# Patient Record
Sex: FEMALE | Race: WHITE | NOT HISPANIC OR LATINO | Employment: OTHER | URBAN - METROPOLITAN AREA
[De-identification: names, ages, dates, MRNs, and addresses within clinical notes are randomized per-mention and may not be internally consistent; named-entity substitution may affect disease eponyms.]

---

## 2019-02-19 LAB
BILIRUB SERPL-MCNC: NEGATIVE MG/DL
BLOOD URINE, POC: NORMAL
CLARITY, POC UA: CLEAR
COLOR, POC UA: NORMAL
GLUCOSE UR QL STRIP: NORMAL
KETONES UR QL STRIP: NORMAL
LEUKOCYTE EST, POC UA: NORMAL
NITRITE, POC UA: POSITIVE
PH, POC UA: 7
PROTEIN, POC: NORMAL
SPECIFIC GRAVITY, POC UA: 1.02
UROBILINOGEN, POC UA: NORMAL

## 2019-09-30 ENCOUNTER — HISTORICAL (OUTPATIENT)
Dept: ADMINISTRATIVE | Facility: HOSPITAL | Age: 49
End: 2019-09-30

## 2019-09-30 LAB
APPEARANCE, UA: CLEAR
BACTERIA #/AREA URNS AUTO: ABNORMAL /[HPF]
BILIRUB UR QL STRIP: NEGATIVE
COLOR UR: YELLOW
GLUCOSE (UA): NORMAL
HGB UR QL STRIP: NEGATIVE
HYALINE CASTS #/AREA URNS LPF: ABNORMAL /[LPF]
KETONES UR QL STRIP: NEGATIVE
LEUKOCYTE ESTERASE UR QL STRIP: NEGATIVE
NITRITE UR QL STRIP: NEGATIVE
PH UR STRIP: 7 [PH] (ref 4.5–8)
PROT UR QL STRIP: NEGATIVE
RBC #/AREA URNS AUTO: ABNORMAL /[HPF]
SP GR UR STRIP: 1.01 (ref 1–1.03)
SQUAMOUS #/AREA URNS LPF: ABNORMAL /[LPF]
UROBILINOGEN UR STRIP-ACNC: NORMAL
WBC #/AREA URNS AUTO: ABNORMAL /HPF

## 2019-10-03 LAB — FINAL CULTURE: NO GROWTH

## 2020-06-01 ENCOUNTER — HISTORICAL (OUTPATIENT)
Dept: ADMINISTRATIVE | Facility: HOSPITAL | Age: 50
End: 2020-06-01

## 2020-06-01 LAB
ABS NEUT (OLG): 1.72 X10(3)/MCL (ref 2.1–9.2)
ALBUMIN SERPL-MCNC: 4.1 GM/DL (ref 3.4–5)
ALBUMIN/GLOB SERPL: 1.2 RATIO (ref 1.1–2)
ALP SERPL-CCNC: 61 UNIT/L (ref 45–117)
ALT SERPL-CCNC: 32 UNIT/L (ref 12–78)
AST SERPL-CCNC: 12 UNIT/L (ref 15–37)
BASOPHILS # BLD AUTO: 0 X10(3)/MCL (ref 0–0.2)
BASOPHILS NFR BLD AUTO: 0 %
BILIRUB SERPL-MCNC: 0.5 MG/DL (ref 0.2–1)
BILIRUBIN DIRECT+TOT PNL SERPL-MCNC: 0.1 MG/DL (ref 0–0.2)
BILIRUBIN DIRECT+TOT PNL SERPL-MCNC: 0.4 MG/DL
BUN SERPL-MCNC: 24 MG/DL (ref 7–18)
CALCIUM SERPL-MCNC: 9.9 MG/DL (ref 8.5–10.1)
CHLORIDE SERPL-SCNC: 108 MMOL/L (ref 98–107)
CHOLEST SERPL-MCNC: 248 MG/DL
CHOLEST/HDLC SERPL: 2.4 {RATIO} (ref 0–4.4)
CO2 SERPL-SCNC: 31 MMOL/L (ref 21–32)
CREAT SERPL-MCNC: 0.6 MG/DL (ref 0.6–1.3)
DEPRECATED CALCIDIOL+CALCIFEROL SERPL-MC: 39.9 NG/ML (ref 30–80)
EOSINOPHIL # BLD AUTO: 0.2 X10(3)/MCL (ref 0–0.9)
EOSINOPHIL NFR BLD AUTO: 4 %
ERYTHROCYTE [DISTWIDTH] IN BLOOD BY AUTOMATED COUNT: 12 % (ref 11.5–14.5)
EST. AVERAGE GLUCOSE BLD GHB EST-MCNC: 94 MG/DL
GLOBULIN SER-MCNC: 3.5 GM/ML (ref 2.3–3.5)
GLUCOSE SERPL-MCNC: 72 MG/DL (ref 74–106)
HBA1C MFR BLD: 4.9 % (ref 4.2–6.3)
HCT VFR BLD AUTO: 41.1 % (ref 35–46)
HDLC SERPL-MCNC: 103 MG/DL (ref 40–59)
HGB BLD-MCNC: 13.4 GM/DL (ref 12–16)
IMM GRANULOCYTES # BLD AUTO: 0.01 10*3/UL
IMM GRANULOCYTES NFR BLD AUTO: 0 %
LDLC SERPL CALC-MCNC: 136 MG/DL
LYMPHOCYTES # BLD AUTO: 2.2 X10(3)/MCL (ref 0.6–4.6)
LYMPHOCYTES NFR BLD AUTO: 48 %
MCH RBC QN AUTO: 31.3 PG (ref 26–34)
MCHC RBC AUTO-ENTMCNC: 32.6 GM/DL (ref 31–37)
MCV RBC AUTO: 96 FL (ref 80–100)
MONOCYTES # BLD AUTO: 0.4 X10(3)/MCL (ref 0.1–1.3)
MONOCYTES NFR BLD AUTO: 8 %
NEUTROPHILS # BLD AUTO: 1.72 X10(3)/MCL (ref 2.1–9.2)
NEUTROPHILS NFR BLD AUTO: 39 %
PLATELET # BLD AUTO: 237 X10(3)/MCL (ref 130–400)
PMV BLD AUTO: 9.1 FL (ref 7.4–10.4)
POTASSIUM SERPL-SCNC: 4.6 MMOL/L (ref 3.5–5.1)
PROT SERPL-MCNC: 7.6 GM/DL (ref 6.4–8.2)
RBC # BLD AUTO: 4.28 X10(6)/MCL (ref 4–5.2)
SODIUM SERPL-SCNC: 142 MMOL/L (ref 136–145)
TRIGL SERPL-MCNC: 43 MG/DL
TSH SERPL-ACNC: 1.84 MIU/L (ref 0.36–3.74)
VLDLC SERPL CALC-MCNC: 9 MG/DL
WBC # SPEC AUTO: 4.5 X10(3)/MCL (ref 4.5–11)

## 2020-06-24 ENCOUNTER — HISTORICAL (OUTPATIENT)
Dept: RADIOLOGY | Facility: HOSPITAL | Age: 50
End: 2020-06-24

## 2020-10-27 ENCOUNTER — HISTORICAL (OUTPATIENT)
Dept: LAB | Facility: HOSPITAL | Age: 50
End: 2020-10-27

## 2020-10-27 LAB
ALBUMIN SERPL-MCNC: 4.3 GM/DL (ref 3.5–5)
ALBUMIN/GLOB SERPL: 1.7 RATIO (ref 1.1–2)
ALP SERPL-CCNC: 50 UNIT/L (ref 40–150)
ALT SERPL-CCNC: 18 UNIT/L (ref 0–55)
AST SERPL-CCNC: 14 UNIT/L (ref 5–34)
BILIRUB SERPL-MCNC: 0.5 MG/DL
BILIRUBIN DIRECT+TOT PNL SERPL-MCNC: 0.2 MG/DL (ref 0–0.5)
BILIRUBIN DIRECT+TOT PNL SERPL-MCNC: 0.3 MG/DL (ref 0–0.8)
BUN SERPL-MCNC: 17 MG/DL (ref 7–18.7)
CALCIUM SERPL-MCNC: 9.7 MG/DL (ref 8.4–10.2)
CHLORIDE SERPL-SCNC: 104 MMOL/L (ref 98–107)
CO2 SERPL-SCNC: 31 MMOL/L (ref 22–29)
CREAT SERPL-MCNC: 0.7 MG/DL (ref 0.55–1.02)
ERYTHROCYTE [DISTWIDTH] IN BLOOD BY AUTOMATED COUNT: 12 % (ref 11.5–14.5)
ERYTHROCYTE [SEDIMENTATION RATE] IN BLOOD: 2 MM/HR (ref 0–20)
GLOBULIN SER-MCNC: 2.6 GM/DL (ref 2.4–3.5)
GLUCOSE SERPL-MCNC: 94 MG/DL (ref 74–100)
HCT VFR BLD AUTO: 40 % (ref 35–46)
HGB BLD-MCNC: 13.2 GM/DL (ref 12–16)
MCH RBC QN AUTO: 31.6 PG (ref 26–34)
MCHC RBC AUTO-ENTMCNC: 33 GM/DL (ref 31–37)
MCV RBC AUTO: 95.7 FL (ref 80–100)
PLATELET # BLD AUTO: 244 X10(3)/MCL (ref 130–400)
PMV BLD AUTO: 9.7 FL (ref 7.4–10.4)
POTASSIUM SERPL-SCNC: 3.9 MMOL/L (ref 3.5–5.1)
PROT SERPL-MCNC: 6.9 GM/DL (ref 6.4–8.3)
RBC # BLD AUTO: 4.18 X10(6)/MCL (ref 4–5.2)
RHEUMATOID FACT SERPL-ACNC: <13 IU/ML
SODIUM SERPL-SCNC: 139 MMOL/L (ref 136–145)
URATE SERPL-MCNC: 2.5 MG/DL (ref 2.6–6)
WBC # SPEC AUTO: 4.3 X10(3)/MCL (ref 4.5–11)

## 2021-01-25 ENCOUNTER — HISTORICAL (OUTPATIENT)
Dept: RADIOLOGY | Facility: HOSPITAL | Age: 51
End: 2021-01-25

## 2021-04-19 LAB
BILIRUB SERPL-MCNC: NEGATIVE MG/DL
BLOOD URINE, POC: NEGATIVE
CLARITY, POC UA: CLEAR
COLOR, POC UA: NORMAL
GLUCOSE UR QL STRIP: NEGATIVE
KETONES UR QL STRIP: NEGATIVE
LEUKOCYTE EST, POC UA: NEGATIVE
NITRITE, POC UA: NEGATIVE
PH, POC UA: 7
PROTEIN, POC: NEGATIVE
SPECIFIC GRAVITY, POC UA: 1.01
UROBILINOGEN, POC UA: NORMAL

## 2021-04-23 LAB
HUMAN PAPILLOMAVIRUS (HPV): NORMAL
HUMAN PAPILLOMAVIRUS (HPV): NORMAL
PAP RECOMMENDATION EXT: ABNORMAL
PAP SMEAR: ABNORMAL

## 2021-07-26 ENCOUNTER — HISTORICAL (OUTPATIENT)
Dept: LAB | Facility: HOSPITAL | Age: 51
End: 2021-07-26

## 2021-07-26 LAB
APPEARANCE, UA: CLEAR
BACTERIA #/AREA URNS AUTO: ABNORMAL /HPF
BILIRUB UR QL STRIP: NEGATIVE
COLOR UR: NORMAL
GLUCOSE (UA): NEGATIVE
HGB UR QL STRIP: NEGATIVE
HYALINE CASTS #/AREA URNS LPF: ABNORMAL /LPF
KETONES UR QL STRIP: NEGATIVE
LEUKOCYTE ESTERASE UR QL STRIP: NEGATIVE
NITRITE UR QL STRIP: NEGATIVE
PH UR STRIP: 5.5 [PH] (ref 4.5–8)
PROT UR QL STRIP: NEGATIVE
RBC #/AREA URNS AUTO: ABNORMAL /HPF
SP GR UR STRIP: 1.02 (ref 1–1.03)
SQUAMOUS #/AREA URNS LPF: ABNORMAL /LPF
UROBILINOGEN UR STRIP-ACNC: NORMAL
WBC #/AREA URNS AUTO: ABNORMAL /HPF

## 2021-07-28 LAB — FINAL CULTURE: NORMAL

## 2021-12-08 ENCOUNTER — HISTORICAL (OUTPATIENT)
Dept: LAB | Facility: HOSPITAL | Age: 51
End: 2021-12-08

## 2021-12-08 LAB
ABS NEUT (OLG): 2.63 X10(3)/MCL (ref 2.1–9.2)
BASOPHILS # BLD AUTO: 0 X10(3)/MCL (ref 0–0.2)
BASOPHILS NFR BLD AUTO: 0 %
CHOLEST SERPL-MCNC: 231 MG/DL
CHOLEST/HDLC SERPL: 3 {RATIO} (ref 0–5)
DEPRECATED CALCIDIOL+CALCIFEROL SERPL-MC: 34.6 NG/ML (ref 30–80)
EOSINOPHIL # BLD AUTO: 0.1 X10(3)/MCL (ref 0–0.9)
EOSINOPHIL NFR BLD AUTO: 2 %
ERYTHROCYTE [DISTWIDTH] IN BLOOD BY AUTOMATED COUNT: 11.9 % (ref 11.5–14.5)
EST. AVERAGE GLUCOSE BLD GHB EST-MCNC: 93.9 MG/DL
HBA1C MFR BLD: 4.9 %
HCT VFR BLD AUTO: 40.2 % (ref 35–46)
HDLC SERPL-MCNC: 90 MG/DL (ref 35–60)
HGB BLD-MCNC: 13.7 GM/DL (ref 12–16)
IMM GRANULOCYTES # BLD AUTO: 0.01 10*3/UL
IMM GRANULOCYTES NFR BLD AUTO: 0 %
LDLC SERPL CALC-MCNC: 134 MG/DL (ref 50–140)
LYMPHOCYTES # BLD AUTO: 2.2 X10(3)/MCL (ref 0.6–4.6)
LYMPHOCYTES NFR BLD AUTO: 39 %
MCH RBC QN AUTO: 32.5 PG (ref 26–34)
MCHC RBC AUTO-ENTMCNC: 34.1 GM/DL (ref 31–37)
MCV RBC AUTO: 95.5 FL (ref 80–100)
MONOCYTES # BLD AUTO: 0.6 X10(3)/MCL (ref 0.1–1.3)
MONOCYTES NFR BLD AUTO: 11 %
NEUTROPHILS # BLD AUTO: 2.63 X10(3)/MCL (ref 2.1–9.2)
NEUTROPHILS NFR BLD AUTO: 46 %
NRBC BLD AUTO-RTO: 0 % (ref 0–0.2)
PLATELET # BLD AUTO: 267 X10(3)/MCL (ref 130–400)
PMV BLD AUTO: 9.2 FL (ref 7.4–10.4)
RBC # BLD AUTO: 4.21 X10(6)/MCL (ref 4–5.2)
TRIGL SERPL-MCNC: 35 MG/DL (ref 37–140)
TSH SERPL-ACNC: 1.92 UIU/ML (ref 0.35–4.94)
VIT B12 SERPL-MCNC: 573 PG/ML (ref 213–816)
VLDLC SERPL CALC-MCNC: 7 MG/DL
WBC # SPEC AUTO: 5.7 X10(3)/MCL (ref 4.5–11)

## 2022-02-25 ENCOUNTER — HISTORICAL (OUTPATIENT)
Dept: RADIOLOGY | Facility: HOSPITAL | Age: 52
End: 2022-02-25

## 2022-02-25 ENCOUNTER — HISTORICAL (OUTPATIENT)
Dept: ADMINISTRATIVE | Facility: HOSPITAL | Age: 52
End: 2022-02-25

## 2022-04-10 ENCOUNTER — HISTORICAL (OUTPATIENT)
Dept: ADMINISTRATIVE | Facility: HOSPITAL | Age: 52
End: 2022-04-10
Payer: MEDICAID

## 2022-04-27 VITALS
BODY MASS INDEX: 21.76 KG/M2 | DIASTOLIC BLOOD PRESSURE: 64 MMHG | OXYGEN SATURATION: 97 % | HEIGHT: 64 IN | SYSTOLIC BLOOD PRESSURE: 104 MMHG | WEIGHT: 127.44 LBS

## 2022-05-03 NOTE — HISTORICAL [facility]
This is a historical note converted from Cerjad. Formatting and pictures may have been removed.  Please reference Cerjad for original formatting and attached multimedia. Chief Complaint  Left foot pain  History of Present Illness  49 y/o female PMH anxiety, depression,?IC,?insomnia presents to clinic to establish care.  She has to complaints that she would like to focus on today.  ?  Abdominal pain:Chronic issue. Has a history of IC. Does not follow diet. Recently treated by urgent care for UTI. Symptoms has not resolved. Last urology appt was 9/2019. Patient reports pain is under breast to pelvic area. Unrelated to food, but does notice bloating after eating. Takes tums and pepcid without relief. Reports occasional loose stools. Grandfather with history of colon cancer. No abdominal surgeries.  ?  Left foot pain: Sustained a foot fracture in 2018. Reports pain under ball of foot x several weeks. Has been seen in urgent care. Was told she needs to establish care in order to get a podiatry referral. Describes pain as sharp, shooting pain. Mostly digits 2-4. Also reports pain under ball in foot. Has been going on x several weeks. Reports?pain is worse with walking and extended use. Has tried soaking without improvement. Takes OTC NSAIDS without relief.  Review of Systems  Constitutional: no weight gain,?no weight loss,?no fatigue, no fever,?no chills,?no weakness  Eye: no vision loss/changes,?no blurry or double vision,??no flashing lights  Respiratory: No cough, no hemoptysis, no shortness of breath, no wheeze  Cardiovascular: No chest pain, no tightness, no palpitations, no dyspnea on exertion, no calf pain with walking  Hema/Lymph:No ease of bruising, no abnormal bleeding  Endocrine: No heat or cold intolerance, no sweating, no frequent urination, no thirst, no change in appetite  Integumentary: No rash, no blisters, no itching, no hair or nail changes  Neurologic: No dizziness, no fainting, no seizures, no  weakness  All Other ROS negative  Physical Exam  Vitals & Measurements  T:?36.9? ?C (Oral)? HR:?63(Peripheral)? RR:?18? BP:?121/78? SpO2:?98%?  HT:?162?cm? WT:?56.8?kg? BMI:?21.64?  General: Alert, well appearing, in no acute distress  Eye: PERRLA, EOMI, clear conjunctiva, No pallor  HENT: Oropharynx without erythema/exudate, Oropharynx and nasal mucosal surfaces moist  Neck: full range of motion, no thyromegaly or lymphadenopathy appreciated  Respiratory: clear to auscultation bilaterally, no wheezes, no crackles  Cardiovascular: regular rate and rhythm without murmurs, gallops or rubs  Gastrointestinal: soft, diffuse tenderness mostly RUQ, non-distended with active bowel sounds  Genitourinary: no CVA tenderness to palpation, declined pelvic exam  Musculoskeletal: Left foot: no gross abnormalities, digit range of motion not limited, palpable pulse, sensation intact  Integumentary: no rashes or skin lesions present  Neurologic: no signs of peripheral neurological deficit, motor/sensory function intact  Psych: logical thought, good eye contact?  Assessment/Plan  Interstitial cystitis?N30.10  ?Discussed diet, encouraged compliance  ?Will refer back to urology  Ordered:  CBC w/ Auto Diff, Routine collect, 06/01/20 11:29:00 CDT, Blood, Stop date 06/01/20 11:29:00 CDT, Lab Collect, Interstitial cystitis  Left foot pain  Pelvic pain, 06/01/20 11:29:00 CDT  Clinic Follow up, *Est. 09/01/20 3:00:00 CDT, Order for future visit, Interstitial cystitis  Abdominal pain  Left foot pain  Pelvic pain  Pathological fracture, unspecified foot, sequela  Blood in stool  Clinic Follow up, 06/08/20 10:54:00 CDT, Order for future visit, Interstitial cystitis  Abdominal pain  Left foot pain  Pelvic pain  Comprehensive Metabolic Panel, Routine collect, 06/01/20 11:29:00 CDT, Blood, Stop date 06/01/20 11:29:00 CDT, Lab Collect, Interstitial cystitis  Left foot pain  Pelvic pain, 06/01/20 11:29:00 CDT  Hemoglobin A1C [facility], Routine  collect, 06/01/20 11:29:00 CDT, Blood, Stop date 06/01/20 11:29:00 CDT, Lab Collect, Interstitial cystitis  Left foot pain  Pelvic pain, 06/01/20 11:29:00 CDT  Lipid Panel, Routine collect, 06/01/20 11:29:00 CDT, Blood, Stop date 06/01/20 11:29:00 CDT, Lab Collect, Interstitial cystitis  Left foot pain  Pelvic pain, 06/01/20 11:29:00 CDT  Thyroid Stimulating Hormone, Routine collect, 06/01/20 11:29:00 CDT, Blood, Stop date 06/01/20 11:29:00 CDT, Lab Collect, Interstitial cystitis  Left foot pain  Pelvic pain, 06/01/20 11:29:00 CDT  Vitamin D, 25-Hydroxy Level, Routine collect, 06/01/20 11:29:00 CDT, Blood, Stop date 06/01/20 11:29:00 CDT, Lab Collect, Interstitial cystitis  Left foot pain  Pelvic pain, 06/01/20 11:29:00 CDT  ?  Left foot pain?M79.672  ?Xray ordered  ?Will refer to sports medicine  ?Declined NSAIDS  Ordered:  CBC w/ Auto Diff, Routine collect, 06/01/20 11:29:00 CDT, Blood, Stop date 06/01/20 11:29:00 CDT, Lab Collect, Interstitial cystitis  Left foot pain  Pelvic pain, 06/01/20 11:29:00 CDT  Clinic Follow up, *Est. 09/01/20 3:00:00 CDT, Order for future visit, Interstitial cystitis  Abdominal pain  Left foot pain  Pelvic pain  Pathological fracture, unspecified foot, sequela  Blood in stool  Clinic Follow up, 06/08/20 10:54:00 CDT, Order for future visit, Interstitial cystitis  Abdominal pain  Left foot pain  Pelvic pain  Comprehensive Metabolic Panel, Routine collect, 06/01/20 11:29:00 CDT, Blood, Stop date 06/01/20 11:29:00 CDT, Lab Collect, Interstitial cystitis  Left foot pain  Pelvic pain, 06/01/20 11:29:00 CDT  Hemoglobin A1C [facility], Routine collect, 06/01/20 11:29:00 CDT, Blood, Stop date 06/01/20 11:29:00 CDT, Lab Collect, Interstitial cystitis  Left foot pain  Pelvic pain, 06/01/20 11:29:00 CDT  Lipid Panel, Routine collect, 06/01/20 11:29:00 CDT, Blood, Stop date 06/01/20 11:29:00 CDT, Lab Collect, Interstitial cystitis  Left foot pain  Pelvic pain, 06/01/20 11:29:00  CDT  Thyroid Stimulating Hormone, Routine collect, 06/01/20 11:29:00 CDT, Blood, Stop date 06/01/20 11:29:00 CDT, Lab Collect, Interstitial cystitis  Left foot pain  Pelvic pain, 06/01/20 11:29:00 CDT  Vitamin D, 25-Hydroxy Level, Routine collect, 06/01/20 11:29:00 CDT, Blood, Stop date 06/01/20 11:29:00 CDT, Lab Collect, Interstitial cystitis  Left foot pain  Pelvic pain, 06/01/20 11:29:00 CDT  XR Foot Left Minimum 3 Views, Routine, 06/01/20 10:56:00 CDT, None, Ambulatory, Rad Type, Order for future visit, Left foot pain  Pathological fracture, unspecified foot, sequela, Not Scheduled, 06/01/20 10:56:00 CDT  ?  Pelvic pain?R10.2  ?pelvic US ordered  ?Will send to GYN  ?  Abdominal pain  Keep diary of symptoms  FIT ordered  US ordered  Declined PPI, antacid  Encouraged fiber diet  ?    Routine labs ordered  Mammo ordered  ?  Ordered:  CBC w/ Auto Diff, Routine collect, 06/01/20 11:29:00 CDT, Blood, Stop date 06/01/20 11:29:00 CDT, Lab Collect, Interstitial cystitis  Left foot pain  Pelvic pain, 06/01/20 11:29:00 CDT  Clinic Follow up, *Est. 09/01/20 3:00:00 CDT, Order for future visit, Interstitial cystitis  Abdominal pain  Left foot pain  Pelvic pain  Pathological fracture, unspecified foot, sequela  Blood in stool  Clinic Follow up, 06/08/20 10:54:00 CDT, Order for future visit, Interstitial cystitis  Abdominal pain  Left foot pain  Pelvic pain  Comprehensive Metabolic Panel, Routine collect, 06/01/20 11:29:00 CDT, Blood, Stop date 06/01/20 11:29:00 CDT, Lab Collect, Interstitial cystitis  Left foot pain  Pelvic pain, 06/01/20 11:29:00 CDT  Hemoglobin A1C [facility], Routine collect, 06/01/20 11:29:00 CDT, Blood, Stop date 06/01/20 11:29:00 CDT, Lab Collect, Interstitial cystitis  Left foot pain  Pelvic pain, 06/01/20 11:29:00 CDT  Lipid Panel, Routine collect, 06/01/20 11:29:00 CDT, Blood, Stop date 06/01/20 11:29:00 CDT, Lab Collect, Interstitial cystitis  Left foot pain  Pelvic pain, 06/01/20  11:29:00 CDT  Thyroid Stimulating Hormone, Routine collect, 06/01/20 11:29:00 CDT, Blood, Stop date 06/01/20 11:29:00 CDT, Lab Collect, Interstitial cystitis  Left foot pain  Pelvic pain, 06/01/20 11:29:00 CDT  US Pelvic Non-OB w Transvag if needed, Routine, 06/01/20 11:02:00 CDT, Pelvic Pain, None, Ambulatory, Rad Type, Order for future visit, Pelvic pain, Schedule this test, [facility], 06/01/20 11:02:00 CDT  Vitamin D, 25-Hydroxy Level, Routine collect, 06/01/20 11:29:00 CDT, Blood, Stop date 06/01/20 11:29:00 CDT, Lab Collect, Interstitial cystitis  Left foot pain  Pelvic pain, 06/01/20 11:29:00 CDT  ?  Orders:  MG Screening Bilateral, Routine, 06/01/20 11:08:00 CDT, Screening, None, Ambulatory, Rad Type, Order for future visit, Visit for screening mammogram, Schedule this test, [facility], Follow Breast Imaging Order Set Protocol, 06/01/20 11:08:00 CDT  Occult Blood Fecal Immunoassay, Routine collect, Stool, Order for future visit, 06/01/20 10:58:00 CDT, Stop date 06/01/20 10:58:00 CDT, Nurse collect, Blood in stool  US Abdomen Complete, Routine, 06/01/20 10:59:00 CDT, Abdominal Pain, None, Ambulatory, Rad Type, Order for future visit, Abdominal pain, Schedule this test, [facility], 06/01/20 10:59:00 CDT  Referrals  [facility] Internal Referral to Gynecology Clinic, Specialty: Gynecology, Reason: IC, pelvic pain, Start: 06/01/20 11:01:00 CDT  [facility] Internal Referral to Orthopedics Clinic, Specialty: Sports Medicine, Reason: Left foot pain, Start: 06/01/20 11:02:00 CDT  Clinic Follow up, 06/08/20 10:54:00 CDT, Order for future visit, Interstitial cystitis  Abdominal pain  Left foot pain  Pelvic pain  Clinic Follow up, *Est. 09/01/20 3:00:00 CDT, Order for future visit, Interstitial cystitis  Abdominal pain  Left foot pain  Pelvic pain  Pathological fracture, unspecified foot, sequela  Blood in stool   Problem List/Past Medical  History  Ongoing  Anxiety  Depression  Depression  Dry skin  Family history of Hashimoto thyroiditis  Female hirsutism  Frequency of urination  Insomnia  Vitamin D deficiency  Weight gain  Historical  Conjunctivitis  Fatigue  Pharyngitis  Sinusitis  Thinning hair  Procedure/Surgical History  Tonsillectomy  Uterine fibroidectomy   Medications  No active medications  Allergies  Vicodin  penicillins  Social History  Abuse/Neglect  No, 09/30/2019  Alcohol  Current, 1-2 times per year, 06/01/2020  Employment/School  Unemployed, 06/01/2020  Exercise  Exercise frequency: 3-4 times/week. Exercise type: Walking., 07/09/2015  Home/Environment  Lives with Alone. Risks in environment: Pets/Animal exposure., 06/01/2020  Nutrition/Health  Regular, 07/09/2015  Sexual  Sexually active: No., 07/09/2015  Spiritual/Cultural  Sikhism, 09/30/2019  Substance Use  Never, 02/19/2019  Tobacco  Never (less than 100 in lifetime), Cigarettes, N/A, 09/30/2019  Family History  Fibromyalgia: Mother.  Hypertension.: Father.  Lupus: Mother.  Osteoporosis: Mother.  Primary malignant neoplasm of colon: Maternal Grandfather.  Immunizations  Vaccine Date Status   tuberculin purified protein derivative 04/11/2018 Given   tuberculin purified protein derivative 04/09/2018 Given   tuberculin purified protein derivative 11/11/2015 Given   tetanus/diphtheria/pertussis, acel(Tdap) 11/11/2015 Given   Health Maintenance  Health Maintenance  ???Pending?(in the next year)  ??? ??OverDue  ??? ? ? ?Diabetes Screening due??and every?  ??? ??Due?  ??? ? ? ?Lipid Screening due??06/01/20??and every?  ??? ??Due In Future?  ??? ? ? ?Blood Pressure Screening not due until??09/29/20??and every 1??year(s)  ??? ? ? ?Body Mass Index Check not due until??09/29/20??and every 1??year(s)  ??? ? ? ?Alcohol Misuse Screening not due until??01/01/21??and every 1??year(s)  ??? ? ? ?Obesity Screening not due until??01/01/21??and every 1??year(s)  ???Satisfied?(in the past 1  year)  ??? ??Satisfied?  ??? ? ? ?ADL Screening on??06/01/20.??Satisfied by Lizz Diego  ??? ? ? ?Alcohol Misuse Screening on??06/01/20.??Satisfied by Lizz Diego  ??? ? ? ?Blood Pressure Screening on??06/01/20.??Satisfied by Lizz Diego  ??? ? ? ?Body Mass Index Check on??06/01/20.??Satisfied by Lizz Diego  ??? ? ? ?Depression Screening on??06/01/20.??Satisfied by Lizz Diego  ??? ? ? ?Obesity Screening on??06/01/20.??Satisfied by Lizz Diego  ?

## 2022-05-13 DIAGNOSIS — J02.9 SORE THROAT: Primary | ICD-10-CM

## 2022-06-08 ENCOUNTER — OFFICE VISIT (OUTPATIENT)
Dept: OTOLARYNGOLOGY | Facility: CLINIC | Age: 52
End: 2022-06-08
Payer: MEDICAID

## 2022-06-08 VITALS
BODY MASS INDEX: 21.65 KG/M2 | DIASTOLIC BLOOD PRESSURE: 61 MMHG | WEIGHT: 126.81 LBS | TEMPERATURE: 98 F | SYSTOLIC BLOOD PRESSURE: 91 MMHG | HEIGHT: 64 IN | HEART RATE: 60 BPM

## 2022-06-08 DIAGNOSIS — K21.9 GASTROESOPHAGEAL REFLUX DISEASE, UNSPECIFIED WHETHER ESOPHAGITIS PRESENT: Primary | ICD-10-CM

## 2022-06-08 DIAGNOSIS — J02.9 SORE THROAT: ICD-10-CM

## 2022-06-08 DIAGNOSIS — M26.622 ARTHRALGIA OF LEFT TEMPOROMANDIBULAR JOINT: ICD-10-CM

## 2022-06-08 PROCEDURE — 1159F MED LIST DOCD IN RCRD: CPT | Mod: CPTII,,, | Performed by: NURSE PRACTITIONER

## 2022-06-08 PROCEDURE — 3074F PR MOST RECENT SYSTOLIC BLOOD PRESSURE < 130 MM HG: ICD-10-PCS | Mod: CPTII,,, | Performed by: NURSE PRACTITIONER

## 2022-06-08 PROCEDURE — 1160F RVW MEDS BY RX/DR IN RCRD: CPT | Mod: CPTII,,, | Performed by: NURSE PRACTITIONER

## 2022-06-08 PROCEDURE — 25000003 PHARM REV CODE 250

## 2022-06-08 PROCEDURE — 3008F PR BODY MASS INDEX (BMI) DOCUMENTED: ICD-10-PCS | Mod: CPTII,,, | Performed by: NURSE PRACTITIONER

## 2022-06-08 PROCEDURE — 3008F BODY MASS INDEX DOCD: CPT | Mod: CPTII,,, | Performed by: NURSE PRACTITIONER

## 2022-06-08 PROCEDURE — 99213 OFFICE O/P EST LOW 20 MIN: CPT | Mod: PBBFAC | Performed by: NURSE PRACTITIONER

## 2022-06-08 PROCEDURE — 1160F PR REVIEW ALL MEDS BY PRESCRIBER/CLIN PHARMACIST DOCUMENTED: ICD-10-PCS | Mod: CPTII,,, | Performed by: NURSE PRACTITIONER

## 2022-06-08 PROCEDURE — 3078F PR MOST RECENT DIASTOLIC BLOOD PRESSURE < 80 MM HG: ICD-10-PCS | Mod: CPTII,,, | Performed by: NURSE PRACTITIONER

## 2022-06-08 PROCEDURE — 3078F DIAST BP <80 MM HG: CPT | Mod: CPTII,,, | Performed by: NURSE PRACTITIONER

## 2022-06-08 PROCEDURE — 99204 OFFICE O/P NEW MOD 45 MIN: CPT | Mod: S$PBB,,, | Performed by: NURSE PRACTITIONER

## 2022-06-08 PROCEDURE — 1159F PR MEDICATION LIST DOCUMENTED IN MEDICAL RECORD: ICD-10-PCS | Mod: CPTII,,, | Performed by: NURSE PRACTITIONER

## 2022-06-08 PROCEDURE — 3074F SYST BP LT 130 MM HG: CPT | Mod: CPTII,,, | Performed by: NURSE PRACTITIONER

## 2022-06-08 PROCEDURE — 99204 PR OFFICE/OUTPT VISIT, NEW, LEVL IV, 45-59 MIN: ICD-10-PCS | Mod: S$PBB,,, | Performed by: NURSE PRACTITIONER

## 2022-06-08 RX ORDER — MELOXICAM 15 MG/1
15 TABLET ORAL DAILY PRN
COMMUNITY
Start: 2022-05-04 | End: 2022-11-18

## 2022-06-08 RX ORDER — TETRACAINE HYDROCHLORIDE 5 MG/ML
1 SOLUTION OPHTHALMIC
Status: DISCONTINUED | OUTPATIENT
Start: 2022-06-08 | End: 2022-11-18

## 2022-06-08 NOTE — PROGRESS NOTES
[facility]  Otolaryngology Clinic Note    Remi Baptiste  Encounter Date: 6/8/2022  YOB: 1970    Chief Complaint: throat pain    HPI: 06/08/2022: 52yoF with throat pain for about a month. She describes this as intermittent with a stabbing quality. She states it came up from her throat and was also under her tongue. This was associated with nausea. Magic mouthwash improved symptoms. This has improved but is not back to baseline, and she would like to have it viewed with camera. Denies dysphagia, dysphonia, or weight loss. She is a nonsmoker and does not drink. Denies reflux. She is concerned bc urgent care told her there was fluid in her ears. She has infrequent otalgia L>R. Denies otorrhea or HL. She has infrequent tinnitus but none currently. Denies hx of chronic ear infections. Had a tonsillectomy in the past.     ROS:   General: Negative except per HPI  Skin: Denies rash, ulcer, or lesion.  Eyes: Denies vision changes or diplopia.  Ears: Negative except per HPI  Nose: Negative except per HPI  Throat/mouth: Negative except per HPI  Cardiovascular: Negative except per HPI  Respiratory: Negative except per HPI  Neck: Negative except per HPI  Endocrine: Negative except per HPI  Neurologic: Negative except per HPI    Other 10-point review of systems negative except per HPI      Review of patient's allergies indicates:   Allergen Reactions    Hydrocodone-acetaminophen     Penicillins Rash       History reviewed. No pertinent past medical history.    Past Surgical History:   Procedure Laterality Date    TONSILLECTOMY         Social History     Socioeconomic History    Marital status: Single   Tobacco Use    Smoking status: Never Smoker    Smokeless tobacco: Never Used   Substance and Sexual Activity    Alcohol use: Not Currently    Drug use: Never    Sexual activity: Not Currently       Family History   Problem Relation Age of Onset    Hypertension Mother     Hypertension  "Father        Outpatient Encounter Medications as of 6/8/2022   Medication Sig Dispense Refill    meloxicam (MOBIC) 15 MG tablet Take 15 mg by mouth daily as needed.       No facility-administered encounter medications on file as of 6/8/2022.       Physical Exam:  Vitals:    06/08/22 0944   BP: 91/61   BP Location: Right arm   Patient Position: Sitting   Pulse: 60   Temp: 97.9 °F (36.6 °C)   TempSrc: Oral   Weight: 57.5 kg (126 lb 12.8 oz)   Height: 5' 3.78" (1.62 m)       General: NAD, voice normal  Neuro: AAO, CN II - XII grossly intact  Head/ Face: NCAT, symmetric, sensations intact bilaterally. TTP over TMJ L>R  Eyes: EOMI, PERRL  Ears: externally normal with grossly normal hearing  AD: EAC patent, TM intact, no middle ear effusion, no retractions  AS: EAC patent, TM intact, no middle ear effusion, no retractions  Nose: bilateral nares patent, midline septum, no rhinorrhea, no external deformity, +ITH  OC/OP: MMM, no intraoral lesions, no trismus, dentition is moderate, no uvular deviation, bilaterally symmetric soft palate elevation, palatoglossus and palatopharyngeal fold wnl. Mild TTP over b/l pterygoids  Indirect laryngoscopy: deferred due to patient intolerance  Neck: tight musculature, supple, mild TTP over SMD and cricoid areas without swelling, no LAD, normal ROM, no thyromegaly  Respiratory: nonlabored, no wheezing, bilateral chest rise  Cardiovascular: RRR  Gastrointestinal: S NT ND  Skin: warm, no lesions  Musculoskeletal: 5/5 strength  Psych: Appropriate affect. Anxious mood      Flexible Fiberoptic Laryngoscopy/Nasopharyngoscopy with Dr. Goldsmith    Procedure in Detail: Informed consent was obtained from the patient after explanation of procedure, indications, risks and benefits. Flexible endoscopy was performed through the nasal passages. The nasal cavity, nasopharynx, oropharynx, hypopharynx and larynx were adequately visualized. The true vocal cords and arytenoids were examined during phonation and " repose.    Anesthesia: Topical Neosynephrine/Tetracaine  Adverse Events: None  Blood loss: none  Condition: good    Findings:  NP/OP: no masses/lesions of NC, eustachian tube, fossa of Rosenmuller, no adenoid hypertrophy  BOT/vallecula: no lingual hypertrophy, no masses/lesions, no secretions obscuring visualization  Piriform sinuses/post-cricoid: no masses/lesions, no pooling of secretions  Epiglottis: lingual and laryngeal surfaces within normal limits  Arytenoids/FVFs: no masses/lesions, no edema, bilateral mobility  TVCs: bilateral cord mobility, no masses or lesions  No aspiration, no pooled secretions  No sign of malignancy    Pt had anxiety reaction to tetracaine, feeling that her throat was closing. Extensive reassurance offered. Otherwise, tolerated well.     Pertinent Data:  ? LABS:  ? AUDIO:            ? CT Scan:      Assessment/Plan:  52 y.o. female with 1mo hx of throat pain and intermittent left otalgia, likely GERD + TMJ arthralgia. Symptoms have improved but pt has high anxiety. Extensive reassurance provided by author and Dr. Goldsmith that there were no concerning findings, so signs of cancer, lesions, or swelling.  - Pepcid daily (states she will get this OTC)  - GERD lifestyle modifications  - TMJ & neck exercise handouts provided  - F/u 2-3mo. Ok for telemed    Jimena Butler NP

## 2022-06-16 ENCOUNTER — TELEPHONE (OUTPATIENT)
Dept: GYNECOLOGY | Facility: CLINIC | Age: 52
End: 2022-06-16
Payer: MEDICAID

## 2022-06-16 NOTE — TELEPHONE ENCOUNTER
----- Message from Jenny Bashir sent at 6/16/2022 10:22 AM CDT -----  Regarding: Physical Therapy Referral  Patient called and requested that another referral be sent to the physical therapy that she previously seen.  She says the Therapy help, but still is having neck pain.  She would like to talk to the provider about looking deeper into the matter, perhaps schedule a MRI, CT, etc.  She does have an appointment in July scheduled.  Please advise and Thank You.

## 2022-06-20 NOTE — TELEPHONE ENCOUNTER
Patient return call. Informed patient that Dr. Georges will further discuss during next appointment. Patient would like a sooner appointment to further discuss.         Please check to see if there is a sooner appointment for patient. Thanks in advance for your attention with this matter.

## 2022-06-22 ENCOUNTER — HOSPITAL ENCOUNTER (OUTPATIENT)
Dept: RADIOLOGY | Facility: HOSPITAL | Age: 52
Discharge: HOME | End: 2022-06-22
Attending: FAMILY MEDICINE
Payer: MEDICAID

## 2022-06-22 ENCOUNTER — OFFICE VISIT (OUTPATIENT)
Dept: GYNECOLOGY | Facility: CLINIC | Age: 52
End: 2022-06-22
Payer: MEDICAID

## 2022-06-22 VITALS
TEMPERATURE: 98 F | HEART RATE: 70 BPM | HEIGHT: 64 IN | SYSTOLIC BLOOD PRESSURE: 107 MMHG | WEIGHT: 125 LBS | DIASTOLIC BLOOD PRESSURE: 68 MMHG | BODY MASS INDEX: 21.34 KG/M2 | RESPIRATION RATE: 18 BRPM

## 2022-06-22 DIAGNOSIS — M54.2 NECK PAIN: ICD-10-CM

## 2022-06-22 DIAGNOSIS — M54.2 NECK PAIN: Primary | ICD-10-CM

## 2022-06-22 PROCEDURE — 99214 OFFICE O/P EST MOD 30 MIN: CPT | Mod: PBBFAC | Performed by: FAMILY MEDICINE

## 2022-06-22 PROCEDURE — 3078F DIAST BP <80 MM HG: CPT | Mod: CPTII,,, | Performed by: FAMILY MEDICINE

## 2022-06-22 PROCEDURE — 99214 OFFICE O/P EST MOD 30 MIN: CPT | Mod: S$PBB,,, | Performed by: FAMILY MEDICINE

## 2022-06-22 PROCEDURE — 3074F SYST BP LT 130 MM HG: CPT | Mod: CPTII,,, | Performed by: FAMILY MEDICINE

## 2022-06-22 PROCEDURE — 3074F PR MOST RECENT SYSTOLIC BLOOD PRESSURE < 130 MM HG: ICD-10-PCS | Mod: CPTII,,, | Performed by: FAMILY MEDICINE

## 2022-06-22 PROCEDURE — 72050 X-RAY EXAM NECK SPINE 4/5VWS: CPT | Mod: TC

## 2022-06-22 PROCEDURE — 3078F PR MOST RECENT DIASTOLIC BLOOD PRESSURE < 80 MM HG: ICD-10-PCS | Mod: CPTII,,, | Performed by: FAMILY MEDICINE

## 2022-06-22 PROCEDURE — 3008F PR BODY MASS INDEX (BMI) DOCUMENTED: ICD-10-PCS | Mod: CPTII,,, | Performed by: FAMILY MEDICINE

## 2022-06-22 PROCEDURE — 1159F MED LIST DOCD IN RCRD: CPT | Mod: CPTII,,, | Performed by: FAMILY MEDICINE

## 2022-06-22 PROCEDURE — 99214 PR OFFICE/OUTPT VISIT, EST, LEVL IV, 30-39 MIN: ICD-10-PCS | Mod: S$PBB,,, | Performed by: FAMILY MEDICINE

## 2022-06-22 PROCEDURE — 3008F BODY MASS INDEX DOCD: CPT | Mod: CPTII,,, | Performed by: FAMILY MEDICINE

## 2022-06-22 PROCEDURE — 1159F PR MEDICATION LIST DOCUMENTED IN MEDICAL RECORD: ICD-10-PCS | Mod: CPTII,,, | Performed by: FAMILY MEDICINE

## 2022-06-22 RX ORDER — CYCLOBENZAPRINE HCL 10 MG
10 TABLET ORAL 3 TIMES DAILY PRN
Qty: 30 TABLET | Refills: 1 | Status: SHIPPED | OUTPATIENT
Start: 2022-06-22 | End: 2022-07-02

## 2022-06-22 RX ORDER — DICLOFENAC SODIUM 75 MG/1
75 TABLET, DELAYED RELEASE ORAL 2 TIMES DAILY
Qty: 60 TABLET | Refills: 1 | Status: SHIPPED | OUTPATIENT
Start: 2022-06-22 | End: 2022-11-18

## 2022-06-22 NOTE — PROGRESS NOTES
Remi Baptiste  06/22/2022  [id number]        Chief Complaint:  Chief Complaint   Patient presents with    Follow-up       History of Present Illness:    52-year-old female with past medical history of anxiety/depression, foot neuroma IC, post menopausal symptoms, insomnia, OAB, low vitamin D, cervical radiculopathy presents for follow up  Patient complaining of worsening neck pain. Completed PT a month ago. Report now feeling grinding sensation with turning head. Denied trauma. Patient also reporting family history of debilitating DJD in mother      History:  History reviewed. No pertinent past medical history.  Past Surgical History:   Procedure Laterality Date    TONSILLECTOMY       Family History   Problem Relation Age of Onset    Hypertension Mother     Hypertension Father      Social History     Socioeconomic History    Marital status: Single   Tobacco Use    Smoking status: Never Smoker    Smokeless tobacco: Never Used   Substance and Sexual Activity    Alcohol use: Not Currently    Drug use: Never    Sexual activity: Not Currently     Social Determinants of Health     Financial Resource Strain: Low Risk     Difficulty of Paying Living Expenses: Not hard at all   Food Insecurity: No Food Insecurity    Worried About Running Out of Food in the Last Year: Never true    Ran Out of Food in the Last Year: Never true   Transportation Needs: No Transportation Needs    Lack of Transportation (Medical): No    Lack of Transportation (Non-Medical): No   Physical Activity: Insufficiently Active    Days of Exercise per Week: 4 days    Minutes of Exercise per Session: 30 min   Stress: No Stress Concern Present    Feeling of Stress : Not at all   Social Connections: Unknown    Frequency of Communication with Friends and Family: More than three times a week    Frequency of Social Gatherings with Friends and Family: More than three times a week   Housing Stability: Low Risk     Unable to Pay for Housing in  the Last Year: No    Number of Places Lived in the Last Year: 1    Unstable Housing in the Last Year: No     There is no problem list on file for this patient.    Review of patient's allergies indicates:   Allergen Reactions    Hydrocodone-acetaminophen     Penicillins Rash         Medications:  Current Outpatient Medications on File Prior to Visit   Medication Sig Dispense Refill    meloxicam (MOBIC) 15 MG tablet Take 15 mg by mouth daily as needed.       Current Facility-Administered Medications on File Prior to Visit   Medication Dose Route Frequency Provider Last Rate Last Admin    TETRAcaine HCl (PF) 0.5 % Drop 1 drop  1 drop Both Eyes 1 time in Clinic/[facility]           Medications have been reviewed and reconciled with patient at this visit.    Adverse reactions to current medications reviewed with patient..      Exam:  Wt Readings from Last 3 Encounters:   06/22/22 56.7 kg (125 lb)   06/08/22 57.5 kg (126 lb 12.8 oz)   07/22/21 57.8 kg (127 lb 6.8 oz)     Temp Readings from Last 3 Encounters:   06/22/22 98.1 °F (36.7 °C)   06/08/22 97.9 °F (36.6 °C) (Oral)     BP Readings from Last 3 Encounters:   06/22/22 107/68   06/08/22 91/61   07/22/21 104/64     Pulse Readings from Last 3 Encounters:   06/22/22 70   06/08/22 60     Body mass index is 21.46 kg/m².      ROS:  See HPI for details    All Other ROS: Negative except as stated in HPI.    PE:  General: Alert and oriented, No acute distress.  Head: Normocephalic, Atraumatic.  Eye: Pupils are equal, round and reactive to light, Extraocular movements are intact, Sclera non-icteric.  Ears/Nose/Throat: Normal, Mucosa moist,Clear.  Neck/Thyroid: SuppleNo palpable thyromegaly or thyroid nodule, No lymphadenopathy, No JVD, Pain with chin to ceiling, ear to shoulder, limited ROM  Respiratory: Clear to auscultation bilaterallySymmetrical chest wall expansion.  Integumentary: Warm, Dry, Intact, No suspicious lesions or rashes.  Extremities: No clubbing,  cyanosis or edema  Neurologic: No focal deficits, Cranial Nerves II-XII are grossly intact, Motor strength normal upper and lower extremities, Sensory exam intact.  Psychiatric: Normal interaction, Coherent speech, Euthymic mood, Appropriate affect    Laboratory Reviewed ({Yes)  Lab Results   Component Value Date    WBC 5.7 12/08/2021    HGB 13.7 12/08/2021    HCT 40.2 12/08/2021     12/08/2021    CHOL 231 (H) 12/08/2021    TRIG 35 (L) 12/08/2021    HDL 90 (H) 12/08/2021    ALT 19 07/02/2021    AST 17 07/02/2021     07/02/2021    K 4.2 07/02/2021    CREATININE 0.66 07/02/2021    BUN 20.9 (H) 07/02/2021    CO2 25 07/02/2021    TSH 1.9234 12/08/2021    INR 1.00 11/06/2018    HGBA1C 4.9 12/08/2021       Remi was seen today for follow-up.    Diagnoses and all orders for this visit:    Neck pain  -     X-Ray Cervical Spine Complete 5 view; Future  -     MRI Cervical Spine Without Contrast; Future  -     Ambulatory referral/consult to Physical/Occupational Therapy; Future    Other orders  -     cyclobenzaprine (FLEXERIL) 10 MG tablet; Take 1 tablet (10 mg total) by mouth 3 (three) times daily as needed for Muscle spasms.  -     diclofenac (VOLTAREN) 75 MG EC tablet; Take 1 tablet (75 mg total) by mouth 2 (two) times daily.        Flexeril and Voltaren for pain  Xray today  Continue PT  MRI for worsening symptoms after completing PT      Care Plan/Goals: Reviewed    Goals    None         Follow up: Follow up in about 3 months (around 9/22/2022).

## 2022-06-23 ENCOUNTER — TELEPHONE (OUTPATIENT)
Dept: GYNECOLOGY | Facility: CLINIC | Age: 52
End: 2022-06-23
Payer: MEDICAID

## 2022-06-23 DIAGNOSIS — M47.812 CERVICAL SPINE ARTHRITIS: Primary | ICD-10-CM

## 2022-06-23 NOTE — TELEPHONE ENCOUNTER
----- Message from Nicole Georges MD sent at 6/23/2022  1:59 PM CDT -----  Please let patient know:    She has Significant degenerative changes at C5-6 and C6-7.  MRI already ordered. Will refer to neurosurgery. Does patient prefer [facility] or [facility] for referral?

## 2022-07-08 ENCOUNTER — TELEPHONE (OUTPATIENT)
Dept: PRIMARY CARE CLINIC | Facility: CLINIC | Age: 52
End: 2022-07-08
Payer: MEDICAID

## 2022-07-08 RX ORDER — DIAZEPAM 5 MG/1
TABLET ORAL
Qty: 1 TABLET | Refills: 0 | Status: SHIPPED | OUTPATIENT
Start: 2022-07-08 | End: 2022-10-11 | Stop reason: SDUPTHER

## 2022-07-08 NOTE — TELEPHONE ENCOUNTER
----- Message from Kenna Roper LPN sent at 7/6/2022  3:48 PM CDT -----  Regarding: FW: Prescription Request    ----- Message -----  From: Jenny Camejo  Sent: 7/6/2022   2:48 PM CDT  To: [facility] Staff  Subject: Prescription Request                             Patient went today for a MRI and had an anxiety attack.  The rescheduled her appointment until next week and was told to ask her PCP for a sedative for this upcoming appointment.  Please and Thank You.

## 2022-07-22 ENCOUNTER — TELEPHONE (OUTPATIENT)
Dept: FAMILY MEDICINE | Facility: CLINIC | Age: 52
End: 2022-07-22
Payer: MEDICAID

## 2022-07-22 DIAGNOSIS — M48.02 CERVICAL STENOSIS OF SPINAL CANAL: Primary | ICD-10-CM

## 2022-07-22 NOTE — TELEPHONE ENCOUNTER
Please let patient know:    MRI shows: degenerative disc diease and stenosis of the cervical spine.    Recommend neurosurgery eval. Does she know where she wants to be referred to?I will start with [facility].

## 2022-08-03 ENCOUNTER — TELEPHONE (OUTPATIENT)
Dept: FAMILY MEDICINE | Facility: CLINIC | Age: 52
End: 2022-08-03
Payer: MEDICAID

## 2022-08-03 DIAGNOSIS — M48.00 SPINAL STENOSIS, UNSPECIFIED SPINAL REGION: Primary | ICD-10-CM

## 2022-08-03 NOTE — TELEPHONE ENCOUNTER
Patient called stating that she would like to go to neurosurgery in [place]. She states that she would like to go back to physical therapy. Patient states that she is still in pain but she cannot tolerate the muscle relaxers.

## 2022-08-09 ENCOUNTER — TELEPHONE (OUTPATIENT)
Dept: FAMILY MEDICINE | Facility: CLINIC | Age: 52
End: 2022-08-09
Payer: MEDICAID

## 2022-08-17 ENCOUNTER — PATIENT MESSAGE (OUTPATIENT)
Dept: FAMILY MEDICINE | Facility: CLINIC | Age: 52
End: 2022-08-17
Payer: MEDICAID

## 2022-09-01 ENCOUNTER — TELEPHONE (OUTPATIENT)
Dept: FAMILY MEDICINE | Facility: CLINIC | Age: 52
End: 2022-09-01
Payer: MEDICAID

## 2022-09-01 DIAGNOSIS — M48.00 SPINAL STENOSIS, UNSPECIFIED SPINAL REGION: Primary | ICD-10-CM

## 2022-09-01 NOTE — TELEPHONE ENCOUNTER
----- Message from Kenna Roper LPN sent at 9/1/2022  1:35 PM CDT -----  Regarding: FW: New Referral    ----- Message -----  From: Madison Madden  Sent: 9/1/2022   1:24 PM CDT  To: [facility] Clinical Support Staff  Subject: New Referral                                     General Phone Message    Caller is:  (x ) Patient  (  ) Family  (  ) Pharmacy    (  ) Home Health  (  ) Other     Provider: Dr. Nicole Georges    Last Visit:    Next Visit: 9/29/2022    Reason for Call: Patient is asking for a new referral for neuro. She states she was told that Dr Russ is no longer practicing. She is asking to be referred to Dr Nicolas [phone].          Best Time to Contact:    Preferred Phone Number:

## 2022-09-17 ENCOUNTER — HISTORICAL (OUTPATIENT)
Dept: ADMINISTRATIVE | Facility: HOSPITAL | Age: 52
End: 2022-09-17
Payer: MEDICAID

## 2022-09-27 ENCOUNTER — PATIENT MESSAGE (OUTPATIENT)
Dept: FAMILY MEDICINE | Facility: CLINIC | Age: 52
End: 2022-09-27
Payer: MEDICAID

## 2022-09-28 ENCOUNTER — HOSPITAL ENCOUNTER (EMERGENCY)
Facility: HOSPITAL | Age: 52
Discharge: HOME | End: 2022-09-28
Attending: EMERGENCY MEDICINE
Payer: MEDICAID

## 2022-09-28 VITALS
HEIGHT: 64 IN | OXYGEN SATURATION: 99 % | TEMPERATURE: 98 F | WEIGHT: 120 LBS | RESPIRATION RATE: 18 BRPM | SYSTOLIC BLOOD PRESSURE: 116 MMHG | HEART RATE: 77 BPM | BODY MASS INDEX: 20.49 KG/M2 | DIASTOLIC BLOOD PRESSURE: 65 MMHG

## 2022-09-28 DIAGNOSIS — M79.661 PAIN IN RIGHT LOWER LEG: ICD-10-CM

## 2022-09-28 DIAGNOSIS — M50.30 DDD (DEGENERATIVE DISC DISEASE), CERVICAL: Primary | ICD-10-CM

## 2022-09-28 DIAGNOSIS — M79.606 LEG PAIN: ICD-10-CM

## 2022-09-28 PROCEDURE — 99283 EMERGENCY DEPT VISIT LOW MDM: CPT | Mod: 25

## 2022-09-28 RX ORDER — BACLOFEN 10 MG/1
5 TABLET ORAL 3 TIMES DAILY
Qty: 15 TABLET | Refills: 0 | Status: SHIPPED | OUTPATIENT
Start: 2022-09-28 | End: 2022-11-18

## 2022-09-28 RX ORDER — BACLOFEN 5 MG/1
5 TABLET ORAL
Status: DISCONTINUED | OUTPATIENT
Start: 2022-09-28 | End: 2022-09-28 | Stop reason: HOSPADM

## 2022-09-28 NOTE — ED PROVIDER NOTES
"Encounter Date: 9/28/2022       History     Chief Complaint   Patient presents with    Leg Pain     Patient reports right lower leg pain for 2 days, states hard to walk on     52 y.o. female presents to the ED with worsening right lower leg pain onset 2 days ago. Pain starts in the right knee and travels down. States it started as a muscle spasm and has never been relieved. Notes difficulty walking. Has been told she has "neuro issues" and is trying to get appt with neurosurgery and/or neurologist but having trouble due insurance. History of DDD in her neck as well as cervical stenosis. Denies SOB, chest pain, leg swelling, injury or trauma. Notes some numbness in her toes.     The history is provided by the patient. No  was used.   Leg Pain   The incident occurred at home. There was no injury mechanism. The incident occurred two days ago. The pain is present in the right leg. The pain has been Constant since onset. She reports no foreign bodies present. The symptoms are aggravated by bearing weight and palpation.   Review of patient's allergies indicates:   Allergen Reactions    Hydrocodone-acetaminophen     Penicillins Rash     No past medical history on file.  Past Surgical History:   Procedure Laterality Date    TONSILLECTOMY       Family History   Problem Relation Age of Onset    Hypertension Mother     Hypertension Father      Social History     Tobacco Use    Smoking status: Never    Smokeless tobacco: Never   Substance Use Topics    Alcohol use: Not Currently    Drug use: Never     Review of Systems   Constitutional:  Negative for fever.   HENT:  Negative for sore throat.    Respiratory:  Negative for shortness of breath.    Cardiovascular:  Negative for chest pain.   Gastrointestinal:  Negative for nausea.   Genitourinary:  Negative for dysuria.   Musculoskeletal:  Positive for myalgias. Negative for back pain.   Skin:  Negative for rash.   Neurological:  Negative for weakness. "   Hematological:  Does not bruise/bleed easily.   All other systems reviewed and are negative.    Physical Exam     Initial Vitals [09/28/22 1012]   BP Pulse Resp Temp SpO2   116/65 77 18 97.9 °F (36.6 °C) 99 %      MAP       --         Physical Exam    Nursing note and vitals reviewed.  Constitutional: She appears well-developed and well-nourished.   HENT:   Head: Normocephalic and atraumatic.   Eyes: EOM are normal. Pupils are equal, round, and reactive to light.   Neck: Neck supple.   Cardiovascular:  Normal rate, regular rhythm, normal heart sounds and intact distal pulses.           Pulmonary/Chest: Breath sounds normal.   Musculoskeletal:         General: Normal range of motion.      Cervical back: Neck supple. Tenderness and bony tenderness present.      Thoracic back: Normal.      Lumbar back: Normal. No spasms, tenderness or bony tenderness. Normal range of motion.      Right lower leg: Tenderness present. No swelling. No edema.      Right foot: Normal range of motion and normal capillary refill. No bony tenderness or crepitus. Normal pulse.     Neurological: She is alert and oriented to person, place, and time. She has normal strength. She displays a negative Romberg sign. Coordination and gait normal. GCS score is 15. GCS eye subscore is 4. GCS verbal subscore is 5. GCS motor subscore is 6.   Notes slightly decreased sensation in toes    Skin: Skin is warm and dry. Capillary refill takes less than 2 seconds.   Psychiatric: She has a normal mood and affect.       ED Course   Procedures  Labs Reviewed - No data to display       Imaging Results              X-Ray Tibia Fibula 2 View Right (Final result)  Result time 09/28/22 11:21:23      Final result by Tana Hernandez MD (09/28/22 11:21:23)                   Impression:      No acute bony abnormality      Electronically signed by: Tana Hernandez  Date:    09/28/2022  Time:    11:21               Narrative:    EXAMINATION:  XR TIBIA FIBULA 2 VIEW  RIGHT    CLINICAL HISTORY:  Pain in leg, unspecified    COMPARISON:  None.    FINDINGS:  There is no acute fracture or malalignment.  The soft tissues are unremarkable.                                       Medications - No data to display    Medical Decision Making:   Differential Diagnosis:   DVT, superficial thrombophlebitis, stress fracture, neuropathy  Clinical Tests:   Radiological Study: Ordered and Reviewed  ED Management:  On physical exam, tenderness to palpation along right posterior leg however no erythema or swelling noted.  Patient with 2+ DP pulse, and good cap refill in toes.  Patient does note some decreased sensation in her toes.  Denies injury trauma, x-ray negative for acute or occult fractures, ultrasound negative for any DVT.  Patient states she has been worked up for MS in the past, is unsure if maybe this is consistent with that.  I did discuss with her since she has a history of similar episodes in the past with no definitive etiology that she does need to follow up with PCP for Neurology referral.  I will also send referral to neurologist for further evaluation.  I did discuss that this could be due to some sort of neuromuscular disease however these are not test that we will run in the ER.  I gave her strict return precautions for any worsening symptoms.  Will treat with baclofen at home for muscle spasms I gave the patient crutches for symptom relief.  Told to elevate the leg as much as possible over the next few days and apply heat to the area fracture the.  All questions answered and the patient was stable for discharge.    Also sent referral for neurosurgeon due to patient's history of cervical stenosis and degenerative disc disease.                          Clinical Impression:   Final diagnoses:  [M79.661] Pain in right lower leg  [M79.606] Leg pain  [M50.30] DDD (degenerative disc disease), cervical (Primary)        ED Disposition Condition    Discharge Stable          ED  Prescriptions       Medication Sig Dispense Start Date End Date Auth. Provider    baclofen (LIORESAL) 10 MG tablet Take 0.5 tablets (5 mg total) by mouth 3 (three) times daily. for 10 days 15 tablet 9/28/2022 10/8/2022 Jassi Lynch PA-C          Follow-up Information       Follow up With Specialties Details Why Contact Info    Nicole Georges MD Family Medicine   [address] [facility] [id number] [phone]      [facility] - Emergency Dept Emergency Medicine In 1 week If symptoms worsen [facility] [facility] [id number] [phone]             Jassi Lynch PA-C  09/28/22 1715

## 2022-09-28 NOTE — DISCHARGE INSTRUCTIONS
Talk to your PCP tomorrow about possibly calling about neurosurgery referral; may need to have her send MRI results with disc to [facility] for consult

## 2022-09-28 NOTE — FIRST PROVIDER EVALUATION
"Medical screening examination initiated.  I have conducted a focused provider triage encounter, findings are as follows:    Chief Complaint   Patient presents with    Leg Pain     Patient reports right lower leg pain for 2 days, states hard to walk on     Brief history of present illness:  52 y.o. female presents to the ED with worsening right lower leg pain onset 2 days ago. Pain starts in the right knee and travels down. Notes difficulty walking. Has been told she has "neuro issues" and is trying to get appt with neurosurgery and/or neurologist but having trouble due insurance.     Vitals:    09/28/22 1012   BP: 116/65   Pulse: 77   Resp: 18   Temp: 97.9 °F (36.6 °C)   SpO2: 99%   Weight: 54.4 kg (120 lb)   Height: 5' 4" (1.626 m)     Pertinent physical exam:  Awake, alert, non-labored respirations    Brief workup plan:  US, XR    Preliminary workup initiated; this workup will be continued and followed by the physician or advanced practice provider that is assigned to the patient when roomed.  "

## 2022-09-28 NOTE — TELEPHONE ENCOUNTER
Called and spoke to patient. Patient was referred multiple times to different Neurosurgeons of her choice. She stats that she is currently in the ER. She has an appointment with Dr. Georges on tomorrow. Advised to keep appointment to follow up with on tomorrow.

## 2022-09-29 ENCOUNTER — TELEPHONE (OUTPATIENT)
Dept: FAMILY MEDICINE | Facility: CLINIC | Age: 52
End: 2022-09-29

## 2022-09-29 ENCOUNTER — OFFICE VISIT (OUTPATIENT)
Dept: FAMILY MEDICINE | Facility: CLINIC | Age: 52
End: 2022-09-29
Payer: MEDICAID

## 2022-09-29 VITALS
SYSTOLIC BLOOD PRESSURE: 99 MMHG | DIASTOLIC BLOOD PRESSURE: 60 MMHG | WEIGHT: 121.69 LBS | HEART RATE: 64 BPM | TEMPERATURE: 98 F | BODY MASS INDEX: 20.78 KG/M2 | HEIGHT: 64 IN | RESPIRATION RATE: 18 BRPM

## 2022-09-29 DIAGNOSIS — F41.9 ANXIETY: Primary | ICD-10-CM

## 2022-09-29 DIAGNOSIS — M54.16 LUMBAR RADICULOPATHY, CHRONIC: ICD-10-CM

## 2022-09-29 DIAGNOSIS — R20.0 NUMBNESS: ICD-10-CM

## 2022-09-29 DIAGNOSIS — R42 DIZZINESS AND GIDDINESS: ICD-10-CM

## 2022-09-29 DIAGNOSIS — R53.1 WEAKNESS: ICD-10-CM

## 2022-09-29 DIAGNOSIS — M48.02 CERVICAL STENOSIS OF SPINAL CANAL: ICD-10-CM

## 2022-09-29 DIAGNOSIS — M54.9 DORSALGIA, UNSPECIFIED: ICD-10-CM

## 2022-09-29 DIAGNOSIS — M48.00 SPINAL STENOSIS, UNSPECIFIED SPINAL REGION: ICD-10-CM

## 2022-09-29 PROCEDURE — 3008F BODY MASS INDEX DOCD: CPT | Mod: CPTII,,, | Performed by: FAMILY MEDICINE

## 2022-09-29 PROCEDURE — 3078F DIAST BP <80 MM HG: CPT | Mod: CPTII,,, | Performed by: FAMILY MEDICINE

## 2022-09-29 PROCEDURE — 99215 OFFICE O/P EST HI 40 MIN: CPT | Mod: PBBFAC | Performed by: FAMILY MEDICINE

## 2022-09-29 PROCEDURE — 3074F PR MOST RECENT SYSTOLIC BLOOD PRESSURE < 130 MM HG: ICD-10-PCS | Mod: CPTII,,, | Performed by: FAMILY MEDICINE

## 2022-09-29 PROCEDURE — 1159F PR MEDICATION LIST DOCUMENTED IN MEDICAL RECORD: ICD-10-PCS | Mod: CPTII,,, | Performed by: FAMILY MEDICINE

## 2022-09-29 PROCEDURE — 1159F MED LIST DOCD IN RCRD: CPT | Mod: CPTII,,, | Performed by: FAMILY MEDICINE

## 2022-09-29 PROCEDURE — 99214 OFFICE O/P EST MOD 30 MIN: CPT | Mod: S$PBB,,, | Performed by: FAMILY MEDICINE

## 2022-09-29 PROCEDURE — 3008F PR BODY MASS INDEX (BMI) DOCUMENTED: ICD-10-PCS | Mod: CPTII,,, | Performed by: FAMILY MEDICINE

## 2022-09-29 PROCEDURE — 99214 PR OFFICE/OUTPT VISIT, EST, LEVL IV, 30-39 MIN: ICD-10-PCS | Mod: S$PBB,,, | Performed by: FAMILY MEDICINE

## 2022-09-29 PROCEDURE — 3078F PR MOST RECENT DIASTOLIC BLOOD PRESSURE < 80 MM HG: ICD-10-PCS | Mod: CPTII,,, | Performed by: FAMILY MEDICINE

## 2022-09-29 PROCEDURE — 3074F SYST BP LT 130 MM HG: CPT | Mod: CPTII,,, | Performed by: FAMILY MEDICINE

## 2022-09-29 RX ORDER — DULOXETIN HYDROCHLORIDE 60 MG/1
60 CAPSULE, DELAYED RELEASE ORAL DAILY
Qty: 30 CAPSULE | Refills: 5 | Status: SHIPPED | OUTPATIENT
Start: 2022-09-29 | End: 2022-11-18

## 2022-09-29 RX ORDER — ONDANSETRON 4 MG/1
4 TABLET, FILM COATED ORAL EVERY 6 HOURS PRN
COMMUNITY
Start: 2022-04-23 | End: 2022-11-18

## 2022-09-29 RX ORDER — FLUOXETINE HYDROCHLORIDE 20 MG/1
20 CAPSULE ORAL DAILY
Qty: 30 CAPSULE | Refills: 11 | Status: CANCELLED | OUTPATIENT
Start: 2022-09-29 | End: 2023-09-29

## 2022-09-29 NOTE — TELEPHONE ENCOUNTER
----- Message from Nicole Georges MD sent at 9/29/2022 12:00 PM CDT -----  Please have patient present to lab for blood work

## 2022-09-29 NOTE — PROGRESS NOTES
Remi Baptiste  09/29/2022  [id number]      Visit Type:a scheduled routine follow-up visit    Chief Complaint:  Chief Complaint   Patient presents with    Follow-up       History of Present Illness:  53 yo f PMH PMH anxiety, depression, IC, insomnia, spinal stenosis, DJD, foot pain followed by podiatry. May have neuroma, foot drop? Patient has worsening   right lower leg pain with frequent muscle spasm. Makes it difficult to walk. Has numbness in extremities  Was seen in ED with same complaint and was given baclofen which seemed to help.  May have had negative brain MRI in 1-2 years ago. No record of MRI, symptoms have worsened. MRI ordered by podiatry to evaluate for MS.  History:  History reviewed. No pertinent past medical history.  Past Surgical History:   Procedure Laterality Date    BLADDER SUSPENSION      OVARIAN CYST REMOVAL      TONSILLECTOMY       Family History   Problem Relation Age of Onset    Hypertension Mother     Hypertension Father      Social History     Socioeconomic History    Marital status: Single   Tobacco Use    Smoking status: Never    Smokeless tobacco: Never   Substance and Sexual Activity    Alcohol use: Not Currently    Drug use: Never    Sexual activity: Not Currently     Social Determinants of Health     Financial Resource Strain: Low Risk     Difficulty of Paying Living Expenses: Not hard at all   Food Insecurity: No Food Insecurity    Worried About Running Out of Food in the Last Year: Never true    Ran Out of Food in the Last Year: Never true   Transportation Needs: No Transportation Needs    Lack of Transportation (Medical): No    Lack of Transportation (Non-Medical): No   Physical Activity: Insufficiently Active    Days of Exercise per Week: 4 days    Minutes of Exercise per Session: 30 min   Stress: No Stress Concern Present    Feeling of Stress : Not at all   Social Connections: Unknown    Frequency of Communication with Friends and Family: More than three times a week     Frequency of Social Gatherings with Friends and Family: More than three times a week   Housing Stability: Low Risk     Unable to Pay for Housing in the Last Year: No    Number of Places Lived in the Last Year: 1    Unstable Housing in the Last Year: No     There is no problem list on file for this patient.    Review of patient's allergies indicates:   Allergen Reactions    Hydrocodone-guaifenesin Hives    Hydrocodone-acetaminophen     Penicillins Rash         Medications:  Current Outpatient Medications on File Prior to Visit   Medication Sig Dispense Refill    baclofen (LIORESAL) 10 MG tablet Take 0.5 tablets (5 mg total) by mouth 3 (three) times daily. for 10 days 15 tablet 0    diclofenac (VOLTAREN) 75 MG EC tablet Take 1 tablet (75 mg total) by mouth 2 (two) times daily. 60 tablet 1    meloxicam (MOBIC) 15 MG tablet Take 15 mg by mouth daily as needed.      ondansetron (ZOFRAN) 4 MG tablet Take 4 mg by mouth every 6 (six) hours as needed.      diazePAM (VALIUM) 5 MG tablet Take 45 mins before MRI (Patient not taking: Reported on 9/29/2022) 1 tablet 0     Current Facility-Administered Medications on File Prior to Visit   Medication Dose Route Frequency Provider Last Rate Last Admin    TETRAcaine HCl (PF) 0.5 % Drop 1 drop  1 drop Both Eyes 1 time in Clinic/HOD JOAQUINA Jones        [DISCONTINUED] baclofen tablet 5 mg  5 mg Oral ED 1 Time Jassi Lynch PA-C           Medications have been reviewed and reconciled with patient at this visit.    Adverse reactions to current medications reviewed with patient..      Exam:  Wt Readings from Last 3 Encounters:   09/29/22 55.2 kg (121 lb 11.1 oz)   09/28/22 54.4 kg (120 lb)   07/15/22 56.7 kg (125 lb)     Temp Readings from Last 3 Encounters:   09/29/22 97.7 °F (36.5 °C)   09/28/22 97.9 °F (36.6 °C)   06/22/22 98.1 °F (36.7 °C)     BP Readings from Last 3 Encounters:   09/29/22 99/60   09/28/22 116/65   06/22/22 107/68     Pulse Readings from Last 3 Encounters:    09/29/22 64   09/28/22 77   06/22/22 70     Body mass index is 20.89 kg/m².      ROS:  See HPI for details    Constitutional: Denies Change in appetite. Denies Chills. Denies Fever. Denies Night sweats.  Eye: Denies Blurred vision. Denies Discharge. Denies Eye pain.  ENT: Denies Decreased hearing. Denies Sore throat. Denies Swollen glands.  Respiratory: Denies Cough. Denies Shortness of breath. Denies Shortness of breath with exertion. Denies Wheezing.  Cardiovascular: Denies Chest pain at rest. Denies Chest pain with exertion. Denies Irregular heartbeat. Denies Palpitations.  Gastrointestinal: Denies Abdominal pain. Denies Diarrhea. Denies Nausea.   Endocrine: Denies Cold intolerance. Denies Excessive thirst. Denies Heat intolerance. Denies Weight loss. Denies Weight gain.  Musculoskeletal: Denies Painful joints. Denies Weakness.      All Other ROS: Negative except as stated in HPI.    PE:  General: Alert and oriented, No acute distress.  Head: Normocephalic, Atraumatic.  Eye: Pupils are equal, round and reactive to light, Extraocular movements are intact, Sclera non-icteric.  Ears/Nose/Throat: Normal, Mucosa moist,Clear.  Musculoskeletal: Normal range of motion.  Integumentary: Warm, Dry, Intact, No suspicious lesions or rashes.  Extremities: No clubbing, cyanosis or edema  Neurologic: No focal deficits, Cranial Nerves II-XII are grossly intact, 4/5 on right lower extremity, antalgic gait,  decreased sensation right lower extremity  Psychiatric: Normal interaction, Coherent speech, Euthymic mood, Appropriate affect    Laboratory Reviewed ({Yes)  Lab Results   Component Value Date    WBC 5.7 12/08/2021    HGB 13.7 12/08/2021    HCT 40.2 12/08/2021     12/08/2021    CHOL 231 (H) 12/08/2021    TRIG 35 (L) 12/08/2021    HDL 90 (H) 12/08/2021    ALT 19 07/02/2021    AST 17 07/02/2021     07/02/2021    K 4.2 07/02/2021    CREATININE 0.66 07/02/2021    BUN 20.9 (H) 07/02/2021    CO2 25 07/02/2021    TSH  1.9234 12/08/2021    INR 1.00 11/06/2018    HGBA1C 4.9 12/08/2021       Remi was seen today for follow-up.    Diagnoses and all orders for this visit:    Anxiety  -     Ambulatory referral/consult to Psychiatry; Future  -     Ambulatory referral/consult to Behavioral Health; Future    Weakness  -     Ambulatory referral/consult to Neurology; Future  -     Ambulatory referral/consult to Neurology; Future    Spinal stenosis, unspecified spinal region  -     Ambulatory referral/consult to Neurosurgery; Future    Cervical stenosis of spinal canal  -     Ambulatory referral/consult to Neurosurgery; Future    Numbness  -     Ambulatory referral/consult to Neurology; Future  -     Ambulatory referral/consult to Neurology; Future    Other orders  -     DULoxetine (CYMBALTA) 60 MG capsule; Take 1 capsule (60 mg total) by mouth once daily.  The following orders have not been finalized:  -     Cancel: FLUoxetine 20 MG capsule        Assesment as above  Per chart review, similar complaints in 2015 with Dr. Mullins. Will refer back but unsure if he takes insurance. Plan was to evaluate for MS. Working diagnosis was fibromyalgia with chronoc pain syndrome. Will also refer to [facility] neurology for further eval. Looks like ED referred as well  Will refer to neurosurgery in [facility]; already referred to [place] but patient would not be able to get appt until next year. Referred to Dr. Luna earlier this  month per patient's request.   Will start Cymbalta for  anxiety symptoms. May help with some of the other symptoms  Will order MRI brain, lumbar spine due to worsening symptoms. Will need valium 5 once scheduled  Blood work ordered as well  Refilled baclofen    Care Plan/Goals: Reviewed    Goals    None         Follow up: Follow up in about 3 months (around 12/29/2022).

## 2022-10-11 ENCOUNTER — TELEPHONE (OUTPATIENT)
Dept: FAMILY MEDICINE | Facility: CLINIC | Age: 52
End: 2022-10-11
Payer: MEDICAID

## 2022-10-11 DIAGNOSIS — F40.240 CLAUSTROPHOBIA: Primary | ICD-10-CM

## 2022-10-11 RX ORDER — DIAZEPAM 5 MG/1
TABLET ORAL
Qty: 1 TABLET | Refills: 0 | Status: SHIPPED | OUTPATIENT
Start: 2022-10-11 | End: 2022-11-18

## 2022-10-11 NOTE — TELEPHONE ENCOUNTER
----- Message from Madison Madden sent at 10/11/2022  9:29 AM CDT -----  Regarding: Medication for MRI  General Phone Message    Caller is:  (x ) Patient  (  ) Family  (  ) Pharmacy    (  ) Home Health  (  ) Other     Provider: Dr. Nicole Georges    Last Visit:    Next Visit: 12/30/2022    Reason for Call: Patient is calling about a medication she was supposed to be prescribed so she could have her MRI done on Friday., 10/14/2022.                   Best Time to Contact:    Preferred Phone Number:

## 2022-10-18 ENCOUNTER — TELEPHONE (OUTPATIENT)
Dept: FAMILY MEDICINE | Facility: CLINIC | Age: 52
End: 2022-10-18
Payer: MEDICAID

## 2022-10-18 NOTE — TELEPHONE ENCOUNTER
----- Message from Jenny Bashir sent at 10/17/2022 11:54 AM CDT -----  Regarding: MRI  Patient called and would like to discuss her recent MRI results with the Provider.  She asked for a return call and wants to know what's her next move should be.  Please advise and Thank You.

## 2022-10-18 NOTE — TELEPHONE ENCOUNTER
Called patient to inform her that  is not in and she will review her MRI when she gets back. Patient verbalized understanding.

## 2022-10-25 ENCOUNTER — TELEPHONE (OUTPATIENT)
Dept: FAMILY MEDICINE | Facility: CLINIC | Age: 52
End: 2022-10-25
Payer: MEDICAID

## 2022-10-25 DIAGNOSIS — I67.89 CEREBRAL MICROVASCULAR DISEASE: Primary | ICD-10-CM

## 2022-10-25 NOTE — TELEPHONE ENCOUNTER
Called patient to give results. Patient verbalized understanding.       Patient states that she has a line in her brain shown on her MRI that is concerning and she states that she needs the doctor to call her back. Patient states that this line is the reason for her not being able to walk and her memory loss.

## 2022-10-25 NOTE — TELEPHONE ENCOUNTER
Please let patient know:    MRI brain does not have any significant abnormalities. It did show the abnormalities of the cervical spine (arthritis C4-C5) that we saw on previous MRI.  Patient has bulging disc at L3-L4.     Patient needs to see neurosurgery. She was referred at last visit.

## 2022-11-18 ENCOUNTER — OFFICE VISIT (OUTPATIENT)
Dept: BEHAVIORAL HEALTH | Facility: CLINIC | Age: 52
End: 2022-11-18
Payer: MEDICAID

## 2022-11-18 VITALS
OXYGEN SATURATION: 98 % | BODY MASS INDEX: 21.73 KG/M2 | HEART RATE: 83 BPM | WEIGHT: 126.63 LBS | DIASTOLIC BLOOD PRESSURE: 69 MMHG | SYSTOLIC BLOOD PRESSURE: 115 MMHG

## 2022-11-18 DIAGNOSIS — F43.0 ACUTE STRESS DISORDER: Primary | ICD-10-CM

## 2022-11-18 DIAGNOSIS — F41.1 GAD (GENERALIZED ANXIETY DISORDER): ICD-10-CM

## 2022-11-18 PROCEDURE — 99205 OFFICE O/P NEW HI 60 MIN: CPT | Mod: AF,HB,S$PBB, | Performed by: STUDENT IN AN ORGANIZED HEALTH CARE EDUCATION/TRAINING PROGRAM

## 2022-11-18 PROCEDURE — 3008F PR BODY MASS INDEX (BMI) DOCUMENTED: ICD-10-PCS | Mod: AF,HB,CPTII, | Performed by: STUDENT IN AN ORGANIZED HEALTH CARE EDUCATION/TRAINING PROGRAM

## 2022-11-18 PROCEDURE — 99205 PR OFFICE/OUTPT VISIT, NEW, LEVL V, 60-74 MIN: ICD-10-PCS | Mod: AF,HB,S$PBB, | Performed by: STUDENT IN AN ORGANIZED HEALTH CARE EDUCATION/TRAINING PROGRAM

## 2022-11-18 PROCEDURE — 1160F RVW MEDS BY RX/DR IN RCRD: CPT | Mod: AF,HB,CPTII, | Performed by: STUDENT IN AN ORGANIZED HEALTH CARE EDUCATION/TRAINING PROGRAM

## 2022-11-18 PROCEDURE — 3074F PR MOST RECENT SYSTOLIC BLOOD PRESSURE < 130 MM HG: ICD-10-PCS | Mod: AF,HB,CPTII, | Performed by: STUDENT IN AN ORGANIZED HEALTH CARE EDUCATION/TRAINING PROGRAM

## 2022-11-18 PROCEDURE — 3074F SYST BP LT 130 MM HG: CPT | Mod: AF,HB,CPTII, | Performed by: STUDENT IN AN ORGANIZED HEALTH CARE EDUCATION/TRAINING PROGRAM

## 2022-11-18 PROCEDURE — 1159F MED LIST DOCD IN RCRD: CPT | Mod: AF,HB,CPTII, | Performed by: STUDENT IN AN ORGANIZED HEALTH CARE EDUCATION/TRAINING PROGRAM

## 2022-11-18 PROCEDURE — 1160F PR REVIEW ALL MEDS BY PRESCRIBER/CLIN PHARMACIST DOCUMENTED: ICD-10-PCS | Mod: AF,HB,CPTII, | Performed by: STUDENT IN AN ORGANIZED HEALTH CARE EDUCATION/TRAINING PROGRAM

## 2022-11-18 PROCEDURE — 3078F PR MOST RECENT DIASTOLIC BLOOD PRESSURE < 80 MM HG: ICD-10-PCS | Mod: AF,HB,CPTII, | Performed by: STUDENT IN AN ORGANIZED HEALTH CARE EDUCATION/TRAINING PROGRAM

## 2022-11-18 PROCEDURE — 3078F DIAST BP <80 MM HG: CPT | Mod: AF,HB,CPTII, | Performed by: STUDENT IN AN ORGANIZED HEALTH CARE EDUCATION/TRAINING PROGRAM

## 2022-11-18 PROCEDURE — 3008F BODY MASS INDEX DOCD: CPT | Mod: AF,HB,CPTII, | Performed by: STUDENT IN AN ORGANIZED HEALTH CARE EDUCATION/TRAINING PROGRAM

## 2022-11-18 PROCEDURE — 99213 OFFICE O/P EST LOW 20 MIN: CPT | Mod: PBBFAC,PN | Performed by: STUDENT IN AN ORGANIZED HEALTH CARE EDUCATION/TRAINING PROGRAM

## 2022-11-18 PROCEDURE — 1159F PR MEDICATION LIST DOCUMENTED IN MEDICAL RECORD: ICD-10-PCS | Mod: AF,HB,CPTII, | Performed by: STUDENT IN AN ORGANIZED HEALTH CARE EDUCATION/TRAINING PROGRAM

## 2022-11-18 RX ORDER — HYDROXYZINE PAMOATE 25 MG/1
25 CAPSULE ORAL 2 TIMES DAILY PRN
Qty: 60 CAPSULE | Refills: 2 | Status: SHIPPED | OUTPATIENT
Start: 2022-11-18 | End: 2023-01-19

## 2022-11-18 RX ORDER — PRAZOSIN HYDROCHLORIDE 2 MG/1
2 CAPSULE ORAL 2 TIMES DAILY
Qty: 60 CAPSULE | Refills: 5 | Status: SHIPPED | OUTPATIENT
Start: 2022-11-18 | End: 2023-01-19
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
## 2022-11-23 ENCOUNTER — OFFICE VISIT (OUTPATIENT)
Dept: FAMILY MEDICINE | Facility: CLINIC | Age: 52
End: 2022-11-23
Payer: MEDICAID

## 2022-11-23 VITALS
HEIGHT: 64 IN | BODY MASS INDEX: 21.61 KG/M2 | WEIGHT: 126.56 LBS | TEMPERATURE: 98 F | HEART RATE: 81 BPM | DIASTOLIC BLOOD PRESSURE: 70 MMHG | SYSTOLIC BLOOD PRESSURE: 107 MMHG

## 2022-11-23 DIAGNOSIS — F41.1 GAD (GENERALIZED ANXIETY DISORDER): ICD-10-CM

## 2022-11-23 DIAGNOSIS — F41.9 ANXIETY: Primary | ICD-10-CM

## 2022-11-23 DIAGNOSIS — F43.21 GRIEF: ICD-10-CM

## 2022-11-23 PROBLEM — F43.10 PTSD (POST-TRAUMATIC STRESS DISORDER): Status: ACTIVE | Noted: 2022-11-18

## 2022-11-23 PROBLEM — F43.20 GRIEF REACTION: Status: ACTIVE | Noted: 2022-11-23

## 2022-11-23 PROCEDURE — 99417 PR PROLONGED SVC, OUTPT, W/WO DIRECT PT CONTACT,  EA ADDTL 15 MIN: ICD-10-PCS | Mod: S$PBB,SA,HB, | Performed by: NURSE PRACTITIONER

## 2022-11-23 PROCEDURE — 99205 OFFICE O/P NEW HI 60 MIN: CPT | Mod: S$PBB,SA,HB, | Performed by: NURSE PRACTITIONER

## 2022-11-23 PROCEDURE — 3078F PR MOST RECENT DIASTOLIC BLOOD PRESSURE < 80 MM HG: ICD-10-PCS | Mod: CPTII,SA,HB, | Performed by: NURSE PRACTITIONER

## 2022-11-23 PROCEDURE — 99214 OFFICE O/P EST MOD 30 MIN: CPT | Mod: PBBFAC | Performed by: NURSE PRACTITIONER

## 2022-11-23 PROCEDURE — 3074F SYST BP LT 130 MM HG: CPT | Mod: CPTII,SA,HB, | Performed by: NURSE PRACTITIONER

## 2022-11-23 PROCEDURE — 90838 PR PSYCHOTHERAPY W/PATIENT W/E&M, 60 MIN (ADD ON): ICD-10-PCS | Mod: S$PBB,SA,HB, | Performed by: NURSE PRACTITIONER

## 2022-11-23 PROCEDURE — 3008F BODY MASS INDEX DOCD: CPT | Mod: CPTII,SA,HB, | Performed by: NURSE PRACTITIONER

## 2022-11-23 PROCEDURE — 99205 PR OFFICE/OUTPT VISIT, NEW, LEVL V, 60-74 MIN: ICD-10-PCS | Mod: S$PBB,SA,HB, | Performed by: NURSE PRACTITIONER

## 2022-11-23 PROCEDURE — 99417 PROLNG OP E/M EACH 15 MIN: CPT | Mod: S$PBB,SA,HB, | Performed by: NURSE PRACTITIONER

## 2022-11-23 PROCEDURE — 1159F PR MEDICATION LIST DOCUMENTED IN MEDICAL RECORD: ICD-10-PCS | Mod: CPTII,SA,HB, | Performed by: NURSE PRACTITIONER

## 2022-11-23 PROCEDURE — 3008F PR BODY MASS INDEX (BMI) DOCUMENTED: ICD-10-PCS | Mod: CPTII,SA,HB, | Performed by: NURSE PRACTITIONER

## 2022-11-23 PROCEDURE — 90838 PSYTX W PT W E/M 60 MIN: CPT | Mod: S$PBB,SA,HB, | Performed by: NURSE PRACTITIONER

## 2022-11-23 PROCEDURE — 90838 PSYTX W PT W E/M 60 MIN: CPT | Mod: PBBFAC | Performed by: NURSE PRACTITIONER

## 2022-11-23 PROCEDURE — 3074F PR MOST RECENT SYSTOLIC BLOOD PRESSURE < 130 MM HG: ICD-10-PCS | Mod: CPTII,SA,HB, | Performed by: NURSE PRACTITIONER

## 2022-11-23 PROCEDURE — 3078F DIAST BP <80 MM HG: CPT | Mod: CPTII,SA,HB, | Performed by: NURSE PRACTITIONER

## 2022-11-23 PROCEDURE — 1159F MED LIST DOCD IN RCRD: CPT | Mod: CPTII,SA,HB, | Performed by: NURSE PRACTITIONER
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
## 2022-11-23 NOTE — PATIENT INSTRUCTIONS
Meds as directed  Keep all healthcare appointments  Continue with   Walk every day for 20 minutes  Use deep breathing at the first sign of increasing anxiety  Follow up with talking clergy  Utilize self-interventions from patient education materials  Nearest ER if overwhelmed   Clinic 4  weeks

## 2022-11-30 ENCOUNTER — HOSPITAL ENCOUNTER (OUTPATIENT)
Dept: RADIOLOGY | Facility: HOSPITAL | Age: 52
Discharge: HOME | End: 2022-11-30
Attending: FAMILY MEDICINE
Payer: MEDICAID

## 2022-11-30 DIAGNOSIS — I67.89 CEREBRAL MICROVASCULAR DISEASE: ICD-10-CM

## 2022-11-30 PROCEDURE — 93880 EXTRACRANIAL BILAT STUDY: CPT

## 2022-12-02 LAB
LEFT CBA DIAS: 22 CM/S
LEFT CBA SYS: 65 CM/S
LEFT CCA DIST DIAS: 31 CM/S
LEFT CCA DIST SYS: 95 CM/S
LEFT CCA MID DIAS: 33 CM/S
LEFT CCA MID SYS: 104 CM/S
LEFT CCA PROX DIAS: 32 CM/S
LEFT CCA PROX SYS: 99 CM/S
LEFT ECA DIAS: 15 CM/S
LEFT ECA SYS: 74 CM/S
LEFT ICA DIST DIAS: 35 CM/S
LEFT ICA DIST SYS: 75 CM/S
LEFT ICA MID DIAS: 38 CM/S
LEFT ICA MID SYS: 81 CM/S
LEFT ICA PROX DIAS: 13 CM/S
LEFT ICA PROX SYS: 42 CM/S
LEFT VERTEBRAL DIAS: 21 CM/S
LEFT VERTEBRAL SYS: 60 CM/S
OHS CV CAROTID RIGHT ICA EDV HIGHEST: 39
OHS CV CAROTID ULTRASOUND LEFT ICA/CCA RATIO: 0.85
OHS CV CAROTID ULTRASOUND RIGHT ICA/CCA RATIO: 0.98
OHS CV PV CAROTID LEFT HIGHEST CCA: 104
OHS CV PV CAROTID LEFT HIGHEST ICA: 81
OHS CV PV CAROTID RIGHT HIGHEST CCA: 116
OHS CV PV CAROTID RIGHT HIGHEST ICA: 84
OHS CV US CAROTID LEFT HIGHEST EDV: 38
RIGHT CBA DIAS: 32 CM/S
RIGHT CBA SYS: 83 CM/S
RIGHT CCA DIST DIAS: 27 CM/S
RIGHT CCA DIST SYS: 86 CM/S
RIGHT CCA MID DIAS: 29 CM/S
RIGHT CCA MID SYS: 100 CM/S
RIGHT CCA PROX DIAS: 29 CM/S
RIGHT CCA PROX SYS: 116 CM/S
RIGHT ECA DIAS: 14 CM/S
RIGHT ECA SYS: 72 CM/S
RIGHT ICA DIST DIAS: 39 CM/S
RIGHT ICA DIST SYS: 84 CM/S
RIGHT ICA MID DIAS: 35 CM/S
RIGHT ICA MID SYS: 84 CM/S
RIGHT ICA PROX DIAS: 25 CM/S
RIGHT ICA PROX SYS: 76 CM/S
RIGHT VERTEBRAL DIAS: 16 CM/S
RIGHT VERTEBRAL SYS: 43 CM/S

## 2022-12-13 DIAGNOSIS — R10.13 ABDOMINAL PAIN, EPIGASTRIC: Primary | ICD-10-CM

## 2023-01-06 ENCOUNTER — OFFICE VISIT (OUTPATIENT)
Dept: FAMILY MEDICINE | Facility: CLINIC | Age: 53
End: 2023-01-06
Payer: MEDICAID

## 2023-01-06 VITALS
RESPIRATION RATE: 18 BRPM | HEIGHT: 64 IN | WEIGHT: 129.63 LBS | HEART RATE: 75 BPM | SYSTOLIC BLOOD PRESSURE: 104 MMHG | DIASTOLIC BLOOD PRESSURE: 67 MMHG | TEMPERATURE: 98 F | BODY MASS INDEX: 22.13 KG/M2

## 2023-01-06 DIAGNOSIS — M50.30 DEGENERATIVE DISC DISEASE, CERVICAL: ICD-10-CM

## 2023-01-06 DIAGNOSIS — N80.9 ENDOMETRIOSIS: Primary | ICD-10-CM

## 2023-01-06 DIAGNOSIS — T78.40XA ALLERGIC REACTION, INITIAL ENCOUNTER: ICD-10-CM

## 2023-01-06 PROCEDURE — 99214 OFFICE O/P EST MOD 30 MIN: CPT | Mod: PBBFAC | Performed by: STUDENT IN AN ORGANIZED HEALTH CARE EDUCATION/TRAINING PROGRAM

## 2023-01-06 RX ORDER — MELOXICAM 15 MG/1
15 TABLET ORAL DAILY
COMMUNITY
End: 2023-01-06 | Stop reason: SDUPTHER

## 2023-01-06 RX ORDER — ONDANSETRON 4 MG/1
4 TABLET, ORALLY DISINTEGRATING ORAL EVERY 6 HOURS PRN
Status: ON HOLD | COMMUNITY
Start: 2022-12-12 | End: 2023-04-28

## 2023-01-06 RX ORDER — DICYCLOMINE HYDROCHLORIDE 20 MG/1
20 TABLET ORAL 4 TIMES DAILY
Status: ON HOLD | COMMUNITY
Start: 2022-12-12 | End: 2023-04-28

## 2023-01-06 RX ORDER — MELOXICAM 15 MG/1
15 TABLET ORAL DAILY
Qty: 30 TABLET | Refills: 3 | Status: ON HOLD | OUTPATIENT
Start: 2023-01-06 | End: 2023-04-28

## 2023-01-06 NOTE — PROGRESS NOTES
Clinic Note                                    HPI  53yo female here for follow-up  Previous patient of Dr. Nicole Georges  Requesting referral for neurosurgery and review of MRI  Complaining of persistent neck and back pain  Complaining of abdominal pain similar to when she previously had endometriosis  She also developed a rash on her neck and chest today. She reports previous episodes that are self-resolving in nature       History reviewed. No pertinent past medical history.      Past Surgical History:   Procedure Laterality Date    BLADDER SUSPENSION      OVARIAN CYST REMOVAL      TONSILLECTOMY           Family History   Problem Relation Age of Onset    Hypertension Mother     Arthritis Mother     COPD Mother     Hypertension Father     Cancer Paternal Grandmother          Social History     Socioeconomic History    Marital status: Single   Tobacco Use    Smoking status: Never    Smokeless tobacco: Never   Substance and Sexual Activity    Alcohol use: Not Currently     Comment: Occasionally    Drug use: Never    Sexual activity: Not Currently     Partners: Male     Birth control/protection: Post-menopausal     Social Determinants of Health     Financial Resource Strain: Low Risk     Difficulty of Paying Living Expenses: Not hard at all   Food Insecurity: No Food Insecurity    Worried About Running Out of Food in the Last Year: Never true    Ran Out of Food in the Last Year: Never true   Transportation Needs: No Transportation Needs    Lack of Transportation (Medical): No    Lack of Transportation (Non-Medical): No   Physical Activity: Insufficiently Active    Days of Exercise per Week: 4 days    Minutes of Exercise per Session: 30 min   Stress: No Stress Concern Present    Feeling of Stress : Not at all   Social Connections: Unknown    Frequency of Communication with Friends and Family: More than three times a week    Frequency of Social Gatherings with Friends and  Family: More than three times a week   Housing Stability: Low Risk     Unable to Pay for Housing in the Last Year: No    Number of Places Lived in the Last Year: 1    Unstable Housing in the Last Year: No         Review of patient's allergies indicates:   Allergen Reactions    Hydrocodone-guaifenesin Hives    Hydrocodone-acetaminophen     Penicillins Rash         Current Outpatient Medications on File Prior to Visit   Medication Sig Dispense Refill    dicyclomine (BENTYL) 20 mg tablet Take 20 mg by mouth 4 (four) times daily.      hydrOXYzine pamoate (VISTARIL) 25 MG Cap Take 1 capsule (25 mg total) by mouth 2 (two) times daily as needed (anxiety or panic). 60 capsule 2    ondansetron (ZOFRAN-ODT) 4 MG TbDL Take 4 mg by mouth every 6 (six) hours as needed.      [DISCONTINUED] meloxicam (MOBIC) 15 MG tablet Take 15 mg by mouth once daily.      prazosin (MINIPRESS) 2 MG Cap Take 1 capsule (2 mg total) by mouth 2 (two) times daily. (Patient not taking: Reported on 1/6/2023) 60 capsule 5     No current facility-administered medications on file prior to visit.         Review of Systems   Constitutional:  Negative for chills and fever.   HENT:  Negative for hearing loss.    Cardiovascular:  Negative for chest pain, palpitations, orthopnea and leg swelling.   Gastrointestinal:  Negative for abdominal pain, nausea and vomiting.   Genitourinary:  Negative for dysuria.   Musculoskeletal:  Positive for back pain and neck pain. Negative for myalgias.   Skin:  Negative for rash.   Neurological:  Negative for dizziness and headaches.       Vitals:    01/06/23 1404   BP: 104/67   Pulse: 75   Resp: 18   Temp: 98.1 °F (36.7 °C)         Physical Exam  Constitutional:       General: She is not in acute distress.     Appearance: Normal appearance. She is not ill-appearing.   HENT:      Head: Normocephalic and atraumatic.   Cardiovascular:      Rate and Rhythm: Normal rate and regular rhythm.      Heart sounds: Normal heart sounds. No  murmur heard.    No friction rub. No gallop.   Pulmonary:      Effort: Pulmonary effort is normal.      Breath sounds: Normal breath sounds.   Abdominal:      General: Bowel sounds are normal. There is no distension.   Musculoskeletal:      Cervical back: Normal range of motion.   Skin:     Findings: Rash present.          Neurological:      General: No focal deficit present.      Mental Status: She is alert. Mental status is at baseline.   Psychiatric:         Mood and Affect: Mood normal.         Behavior: Behavior normal.         Thought Content: Thought content normal.         Judgment: Judgment normal.            Assessment/Plan    Problem List Items Addressed This Visit          Neuro    Degenerative disc disease, cervical    Relevant Orders    Ambulatory referral/consult to Neurosurgery    Follow-up primary physician     Other Visit Diagnoses       Endometriosis    -  Primary    Relevant Orders    Ambulatory referral/consult to Gynecology    Follow-up primary physician    Allergic reaction, initial encounter              Referral sent for neurosurgery in [facility]. Patient given number for scheduling line  Review MRI with patient. She has severe DDD with severe spinal stenosis. She sees a chiropractor. Advised against having anyone do work on neck until she is seen by neurosurgery. Patient seemed reluctant and reported relief from chiropractors. Strongly advised against.   She has a history of entometriosis and reports recent recurrance. Will send referral to GYN  Rash appears to be allergic in nature. Instructed to use hydrocortisone cream and to log activity and ingestions for the last 24 hours. I instructed her to review the list for similarities next time the rash appears. Patient given ED precautions.           Radha Prater, DO  Internal Medicine

## 2023-01-09 ENCOUNTER — DOCUMENTATION ONLY (OUTPATIENT)
Dept: ADMINISTRATIVE | Facility: HOSPITAL | Age: 53
End: 2023-01-09
Payer: MEDICAID

## 2023-01-17 ENCOUNTER — OFFICE VISIT (OUTPATIENT)
Dept: FAMILY MEDICINE | Facility: CLINIC | Age: 53
End: 2023-01-17
Payer: MEDICAID

## 2023-01-17 VITALS
HEIGHT: 64 IN | RESPIRATION RATE: 20 BRPM | HEART RATE: 74 BPM | TEMPERATURE: 98 F | BODY MASS INDEX: 21.91 KG/M2 | SYSTOLIC BLOOD PRESSURE: 107 MMHG | WEIGHT: 128.31 LBS | OXYGEN SATURATION: 98 % | DIASTOLIC BLOOD PRESSURE: 61 MMHG

## 2023-01-17 DIAGNOSIS — F41.1 GAD (GENERALIZED ANXIETY DISORDER): ICD-10-CM

## 2023-01-17 DIAGNOSIS — F32.A MILD DEPRESSION: ICD-10-CM

## 2023-01-17 DIAGNOSIS — F43.21 GRIEF: ICD-10-CM

## 2023-01-17 DIAGNOSIS — F43.10 PTSD (POST-TRAUMATIC STRESS DISORDER): ICD-10-CM

## 2023-01-17 PROCEDURE — 90838 PR PSYCHOTHERAPY W/PATIENT W/E&M, 60 MIN (ADD ON): ICD-10-PCS | Mod: S$PBB,SA,HB, | Performed by: NURSE PRACTITIONER

## 2023-01-17 PROCEDURE — 90838 PSYTX W PT W E/M 60 MIN: CPT | Mod: PBBFAC | Performed by: NURSE PRACTITIONER

## 2023-01-17 PROCEDURE — 90838 PSYTX W PT W E/M 60 MIN: CPT | Mod: S$PBB,SA,HB, | Performed by: NURSE PRACTITIONER

## 2023-01-17 PROCEDURE — 99417 PR PROLONGED SVC, OUTPT, W/WO DIRECT PT CONTACT,  EA ADDTL 15 MIN: ICD-10-PCS | Mod: S$PBB,SA,HB, | Performed by: NURSE PRACTITIONER

## 2023-01-17 PROCEDURE — 3008F PR BODY MASS INDEX (BMI) DOCUMENTED: ICD-10-PCS | Mod: CPTII,SA,HB, | Performed by: NURSE PRACTITIONER

## 2023-01-17 PROCEDURE — 3078F DIAST BP <80 MM HG: CPT | Mod: CPTII,SA,HB, | Performed by: NURSE PRACTITIONER

## 2023-01-17 PROCEDURE — 99215 OFFICE O/P EST HI 40 MIN: CPT | Mod: S$PBB,SA,HB, | Performed by: NURSE PRACTITIONER

## 2023-01-17 PROCEDURE — 99215 PR OFFICE/OUTPT VISIT, EST, LEVL V, 40-54 MIN: ICD-10-PCS | Mod: S$PBB,SA,HB, | Performed by: NURSE PRACTITIONER

## 2023-01-17 PROCEDURE — 3078F PR MOST RECENT DIASTOLIC BLOOD PRESSURE < 80 MM HG: ICD-10-PCS | Mod: CPTII,SA,HB, | Performed by: NURSE PRACTITIONER

## 2023-01-17 PROCEDURE — 3074F PR MOST RECENT SYSTOLIC BLOOD PRESSURE < 130 MM HG: ICD-10-PCS | Mod: CPTII,SA,HB, | Performed by: NURSE PRACTITIONER

## 2023-01-17 PROCEDURE — 1159F MED LIST DOCD IN RCRD: CPT | Mod: CPTII,SA,HB, | Performed by: NURSE PRACTITIONER

## 2023-01-17 PROCEDURE — 1159F PR MEDICATION LIST DOCUMENTED IN MEDICAL RECORD: ICD-10-PCS | Mod: CPTII,SA,HB, | Performed by: NURSE PRACTITIONER

## 2023-01-17 PROCEDURE — 99214 OFFICE O/P EST MOD 30 MIN: CPT | Mod: PBBFAC | Performed by: NURSE PRACTITIONER

## 2023-01-17 PROCEDURE — 3008F BODY MASS INDEX DOCD: CPT | Mod: CPTII,SA,HB, | Performed by: NURSE PRACTITIONER

## 2023-01-17 PROCEDURE — 3074F SYST BP LT 130 MM HG: CPT | Mod: CPTII,SA,HB, | Performed by: NURSE PRACTITIONER

## 2023-01-17 PROCEDURE — 99417 PROLNG OP E/M EACH 15 MIN: CPT | Mod: S$PBB,SA,HB, | Performed by: NURSE PRACTITIONER

## 2023-01-17 NOTE — PATIENT INSTRUCTIONS
Meds as directed  Keep all healthcare appointments  Utilize deep breathing and visualization  Obtain [employer] Anxiety & Worry Workbook--begin reading and doing worksheets  Utilize self-interventions from patient education materials  Nearest ER if overwhelmed   Clinic 4  weeks
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
## 2023-01-18 NOTE — PROGRESS NOTES
[facility] Neurology Initial Office Visit Note    Initial Visit Date: 1/19/2023  Current Visit Date:  01/19/2023    Chief Complaint:     Chief Complaint   Patient presents with    Patient is here to discuss pain from neuropathy       History of Present Illness:      This is 52 y.o. female with history of PTSD, DJD, who is referred for right leg paresthesia of unknown chronicity. She states that she has been having bilateral hand paresthesia, upon awakening, along with neck pain. She also notes right leg numbness and paresthesia. Denied any UI/SI and saddle anesthesia. 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 She has not been doing home exercises.     Medications:     Current Outpatient Medications on File Prior to Visit   Medication Sig Dispense Refill    dicyclomine (BENTYL) 20 mg tablet Take 20 mg by mouth 4 (four) times daily.      hydrOXYzine pamoate (VISTARIL) 25 MG Cap Take 1 capsule (25 mg total) by mouth 2 (two) times daily as needed (anxiety or panic). 60 capsule 2    meloxicam (MOBIC) 15 MG tablet Take 1 tablet (15 mg total) by mouth once daily. 30 tablet 3    ondansetron (ZOFRAN-ODT) 4 MG TbDL Take 4 mg by mouth every 6 (six) hours as needed.      prazosin (MINIPRESS) 2 MG Cap Take 1 capsule (2 mg total) by mouth 2 (two) times daily. 60 capsule 5     No current facility-administered medications on file prior to visit.       Labs:     Results for orders placed or performed in visit on 01/09/23   HPV DNA probe, amplified    Specimen: Cervix; Genital   Result Value Ref Range    HPV DNA None Detected None Detected    HPV DNA None Detected None Detected   HM PAP SMEAR   Result Value Ref Range    PAP Recommendation External No follow-up frequency specified     Pap Epithelial cell abnormality (A) Negative for intraephithelial lesion or malignancy, Other       Studies:      MRI C and L-spine without contrast 2022: Multilevel DJD with severe left neuroforaminal narrowing at C4-C5, C5-C6, C6-C7.   Mild-to-moderate right L3-L4 neural foraminal narrowing.    MRI brain without without contrast 10/2022:  I have reviewed the study independently and with the patient.  Chronic microvascular changes.    Review of Systems:     Review of Systems   All other systems reviewed and are negative.    Physical Exams:     Vitals:    01/19/23 1325   BP: 105/67   Pulse: 63   Temp: 97.6 °F (36.4 °C)       Physical Exam  Vitals and nursing note reviewed.   Constitutional:       Appearance: Normal appearance.   HENT:      Head: Normocephalic and atraumatic.      Nose: Nose normal.      Mouth/Throat:      Mouth: Mucous membranes are moist.      Pharynx: Oropharynx is clear.   Eyes:      Conjunctiva/sclera: Conjunctivae normal.   Cardiovascular:      Rate and Rhythm: Normal rate and regular rhythm.      Pulses: Normal pulses.   Pulmonary:      Effort: Pulmonary effort is normal.      Breath sounds: Normal breath sounds.   Abdominal:      General: Abdomen is flat.   Musculoskeletal:         General: Normal range of motion.      Cervical back: Normal range of motion.   Skin:     General: Skin is warm.   Neurological:      Mental Status: She is alert.       Comprehensive Neurological Exam:  Mental Status: Alert Oriented to Self, Date, and Place. Naming, repetition, reading, and writing wnl. Comprehension wnl. No dysarthria.   CN II - XII: LUCI, No APD, Fundus wnl OU, VA grossly intact to finger counting at 6 ft, VFFC, No ptosis OU, EOMI without nystagmus, LT/Temp symmetric in CN V1-3 distribution, Hearing grossly intact, Face Symmetric, Tongue and Uvula midline, Trapezius symmetric bilateral.   Motor: tone and bulk wnl throughout, no abnormal involuntary or voluntary movements, 5/5 to confrontation, Fine finger movements wnl b/l, No pronator drift.   Sensory: LT, Proprioception, Vibration, PP, Temp symmetric. No sensory simultagnosia.   Reflexes: 2+ throughout, plantar reflexes downward bilateral.   Cerebellar: FNF wnl b/l, RAHM wnl  b/l,   Romberg: Negative  Gait: normal. Heel Gait, Toe Gait, Tandem Gait wnl.     Assessment:     This is 52 y.o. female with history of  PTSD, DJD, who is referred for Cervical and Lumbar DJD.     Problem List Items Addressed This Visit          Neuro    Cervical radiculopathy    Lumbar radiculopathy       Plan:     [] pending neurosurgical evaluation     RTC PRN      I have explained the treatment plan, diagnosis, and prognosis to patient. All questions are answered to the best of my knowledge.     Face to face time 45 minutes, including documentation, chart review, counseling, education, review of test results, relevant medical records, and coordination of care.   I have explained the treatment plan, diagnosis, and prognosis to patient. All questions are answered to the best of my knowledge.       Ana Richards MD   General Neurology  01/19/2023

## 2023-01-19 ENCOUNTER — OFFICE VISIT (OUTPATIENT)
Dept: NEUROLOGY | Facility: CLINIC | Age: 53
End: 2023-01-19
Payer: MEDICAID

## 2023-01-19 VITALS
OXYGEN SATURATION: 96 % | TEMPERATURE: 98 F | SYSTOLIC BLOOD PRESSURE: 105 MMHG | WEIGHT: 128 LBS | HEART RATE: 63 BPM | BODY MASS INDEX: 21.85 KG/M2 | DIASTOLIC BLOOD PRESSURE: 67 MMHG | HEIGHT: 64 IN

## 2023-01-19 DIAGNOSIS — M54.12 CERVICAL RADICULOPATHY: ICD-10-CM

## 2023-01-19 DIAGNOSIS — M54.16 LUMBAR RADICULOPATHY: ICD-10-CM

## 2023-01-19 PROCEDURE — 99204 PR OFFICE/OUTPT VISIT, NEW, LEVL IV, 45-59 MIN: ICD-10-PCS | Mod: S$PBB,,, | Performed by: PSYCHIATRY & NEUROLOGY

## 2023-01-19 PROCEDURE — 3008F PR BODY MASS INDEX (BMI) DOCUMENTED: ICD-10-PCS | Mod: CPTII,,, | Performed by: PSYCHIATRY & NEUROLOGY

## 2023-01-19 PROCEDURE — 99213 OFFICE O/P EST LOW 20 MIN: CPT | Mod: PBBFAC | Performed by: PSYCHIATRY & NEUROLOGY

## 2023-01-19 PROCEDURE — 3008F BODY MASS INDEX DOCD: CPT | Mod: CPTII,,, | Performed by: PSYCHIATRY & NEUROLOGY

## 2023-01-19 PROCEDURE — 3074F PR MOST RECENT SYSTOLIC BLOOD PRESSURE < 130 MM HG: ICD-10-PCS | Mod: CPTII,,, | Performed by: PSYCHIATRY & NEUROLOGY

## 2023-01-19 PROCEDURE — 3078F DIAST BP <80 MM HG: CPT | Mod: CPTII,,, | Performed by: PSYCHIATRY & NEUROLOGY

## 2023-01-19 PROCEDURE — 99204 OFFICE O/P NEW MOD 45 MIN: CPT | Mod: S$PBB,,, | Performed by: PSYCHIATRY & NEUROLOGY

## 2023-01-19 PROCEDURE — 3078F PR MOST RECENT DIASTOLIC BLOOD PRESSURE < 80 MM HG: ICD-10-PCS | Mod: CPTII,,, | Performed by: PSYCHIATRY & NEUROLOGY

## 2023-01-19 PROCEDURE — 3074F SYST BP LT 130 MM HG: CPT | Mod: CPTII,,, | Performed by: PSYCHIATRY & NEUROLOGY

## 2023-01-27 ENCOUNTER — OFFICE VISIT (OUTPATIENT)
Dept: FAMILY MEDICINE | Facility: CLINIC | Age: 53
End: 2023-01-27
Payer: MEDICAID

## 2023-01-27 VITALS
TEMPERATURE: 98 F | DIASTOLIC BLOOD PRESSURE: 60 MMHG | SYSTOLIC BLOOD PRESSURE: 100 MMHG | RESPIRATION RATE: 18 BRPM | WEIGHT: 130.63 LBS | HEART RATE: 78 BPM | HEIGHT: 64 IN | BODY MASS INDEX: 22.3 KG/M2

## 2023-01-27 DIAGNOSIS — G89.29 CHRONIC LOW BACK PAIN, UNSPECIFIED BACK PAIN LATERALITY, UNSPECIFIED WHETHER SCIATICA PRESENT: ICD-10-CM

## 2023-01-27 DIAGNOSIS — M79.661 PAIN OF RIGHT LOWER LEG: ICD-10-CM

## 2023-01-27 DIAGNOSIS — R10.13 EPIGASTRIC ABDOMINAL PAIN: ICD-10-CM

## 2023-01-27 DIAGNOSIS — M48.02 CERVICAL STENOSIS OF SPINAL CANAL: Primary | ICD-10-CM

## 2023-01-27 DIAGNOSIS — M54.50 CHRONIC LOW BACK PAIN, UNSPECIFIED BACK PAIN LATERALITY, UNSPECIFIED WHETHER SCIATICA PRESENT: ICD-10-CM

## 2023-01-27 PROCEDURE — 99214 PR OFFICE/OUTPT VISIT, EST, LEVL IV, 30-39 MIN: ICD-10-PCS | Mod: S$PBB,,, | Performed by: FAMILY MEDICINE

## 2023-01-27 PROCEDURE — 3074F SYST BP LT 130 MM HG: CPT | Mod: CPTII,,, | Performed by: FAMILY MEDICINE

## 2023-01-27 PROCEDURE — 99214 OFFICE O/P EST MOD 30 MIN: CPT | Mod: PBBFAC | Performed by: FAMILY MEDICINE

## 2023-01-27 PROCEDURE — 3008F BODY MASS INDEX DOCD: CPT | Mod: CPTII,,, | Performed by: FAMILY MEDICINE

## 2023-01-27 PROCEDURE — 1159F MED LIST DOCD IN RCRD: CPT | Mod: CPTII,,, | Performed by: FAMILY MEDICINE

## 2023-01-27 PROCEDURE — 1159F PR MEDICATION LIST DOCUMENTED IN MEDICAL RECORD: ICD-10-PCS | Mod: CPTII,,, | Performed by: FAMILY MEDICINE

## 2023-01-27 PROCEDURE — 99214 OFFICE O/P EST MOD 30 MIN: CPT | Mod: S$PBB,,, | Performed by: FAMILY MEDICINE

## 2023-01-27 PROCEDURE — 3078F DIAST BP <80 MM HG: CPT | Mod: CPTII,,, | Performed by: FAMILY MEDICINE

## 2023-01-27 PROCEDURE — 3074F PR MOST RECENT SYSTOLIC BLOOD PRESSURE < 130 MM HG: ICD-10-PCS | Mod: CPTII,,, | Performed by: FAMILY MEDICINE

## 2023-01-27 PROCEDURE — 3008F PR BODY MASS INDEX (BMI) DOCUMENTED: ICD-10-PCS | Mod: CPTII,,, | Performed by: FAMILY MEDICINE

## 2023-01-27 PROCEDURE — 3078F PR MOST RECENT DIASTOLIC BLOOD PRESSURE < 80 MM HG: ICD-10-PCS | Mod: CPTII,,, | Performed by: FAMILY MEDICINE

## 2023-01-27 RX ORDER — OMEPRAZOLE 40 MG/1
40 CAPSULE, DELAYED RELEASE ORAL DAILY
Qty: 30 CAPSULE | Refills: 2 | Status: ON HOLD | OUTPATIENT
Start: 2023-01-27 | End: 2023-04-28
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
## 2023-02-07 ENCOUNTER — HOSPITAL ENCOUNTER (OUTPATIENT)
Dept: CARDIOLOGY | Facility: HOSPITAL | Age: 53
Discharge: HOME | End: 2023-02-07
Attending: FAMILY MEDICINE
Payer: MEDICAID

## 2023-02-07 DIAGNOSIS — M79.661 PAIN OF RIGHT LOWER LEG: ICD-10-CM

## 2023-02-07 PROCEDURE — 93971 CV US DOPPLER VENOUS LEG RIGHT (CUPID ONLY): ICD-10-PCS | Mod: 26,RT,, | Performed by: SURGERY

## 2023-02-07 PROCEDURE — 93971 EXTREMITY STUDY: CPT | Mod: RT

## 2023-02-07 PROCEDURE — 93971 EXTREMITY STUDY: CPT | Mod: 26,RT,, | Performed by: SURGERY

## 2023-02-13 ENCOUNTER — PATIENT MESSAGE (OUTPATIENT)
Dept: ADMINISTRATIVE | Facility: HOSPITAL | Age: 53
End: 2023-02-13
Payer: MEDICAID

## 2023-02-13 PROBLEM — M51.36 DEGENERATIVE DISC DISEASE, LUMBAR: Status: ACTIVE | Noted: 2023-02-13

## 2023-02-13 PROBLEM — M51.369 DEGENERATIVE DISC DISEASE, LUMBAR: Status: ACTIVE | Noted: 2023-02-13

## 2023-02-20 ENCOUNTER — OFFICE VISIT (OUTPATIENT)
Dept: FAMILY MEDICINE | Facility: CLINIC | Age: 53
End: 2023-02-20
Payer: MEDICAID

## 2023-02-20 VITALS
HEIGHT: 64 IN | DIASTOLIC BLOOD PRESSURE: 65 MMHG | OXYGEN SATURATION: 98 % | RESPIRATION RATE: 18 BRPM | TEMPERATURE: 98 F | SYSTOLIC BLOOD PRESSURE: 98 MMHG | BODY MASS INDEX: 22.1 KG/M2 | WEIGHT: 129.44 LBS | HEART RATE: 81 BPM

## 2023-02-20 DIAGNOSIS — F32.A MILD DEPRESSION: ICD-10-CM

## 2023-02-20 DIAGNOSIS — F43.21 GRIEF REACTION: ICD-10-CM

## 2023-02-20 DIAGNOSIS — F41.1 GAD (GENERALIZED ANXIETY DISORDER): ICD-10-CM

## 2023-02-20 DIAGNOSIS — F43.10 PTSD (POST-TRAUMATIC STRESS DISORDER): Primary | ICD-10-CM

## 2023-02-20 PROCEDURE — 3008F BODY MASS INDEX DOCD: CPT | Mod: CPTII,SA,HB, | Performed by: NURSE PRACTITIONER

## 2023-02-20 PROCEDURE — 1159F PR MEDICATION LIST DOCUMENTED IN MEDICAL RECORD: ICD-10-PCS | Mod: CPTII,SA,HB, | Performed by: NURSE PRACTITIONER

## 2023-02-20 PROCEDURE — 3078F PR MOST RECENT DIASTOLIC BLOOD PRESSURE < 80 MM HG: ICD-10-PCS | Mod: CPTII,SA,HB, | Performed by: NURSE PRACTITIONER

## 2023-02-20 PROCEDURE — 90838 PR PSYCHOTHERAPY W/PATIENT W/E&M, 60 MIN (ADD ON): ICD-10-PCS | Mod: S$PBB,SA,HB, | Performed by: NURSE PRACTITIONER

## 2023-02-20 PROCEDURE — 99214 OFFICE O/P EST MOD 30 MIN: CPT | Mod: PBBFAC | Performed by: NURSE PRACTITIONER

## 2023-02-20 PROCEDURE — 3074F PR MOST RECENT SYSTOLIC BLOOD PRESSURE < 130 MM HG: ICD-10-PCS | Mod: CPTII,SA,HB, | Performed by: NURSE PRACTITIONER

## 2023-02-20 PROCEDURE — 3008F PR BODY MASS INDEX (BMI) DOCUMENTED: ICD-10-PCS | Mod: CPTII,SA,HB, | Performed by: NURSE PRACTITIONER

## 2023-02-20 PROCEDURE — 90838 PSYTX W PT W E/M 60 MIN: CPT | Mod: S$PBB,SA,HB, | Performed by: NURSE PRACTITIONER

## 2023-02-20 PROCEDURE — 1159F MED LIST DOCD IN RCRD: CPT | Mod: CPTII,SA,HB, | Performed by: NURSE PRACTITIONER

## 2023-02-20 PROCEDURE — 99214 OFFICE O/P EST MOD 30 MIN: CPT | Mod: S$PBB,SA,HB, | Performed by: NURSE PRACTITIONER

## 2023-02-20 PROCEDURE — 3078F DIAST BP <80 MM HG: CPT | Mod: CPTII,SA,HB, | Performed by: NURSE PRACTITIONER

## 2023-02-20 PROCEDURE — 99214 PR OFFICE/OUTPT VISIT, EST, LEVL IV, 30-39 MIN: ICD-10-PCS | Mod: S$PBB,SA,HB, | Performed by: NURSE PRACTITIONER

## 2023-02-20 PROCEDURE — 3074F SYST BP LT 130 MM HG: CPT | Mod: CPTII,SA,HB, | Performed by: NURSE PRACTITIONER

## 2023-02-20 PROCEDURE — 90838 PSYTX W PT W E/M 60 MIN: CPT | Mod: PBBFAC | Performed by: NURSE PRACTITIONER
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
## 2023-02-20 NOTE — PATIENT INSTRUCTIONS
Meds as directed  Keep all healthcare appointments  Continue working in [employer] workbook--bring to next appointment  Utilize self-interventions from patient education materials  Nearest ER if overwhelmed   Clinic 4 weeks

## 2023-02-27 ENCOUNTER — TELEPHONE (OUTPATIENT)
Dept: FAMILY MEDICINE | Facility: CLINIC | Age: 53
End: 2023-02-27
Payer: MEDICAID

## 2023-02-27 NOTE — TELEPHONE ENCOUNTER
LVM  ----- Message from Raegan Swan MD sent at 2/24/2023 10:36 PM CST -----  No clots of right lower extremity. Normal study.  Thanks

## 2023-02-27 NOTE — TELEPHONE ENCOUNTER
Called patient.  not available.   ----- Message from Raegan Swan MD sent at 2/24/2023 10:36 PM CST -----  No clots of right lower extremity. Normal study.  Thanks

## 2023-04-28 ENCOUNTER — HOSPITAL ENCOUNTER (INPATIENT)
Facility: HOSPITAL | Age: 53
LOS: 1 days | Discharge: HOME | DRG: 063 | End: 2023-04-29
Attending: EMERGENCY MEDICINE | Admitting: HOSPITALIST
Payer: MEDICAID

## 2023-04-28 DIAGNOSIS — R29.90 STROKE-LIKE SYMPTOM: ICD-10-CM

## 2023-04-28 DIAGNOSIS — I63.9 STROKE ABORTED BY ADMINISTRATION OF THROMBOLYTIC AGENT: ICD-10-CM

## 2023-04-28 DIAGNOSIS — R07.9 CHEST PAIN: Primary | ICD-10-CM

## 2023-04-28 DIAGNOSIS — I63.9 STROKE: ICD-10-CM

## 2023-04-28 LAB
ALBUMIN SERPL-MCNC: 3.9 G/DL (ref 3.5–5)
ALBUMIN/GLOB SERPL: 1.8 RATIO (ref 1.1–2)
ALP SERPL-CCNC: 51 UNIT/L (ref 40–150)
ALT SERPL-CCNC: 19 UNIT/L (ref 0–55)
AMPHET UR QL SCN: NEGATIVE
ANION GAP SERPL CALC-SCNC: 14 MMOL/L (ref 8–16)
APPEARANCE UR: CLEAR
APTT PPP: 27.4 SECONDS (ref 23.2–33.7)
AST SERPL-CCNC: 16 UNIT/L (ref 5–34)
B-HCG SERPL QL: NEGATIVE
BACTERIA #/AREA URNS AUTO: NORMAL /HPF
BARBITURATE SCN PRESENT UR: NEGATIVE
BASOPHILS # BLD AUTO: 0.02 X10(3)/MCL (ref 0–0.2)
BASOPHILS # BLD AUTO: 0.03 X10(3)/MCL (ref 0–0.2)
BASOPHILS NFR BLD AUTO: 0.4 %
BASOPHILS NFR BLD AUTO: 0.6 %
BENZODIAZ UR QL SCN: NEGATIVE
BILIRUB UR QL STRIP.AUTO: NEGATIVE MG/DL
BILIRUBIN DIRECT+TOT PNL SERPL-MCNC: 0.4 MG/DL
BUN SERPL-MCNC: 17 MG/DL (ref 9.8–20.1)
BUN SERPL-MCNC: 21 MG/DL (ref 6–30)
CALCIUM SERPL-MCNC: 9.5 MG/DL (ref 8.4–10.2)
CANNABINOIDS UR QL SCN: NEGATIVE
CHLORIDE SERPL-SCNC: 105 MMOL/L (ref 95–110)
CHLORIDE SERPL-SCNC: 106 MMOL/L (ref 98–107)
CHOLEST SERPL-MCNC: 198 MG/DL
CHOLEST/HDLC SERPL: 2 {RATIO} (ref 0–5)
CO2 SERPL-SCNC: 26 MMOL/L (ref 22–29)
COCAINE UR QL SCN: NEGATIVE
COLOR UR AUTO: YELLOW
CREAT SERPL-MCNC: 0.7 MG/DL (ref 0.55–1.02)
CREAT SERPL-MCNC: 0.7 MG/DL (ref 0.5–1.4)
CRP SERPL HS-MCNC: 0.4 MG/L
D DIMER PPP IA.FEU-MCNC: <0.27 UG/ML FEU (ref 0–0.5)
EOSINOPHIL # BLD AUTO: 0.11 X10(3)/MCL (ref 0–0.9)
EOSINOPHIL # BLD AUTO: 0.18 X10(3)/MCL (ref 0–0.9)
EOSINOPHIL NFR BLD AUTO: 2.3 %
EOSINOPHIL NFR BLD AUTO: 3.4 %
ERYTHROCYTE [DISTWIDTH] IN BLOOD BY AUTOMATED COUNT: 11.9 % (ref 11.5–17)
ERYTHROCYTE [DISTWIDTH] IN BLOOD BY AUTOMATED COUNT: 12 % (ref 11.5–17)
EST. AVERAGE GLUCOSE BLD GHB EST-MCNC: 96.8 MG/DL
ETHANOL SERPL-MCNC: <10 MG/DL
FENTANYL UR QL SCN: NEGATIVE
GFR SERPLBLD CREATININE-BSD FMLA CKD-EPI: >60 MLS/MIN/1.73/M2
GLOBULIN SER-MCNC: 2.2 GM/DL (ref 2.4–3.5)
GLUCOSE SERPL-MCNC: 88 MG/DL (ref 70–110)
GLUCOSE SERPL-MCNC: 92 MG/DL (ref 74–100)
GLUCOSE UR QL STRIP.AUTO: NEGATIVE MG/DL
HBA1C MFR BLD: 5 %
HCT VFR BLD AUTO: 38.2 % (ref 37–47)
HCT VFR BLD AUTO: 39.8 % (ref 37–47)
HCT VFR BLD CALC: 36 %PCV (ref 36–54)
HDLC SERPL-MCNC: 81 MG/DL (ref 35–60)
HGB BLD-MCNC: 12 G/DL
HGB BLD-MCNC: 12.8 G/DL (ref 12–16)
HGB BLD-MCNC: 13.4 G/DL (ref 12–16)
IMM GRANULOCYTES # BLD AUTO: 0 X10(3)/MCL (ref 0–0.04)
IMM GRANULOCYTES # BLD AUTO: 0.01 X10(3)/MCL (ref 0–0.04)
IMM GRANULOCYTES NFR BLD AUTO: 0 %
IMM GRANULOCYTES NFR BLD AUTO: 0.2 %
INR BLD: 0.93 (ref 0–1.3)
KETONES UR QL STRIP.AUTO: NEGATIVE MG/DL
LDLC SERPL CALC-MCNC: 106 MG/DL (ref 50–140)
LEFT CCA DIST DIAS: 29 CM/S
LEFT CCA DIST SYS: 122 CM/S
LEFT CCA PROX DIAS: 27 CM/S
LEFT CCA PROX SYS: 138 CM/S
LEFT ECA DIAS: 9 CM/S
LEFT ECA SYS: 83 CM/S
LEFT ICA DIST DIAS: 19 CM/S
LEFT ICA DIST SYS: 48 CM/S
LEFT ICA MID DIAS: 29 CM/S
LEFT ICA MID SYS: 46 CM/S
LEFT ICA PROX DIAS: 16 CM/S
LEFT ICA PROX SYS: 66 CM/S
LEFT VERTEBRAL DIAS: 14 CM/S
LEFT VERTEBRAL SYS: 60 CM/S
LEUKOCYTE ESTERASE UR QL STRIP.AUTO: NEGATIVE UNIT/L
LYMPHOCYTES # BLD AUTO: 2.02 X10(3)/MCL (ref 0.6–4.6)
LYMPHOCYTES # BLD AUTO: 3.06 X10(3)/MCL (ref 0.6–4.6)
LYMPHOCYTES NFR BLD AUTO: 43 %
LYMPHOCYTES NFR BLD AUTO: 58 %
MCH RBC QN AUTO: 31.8 PG (ref 27–31)
MCH RBC QN AUTO: 31.9 PG (ref 27–31)
MCHC RBC AUTO-ENTMCNC: 33.5 G/DL (ref 33–36)
MCHC RBC AUTO-ENTMCNC: 33.7 G/DL (ref 33–36)
MCV RBC AUTO: 94.5 FL (ref 80–94)
MCV RBC AUTO: 95.3 FL (ref 80–94)
MDMA UR QL SCN: NEGATIVE
MONOCYTES # BLD AUTO: 0.35 X10(3)/MCL (ref 0.1–1.3)
MONOCYTES # BLD AUTO: 0.49 X10(3)/MCL (ref 0.1–1.3)
MONOCYTES NFR BLD AUTO: 7.4 %
MONOCYTES NFR BLD AUTO: 9.3 %
NEUTROPHILS # BLD AUTO: 1.51 X10(3)/MCL (ref 2.1–9.2)
NEUTROPHILS # BLD AUTO: 2.2 X10(3)/MCL (ref 2.1–9.2)
NEUTROPHILS NFR BLD AUTO: 28.5 %
NEUTROPHILS NFR BLD AUTO: 46.9 %
NITRITE UR QL STRIP.AUTO: NEGATIVE
NRBC BLD AUTO-RTO: 0 %
NRBC BLD AUTO-RTO: 0 %
OHS CV CAROTID RIGHT ICA EDV HIGHEST: 30
OHS CV CAROTID ULTRASOUND LEFT ICA/CCA RATIO: 0.54
OHS CV CAROTID ULTRASOUND RIGHT ICA/CCA RATIO: 0.98
OHS CV PV CAROTID LEFT HIGHEST CCA: 138
OHS CV PV CAROTID LEFT HIGHEST ICA: 66
OHS CV PV CAROTID RIGHT HIGHEST CCA: 90
OHS CV PV CAROTID RIGHT HIGHEST ICA: 88
OHS CV US CAROTID LEFT HIGHEST EDV: 29
OPIATES UR QL SCN: NEGATIVE
PCP UR QL: NEGATIVE
PH UR STRIP.AUTO: 7 [PH]
PH UR: 7 [PH] (ref 3–11)
PLATELET # BLD AUTO: 205 X10(3)/MCL (ref 130–400)
PLATELET # BLD AUTO: 237 X10(3)/MCL (ref 130–400)
PMV BLD AUTO: 10.8 FL (ref 7.4–10.4)
PMV BLD AUTO: 9.4 FL (ref 7.4–10.4)
POC IONIZED CALCIUM: 1.23 MMOL/L (ref 1.06–1.42)
POC PTINR: 1.2 (ref 0.9–1.2)
POC PTWBT: 14.1 SEC (ref 9.7–14.3)
POC TCO2 (MEASURED): 28 MMOL/L (ref 23–29)
POCT GLUCOSE: 92 MG/DL (ref 70–110)
POTASSIUM BLD-SCNC: 4.3 MMOL/L (ref 3.5–5.1)
POTASSIUM SERPL-SCNC: 4 MMOL/L (ref 3.5–5.1)
PROT SERPL-MCNC: 6.1 GM/DL (ref 6.4–8.3)
PROT UR QL STRIP.AUTO: NEGATIVE MG/DL
PROTHROMBIN TIME: 12.4 SECONDS (ref 12.5–14.5)
RBC # BLD AUTO: 4.01 X10(6)/MCL (ref 4.2–5.4)
RBC # BLD AUTO: 4.21 X10(6)/MCL (ref 4.2–5.4)
RBC #/AREA URNS AUTO: <5 /HPF
RBC UR QL AUTO: NEGATIVE UNIT/L
RIGHT CCA DIST DIAS: 19 CM/S
RIGHT CCA DIST SYS: 90 CM/S
RIGHT CCA PROX DIAS: 13 CM/S
RIGHT CCA PROX SYS: 74 CM/S
RIGHT ECA DIAS: 5 CM/S
RIGHT ECA SYS: 58 CM/S
RIGHT ICA DIST DIAS: 30 CM/S
RIGHT ICA DIST SYS: 83 CM/S
RIGHT ICA MID DIAS: 19 CM/S
RIGHT ICA MID SYS: 54 CM/S
RIGHT ICA PROX DIAS: 29 CM/S
RIGHT ICA PROX SYS: 88 CM/S
RIGHT VERTEBRAL DIAS: 12 CM/S
RIGHT VERTEBRAL SYS: 46 CM/S
SAMPLE: NORMAL
SAMPLE: NORMAL
SODIUM BLD-SCNC: 141 MMOL/L (ref 136–145)
SODIUM SERPL-SCNC: 140 MMOL/L (ref 136–145)
SP GR UR STRIP.AUTO: >=1.04 (ref 1–1.03)
SQUAMOUS #/AREA URNS AUTO: <5 /HPF
TRIGL SERPL-MCNC: 53 MG/DL (ref 37–140)
TROPONIN I SERPL-MCNC: <0.01 NG/ML (ref 0–0.04)
TSH SERPL-ACNC: 2.48 UIU/ML (ref 0.35–4.94)
UROBILINOGEN UR STRIP-ACNC: 0.2 MG/DL
VLDLC SERPL CALC-MCNC: 11 MG/DL
WBC # SPEC AUTO: 4.7 X10(3)/MCL (ref 4.5–11.5)
WBC # SPEC AUTO: 5.3 X10(3)/MCL (ref 4.5–11.5)
WBC #/AREA URNS AUTO: <5 /HPF

## 2023-04-28 PROCEDURE — 20000000 HC ICU ROOM

## 2023-04-28 PROCEDURE — 84443 ASSAY THYROID STIM HORMONE: CPT | Performed by: FAMILY MEDICINE

## 2023-04-28 PROCEDURE — 93010 ELECTROCARDIOGRAM REPORT: CPT | Mod: ,,, | Performed by: INTERNAL MEDICINE

## 2023-04-28 PROCEDURE — 82962 GLUCOSE BLOOD TEST: CPT

## 2023-04-28 PROCEDURE — 92523 SPEECH SOUND LANG COMPREHEN: CPT

## 2023-04-28 PROCEDURE — 86141 C-REACTIVE PROTEIN HS: CPT | Performed by: FAMILY MEDICINE

## 2023-04-28 PROCEDURE — 85025 COMPLETE CBC W/AUTO DIFF WBC: CPT | Performed by: EMERGENCY MEDICINE

## 2023-04-28 PROCEDURE — 99233 PR SUBSEQUENT HOSPITAL CARE,LEVL III: ICD-10-PCS | Mod: ,,, | Performed by: PSYCHIATRY & NEUROLOGY

## 2023-04-28 PROCEDURE — 80061 LIPID PANEL: CPT | Performed by: FAMILY MEDICINE

## 2023-04-28 PROCEDURE — 84484 ASSAY OF TROPONIN QUANT: CPT | Performed by: EMERGENCY MEDICINE

## 2023-04-28 PROCEDURE — 85610 PROTHROMBIN TIME: CPT | Performed by: EMERGENCY MEDICINE

## 2023-04-28 PROCEDURE — 99233 SBSQ HOSP IP/OBS HIGH 50: CPT | Mod: ,,, | Performed by: PSYCHIATRY & NEUROLOGY

## 2023-04-28 PROCEDURE — 96374 THER/PROPH/DIAG INJ IV PUSH: CPT

## 2023-04-28 PROCEDURE — 25500020 PHARM REV CODE 255: Performed by: EMERGENCY MEDICINE

## 2023-04-28 PROCEDURE — 63600175 PHARM REV CODE 636 W HCPCS: Performed by: EMERGENCY MEDICINE

## 2023-04-28 PROCEDURE — 25000003 PHARM REV CODE 250

## 2023-04-28 PROCEDURE — 93005 ELECTROCARDIOGRAM TRACING: CPT

## 2023-04-28 PROCEDURE — 81001 URINALYSIS AUTO W/SCOPE: CPT | Performed by: EMERGENCY MEDICINE

## 2023-04-28 PROCEDURE — 82077 ASSAY SPEC XCP UR&BREATH IA: CPT | Performed by: EMERGENCY MEDICINE

## 2023-04-28 PROCEDURE — 25000003 PHARM REV CODE 250: Performed by: FAMILY MEDICINE

## 2023-04-28 PROCEDURE — 83036 HEMOGLOBIN GLYCOSYLATED A1C: CPT | Performed by: FAMILY MEDICINE

## 2023-04-28 PROCEDURE — 85025 COMPLETE CBC W/AUTO DIFF WBC: CPT | Performed by: FAMILY MEDICINE

## 2023-04-28 PROCEDURE — 85379 FIBRIN DEGRADATION QUANT: CPT | Performed by: EMERGENCY MEDICINE

## 2023-04-28 PROCEDURE — 85730 THROMBOPLASTIN TIME PARTIAL: CPT | Performed by: EMERGENCY MEDICINE

## 2023-04-28 PROCEDURE — 81025 URINE PREGNANCY TEST: CPT | Performed by: EMERGENCY MEDICINE

## 2023-04-28 PROCEDURE — 25000003 PHARM REV CODE 250: Performed by: STUDENT IN AN ORGANIZED HEALTH CARE EDUCATION/TRAINING PROGRAM

## 2023-04-28 PROCEDURE — 80053 COMPREHEN METABOLIC PANEL: CPT | Performed by: EMERGENCY MEDICINE

## 2023-04-28 PROCEDURE — 80307 DRUG TEST PRSMV CHEM ANLYZR: CPT | Performed by: EMERGENCY MEDICINE

## 2023-04-28 PROCEDURE — 93010 EKG 12-LEAD: ICD-10-PCS | Mod: ,,, | Performed by: INTERNAL MEDICINE

## 2023-04-28 PROCEDURE — 25500020 PHARM REV CODE 255: Performed by: HOSPITALIST

## 2023-04-28 PROCEDURE — 63600175 PHARM REV CODE 636 W HCPCS: Mod: JG

## 2023-04-28 PROCEDURE — A4216 STERILE WATER/SALINE, 10 ML: HCPCS

## 2023-04-28 PROCEDURE — 99285 EMERGENCY DEPT VISIT HI MDM: CPT | Mod: 25

## 2023-04-28 RX ORDER — SODIUM CHLORIDE 0.9 % (FLUSH) 0.9 %
10 SYRINGE (ML) INJECTION
Status: DISCONTINUED | OUTPATIENT
Start: 2023-04-28 | End: 2023-04-29 | Stop reason: HOSPADM

## 2023-04-28 RX ORDER — SODIUM CHLORIDE 9 MG/ML
INJECTION, SOLUTION INTRAVENOUS CONTINUOUS
Status: DISCONTINUED | OUTPATIENT
Start: 2023-04-28 | End: 2023-04-29 | Stop reason: HOSPADM

## 2023-04-28 RX ORDER — ALPRAZOLAM 0.5 MG/1
0.5 TABLET ORAL 3 TIMES DAILY PRN
Status: DISCONTINUED | OUTPATIENT
Start: 2023-04-28 | End: 2023-04-28

## 2023-04-28 RX ORDER — ACETAMINOPHEN 325 MG/1
650 TABLET ORAL EVERY 6 HOURS PRN
Status: DISCONTINUED | OUTPATIENT
Start: 2023-04-28 | End: 2023-04-29 | Stop reason: HOSPADM

## 2023-04-28 RX ORDER — SODIUM CHLORIDE 0.9 % (FLUSH) 0.9 %
10 SYRINGE (ML) INJECTION ONCE
Status: COMPLETED | OUTPATIENT
Start: 2023-04-28 | End: 2023-04-28

## 2023-04-28 RX ORDER — LORAZEPAM 2 MG/ML
INJECTION INTRAMUSCULAR
Status: COMPLETED
Start: 2023-04-28 | End: 2023-04-28

## 2023-04-28 RX ORDER — TALC
6 POWDER (GRAM) TOPICAL NIGHTLY PRN
Status: DISCONTINUED | OUTPATIENT
Start: 2023-04-28 | End: 2023-04-29 | Stop reason: HOSPADM

## 2023-04-28 RX ORDER — PANTOPRAZOLE SODIUM 40 MG/1
40 TABLET, DELAYED RELEASE ORAL DAILY
Status: DISCONTINUED | OUTPATIENT
Start: 2023-04-28 | End: 2023-04-29 | Stop reason: HOSPADM

## 2023-04-28 RX ORDER — ATORVASTATIN CALCIUM 40 MG/1
40 TABLET, FILM COATED ORAL DAILY
Status: DISCONTINUED | OUTPATIENT
Start: 2023-04-28 | End: 2023-04-29 | Stop reason: HOSPADM

## 2023-04-28 RX ORDER — LORAZEPAM 2 MG/ML
1 INJECTION INTRAMUSCULAR
Status: COMPLETED | OUTPATIENT
Start: 2023-04-28 | End: 2023-04-28

## 2023-04-28 RX ADMIN — Medication 10 ML: at 02:04

## 2023-04-28 RX ADMIN — LORAZEPAM 1 MG: 2 INJECTION INTRAMUSCULAR; INTRAVENOUS at 01:04

## 2023-04-28 RX ADMIN — ALPRAZOLAM 0.5 MG: 0.5 TABLET ORAL at 08:04

## 2023-04-28 RX ADMIN — Medication 15.5 MG: at 01:04

## 2023-04-28 RX ADMIN — ALPRAZOLAM 0.5 MG: 0.5 TABLET ORAL at 03:04

## 2023-04-28 RX ADMIN — ATORVASTATIN CALCIUM 40 MG: 40 TABLET, FILM COATED ORAL at 03:04

## 2023-04-28 RX ADMIN — SODIUM CHLORIDE: 9 INJECTION, SOLUTION INTRAVENOUS at 02:04

## 2023-04-28 RX ADMIN — PANTOPRAZOLE SODIUM 40 MG: 40 TABLET, DELAYED RELEASE ORAL at 09:04

## 2023-04-28 RX ADMIN — IOPAMIDOL 100 ML: 755 INJECTION, SOLUTION INTRAVENOUS at 01:04

## 2023-04-28 RX ADMIN — ACETAMINOPHEN 650 MG: 325 TABLET ORAL at 08:04

## 2023-04-28 NOTE — PLAN OF CARE
04/28/23 1143   Discharge Assessment   Assessment Type Discharge Planning Assessment   Confirmed/corrected address, phone number and insurance Yes   Confirmed Demographics Correct on Facesheet   Source of Information patient   When was your last doctors appointment?   (Patient reports 6 months ago.)   Communicated VASILE with patient/caregiver Yes   Reason For Admission Stroke-like-symptom   People in Home alone   Do you expect to return to your current living situation? Yes   Do you have help at home or someone to help you manage your care at home? Yes   Who are your caregiver(s) and their phone number(s)? Friend: Chay Pedersen: [phone]   Prior to hospitilization cognitive status: Unable to Assess   Current cognitive status: Alert/Oriented   Home Accessibility stairs to enter home   Number of Stairs, Main Entrance four   Home Layout Able to live on 1st floor   Equipment Currently Used at Home none   Readmission within 30 days? No   Patient currently being followed by outpatient case management? No   Do you currently have service(s) that help you manage your care at home? No   Do you take prescription medications? Yes   Do you have prescription coverage? Yes   Coverage Medicaid- [facility]   Do you have any problems affording any of your prescribed medications? No   Is the patient taking medications as prescribed? yes   Who is going to help you get home at discharge? Patient reports her friend, Chay Pedersen   How do you get to doctors appointments? car, drives self   Are you on dialysis? No   Do you take coumadin? No  (Aspirin)   Discharge Plan A Home   Discharge Plan B Home with family   DME Needed Upon Discharge  none   Discharge Plan discussed with: Patient   Discharge Barriers Identified None       Patient's PCP is Raegan Jeronimo.  No barriers to discharge at this time.

## 2023-04-28 NOTE — PT/OT/SLP EVAL
Speech Language Pathology Department  Cognitive-Communication Evaluation    Patient Name:  Remi Baptiste   MRN:  [id number]    Recommendations:     General recommendations:  standardized cognitive testing given previously high level of independent functioning  Communication strategies:  none    History:     Remi Baptiste is a/n 52 y.o. female admitted with chest pain and decreased sensation and weakness to the E.  Code FAST was called and pt was given tenecteplase.    Pt passed nursing swallow screen in the ED.    Past Medical History:   Diagnosis Date    Anxiety     Depression      Past Surgical History:   Procedure Laterality Date    BLADDER SUSPENSION      OVARIAN CYST REMOVAL      TONSILLECTOMY         Previous level of Function  Education:college  Occupation: full time job doing housekeeping  Lives: alone  Handed: Right  Glasses: Yes  Hearing Aids: No  Home Responsibilities: independent with all home management tasks    Subjective     Patient awake, alert, and cooperative.    Patient goals: to go home     Spiritual/Cultural/Samaritan Beliefs/Practices that affect care: no  Pain/Comfort: Pain Rating 1: 0/10  Respiratory Status: room air    Objective:     SPEECH PRODUCTION  Phoneme Production: adequate  Voice Quality: adequate  Voice Production: adequate  Speech Rate: appropriate  Loudness: acceptable  Respiration: WFL for speech  Resonance: adequate  Prosody: adequate  Speech Intelligibility  Known Context: Greater that 90%  Unknown Context: Greater that 90%    AUDITORY COMPREHENSION  Following Directions:  1-Step: 100%  2-Step: 100%  Yes/No Questions:  Biographical: 100%  Environmental: 100%  Simple: 100%    VERBAL EXPRESSION  Automatic Speech:  Functional needs: WFL  Confrontation Naming  Objects: 100%  Wh- Questions:  Object name: 100%  Object function: 100%    COGNITION  Orientation:  Person: yes  Place: yes  Time: yes  Situation: yes  Attention:  Sustained: WFL  Pragmatics:  Eye contact: impaired  Facial  expression: impaired  Memory:  Immediate: WFL  Delayed: impaired (9/12 Four Word Memory Task)  Long Term: impaired  Problem Solving  Functional simple: WFL  Executive Function:  WFL    Assessment:     Pt present with functional speech/language abilities and basic cognitive abilities.  Given previously high level of independent level of functioning, further assessment of high level cognitive abilities warranted.    Patient Education:     Patient provided with verbal education regarding SLP POC.  Understanding was verbalized.    Plan:     SLP Follow-Up:  Yes   Plan of Care reviewed with:  patient      Time Tracking:     SLP Treatment Date:   04/28/23  Speech Start Time:  1015  Speech Stop Time:  1035     Speech Total Time (min):  20 min    Billable minutes:  Evaluation of Speech Sound Production with Comprehension and Expression, 20 minutes      04/28/2023

## 2023-04-28 NOTE — ED PROVIDER NOTES
"Encounter Date: 4/28/2023    SCRIBE #1 NOTE: I, Cheryl Koo, am scribing for, and in the presence of,  Hernandez Trammell MD. I have scribed the following portions of the note - Other sections scribed: HPI,ROS,PE.     History     Chief Complaint   Patient presents with    Chest Pain     Pt reports R sided chest pain that started tonight upon standing from sofa causing L arm to feel "weird". Reports being short winded this week.      52-year-old female with past medical history of HLD, anxiety, and depression presents to ED initially c/o intermittent pressure-like left sided chest pain onset tonight. States while in the waiting room, she stood up and had sudden onset of stabbing CP followed by numbness/tingling in left arm. Notes over the past week, she has been having some SCOTT, which she reports is unusual. Following pt assessment, code fast was called at 0108. Pt was not initially called as a code fast due to initial complaint of CP,  she then reported decrease sensation to LUE and weakness after entering room.     The history is provided by the patient. No  was used.   Chest Pain  The current episode started today. Chest pain occurs intermittently. The chest pain is unchanged. The quality of the pain is described as stabbing, tightness and pressure-like. Pertinent negatives for primary symptoms include no fever, no fatigue, no cough, no wheezing, no abdominal pain, no nausea and no vomiting.   Associated symptoms include numbness and weakness.   Pertinent negatives for associated symptoms include no diaphoresis.   Her past medical history is significant for hyperlipidemia.   Review of patient's allergies indicates:   Allergen Reactions    Hydrocodone-guaifenesin Hives    Hydrocodone-acetaminophen     Penicillins Rash     Past Medical History:   Diagnosis Date    Anxiety     Depression      Past Surgical History:   Procedure Laterality Date    BLADDER SUSPENSION      OVARIAN CYST REMOVAL      " TONSILLECTOMY       Family History   Problem Relation Age of Onset    Hypertension Mother     Arthritis Mother     COPD Mother     Hypertension Father     Cancer Paternal Grandmother      Social History     Tobacco Use    Smoking status: Never     Passive exposure: Never    Smokeless tobacco: Never   Substance Use Topics    Alcohol use: Not Currently     Comment: Occasionally    Drug use: Never     Review of Systems   Constitutional:  Negative for chills, diaphoresis, fatigue and fever.   HENT:  Negative for congestion and trouble swallowing.    Eyes:  Negative for pain and discharge.   Respiratory:  Positive for chest tightness. Negative for cough and wheezing.    Cardiovascular:  Positive for chest pain. Negative for leg swelling.   Gastrointestinal:  Negative for abdominal pain, diarrhea, nausea and vomiting.   Genitourinary:  Negative for dysuria, flank pain, vaginal bleeding and vaginal discharge.   Musculoskeletal:  Negative for arthralgias.   Skin:  Negative for color change.   Neurological:  Positive for weakness and numbness. Negative for syncope.   Psychiatric/Behavioral:  Negative for agitation and confusion. The patient is nervous/anxious.    All other systems reviewed and are negative.    Physical Exam     Initial Vitals [04/28/23 0036]   BP Pulse Resp Temp SpO2   126/68 71 20 98.4 °F (36.9 °C) 98 %      MAP       --         Physical Exam    Nursing note and vitals reviewed.  Constitutional: She appears well-developed.   HENT:   Head: Normocephalic and atraumatic.   Mouth/Throat: Oropharynx is clear and moist.   Eyes: Conjunctivae and EOM are normal. Pupils are equal, round, and reactive to light.   Neck: Neck supple.   Cardiovascular:  Normal rate and regular rhythm.           No murmur heard.  Pulses:       Radial pulses are 2+ on the right side and 2+ on the left side.   Pulmonary/Chest: Breath sounds normal. No respiratory distress. She exhibits no tenderness.   Abdominal: Abdomen is soft. Bowel  sounds are normal. She exhibits no distension. There is no abdominal tenderness.   Musculoskeletal:         General: Normal range of motion.      Cervical back: Neck supple.      Lumbar back: Normal. Normal range of motion.     Neurological: She is alert and oriented to person, place, and time.   Patient very anxious in room, she has weakness to upper extremity, Left greater than right. 4/5 motor strength in LLE, 5/5 motor strength in RLE. There is no facial droop or asymmetry.    Psychiatric: Her mood appears anxious.       ED Course   Critical Care    Date/Time: 4/28/2023 1:50 AM  Performed by: Hernandez Trammell MD  Authorized by: Hernandez Trammell MD   Direct patient critical care time: 45 minutes  Total critical care time (exclusive of procedural time) : 45 minutes  Critical care time was exclusive of separately billable procedures and treating other patients.  Critical care was time spent personally by me on the following activities: blood draw for specimens, development of treatment plan with patient or surrogate, discussions with consultants, interpretation of cardiac output measurements, evaluation of patient's response to treatment, examination of patient, obtaining history from patient or surrogate, ordering and performing treatments and interventions, ordering and review of laboratory studies, ordering and review of radiographic studies, pulse oximetry and re-evaluation of patient's condition.      Labs Reviewed   CBC W/ AUTO DIFFERENTIAL    Narrative:     The following orders were created for panel order CBC auto differential.  Procedure                               Abnormality         Status                     ---------                               -----------         ------                     CBC with Differential[[id number]]                            In process                   Please view results for these tests on the individual orders.   COMPREHENSIVE METABOLIC PANEL   APTT   TROPONIN I    URINALYSIS, REFLEX TO URINE CULTURE   PREGNANCY TEST, URINE RAPID   PROTIME-INR   ALCOHOL,MEDICAL (ETHANOL)   DRUG SCREEN, URINE (BEAKER)   D DIMER, QUANTITATIVE   CBC WITH DIFFERENTIAL   PLATELET COUNT     EKG Readings: (Independently Interpreted)   Rhythm: Normal Sinus Rhythm. Ectopy: No Ectopy. Conduction: Normal. ST Segments: Normal ST Segments. T Waves: Normal. Axis: Normal.   EKG done 0039: Sinus rhythm at 61bpm      Imaging Results              CTA Chest Aorta Non Coronary (In process)                      X-Ray Chest 1 View (In process)                      CTA Head and Neck (xpd) (In process)    Procedure changed from CTA Brain                    CT HEAD FOR STROKE (Preliminary result)  Result time 04/28/23 01:32:10   Procedure changed from CT Head Without Contrast     Preliminary result by Mitul Flanagan Jr., MD (04/28/23 01:32:10)                   Narrative:    START OF REPORT:  TECHNIQUE: CT OF THE HEAD WAS PERFORMED WITHOUT INTRAVENOUS CONTRAST WITH AXIAL AS WELL AS CORONAL AND SAGITTAL IMAGES.    COMPARISON: COMPARISON IS WITH MRI STUDY DATED2022-10-14 17:56:02.    DOSAGE INFORMATION: AUTOMATED EXPOSURE CONTROL WAS UTILIZED.    CLINICAL HISTORY: LEFT ARM WEAKNESS, NEEDS READ.    Findings:  Hemorrhage: No acute intracranial hemorrhage is seen.  CSF spaces: The ventricles sulci and basal cisterns are within normal limits.  Brain parenchyma: Unremarkable with preservation of the grey white junction throughout.  Cerebellum: Unremarkable.  Sella and skull base: The sella appears to be within normal limits for age.  Herniation: None.  Intracranial calcifications: Incidental note is made of bilateral choroid plexus calcification. Incidental note is made of some pineal region calcification.  Calvarium: No acute linear or depressed skull fracture is seen.    Maxillofacial Structures:  Paranasal sinuses: The visualized paranasal sinuses appear clear with no mucoperiosteal thickening or air fluid levels  identified.  Orbits: The orbits appear unremarkable.  Zygomatic arches: The zygomatic arches are intact and unremarkable.  Temporal bones and mastoids: The temporal bones and mastoids appear unremarkable.  TMJ: The mandibular condyles appear normally placed with respect to the mandibular fossa.    Notifications: This is a stroke protocol report and the results were discussed with the emergency room physician Dr. Trammell prior to dictation at 2023-04-28 01:40:19 CDT.      Impression:  1. No acute intracranial process identified. Details and findings as noted above.                                         Medications   LORazepam (ATIVAN) 2 mg/mL injection (has no administration in time range)   0.9%  NaCl infusion (has no administration in time range)   LORazepam injection 1 mg (1 mg Intravenous Given 4/28/23 0115)   iopamidoL (ISOVUE-370) injection 100 mL (100 mLs Intravenous Given 4/28/23 0135)   tenecteplase (TNKase) IV KIT 15.5 mg (15.5 mg Intravenous Given 4/28/23 0159)     Followed by   sodium chloride 0.9% flush 10 mL (10 mLs Intravenous Given 4/28/23 0200)   iopamidoL (ISOVUE-370) injection 100 mL (100 mLs Intravenous Given 4/28/23 0153)      Medical Decision Making  The differential diagnosis includes, but is not limited to: CVA, MI, Aortic dissection, and anxiety     Amount and/or Complexity of Data Reviewed  Radiology: ordered. Decision-making details documented in ED Course.             Scribe Attestation:   Scribe #1: I performed the above scribed service and the documentation accurately describes the services I performed. I attest to the accuracy of the note.    Attending Attestation:           Physician Attestation for Scribe:  Physician Attestation Statement for Scribe #1: I, Hernandez Trammell MD, reviewed documentation, as scribed by Cheryl Koo in my presence, and it is both accurate and complete.           ED Course as of 04/28/23 0201 Fri Apr 28, 2023   0142 Call and Consult radiology  [DP]    0142 Ct head neg per prelim report [NL]   0144 Paged ICU [DP]   0144 Call and Consult ICU [DP]   0144 Tnk ordered by neuro np   [NL]   0144 Icu paged   [NL]   0146 Icu will admit   [NL]      ED Course User Index  [DP] Dot Koo  [NL] Hernandez Trammell MD                   Clinical Impression:   Final diagnoses:  [R07.9] Chest pain (Primary)  [R29.90] Stroke-like symptom        ED Disposition Condition    Admit                 Hernandez Trammell MD  04/28/23 0159       Hernandez Trammell MD  04/28/23 0201

## 2023-04-28 NOTE — CONSULTS
[facility] [address]  Neurology  Consult Note      HPI:  Remi Baptiste is a 52 y.o. female who presented to [facility] on 4/28/2023. Past medical history of HLD, anxiety, and depression. She originally presented to ED with c/o left-sided chest pain, described as pressure and was intermittent.  Per report, while in the waiting room, she stood up and had sudden onset of stabbing CP followed by numbness/tingling in left arm and stated that she has been having some SCOTT, for the past week. over the past week. She also reported decreased sensation of left upper extremity and weakness.  A code fast was called at 0101. I responded at 0102 and upon my assessment. Patient was mildly dysarthric, without noticeable FD, bilateral upper and lower extremity weakness, greater in the lower extremity. CT head negative for any acute intracranial process. Spoke with Dr. Pisano, no contraindications to TNK and orders to give with patient consent at 0113. CTA head pending. The risks and benefits of Tenecteplase were discussed with patient/family. Also discussed that medication is off-FDA label but that it is within the standard of care and appropriate for their treatment. The patient and/or family verbalized   understanding of risks, benefits, off-label nature, and has given verbal consent for Tenecteplase at 0145.         Code FAST 0101  NP arrival 0102  MD notified-0102  Delays: patient decision    Past Medical History:   Diagnosis Date    Anxiety     Depression      Past Surgical History:   Procedure Laterality Date    BLADDER SUSPENSION      OVARIAN CYST REMOVAL      TONSILLECTOMY       Family History   Problem Relation Age of Onset    Hypertension Mother     Arthritis Mother     COPD Mother     Hypertension Father     Cancer Paternal Grandmother      Social History     Tobacco Use    Smoking status: Never     Passive exposure: Never    Smokeless tobacco: Never   Substance Use Topics    Alcohol use: Not Currently      Comment: Occasionally    Drug use: Never      Review of patient's allergies indicates:   Allergen Reactions    Hydrocodone-guaifenesin Hives    Hydrocodone-acetaminophen     Penicillins Rash       Last known normal: 1030  Stroke alert activated: 0101  Stroke NP responded: 010  TNK: 0159      Neurological Exam:   LOC: alert and follows requests    NIHSS:  1a  Level of consciousness: 0=alert; keenly responsive   1b. LOC questions:  0=Answers both tasks correctly   1c. LOC commands: 0=Answers both tasks correctly   2.  Best Gaze: 0=normal   3.  Visual: 0=No visual loss   4. Facial Palsy: 0=Normal symmetric movement   5a.  Motor left arm: 1=Drift, limb holds 90 (or 45) degrees but drifts down before full 10 seconds: does not hit bed   5b.  Motor right arm: 0=No drift, limb holds 90 (or 45) degrees for full 10 seconds   6a. motor left le=Some effort against gravity, limb cannot get to or maintain (if cured) 90 (or 45) degrees, drifts down to bed, but has some effort against gravity   6b  Motor right le=No drift, limb holds 90 (or 45) degrees for full 10 seconds   7. Limb Ataxia: 1=Present in one limb   8.  Sensory: 1=Mild to moderate sensory loss; patient feels pinprick is less sharp or is dull on the affected side; there is a loss of superficial pain with pinprick but patient is aware She is being touched   9. Best Language:  0=No aphasia, normal   10. Dysarthria: 1=Mild to moderate, patient slurs at least some words and at worst, can be understood with some difficulty   11. Extinction and Inattention: 0=No abnormality         Total:   6       Laboratory:  BMP:   Lab Results   Component Value Date    GLUCOSE 90 2022     2022    K 4.6 2022    CO2 26 2022    BUN 15.0 2022    CREATININE 0.71 2022    CALCIUM 10.2 2022     CBC:   Lab Results   Component Value Date    WBC 5.4 2022    RBC 4.20 2022    HGB 13.4 2022    HCT 36 2023      11/30/2022    MCV 96.0 (H) 11/30/2022    MCH 31.9 (H) 11/30/2022    MCHC 33.3 11/30/2022     Lipid Panel:   Lab Results   Component Value Date    CHOL 258 (H) 11/30/2022     (H) 11/30/2022    TRIG 119 11/30/2022     Coagulation:   Lab Results   Component Value Date    INR 1.2 04/28/2023     Hgb A1C:   Lab Results   Component Value Date    HGBA1C 5.0 11/30/2022     TSH:   Lab Results   Component Value Date    TSH 1.3052 11/30/2022       Diagnostic Results  Brain Imaging:   -CTh: negative  Cerebrovascular Imaging:   -CTA h/n: negative for LVO  Cardiac Evaluation:   -EKG/Telemetry: pending        Discussed with neurologist on call: Dr. Pisano, 0113    Thrombolysis Candidate? Yes. The risks and benefits of Tenecteplase were discussed with patient/family. Also discussed that medication is off-FDA label but that it is within the standard of care and appropriate for their treatment. The patient and/or family verbalized understanding of risks, benefits, off-label nature, and has given verbal consent for Tenecteplase. If patient was not competent, or no family was available, treatment will be administered as an emergency procedure and in what we believe to be the patients best interest.  -Delays to Thrombolysis?  No    Interventional Revascularization Candidate? No; No large vessel occlusion identified on imaging   -Delays to Thrombectomy? Not Applicable      PLAN:     S/p TNK    -permissive HTN for now ... SBP less than 180  -q1hr neuro checks  -STAT CTh for any HA or neuro change  -HOB flat, Bedrest, NPO x24 hours post TNK  -repeat CTh after 24 hours to determine if antiplatelet therapy can be initiated         Thank you for your consult. Will follow-up with patient. Please contact us if you have any additional questions.    Arlette Hightower NP  Neurology  [facility] - Emergency Dept

## 2023-04-28 NOTE — PT/OT/SLP PROGRESS
Occupational Therapy      Patient Name:  Rmei Baptiste   MRN:  [id number]    Patient not seen today secondary to 24 hr bedrest per TNK protocol. OT to f/u tomorrow as appropriate.    4/28/2023

## 2023-04-28 NOTE — HPI
[facility] - Emergency Dept  Neurology  Consult Note        Remi Baptiste is a 52 y.o. female who presented to [facility] on 4/28/2023. Past medical history of HLD, anxiety, and depression. She originally presented to ED with c/o left-sided chest pain, described as pressure and was intermittent.  Per report, while in the waiting room, she stood up and had sudden onset of stabbing CP followed by numbness/tingling in left arm and stated that she has been having some SCOTT, for the past week. over the past week. She also reported decreased sensation of left upper extremity and weakness.  A code fast was called at 0101. I responded at 0102 and upon my assessment. Patient was mildly dysarthric, without noticeable FD, bilateral upper and lower extremity weakness, greater in the lower extremity. CT head negative for any acute intracranial process. Spoke with Dr. Pisano, no contraindications to TNK and orders to give with patient consent at 0113. CTA head pending. The risks and benefits of Tenecteplase were discussed with patient/family. Also discussed that medication is off-FDA label but that it is within the standard of care and appropriate for their treatment. The patient and/or family verbalized   understanding of risks, benefits, off-label nature, and has given verbal consent for Tenecteplase at 0145. Patient admitted to ICU for post TNK monitoring. Neurology following.         Code FAST 0101  NP arrival 0102  MD notified-0102  Delays: patient decision

## 2023-04-28 NOTE — H&P
[facility]  Pulmonary Critical Care Note    Patient Name: Rmei Baptiste  MRN: [id number]  Admission Date: 4/28/2023  Hospital Length of Stay: 0 days  Code Status: Full Code  Attending Provider: Isiah Perez MD  Primary Care Provider: Raegan Swan MD     Subjective:     HPI:   This is a 52-year-old female with a history of HLD, anxiety, depression, irritable bowel disease, who presented to the emergency department with left-sided chest pain which was pressure and intermittent.  She also was having shortness of breath and dyspnea on exertion for about the last week intermittently.  She states that she was partially concerned about a heart condition due to having a history of hyperlipidemia.  She also notes that today she was found to have decreased sensation of the left upper extremity and lower extremity weakness as well.    She denies any headache, vomiting, she has been nauseous.  She does have reproducible pain on her chest with certain types of movements.    She was seen in the ER by Neurology for the weakness and a CT and CTA were done which demonstrated no significant abnormalities, she was given TNK at 1:45 a.m. and her NIH stroke scale was 6 for drift of the left arm, drift of the left leg, sensory abnormalities and mild dysarthria.    Hospital Course/Significant events:  04/28/2023 admitted to ICU post TNK    24 Hour Interval History:  N/a    Past Medical History:   Diagnosis Date    Anxiety     Depression        Past Surgical History:   Procedure Laterality Date    BLADDER SUSPENSION      OVARIAN CYST REMOVAL      TONSILLECTOMY         Social History     Socioeconomic History    Marital status: Single    Number of children: 0   Occupational History    Occupation: [employer]    Occupation: Housekeeping   Tobacco Use    Smoking status: Never     Passive exposure: Never    Smokeless tobacco: Never   Substance and Sexual Activity    Alcohol use: Not Currently     Comment:  Occasionally    Drug use: Never    Sexual activity: Not Currently     Partners: Male     Birth control/protection: Post-menopausal     Social Determinants of Health     Financial Resource Strain: Low Risk     Difficulty of Paying Living Expenses: Not hard at all   Food Insecurity: No Food Insecurity    Worried About Running Out of Food in the Last Year: Never true    Ran Out of Food in the Last Year: Never true   Transportation Needs: No Transportation Needs    Lack of Transportation (Medical): No    Lack of Transportation (Non-Medical): No   Physical Activity: Insufficiently Active    Days of Exercise per Week: 4 days    Minutes of Exercise per Session: 30 min   Stress: No Stress Concern Present    Feeling of Stress : Not at all   Social Connections: Unknown    Frequency of Communication with Friends and Family: More than three times a week    Frequency of Social Gatherings with Friends and Family: More than three times a week   Housing Stability: Low Risk     Unable to Pay for Housing in the Last Year: No    Number of Places Lived in the Last Year: 1    Unstable Housing in the Last Year: No           Current Outpatient Medications   Medication Instructions    dicyclomine (BENTYL) 20 mg, Oral, 4 times daily    meloxicam (MOBIC) 15 mg, Oral, Daily    omeprazole (PRILOSEC) 40 mg, Oral, Daily    ondansetron (ZOFRAN-ODT) 4 mg, Oral, Every 6 hours PRN       Current Inpatient Medications   atorvastatin  40 mg Oral Daily    pantoprazole  40 mg Oral Daily       Current Intravenous Infusions   sodium chloride 0.9% 75 mL/hr at 04/28/23 0204         ROS   Constitutional: no fever, fatigue, weakness  Eye: no vision loss, eye redness, drainage, or pain  ENMT: no sore throat, ear pain, sinus pain/congestion, nasal congestion/drainage  Respiratory: no cough, no wheezing, no shortness of breath  Cardiovascular: + chest pain, no palpitations, no edema  Gastrointestinal: no nausea, vomiting, or diarrhea. No abdominal  pain  Genitourinary: no dysuria, no urinary frequency or urgency, no hematuria  Hema/Lymph: no abnormal bruising or bleeding  Endocrine: no heat or cold intolerance, no excessive thirst or excessive urination  Musculoskeletal: no muscle or joint pain, no joint swelling  Integumentary: no skin rash or abnormal lesion  Neurologic: no headache, no dizziness, + weakness or numbness       Objective:     No intake or output data in the 24 hours ending 04/28/23 0432      Vital Signs (Most Recent):  Temp: 98.2 °F (36.8 °C) (04/28/23 0400)  Pulse: 74 (04/28/23 0400)  Resp: (!) 31 (04/28/23 0400)  BP: 114/65 (04/28/23 0400)  SpO2: 99 % (04/28/23 0400)  Body mass index is 23.65 kg/m².  Weight: 62.5 kg (137 lb 12.6 oz) Vital Signs (24h Range):  Temp:  [98 °F (36.7 °C)-98.4 °F (36.9 °C)] 98.2 °F (36.8 °C)  Pulse:  [70-93] 74  Resp:  [10-31] 31  SpO2:  [96 %-99 %] 99 %  BP: ()/(43-81) 114/65     Physical Exam  General: Awake, alert, & oriented to person, place & time. No acute distress  Psychiatric: Mood and affect normal  HEENT: Normocephalic, atraumatic. Face symmetric.  Cardiovascular: Regular rate & rhythm. Normal S1 & S2 w/out murmurs, rubs or gallops.  No JVD appreciated.  2+ pulses throughout.  Reproducible chest pain to palpation.  Pulmonary: Bilateral symmetric chest rise. Non-labored, no wheezes, rhonchi or crackles are appreciated.  Abdominal:  Soft, nontender, nondistended. Bowel sounds present  Extremities: No clubbing, cyanosis or edema.  Skin:  Exposed skin is warm & dry.  Neuro:  4/5 strength in the left upper extremity, 3/5 strength in the left lower extremity, decreased sensation in the left, no dysarthria, cranial nerves grossly intact.  Normal reflexes.  No clonus.    Lines/Drains/Airways       Peripheral Intravenous Line  Duration                  Peripheral IV - Single Lumen 18 G Left Antecubital -- days         Peripheral IV - Single Lumen 04/28/23 0138 20 G Left Forearm <1 day                     Significant Labs:    Lab Results   Component Value Date    WBC 5.3 04/28/2023    HGB 13.4 04/28/2023    HCT 39.8 04/28/2023    MCV 94.5 (H) 04/28/2023     04/28/2023         BMP  Lab Results   Component Value Date     04/28/2023    K 4.0 04/28/2023    CO2 26 04/28/2023    BUN 17.0 04/28/2023    CREATININE 0.70 04/28/2023    CALCIUM 9.5 04/28/2023    EGFRNONAA >60 07/02/2021       ABG  No results for input(s): PH, PO2, PCO2, HCO3, BE in the last 168 hours.    Mechanical Ventilation Support:       Significant Imaging:  I have reviewed the pertinent imaging within the past 24 hours.        Assessment/Plan:     Assessment  Concern for stroke s/p tPA  Chest pain low concern for ACS-EKG nonischemic in sinus and normal troponin  History of irritable bowel syndrome  History of anxiety  History of GERD      Plan  -continue post tPA protocol, permissive hypertension, head CT in the a.m., 24 hour NIH stroke scale, q.1h neuro checks HOB flat, bedrest, NPO times 24 hours  -continue home medications once off tPA protocol  -neurology following, appreciate recommendations  -brain MRI in the morning to workup for stroke  - TSH, A1c, lipids, CRP in the a.m.    DVT Prophylaxis:  N/a  GI Prophylaxis:  Home Protonix     32 minutes of critical care was time spent personally by me on the following activities: development of treatment plan with patient or surrogate and bedside caregivers, discussions with consultants, evaluation of patient's response to treatment, examination of patient, ordering and performing treatments and interventions, ordering and review of laboratory studies, ordering and review of radiographic studies, pulse oximetry, re-evaluation of patient's condition.  This critical care time did not overlap with that of any other provider or involve time for any procedures.     Mann Marshall MD  Pulmonary Critical Care Medicine  [facility]

## 2023-04-28 NOTE — NURSING
Nurses Note -- 4 Eyes      4/28/2023   3:49 PM      Skin assessed during: Daily Assessment      [x] No Altered Skin Integrity Present    [x]Prevention Measures Documented      [] Yes- Altered Skin Integrity Present or Discovered   [] LDA Added if Not in Epic (Describe Wound)   [] New Altered Skin Integrity was Present on Admit and Documented in LDA   [] Wound Image Taken    Wound Care Consulted? No    Attending Nurse:  Geoff Son IV, RN     Second RN/Staff Member:  Eliel BLUNT RN

## 2023-04-29 VITALS
BODY MASS INDEX: 23.53 KG/M2 | OXYGEN SATURATION: 96 % | WEIGHT: 137.81 LBS | SYSTOLIC BLOOD PRESSURE: 107 MMHG | HEART RATE: 72 BPM | TEMPERATURE: 98 F | HEIGHT: 64 IN | DIASTOLIC BLOOD PRESSURE: 56 MMHG | RESPIRATION RATE: 15 BRPM

## 2023-04-29 PROBLEM — I63.9 STROKE ABORTED BY ADMINISTRATION OF THROMBOLYTIC AGENT: Status: ACTIVE | Noted: 2023-04-29

## 2023-04-29 PROBLEM — R53.1 WEAKNESS DUE TO ACUTE STROKE: Status: ACTIVE | Noted: 2023-04-29

## 2023-04-29 PROBLEM — R07.9 CHEST PAIN: Status: ACTIVE | Noted: 2023-04-29

## 2023-04-29 PROBLEM — I63.9 WEAKNESS DUE TO ACUTE STROKE: Status: ACTIVE | Noted: 2023-04-29

## 2023-04-29 LAB
ALBUMIN SERPL-MCNC: 3.6 G/DL (ref 3.5–5)
ALBUMIN/GLOB SERPL: 1.9 RATIO (ref 1.1–2)
ALP SERPL-CCNC: 42 UNIT/L (ref 40–150)
ALT SERPL-CCNC: 17 UNIT/L (ref 0–55)
AST SERPL-CCNC: 15 UNIT/L (ref 5–34)
AV INDEX (PROSTH): 0.83
AV MEAN GRADIENT: 5 MMHG
AV PEAK GRADIENT: 8 MMHG
AV VALVE AREA: 2.34 CM2
AV VELOCITY RATIO: 0.85
BASOPHILS # BLD AUTO: 0.03 X10(3)/MCL (ref 0–0.2)
BASOPHILS NFR BLD AUTO: 0.7 %
BILIRUBIN DIRECT+TOT PNL SERPL-MCNC: 0.7 MG/DL
BSA FOR ECHO PROCEDURE: 1.61 M2
BUN SERPL-MCNC: 13 MG/DL (ref 9.8–20.1)
CALCIUM SERPL-MCNC: 9.3 MG/DL (ref 8.4–10.2)
CHLORIDE SERPL-SCNC: 111 MMOL/L (ref 98–107)
CO2 SERPL-SCNC: 23 MMOL/L (ref 22–29)
CREAT SERPL-MCNC: 0.61 MG/DL (ref 0.55–1.02)
CV ECHO LV RWT: 0.39 CM
DOP CALC AO PEAK VEL: 1.44 M/S
DOP CALC AO VTI: 33.2 CM
DOP CALC LVOT AREA: 2.8 CM2
DOP CALC LVOT DIAMETER: 1.9 CM
DOP CALC LVOT PEAK VEL: 1.22 M/S
DOP CALC LVOT STROKE VOLUME: 77.65 CM3
DOP CALC MV VTI: 30.8 CM
DOP CALCLVOT PEAK VEL VTI: 27.4 CM
E WAVE DECELERATION TIME: 219 MSEC
E/A RATIO: 1.17
E/E' RATIO: 7 M/S
ECHO LV POSTERIOR WALL: 0.76 CM (ref 0.6–1.1)
EJECTION FRACTION: 65 %
EOSINOPHIL # BLD AUTO: 0.17 X10(3)/MCL (ref 0–0.9)
EOSINOPHIL NFR BLD AUTO: 4 %
ERYTHROCYTE [DISTWIDTH] IN BLOOD BY AUTOMATED COUNT: 11.9 % (ref 11.5–17)
FRACTIONAL SHORTENING: 34 % (ref 28–44)
GFR SERPLBLD CREATININE-BSD FMLA CKD-EPI: >60 MLS/MIN/1.73/M2
GLOBULIN SER-MCNC: 1.9 GM/DL (ref 2.4–3.5)
GLUCOSE SERPL-MCNC: 89 MG/DL (ref 74–100)
HCT VFR BLD AUTO: 39.4 % (ref 37–47)
HGB BLD-MCNC: 12.8 G/DL (ref 12–16)
IMM GRANULOCYTES # BLD AUTO: 0.01 X10(3)/MCL (ref 0–0.04)
IMM GRANULOCYTES NFR BLD AUTO: 0.2 %
INTERVENTRICULAR SEPTUM: 0.56 CM (ref 0.6–1.1)
LEFT ATRIUM SIZE: 3.1 CM
LEFT ATRIUM VOLUME INDEX MOD: 12.5 ML/M2
LEFT ATRIUM VOLUME MOD: 20.9 CM3
LEFT INTERNAL DIMENSION IN SYSTOLE: 2.56 CM (ref 2.1–4)
LEFT VENTRICLE DIASTOLIC VOLUME INDEX: 38.5 ML/M2
LEFT VENTRICLE DIASTOLIC VOLUME: 64.3 ML
LEFT VENTRICLE MASS INDEX: 41 G/M2
LEFT VENTRICLE SYSTOLIC VOLUME INDEX: 14.2 ML/M2
LEFT VENTRICLE SYSTOLIC VOLUME: 23.7 ML
LEFT VENTRICULAR INTERNAL DIMENSION IN DIASTOLE: 3.86 CM (ref 3.5–6)
LEFT VENTRICULAR MASS: 68.39 G
LV LATERAL E/E' RATIO: 5.92 M/S
LV SEPTAL E/E' RATIO: 8.56 M/S
LVOT MG: 3 MMHG
LVOT MV: 0.79 CM/S
LYMPHOCYTES # BLD AUTO: 2.04 X10(3)/MCL (ref 0.6–4.6)
LYMPHOCYTES NFR BLD AUTO: 47.9 %
MCH RBC QN AUTO: 31.3 PG (ref 27–31)
MCHC RBC AUTO-ENTMCNC: 32.5 G/DL (ref 33–36)
MCV RBC AUTO: 96.3 FL (ref 80–94)
MONOCYTES # BLD AUTO: 0.4 X10(3)/MCL (ref 0.1–1.3)
MONOCYTES NFR BLD AUTO: 9.4 %
MV MEAN GRADIENT: 2 MMHG
MV PEAK A VEL: 0.66 M/S
MV PEAK E VEL: 0.77 M/S
MV PEAK GRADIENT: 4 MMHG
MV STENOSIS PRESSURE HALF TIME: 81 MS
MV VALVE AREA BY CONTINUITY EQUATION: 2.52 CM2
MV VALVE AREA P 1/2 METHOD: 2.72 CM2
NEUTROPHILS # BLD AUTO: 1.61 X10(3)/MCL (ref 2.1–9.2)
NEUTROPHILS NFR BLD AUTO: 37.8 %
NRBC BLD AUTO-RTO: 0 %
OHS LV EJECTION FRACTION SIMPSONS BIPLANE MOD: 7 %
PISA TR MAX VEL: 1.83 M/S
PLATELET # BLD AUTO: 213 X10(3)/MCL (ref 130–400)
PMV BLD AUTO: 9.4 FL (ref 7.4–10.4)
POCT GLUCOSE: 98 MG/DL (ref 70–110)
POTASSIUM SERPL-SCNC: 3.8 MMOL/L (ref 3.5–5.1)
PROT SERPL-MCNC: 5.5 GM/DL (ref 6.4–8.3)
RA PRESSURE: 3 MMHG
RBC # BLD AUTO: 4.09 X10(6)/MCL (ref 4.2–5.4)
SINUS: 3.1 CM
SODIUM SERPL-SCNC: 142 MMOL/L (ref 136–145)
TDI LATERAL: 0.13 M/S
TDI SEPTAL: 0.09 M/S
TDI: 0.11 M/S
TR MAX PG: 13 MMHG
TRICUSPID ANNULAR PLANE SYSTOLIC EXCURSION: 2.5 CM
TV REST PULMONARY ARTERY PRESSURE: 16 MMHG
WBC # SPEC AUTO: 4.3 X10(3)/MCL (ref 4.5–11.5)

## 2023-04-29 PROCEDURE — 97162 PT EVAL MOD COMPLEX 30 MIN: CPT

## 2023-04-29 PROCEDURE — 25000003 PHARM REV CODE 250

## 2023-04-29 PROCEDURE — 99232 PR SUBSEQUENT HOSPITAL CARE,LEVL II: ICD-10-PCS | Mod: ,,, | Performed by: PSYCHIATRY & NEUROLOGY

## 2023-04-29 PROCEDURE — 25000003 PHARM REV CODE 250: Performed by: FAMILY MEDICINE

## 2023-04-29 PROCEDURE — 80053 COMPREHEN METABOLIC PANEL: CPT | Performed by: FAMILY MEDICINE

## 2023-04-29 PROCEDURE — 63600175 PHARM REV CODE 636 W HCPCS: Performed by: FAMILY MEDICINE

## 2023-04-29 PROCEDURE — 99232 SBSQ HOSP IP/OBS MODERATE 35: CPT | Mod: ,,, | Performed by: PSYCHIATRY & NEUROLOGY

## 2023-04-29 PROCEDURE — 85025 COMPLETE CBC W/AUTO DIFF WBC: CPT | Performed by: FAMILY MEDICINE

## 2023-04-29 PROCEDURE — 94761 N-INVAS EAR/PLS OXIMETRY MLT: CPT

## 2023-04-29 PROCEDURE — 97166 OT EVAL MOD COMPLEX 45 MIN: CPT

## 2023-04-29 RX ORDER — ASPIRIN 81 MG/1
81 TABLET ORAL DAILY
Status: DISCONTINUED | OUTPATIENT
Start: 2023-04-29 | End: 2023-04-29 | Stop reason: HOSPADM

## 2023-04-29 RX ORDER — ATORVASTATIN CALCIUM 40 MG/1
40 TABLET, FILM COATED ORAL DAILY
Qty: 30 TABLET | Refills: 0 | Status: SHIPPED | OUTPATIENT
Start: 2023-04-30 | End: 2023-05-31

## 2023-04-29 RX ORDER — ASPIRIN 81 MG/1
81 TABLET ORAL DAILY
Qty: 90 TABLET | Refills: 3 | Status: SHIPPED | OUTPATIENT
Start: 2023-04-30 | End: 2024-02-28

## 2023-04-29 RX ADMIN — ASPIRIN 81 MG: 81 TABLET, COATED ORAL at 08:04

## 2023-04-29 RX ADMIN — LORAZEPAM 2 MG: 2 INJECTION INTRAMUSCULAR; INTRAVENOUS at 01:04

## 2023-04-29 RX ADMIN — ATORVASTATIN CALCIUM 40 MG: 40 TABLET, FILM COATED ORAL at 08:04

## 2023-04-29 RX ADMIN — SODIUM CHLORIDE: 9 INJECTION, SOLUTION INTRAVENOUS at 12:04

## 2023-04-29 RX ADMIN — PANTOPRAZOLE SODIUM 40 MG: 40 TABLET, DELAYED RELEASE ORAL at 08:04

## 2023-04-29 NOTE — PLAN OF CARE
Problem: Occupational Therapy  Goal: Occupational Therapy Goal  Description: Goals to be met by: Discharge     Patient will increase functional independence with ADLs by performing:    UE Dressing with [place].  LE Dressing with [place].  Grooming while standing at sink with [place].  Toileting from toilet with [place] for hygiene and clothing management.   Toilet transfer to toilet with [place].    Outcome: Ongoing, Progressing

## 2023-04-29 NOTE — NURSING
Nurses Note -- 4 Eyes      4/29/2023   5:57 AM      Skin assessed during: Daily Assessment      [x] No Altered Skin Integrity Present    [x]Prevention Measures Documented      [] Yes- Altered Skin Integrity Present or Discovered   [] LDA Added if Not in Epic (Describe Wound)   [] New Altered Skin Integrity was Present on Admit and Documented in LDA   [] Wound Image Taken    Wound Care Consulted? No    Attending Nurse:  Linda Iglesias RN     Second RN/Staff Member:  KUMAR Mejias

## 2023-04-29 NOTE — PT/OT/SLP EVAL
Physical Therapy Evaluation    Patient Name:  Remi Baptiste   MRN:  [id number]    Recommendations:     Discharge Recommendations: outpatient PT   Discharge Equipment Recommendations: none (pending)   Barriers to discharge:  medical dx, fall risk    Assessment:     Remi Baptiste is a 52 y.o. female admitted with a medical diagnosis of L sided impaired sensation and weakness; no CVA on imaging; chest pain.  She presents with the following impairments/functional limitations: weakness, gait instability, impaired endurance, impaired balance, decreased lower extremity function, impaired self care skills, impaired functional mobility.    Rehab Prognosis: Good; patient would benefit from acute skilled PT services to address these deficits and reach maximum level of function.    Recent Surgery: * No surgery found *      Plan:     During this hospitalization, patient to be seen 6 x/week to address the identified rehab impairments via gait training, therapeutic activities, therapeutic exercises and progress toward the following goals:    Plan of Care Expires:  05/29/23    Subjective     Chief Complaint: n/a  Patient/Family Comments/goals: to go home   Pain/Comfort:  Pain Rating 1: 0/10    Patients cultural, spiritual, Buddhist conflicts given the current situation: no    Living Environment:  Pt lives alone in a SLH; 3 steps to enter/exit with B railings   Prior to admission, patients level of function was independent; works as a house keeper .    Equipment used at home: none.  DME owned (not currently used): none.    Upon discharge, patient will have assistance from friends.    Objective:     Communicated with NSG prior to session.  Patient found HOB elevated with blood pressure cuff, pulse ox (continuous), telemetry  upon PT entry to room.    General Precautions: Standard, fall, SBP <180  Orthopedic Precautions:N/A   Braces: N/A  Respiratory Status: Room air  Blood Pressure: 108/63 prior to ax       Exams:  Cognitive Exam:   "Patient is oriented to Person, Place, Time, and Situation  Sensation:    -       Intact  RLE Strength:   Hip flex: 4/5  Knee flexors: 4-/5  Knee extenders: 5/5  DF/PF: 5/5  LLE Strength:   Hip flex: 3+/5  Knee flexors: 3+/5  Knee extenders: 4-/5  DF/PF:5/5  Skin integrity: Visible skin intact      Functional Mobility:  Bed Mobility:     Supine to Sit: stand by assistance  Sit to Supine: stand by assistance  Transfers:     Sit to Stand:   Upon initially standing pt required modA 2/2 being quite unsteady   Pt practiced an additional 3 sit<>stand trials and required SBA   Gait: pt ambulated approx 150 ft without use of an AD; unsteady throughout but did not demonstrated an overt LOB, did require Terry for stability; step through pattern; somewhat ataxic, pt contributed her unsteadiness to "the medicine"       Patient provided with verbal education regarding POC, safety, fall risk, mobility.  Understanding was verbalized.     Patient left HOB elevated with all lines intact, call button in reach, and RN notified.    GOALS:   Multidisciplinary Problems       Physical Therapy Goals          Problem: Physical Therapy    Goal Priority Disciplines Outcome Goal Variances Interventions   Physical Therapy Goal     PT, PT/OT Ongoing, Progressing     Description: Goals to be met by: 23     Patient will increase functional independence with mobility by performin. Supine to sit with [place]  2. Sit to supine with [place]  3. Sit to stand transfer with [place]  4. Gait  x 200 feet with [place] using No Assistive Device.   5. Ascend/descend 3 stair with bilateral Handrails Supervision using No Assistive Device.   6. Pt to increase B LE strength to 5/5                          History:     Past Medical History:   Diagnosis Date    Anxiety     Depression        Past Surgical History:   Procedure Laterality Date    BLADDER SUSPENSION      OVARIAN CYST REMOVAL      TONSILLECTOMY         Time Tracking:     PT " Received On: 04/29/23  PT Start Time: 1158     PT Stop Time: 1214  PT Total Time (min): 16 min     Billable Minutes: Evaluation mod      04/29/2023

## 2023-04-29 NOTE — PROGRESS NOTES
[facility]  Pulmonary Critical Care Note    Patient Name: Remi Baptiste  MRN: [id number]  Admission Date: 4/28/2023  Hospital Length of Stay: 1 days  Code Status: Full Code  Attending Provider: Isiah Perez MD  Primary Care Provider: Raegan Swan MD     Subjective:     HPI:   This is a 52-year-old female with a history of HLD, anxiety, depression, irritable bowel disease, who presented to the emergency department with left-sided chest pain which was pressure and intermittent.  She also was having shortness of breath and dyspnea on exertion for about the last week intermittently.  She states that she was partially concerned about a heart condition due to having a history of hyperlipidemia.  She also notes that today she was found to have decreased sensation of the left upper extremity and lower extremity weakness as well.    She denies any headache, vomiting, she has been nauseous.  She does have reproducible pain on her chest with certain types of movements.    She was seen in the ER by Neurology for the weakness and a CT and CTA were done which demonstrated no significant abnormalities, she was given TNK at 1:45 a.m. and her NIH stroke scale was 6 for drift of the left arm, drift of the left leg, sensory abnormalities and mild dysarthria.      Hospital Course/Significant events:  04/28/2023 admitted to ICU post TNK      24 Hour Interval History:  Reached 24 hour post TNK monitoring at 2:00 a.m. overnight. Feels that she still has some residual left upper extremity numbness and LLE weakness.         14 point ROS reviewed and negative unless documented in the history of present illness.         Past Medical History:   Diagnosis Date    Anxiety     Depression        Past Surgical History:   Procedure Laterality Date    BLADDER SUSPENSION      OVARIAN CYST REMOVAL      TONSILLECTOMY         Social History     Socioeconomic History    Marital status: Single    Number of children: 0    Occupational History    Occupation: [employer]    Occupation: Housekeeping   Tobacco Use    Smoking status: Never     Passive exposure: Never    Smokeless tobacco: Never   Substance and Sexual Activity    Alcohol use: Not Currently     Comment: Occasionally    Drug use: Never    Sexual activity: Not Currently     Partners: Male     Birth control/protection: Post-menopausal     Social Determinants of Health     Financial Resource Strain: Low Risk     Difficulty of Paying Living Expenses: Not hard at all   Food Insecurity: No Food Insecurity    Worried About Running Out of Food in the Last Year: Never true    Ran Out of Food in the Last Year: Never true   Transportation Needs: No Transportation Needs    Lack of Transportation (Medical): No    Lack of Transportation (Non-Medical): No   Physical Activity: Insufficiently Active    Days of Exercise per Week: 4 days    Minutes of Exercise per Session: 30 min   Stress: No Stress Concern Present    Feeling of Stress : Not at all   Social Connections: Unknown    Frequency of Communication with Friends and Family: More than three times a week    Frequency of Social Gatherings with Friends and Family: More than three times a week   Housing Stability: Low Risk     Unable to Pay for Housing in the Last Year: No    Number of Places Lived in the Last Year: 1    Unstable Housing in the Last Year: No           No current outpatient medications      Current Inpatient Medications   aspirin  81 mg Oral Daily    atorvastatin  40 mg Oral Daily    pantoprazole  40 mg Oral Daily       Current Intravenous Infusions   sodium chloride 0.9% 75 mL/hr at 04/29/23 0000           Objective:       Intake/Output Summary (Last 24 hours) at 4/29/2023 0717  Last data filed at 4/28/2023 1400  Gross per 24 hour   Intake 351.25 ml   Output --   Net 351.25 ml         Vital Signs (Most Recent):  Temp: 97.8 °F (36.6 °C) (04/29/23 0400)  Pulse: (!) 57 (04/29/23 0600)  Resp: 15 (04/29/23 0600)  BP: (!)  96/54 (04/29/23 0600)  SpO2: 95 % (04/29/23 0600)  Body mass index is 23.65 kg/m².  Weight: 62.5 kg (137 lb 12.6 oz) Vital Signs (24h Range):  Temp:  [97.6 °F (36.4 °C)-98.9 °F (37.2 °C)] 97.8 °F (36.6 °C)  Pulse:  [55-73] 57  Resp:  [12-29] 15  SpO2:  [94 %-100 %] 95 %  BP: ()/(31-84) 96/54         Physical Exam  Gen- A/O, NAD  HENT- ATNC, MMM  CV- RRR, no murmurs  Resp- CTAB, normal work of breathing   MSK- WWP, no LE edema  Neuro- A/Ox3, endorses reduced sensation within the LUE; 4/5 strength noted in LUE but effort dependent exam within the RUE; 4/5 strength with dorsifelxion and plantar flexion in the LLE, but effort dependent exam within the RLE noted   Psych- appropriate mood and affect         Lines/Drains/Airways       Peripheral Intravenous Line  Duration                  Peripheral IV - Single Lumen 18 G Left Antecubital -- days         Peripheral IV - Single Lumen 04/28/23 0138 20 G Left Forearm 1 day                    Significant Labs:    Lab Results   Component Value Date    WBC 4.3 (L) 04/29/2023    HGB 12.8 04/29/2023    HCT 39.4 04/29/2023    MCV 96.3 (H) 04/29/2023     04/29/2023         BMP  Lab Results   Component Value Date     04/29/2023    K 3.8 04/29/2023    CO2 23 04/29/2023    BUN 13.0 04/29/2023    CREATININE 0.61 04/29/2023    CALCIUM 9.3 04/29/2023    EGFRNONAA >60 07/02/2021           Assessment/Plan:     Assessment  Concern for stroke s/p tPA  Chest pain low concern for ACS-EKG nonischemic in sinus and normal troponin  History of irritable bowel syndrome  History of anxiety  History of GERD      Plan  -mild persistent LUE numbness and LLE weakness on exam, but significant effort dependence noted, feel these symptoms are likely non-neurologic in origin  -psychiatry consult has been ordered and pending   -MRI brain performed this AM, final read pending but no large stroke visualized on my review   -echo with preserved EF, no significant valvular abnormalities   -OK  for downgrade to floor pending neurology evaluation and de-escalation of neuro check frequency          Caleb Goldberg MD  Pulmonary Critical Care Medicine  [facility]

## 2023-04-29 NOTE — PROGRESS NOTES
S:  Back to normal    Mri brain is negative    Wants to go home    Vitals:    04/29/23 1400   BP: (!) 107/56   Pulse: 72   Resp: 15   Temp:      Mental Status: Alert and oriented x3. Language is fluent with good comprehension.    Cranial Nerve: Pupils are equal, round, and reactive to light. Visual fields are intact to confrontation. Normal fundi. Ocular movements are intact. Face is symmetric at rest and with activation with intact sensation throughout. Hearing intact to finger rub bilaterally. Muscles of tongue and palate activate symmetrically. No dysarthria. Strength is full in sternocleidomastoid and trapezius bilaterally.    Motor: Muscle bulk and tone are normal. Strength is 5/5 in all four extremities both proximally and distally. Intact fine motor movements bilaterally. There is no pronator drift or satelliting on arm roll.    Sensory: Sensation is intact to light touch, pinprick, vibration, and proprioception throughout. Romberg is negative.    Reflexes: 2+ and symmetric at the biceps, triceps, brachioradialis, patella, and Achilles bilaterally. Plantar response is flexor bilaterally.    Coordination: No dysmetria on finger-nose-finger or heel-knee-shin. Normal rapid alternating movements. Fast finger tapping with normal amplitude and speed.    Gait: Narrow based with normal stride length and good arm swing bilaterally. Able to walk on heels, toes, and in tandem.    Vessels good  Lipids slightly elevated      A/p  AIS, resolved with TNK  Asa/statin  Followup with pcp

## 2023-04-29 NOTE — PLAN OF CARE
Problem: Physical Therapy  Goal: Physical Therapy Goal  Description: Goals to be met by: 23     Patient will increase functional independence with mobility by performin. Supine to sit with [place]  2. Sit to supine with [place]  3. Sit to stand transfer with [place]  4. Gait  x 200 feet with [place] using No Assistive Device.   5. Ascend/descend 3 stair with bilateral Handrails Supervision using No Assistive Device.   6. Pt to increase B LE strength to      Outcome: Ongoing, Progressing

## 2023-04-29 NOTE — NURSING
Dr. Lorenzana at bedside rounding with patient. Told patient she was able to go home today. Awaiting d/c orders by Dr. Barnes or Resident.

## 2023-04-29 NOTE — DISCHARGE SUMMARY
[facility] Discharge Summary    Admitting Physician: Isiah Perez MD  Attending Physician: Isiah Perez MD  Date of Admit: 4/28/2023  Date of Discharge: 4/29/2023    Condition: Good  Outcome: Patient tolerated treatment/procedure well without complication and is now ready for discharge.  DISPOSITION: Home or Self Care    Discharge Diagnoses     Patient Active Problem List   Diagnosis    SAVI (generalized anxiety disorder)    PTSD (post-traumatic stress disorder)    Grief reaction    Degenerative disc disease, cervical    Cervical radiculopathy    Lumbar radiculopathy    Degenerative disc disease, lumbar    Stroke aborted by administration of thrombolytic agent    Chest pain    Weakness due to acute stroke       Principal Problem:  Stroke aborted by administration of thrombolytic agent    Consultants and Procedures     Consultants:  IP CONSULT TO VASCULAR (STROKE) NEUROLOGY  IP CONSULT TO SOCIAL WORK/CASE MANAGEMENT  IP CONSULT TO PSYCHIATRY    Procedures:   * No surgery found *     Brief Admission History      52-year-old female with a history of HLD, anxiety, depression, irritable bowel disease, who presented to the emergency department with left-sided 2nd intercostal chest pain, SOB for approximately 1 week.  She states that she was partially concerned about a heart condition due to having a history of hyperlipidemia, of note patient has longstanding history of anxiety.  She also noted that on the day of admission she had decreased sensation of the LUE & LLE weakness.  She denied headache and vomiting however endorse nausea.  At the time of admission her chest pain was reproducible.  She was seen in the ER by Neurology for the weakness and a CT and CTA were done which demonstrated no significant abnormalities, she was given TNKase at 1:45 a.m. and her NIH stroke scale at that time was 6 for drift of the left arm, drift of the left leg, sensory abnormalities and mild dysarthria.    [facility]  "Course with Pertinent Findings     Day 1 of admission patient's neurological findings had greatly improved however had not normalized, patient received MRI at the 24 hour jose s/p TNKase with no acute findings no concern for stroke and no concern for hemorrhage.  Was notified by ICU nurse the Neurology recommended discharge from the ICU without further need to step patient down to the floor.  At that time patient was adamant on being discharge.  Neurology recommended discharging on aspirin daily, this medication has been sent to patient's pharmacy please see below for other medications prescribed on this discharge which includes statin.  Please see below for scheduled follow-ups.    Discharge physical exam:  Vitals  BP: (!) 107/56  Temp: 98.4 °F (36.9 °C)  Temp Source: Oral  Pulse: 72  Resp: 15  SpO2: 96 %  Height: 5' 4" (162.6 cm)  Weight: 62.5 kg (137 lb 12.6 oz)      TIME SPENT ON DISCHARGE: 60 minutes    Discharge Medications        Medication List        START taking these medications      aspirin 81 MG EC tablet  Commonly known as: ECOTRIN  Take 1 tablet (81 mg total) by mouth once daily.  Start taking on: April 30, 2023     atorvastatin 40 MG tablet  Commonly known as: LIPITOR  Take 1 tablet (40 mg total) by mouth once daily.  Start taking on: April 30, 2023               Discharge Information:   Ms. Remi Baptiste is being discharged Home or Self Care.    Discharge Procedure Orders   Ambulatory referral/consult to Neurology   Standing Status: Future   Referral Priority: Routine Referral Type: Consultation   Referral Reason: Specialty Services Required   Referred to Provider: QING COBURN Requested Specialty: Neurology   Number of Visits Requested: 1     Diet Dysphagia Soft   Order Comments: Advance as tolerated     Activity as tolerated        Follow-Up Appointments:   Follow-up Information       [facility] - Neurology Follow up in 1 week(s).    Specialty: Neurology  Why: Clinic will call to " Schedule appointment  Contact information:  [address] [facility] [id number] [phone]  Additional information:  Entrance 1             [facility] - Emergency Dept Follow up.    Specialty: Emergency Medicine  Why: If symptoms worsen, As needed  Contact information:  [address]  [facility] [id number] [phone]             Raegan Swan MD Follow up in 3 day(s).    Specialty: Family Medicine  Why: Please call to make/infer about appointment  Contact information:  [address] [facility] [address]  [phone]                             To address at follow-up:  Patient will need NIHSS performed at 1 week follow-up with Neurology.  All other labs and imaging to be discuss with Neurology.      The above information was discussed with the patient in clear terms. She was able to repeat the instructions to me in her own words. All questions answered. ED precautions provided.    Allen Hector MD  Internal Medicine - PGY-2

## 2023-04-29 NOTE — PLAN OF CARE
Problem: Adult Inpatient Plan of Care  Goal: Plan of Care Review  Outcome: Ongoing, Progressing  Flowsheets (Taken 4/28/2023 2126)  Plan of Care Reviewed With: patient  Goal: Patient-Specific Goal (Individualized)  Outcome: Ongoing, Progressing  Goal: Absence of Hospital-Acquired Illness or Injury  Outcome: Ongoing, Progressing  Intervention: Identify and Manage Fall Risk  Flowsheets (Taken 4/28/2023 2126)  Safety Promotion/Fall Prevention:   assistive device/personal item within reach   room near unit station   side rails raised x 2   pulse ox   medications reviewed  Intervention: Prevent Skin Injury  Flowsheets (Taken 4/28/2023 2126)  Body Position: position changed independently  Skin Protection:   adhesive use limited   tubing/devices free from skin contact  Goal: Optimal Comfort and Wellbeing  Outcome: Ongoing, Progressing  Goal: Readiness for Transition of Care  Outcome: Ongoing, Progressing     Problem: Adjustment to Illness (Stroke, Ischemic/Transient Ischemic Attack)  Goal: Optimal Coping  Outcome: Ongoing, Progressing  Intervention: Support Psychosocial Response to Stroke  Flowsheets (Taken 4/28/2023 2126)  Supportive Measures: active listening utilized  Family/Support System Care: support provided     Problem: Bowel Elimination Impaired (Stroke, Ischemic/Transient Ischemic Attack)  Goal: Effective Bowel Elimination  Outcome: Ongoing, Progressing     Problem: Cerebral Tissue Perfusion (Stroke, Ischemic/Transient Ischemic Attack)  Goal: Optimal Cerebral Tissue Perfusion  Outcome: Ongoing, Progressing     Problem: Cognitive Impairment (Stroke, Ischemic/Transient Ischemic Attack)  Goal: Optimal Cognitive Function  Outcome: Ongoing, Progressing  Intervention: Optimize Cognitive Function  Flowsheets (Taken 4/28/2023 2126)  Environment Familiarity/Consistency: daily routine followed  Reorientation Measures: clock in view  Sensory Stimulation Regulation:   lighting decreased   care clustered     Problem:  Communication Impairment (Stroke, Ischemic/Transient Ischemic Attack)  Goal: Improved Communication Skills  Outcome: Ongoing, Progressing     Problem: Functional Ability Impaired (Stroke, Ischemic/Transient Ischemic Attack)  Goal: Optimal Functional Ability  Outcome: Ongoing, Progressing     Problem: Respiratory Compromise (Stroke, Ischemic/Transient Ischemic Attack)  Goal: Effective Oxygenation and Ventilation  Outcome: Ongoing, Progressing     Problem: Sensorimotor Impairment (Stroke, Ischemic/Transient Ischemic Attack)  Goal: Improved Sensorimotor Function  Outcome: Ongoing, Progressing     Problem: Swallowing Impairment (Stroke, Ischemic/Transient Ischemic Attack)  Goal: Optimal Eating and Swallowing without Aspiration  Outcome: Ongoing, Progressing     Problem: Urinary Elimination Impaired (Stroke, Ischemic/Transient Ischemic Attack)  Goal: Effective Urinary Elimination  Outcome: Ongoing, Progressing     Problem: Skin Injury Risk Increased  Goal: Skin Health and Integrity  Outcome: Ongoing, Progressing  Intervention: Optimize Skin Protection  Flowsheets (Taken 4/28/2023 2126)  Skin Protection:   adhesive use limited   tubing/devices free from skin contact  Head of Bed (HOB) Positioning: HOB flat

## 2023-04-29 NOTE — NURSING
Nurses Note -- 4 Eyes      4/29/2023   1:58 PM      Skin assessed during: Daily Assessment      [x] No Altered Skin Integrity Present    [x]Prevention Measures Documented      [] Yes- Altered Skin Integrity Present or Discovered   [] LDA Added if Not in Epic (Describe Wound)   [] New Altered Skin Integrity was Present on Admit and Documented in LDA   [] Wound Image Taken    Wound Care Consulted? No    Attending Nurse:  Fredy Campbell RN     Second RN/Staff Member:  Sandy Vasquez RN

## 2023-04-29 NOTE — PT/OT/SLP EVAL
"Occupational Therapy  Evaluation    Name: Remi Baptiste  MRN: [id number]  Admitting Diagnosis: Stroke w/u, s/p TNK, chest pain  Recent Surgery: * No surgery found *      Recommendations:     Discharge Recommendations: home health OT, home with home health  Discharge Equipment Recommendations:  walker, rolling, other (see comments) (Pending progress)  Barriers to discharge:  Other (Comment) (Functional deficits)    Assessment:     Remi Baptiste is a 52 y.o. female with a medical diagnosis of Stroke w/u, s/p TNK, chest pain.  She presents with the following performance deficits affecting function: weakness, impaired functional mobility, impaired self care skills, impaired balance. Pt tolerated today's evaluation well, however she demonstrates mild functional deficits limiting her safety and functional independence. She generally requires CGA-Min A for all ADLs and functional mobility assessed today. She would benefit from continued acute OT services, as well as home health OT upon d/c in order to further address her deficits and ensure her safety upon returning home.     Rehab Prognosis: Good; patient would benefit from acute skilled OT services to address these deficits and reach maximum level of function.       Plan:     Patient to be seen 3 x/week to address the above listed problems via self-care/home management, therapeutic activities, therapeutic exercises  Plan of Care Expires: 05/12/23  Plan of Care Reviewed with: patient    Subjective     Chief Complaint: "I want to go home"  Patient/Family Comments/goals: -    Occupational Profile:  Living Environment: Pt reports living alone in a [facility]. She has access to a tub shower.   Previous level of function: Independent with all ADLs, IADLs, and work/leisure activities.   Roles and Routines: Basic self-care tasks, works as an actress  Equipment Used at Home: none  Assistance upon Discharge: She reports her friends will be able to assist as needed.    Pain/Comfort:  Pain " Rating 1: 0/10    Patients cultural, spiritual, Moravian conflicts given the current situation: no    Objective:     Communicated with: RN prior to session.  Patient found right sidelying with blood pressure cuff, peripheral IV, pulse ox (continuous), telemetry, SCD upon OT entry to room.    General Precautions: Standard, fall (SBP<180)  Orthopedic Precautions: N/A  Braces: N/A  Respiratory Status: Room air  Vital Signs: Blood Pressure: 112/62    Occupational Performance:    Bed Mobility:    Patient completed Rolling/Turning to Right with independence  Patient completed Scooting/Bridging with independence  Patient completed Supine to Sit with modified independence  Patient completed Sit to Supine with modified independence    Functional Mobility/Transfers:  Patient completed Sit <> Stand Transfer with contact guard assistance  with  no assistive device   Patient completed Toilet Transfer Step Transfer technique with contact guard assistance with  no AD  Functional Mobility: Pt completed functional mobility from bedside>bathroom>bedside w/ Min A and no AD. Pt did not lose her balance, however she remained unsteady with an atypical gait throughout.     Activities of Daily Living:  Grooming: contact guard assistance washing her hands while standing at the sink  Lower Body Dressing: stand by assistance for managing underwear in standing surrounding toileting activity.  Toileting: stand by assistance for pericare and clothing management in standing after voiding in hospital room toilet.    Cognitive/Visual Perceptual:  Cognitive/Psychosocial Skills:     -       Oriented to: Person, Place, Time, and Situation   -       Follows Commands/attention:Follows multistep  commands  -       Safety awareness/insight to disability: Mildly impaired   -       Mood/Affect/Coping skills/emotional control: Cooperative  Visual/Perceptual:      -Intact tracking and visual field assessments.    Physical Exam:  Balance:    -       CGA  required for static standing. Static sitting balance was WFL.  Upper Extremity Range of Motion:     -       Right Upper Extremity: WFL  -       Left Upper Extremity: WFL  Upper Extremity Strength:    -       Right Upper Extremity: WFL  -       Left Upper Extremity: MMT affected by pt guarding her PIV, however overall was only slightly weaker than RUE and remained WFL.  Fine Motor Coordination:    -       Intact  Left hand, finger to nose, Right hand, finger to nose, Left hand thumb/finger opposition skills, and Right hand thumb/finger opposition skills    Therapeutic Positioning  Risk for acquired pressure injuries is decreased due to ability to get to BSC/toilet with assist, continence, and ability to complete bed mobility without assist .    Skin assessment: all bony prominences were assessed    Findings: no redness or breakdown noted    OT recommendations for therapeutic positioning throughout hospitalization:   Follow [facility] Pressure Injury Prevention Protocol as appropriate    Patient Education:  Patient provided with verbal education regarding OT role/goals/POC, safety awareness, and Discharge/DME recommendations.  Understanding was verbalized.     Patient left right sidelying with all lines intact and call button in reach    GOALS:   Multidisciplinary Problems       Occupational Therapy Goals          Problem: Occupational Therapy    Goal Priority Disciplines Outcome Interventions   Occupational Therapy Goal     OT, PT/OT Ongoing, Progressing    Description: Goals to be met by: Discharge     Patient will increase functional independence with ADLs by performing:    UE Dressing with [place].  LE Dressing with [place].  Grooming while standing at sink with [place].  Toileting from toilet with [place] for hygiene and clothing management.   Toilet transfer to toilet with [place].                         History:     Past Medical History:   Diagnosis Date    Anxiety     Depression           Past Surgical History:   Procedure Laterality Date    BLADDER SUSPENSION      OVARIAN CYST REMOVAL      TONSILLECTOMY         Time Tracking:     OT Date of Treatment: 04/29/23  OT Start Time: 0915  OT Stop Time: 0926  OT Total Time (min): 11 min    Billable Minutes:Evaluation (Moderate Complexity)    4/29/2023

## 2023-05-08 ENCOUNTER — TELEPHONE (OUTPATIENT)
Dept: NEUROLOGY | Facility: CLINIC | Age: 53
End: 2023-05-08
Payer: MEDICAID

## 2023-05-08 NOTE — TELEPHONE ENCOUNTER
----- Message from Hiral Herring sent at 5/8/2023  2:48 PM CDT -----  Pt last seen Dr Richards on 1/19/23 and was to follow up PRN since then pt was in the hospital on 4/28-4/29 for possible stroke. Pt seen Dr Lorenzana while in hospital. Pt is still having issues with chest pain/pressure and tingling in her arm. Pt would like appt to follow up. Please advise  Pt # [phone]

## 2023-05-23 ENCOUNTER — OFFICE VISIT (OUTPATIENT)
Dept: NEUROLOGY | Facility: CLINIC | Age: 53
End: 2023-05-23
Payer: MEDICAID

## 2023-05-23 VITALS
DIASTOLIC BLOOD PRESSURE: 65 MMHG | HEART RATE: 61 BPM | RESPIRATION RATE: 18 BRPM | WEIGHT: 131 LBS | HEIGHT: 64 IN | SYSTOLIC BLOOD PRESSURE: 101 MMHG | BODY MASS INDEX: 22.36 KG/M2 | TEMPERATURE: 98 F

## 2023-05-23 DIAGNOSIS — M54.16 LUMBAR RADICULOPATHY: ICD-10-CM

## 2023-05-23 DIAGNOSIS — M54.12 CERVICAL RADICULOPATHY: ICD-10-CM

## 2023-05-23 DIAGNOSIS — M50.30 DEGENERATIVE DISC DISEASE, CERVICAL: Primary | ICD-10-CM

## 2023-05-23 DIAGNOSIS — G45.9 BRAIN TIA: ICD-10-CM

## 2023-05-23 PROCEDURE — 99214 OFFICE O/P EST MOD 30 MIN: CPT | Mod: PBBFAC | Performed by: NURSE PRACTITIONER

## 2023-05-23 PROCEDURE — 1159F MED LIST DOCD IN RCRD: CPT | Mod: CPTII,,, | Performed by: NURSE PRACTITIONER

## 2023-05-23 PROCEDURE — 3008F PR BODY MASS INDEX (BMI) DOCUMENTED: ICD-10-PCS | Mod: CPTII,,, | Performed by: NURSE PRACTITIONER

## 2023-05-23 PROCEDURE — 1159F PR MEDICATION LIST DOCUMENTED IN MEDICAL RECORD: ICD-10-PCS | Mod: CPTII,,, | Performed by: NURSE PRACTITIONER

## 2023-05-23 PROCEDURE — 3008F BODY MASS INDEX DOCD: CPT | Mod: CPTII,,, | Performed by: NURSE PRACTITIONER

## 2023-05-23 PROCEDURE — 99215 OFFICE O/P EST HI 40 MIN: CPT | Mod: S$PBB,,, | Performed by: NURSE PRACTITIONER

## 2023-05-23 PROCEDURE — 3078F DIAST BP <80 MM HG: CPT | Mod: CPTII,,, | Performed by: NURSE PRACTITIONER

## 2023-05-23 PROCEDURE — 1111F PR DISCHARGE MEDS RECONCILED W/ CURRENT OUTPATIENT MED LIST: ICD-10-PCS | Mod: CPTII,,, | Performed by: NURSE PRACTITIONER

## 2023-05-23 PROCEDURE — 99215 PR OFFICE/OUTPT VISIT, EST, LEVL V, 40-54 MIN: ICD-10-PCS | Mod: S$PBB,,, | Performed by: NURSE PRACTITIONER

## 2023-05-23 PROCEDURE — 3074F PR MOST RECENT SYSTOLIC BLOOD PRESSURE < 130 MM HG: ICD-10-PCS | Mod: CPTII,,, | Performed by: NURSE PRACTITIONER

## 2023-05-23 PROCEDURE — 1111F DSCHRG MED/CURRENT MED MERGE: CPT | Mod: CPTII,,, | Performed by: NURSE PRACTITIONER

## 2023-05-23 PROCEDURE — 1160F RVW MEDS BY RX/DR IN RCRD: CPT | Mod: CPTII,,, | Performed by: NURSE PRACTITIONER

## 2023-05-23 PROCEDURE — 1160F PR REVIEW ALL MEDS BY PRESCRIBER/CLIN PHARMACIST DOCUMENTED: ICD-10-PCS | Mod: CPTII,,, | Performed by: NURSE PRACTITIONER

## 2023-05-23 PROCEDURE — 3078F PR MOST RECENT DIASTOLIC BLOOD PRESSURE < 80 MM HG: ICD-10-PCS | Mod: CPTII,,, | Performed by: NURSE PRACTITIONER

## 2023-05-23 PROCEDURE — 3074F SYST BP LT 130 MM HG: CPT | Mod: CPTII,,, | Performed by: NURSE PRACTITIONER

## 2023-05-23 RX ORDER — PERPHENAZINE 2 MG
1 TABLET ORAL DAILY
COMMUNITY

## 2023-05-23 RX ORDER — PROPRANOLOL HYDROCHLORIDE 10 MG/1
10 TABLET ORAL DAILY
COMMUNITY
Start: 2023-02-08 | End: 2023-09-22 | Stop reason: SDUPTHER

## 2023-05-23 NOTE — PROGRESS NOTES
"[facility] Neurology Initial Office Visit Note    Initial Visit Date: 1/19/2023  Current Visit Date:  05/24/2023    Chief Complaint:     Chief Complaint   Patient presents with    Cervical Radiculopathy    Stroke     Possible ischemic stroke 4/28/23- Hospital follow up       History of Present Illness:      This is 53 y.o. female with history of PTSD, DJD, who was referred for right leg paresthesia of unknown chronicity. She states that she has been having bilateral hand paresthesia, upon awakening, along with neck pain. She also notes right leg numbness and paresthesia. Denied any UI/SI and saddle anesthesia. 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 She has not been doing home exercises.     Today, Pt states she was admitted via ED on 4/28/2023 for L sided weakness including inability to open OS, difficulty ambulating, states she could not think clearly. States she was at home watching TV. Went to  and was unable to pull up her pants. Significant other transported to [facility] ED. Was told by stroke MD at [facility] she had a stroke. Dr. Mitchell approved TPA administration, CVA work up negative. States symptoms lasted less than 24 hrs. Feels back to baseline today. Denies smoking, ETOH  intake, is a vegetarian. Is not currently taking statin as she read on internet it could cause a stroke. Is followed by [facility] in [facility] for "leaky valve".     Medications:     Current Outpatient Medications on File Prior to Visit   Medication Sig Dispense Refill    aspirin (ECOTRIN) 81 MG EC tablet Take 1 tablet (81 mg total) by mouth once daily. 90 tablet 3    fish,bora,flax oils-om3,6,9no1 (OMEGA 3-6-9) 1,200 mg Cap Take 1 capsule by mouth Daily.      propranoloL (INDERAL) 10 MG tablet Take 10 mg by mouth once daily.      tumeric-ging-olive-oreg-capryl 100 mg-150 mg- 50 mg-150 mg Cap Take 1 capsule by mouth Daily.      vitamin E 100 UNIT capsule Take 100 Units by mouth once daily.       No current facility-administered " medications on file prior to visit.       Labs:     Results for orders placed or performed during the hospital encounter of 04/28/23   Comprehensive metabolic panel   Result Value Ref Range    Sodium Level 140 136 - 145 mmol/L    Potassium Level 4.0 3.5 - 5.1 mmol/L    Chloride 106 98 - 107 mmol/L    Carbon Dioxide 26 22 - 29 mmol/L    Glucose Level 92 74 - 100 mg/dL    Blood Urea Nitrogen 17.0 9.8 - 20.1 mg/dL    Creatinine 0.70 0.55 - 1.02 mg/dL    Calcium Level Total 9.5 8.4 - 10.2 mg/dL    Protein Total 6.1 (L) 6.4 - 8.3 gm/dL    Albumin Level 3.9 3.5 - 5.0 g/dL    Globulin 2.2 (L) 2.4 - 3.5 gm/dL    Albumin/Globulin Ratio 1.8 1.1 - 2.0 ratio    Bilirubin Total 0.4 <=1.5 mg/dL    Alkaline Phosphatase 51 40 - 150 unit/L    Alanine Aminotransferase 19 0 - 55 unit/L    Aspartate Aminotransferase 16 5 - 34 unit/L    eGFR >60 mls/min/1.73/m2   APTT   Result Value Ref Range    PTT 27.4 23.2 - 33.7 seconds   Troponin I   Result Value Ref Range    Troponin-I <0.010 0.000 - 0.045 ng/mL   Urinalysis, Reflex to Urine Culture    Specimen: Urine   Result Value Ref Range    Color, UA Yellow Yellow, Light-Yellow, Dark Yellow, Mckenzie, Straw    Appearance, UA Clear Clear    Specific Gravity, UA >=1.040 (H) 1.005 - 1.030    pH, UA 7.0 5.0 - 8.5    Protein, UA Negative Negative mg/dL    Glucose, UA Negative Negative, Normal mg/dL    Ketones, UA Negative Negative mg/dL    Blood, UA Negative Negative unit/L    Bilirubin, UA Negative Negative mg/dL    Urobilinogen, UA 0.2 0.2, 1.0, Normal mg/dL    Nitrites, UA Negative Negative    Leukocyte Esterase, UA Negative Negative unit/L   Pregnancy, urine rapid   Result Value Ref Range    Beta hCG Qualitative, Urine Negative Negative   Protime-INR   Result Value Ref Range    PT 12.4 (L) 12.5 - 14.5 seconds    INR 0.93 0.00 - 1.30   Ethanol   Result Value Ref Range    Ethanol Level <10.0 <=10.0 mg/dL   Drug Screen, Urine   Result Value Ref Range    Amphetamines, Urine Negative Negative     Barbituates, Urine Negative Negative    Benzodiazepine, Urine Negative Negative    Cannabinoids, Urine Negative Negative    Cocaine, Urine Negative Negative    Fentanyl, Urine Negative Negative    MDMA, Urine Negative Negative    Opiates, Urine Negative Negative    Phencyclidine, Urine Negative Negative    pH, Urine 7.0 3.0 - 11.0   D dimer, quantitative   Result Value Ref Range    D-Dimer <0.27 0.00 - 0.50 ug/mL FEU   CBC with Differential   Result Value Ref Range    WBC 5.3 4.5 - 11.5 x10(3)/mcL    RBC 4.21 4.20 - 5.40 x10(6)/mcL    Hgb 13.4 12.0 - 16.0 g/dL    Hct 39.8 37.0 - 47.0 %    MCV 94.5 (H) 80.0 - 94.0 fL    MCH 31.8 (H) 27.0 - 31.0 pg    MCHC 33.7 33.0 - 36.0 g/dL    RDW 11.9 11.5 - 17.0 %    Platelet 237 130 - 400 x10(3)/mcL    MPV 9.4 7.4 - 10.4 fL    Neut % 28.5 %    Lymph % 58.0 %    Mono % 9.3 %    Eos % 3.4 %    Basophil % 0.6 %    Lymph # 3.06 0.6 - 4.6 x10(3)/mcL    Neut # 1.51 (L) 2.1 - 9.2 x10(3)/mcL    Mono # 0.49 0.1 - 1.3 x10(3)/mcL    Eos # 0.18 0 - 0.9 x10(3)/mcL    Baso # 0.03 0 - 0.2 x10(3)/mcL    IG# 0.01 0 - 0.04 x10(3)/mcL    IG% 0.2 %    NRBC% 0.0 %   Urinalysis, Microscopic   Result Value Ref Range    RBC, UA <5 <=5 /HPF    WBC, UA <5 <=5 /HPF    Squamous Epithelial Cells, UA <5 <=5 /HPF    Bacteria, UA None Seen None Seen, Rare, Occasional /HPF   Lipid panel   Result Value Ref Range    Cholesterol Total 198 <=200 mg/dL    HDL Cholesterol 81 (H) 35 - 60 mg/dL    Triglyceride 53 37 - 140 mg/dL    Cholesterol/HDL Ratio 2 0 - 5    Very Low Density Lipoprotein 11     LDL Cholesterol 106.00 50.00 - 140.00 mg/dL   Hemoglobin A1C   Result Value Ref Range    Hemoglobin A1c 5.0 <=7.0 %    Estimated Average Glucose 96.8 mg/dL   TSH   Result Value Ref Range    Thyroid Stimulating Hormone 2.479 0.350 - 4.940 uIU/mL   High sensitivity CRP   Result Value Ref Range    C-Reactive Protein High Sensitivity 0.40 <=5.00 mg/L   CBC with Differential   Result Value Ref Range    WBC 4.7 4.5 - 11.5  x10(3)/mcL    RBC 4.01 (L) 4.20 - 5.40 x10(6)/mcL    Hgb 12.8 12.0 - 16.0 g/dL    Hct 38.2 37.0 - 47.0 %    MCV 95.3 (H) 80.0 - 94.0 fL    MCH 31.9 (H) 27.0 - 31.0 pg    MCHC 33.5 33.0 - 36.0 g/dL    RDW 12.0 11.5 - 17.0 %    Platelet 205 130 - 400 x10(3)/mcL    MPV 10.8 (H) 7.4 - 10.4 fL    Neut % 46.9 %    Lymph % 43.0 %    Mono % 7.4 %    Eos % 2.3 %    Basophil % 0.4 %    Lymph # 2.02 0.6 - 4.6 x10(3)/mcL    Neut # 2.20 2.1 - 9.2 x10(3)/mcL    Mono # 0.35 0.1 - 1.3 x10(3)/mcL    Eos # 0.11 0 - 0.9 x10(3)/mcL    Baso # 0.02 0 - 0.2 x10(3)/mcL    IG# 0.00 0 - 0.04 x10(3)/mcL    IG% 0.0 %    NRBC% 0.0 %   Comprehensive metabolic panel   Result Value Ref Range    Sodium Level 142 136 - 145 mmol/L    Potassium Level 3.8 3.5 - 5.1 mmol/L    Chloride 111 (H) 98 - 107 mmol/L    Carbon Dioxide 23 22 - 29 mmol/L    Glucose Level 89 74 - 100 mg/dL    Blood Urea Nitrogen 13.0 9.8 - 20.1 mg/dL    Creatinine 0.61 0.55 - 1.02 mg/dL    Calcium Level Total 9.3 8.4 - 10.2 mg/dL    Protein Total 5.5 (L) 6.4 - 8.3 gm/dL    Albumin Level 3.6 3.5 - 5.0 g/dL    Globulin 1.9 (L) 2.4 - 3.5 gm/dL    Albumin/Globulin Ratio 1.9 1.1 - 2.0 ratio    Bilirubin Total 0.7 <=1.5 mg/dL    Alkaline Phosphatase 42 40 - 150 unit/L    Alanine Aminotransferase 17 0 - 55 unit/L    Aspartate Aminotransferase 15 5 - 34 unit/L    eGFR >60 mls/min/1.73/m2   CBC with Differential   Result Value Ref Range    WBC 4.3 (L) 4.5 - 11.5 x10(3)/mcL    RBC 4.09 (L) 4.20 - 5.40 x10(6)/mcL    Hgb 12.8 12.0 - 16.0 g/dL    Hct 39.4 37.0 - 47.0 %    MCV 96.3 (H) 80.0 - 94.0 fL    MCH 31.3 (H) 27.0 - 31.0 pg    MCHC 32.5 (L) 33.0 - 36.0 g/dL    RDW 11.9 11.5 - 17.0 %    Platelet 213 130 - 400 x10(3)/mcL    MPV 9.4 7.4 - 10.4 fL    Neut % 37.8 %    Lymph % 47.9 %    Mono % 9.4 %    Eos % 4.0 %    Basophil % 0.7 %    Lymph # 2.04 0.6 - 4.6 x10(3)/mcL    Neut # 1.61 (L) 2.1 - 9.2 x10(3)/mcL    Mono # 0.40 0.1 - 1.3 x10(3)/mcL    Eos # 0.17 0 - 0.9 x10(3)/mcL    Baso # 0.03 0  - 0.2 x10(3)/mcL    IG# 0.01 0 - 0.04 x10(3)/mcL    IG% 0.2 %    NRBC% 0.0 %   CV Ultrasound Bilateral Doppler Carotid   Result Value Ref Range    Left ICA/CCA ratio 0.54     Right ICA/CCA ratio 0.98     Left Highest ICA 66.00     Left Highest      Right Highest ICA 88.00     Right Highest CCA 90     Right Highest EDV 30.00     LT Highest EDV 29.00     Right CCA prox sys 74 cm/s    Right CCA prox fall 13 cm/s    Right CCA dist sys 90 cm/s    Right CCA dist fall 19 cm/s    Right ICA prox sys 88 cm/s    Right ICA prox fall 29 cm/s    Right ICA mid sys 54 cm/s    Right ICA mid fall 19 cm/s    Right ICA dist sys 83 cm/s    Right ICA dist fall 30 cm/s    Right ECA sys 58 cm/s    Right vertebral sys 46 cm/s    Left CCA prox sys 138 cm/s    Left CCA prox fall 27 cm/s    Left CCA dist sys 122 cm/s    Left CCA dist fall 29 cm/s    Left ICA prox sys 66 cm/s    Left ICA prox fall 16 cm/s    Left ICA mid sys 46 cm/s    Left ICA mid fall 29 cm/s    Left ICA dist sys 48 cm/s    Left ICA dist fall 19 cm/s    Left ECA sys 83 cm/s    Left vertebral sys 60 cm/s    Right vertebral fall 12 cm/s    Right ECA fall 5 cm/s    Left vertebral fall 14 cm/s    Left ECA fall 9 cm/s   Echo Saline Bubble? Yes   Result Value Ref Range    BSA 1.61 m2    TDI SEPTAL 0.09 m/s    LV LATERAL E/E' RATIO 5.92 m/s    LV SEPTAL E/E' RATIO 8.56 m/s    Right Atrial Pressure (from IVC) 3 mmHg    EF 65 %    Left Ventricular Outflow Tract Mean Velocity 0.79 cm/s    Left Ventricular Outflow Tract Mean Gradient 3.00 mmHg    TDI LATERAL 0.13 m/s    LVIDd 3.86 3.5 - 6.0 cm    IVS 0.56 (A) 0.6 - 1.1 cm    Posterior Wall 0.76 0.6 - 1.1 cm    LVIDs 2.56 2.1 - 4.0 cm    FS 34 28 - 44 %    Sinus 3.10 cm    LV mass 68.39 g    LA size 3.10 cm    TAPSE 2.50 cm    Left Ventricle Relative Wall Thickness 0.39 cm    AV mean gradient 5 mmHg    AV valve area 2.34 cm2    AV Velocity Ratio 0.85     AV index (prosthetic) 0.83     MV mean gradient 2 mmHg    MV valve area p 1/2  method 2.72 cm2    MV valve area by continuity eq 2.52 cm2    E/A ratio 1.17     Mean e' 0.11 m/s    E wave deceleration time 219.00 msec    LVOT diameter 1.90 cm    LVOT area 2.8 cm2    LVOT peak adama 1.22 m/s    LVOT peak VTI 27.40 cm    Ao peak adama 1.44 m/s    Ao VTI 33.2 cm    LVOT stroke volume 77.65 cm3    AV peak gradient 8 mmHg    MV peak gradient 4 mmHg    TV rest pulmonary artery pressure 16 mmHg    E/E' ratio 7.00 m/s    MV Peak E Adama 0.77 m/s    TR Max Adama 1.83 m/s    MV VTI 30.8 cm    MV stenosis pressure 1/2 time 81.00 ms    MV Peak A Adama 0.66 m/s    LV Systolic Volume 23.70 mL    LV Systolic Volume Index 14.2 mL/m2    LV Diastolic Volume 64.30 mL    LV Diastolic Volume Index 38.50 mL/m2    LV Mass Index 41 g/m2    Triscuspid Valve Regurgitation Peak Gradient 13 mmHg    LA Volume Index (Mod) 12.5 mL/m2    LA volume (mod) 20.90 cm3    Redding's Biplane MOD Ejection Fraction 7 %   ISTAT PROCEDURE   Result Value Ref Range    POC PTWBT 14.1 9.7 - 14.3 sec    POC PTINR 1.2 0.9 - 1.2    Sample CAPILLARY    ISTAT CHEM8   Result Value Ref Range    POC Glucose 88 70 - 110 mg/dL    POC BUN 21 6 - 30 mg/dL    POC Creatinine 0.7 0.5 - 1.4 mg/dL    POC Sodium 141 136 - 145 mmol/L    POC Potassium 4.3 3.5 - 5.1 mmol/L    POC Chloride 105 95 - 110 mmol/L    POC TCO2 (MEASURED) 28 23 - 29 mmol/L    POC Anion Gap 14 8 - 16 mmol/L    POC Ionized Calcium 1.23 1.06 - 1.42 mmol/L    POC Hematocrit 36 36 - 54 %PCV    POC HEMOGLOBIN 12 g/dL    Sample VENOUS    POCT glucose   Result Value Ref Range    POCT Glucose 92 70 - 110 mg/dL   POCT glucose   Result Value Ref Range    POCT Glucose 98 70 - 110 mg/dL       Studies:      MRI C and L-spine without contrast 2022: Multilevel DJD with severe left neuroforaminal narrowing at C4-C5, C5-C6, C6-C7.  Mild-to-moderate right L3-L4 neural foraminal narrowing.    MRI brain without without contrast 10/2022:  I have reviewed the study independently and with the patient.  Chronic  microvascular changes.    Review of Systems:     Review of Systems   All other systems reviewed and are negative.    Physical Exams:     Vitals:    05/23/23 1452   BP: 101/65   Pulse: 61   Resp: 18   Temp: 98.2 °F (36.8 °C)     Physical Exam  Vitals and nursing note reviewed.   Constitutional:       Appearance: Normal appearance.   HENT:      Head: Normocephalic and atraumatic.      Nose: Nose normal.      Mouth/Throat:      Mouth: Mucous membranes are moist.      Pharynx: Oropharynx is clear.   Eyes:      Conjunctiva/sclera: Conjunctivae normal.   Cardiovascular:      Rate and Rhythm: Normal rate and regular rhythm.      Pulses: Normal pulses.   Pulmonary:      Effort: Pulmonary effort is normal.      Breath sounds: Normal breath sounds.   Abdominal:      General: Abdomen is flat.   Musculoskeletal:         General: Normal range of motion.      Cervical back: Normal range of motion.   Skin:     General: Skin is warm.   Neurological:      Mental Status: She is alert.     Comprehensive Neurological Exam:  Mental Status: Alert Oriented to Self, Date, and Place. Naming, repetition, reading, and writing wnl. Comprehension wnl. No dysarthria.   CN II - XII: LUCI, No APD, VA grossly intact to finger counting at 6 ft, VFFC, No ptosis OU, EOMI without nystagmus, LT/Temp symmetric in CN V1-3 distribution, Hearing grossly intact, Face Symmetric, Tongue and Uvula midline, Trapezius symmetric bilateral.   Motor: tone and bulk wnl throughout, no abnormal involuntary or voluntary movements, 5/5 to confrontation, Fine finger movements wnl b/l, No pronator drift.   Sensory: LT, Proprioception, Vibration, PP, Temp symmetric. No sensory simultagnosia.   Reflexes: 2+ throughout, plantar reflexes downward bilateral.   Cerebellar: FNF wnl b/l, RAHM wnl b/l,   Romberg: Negative  Gait: normal. Heel Gait, Toe Gait, Tandem Gait wnl.     Assessment:     This is 53 y.o. female with history of  PTSD, DJD, who was referred for Cervical and  "Lumbar DJD. Recent admission for weakness. Was evaluated by neurosurgeon who did not recommend sx. CVA workup  negative. Is followed by [facility] in [facility] for "leaky valve". Denies smoking, ETOH intake, is a vegetarian. Is not currently taking statin.    Problem List Items Addressed This Visit          Neuro    Degenerative disc disease, cervical - Primary    Cervical radiculopathy    Lumbar radiculopathy    Brain TIA       Plan:     [] f/u w/[facility] regarding CVA work-up - ?A-fib and need to continue statin    RTC 3mth    I have explained the treatment plan, diagnosis, and prognosis to patient. All questions are answered to the best of my knowledge.     Face to face time 30 minutes, including documentation, chart review, counseling, education, review of test results, relevant medical records, and coordination of care.     I have explained the treatment plan, diagnosis, collaboration w/MD, and prognosis to patient. All questions are answered to the best of my knowledge.     05/24/2023        "

## 2023-05-24 PROBLEM — G45.9 BRAIN TIA: Status: ACTIVE | Noted: 2023-05-24

## 2023-05-25 ENCOUNTER — OFFICE VISIT (OUTPATIENT)
Dept: FAMILY MEDICINE | Facility: CLINIC | Age: 53
End: 2023-05-25
Payer: MEDICAID

## 2023-05-25 ENCOUNTER — TELEPHONE (OUTPATIENT)
Dept: NEUROLOGY | Facility: CLINIC | Age: 53
End: 2023-05-25
Payer: MEDICAID

## 2023-05-25 ENCOUNTER — TELEPHONE (OUTPATIENT)
Dept: GYNECOLOGY | Facility: CLINIC | Age: 53
End: 2023-05-25
Payer: MEDICAID

## 2023-05-25 ENCOUNTER — LAB VISIT (OUTPATIENT)
Dept: LAB | Facility: HOSPITAL | Age: 53
End: 2023-05-25
Attending: FAMILY MEDICINE
Payer: MEDICAID

## 2023-05-25 VITALS
WEIGHT: 134 LBS | SYSTOLIC BLOOD PRESSURE: 106 MMHG | RESPIRATION RATE: 18 BRPM | DIASTOLIC BLOOD PRESSURE: 70 MMHG | HEIGHT: 64 IN | OXYGEN SATURATION: 97 % | TEMPERATURE: 98 F | BODY MASS INDEX: 22.88 KG/M2 | HEART RATE: 62 BPM

## 2023-05-25 DIAGNOSIS — Z11.59 ENCOUNTER FOR HEPATITIS C SCREENING TEST FOR LOW RISK PATIENT: ICD-10-CM

## 2023-05-25 DIAGNOSIS — R53.1 WEAKNESS: ICD-10-CM

## 2023-05-25 DIAGNOSIS — I63.9 STROKE ABORTED BY ADMINISTRATION OF THROMBOLYTIC AGENT: ICD-10-CM

## 2023-05-25 DIAGNOSIS — E78.5 HYPERLIPIDEMIA, UNSPECIFIED HYPERLIPIDEMIA TYPE: ICD-10-CM

## 2023-05-25 DIAGNOSIS — R53.1 WEAKNESS: Primary | ICD-10-CM

## 2023-05-25 DIAGNOSIS — Z12.31 ENCOUNTER FOR SCREENING MAMMOGRAM FOR MALIGNANT NEOPLASM OF BREAST: ICD-10-CM

## 2023-05-25 DIAGNOSIS — Z11.4 ENCOUNTER FOR SCREENING FOR HIV: ICD-10-CM

## 2023-05-25 LAB
HCV AB SERPL QL IA: NONREACTIVE
HIV 1+2 AB+HIV1 P24 AG SERPL QL IA: NONREACTIVE

## 2023-05-25 PROCEDURE — 87389 HIV-1 AG W/HIV-1&-2 AB AG IA: CPT

## 2023-05-25 PROCEDURE — 3008F PR BODY MASS INDEX (BMI) DOCUMENTED: ICD-10-PCS | Mod: CPTII,,, | Performed by: FAMILY MEDICINE

## 2023-05-25 PROCEDURE — 1159F MED LIST DOCD IN RCRD: CPT | Mod: CPTII,,, | Performed by: FAMILY MEDICINE

## 2023-05-25 PROCEDURE — 99215 PR OFFICE/OUTPT VISIT, EST, LEVL V, 40-54 MIN: ICD-10-PCS | Mod: S$PBB,,, | Performed by: FAMILY MEDICINE

## 2023-05-25 PROCEDURE — 3074F SYST BP LT 130 MM HG: CPT | Mod: CPTII,,, | Performed by: FAMILY MEDICINE

## 2023-05-25 PROCEDURE — 3078F DIAST BP <80 MM HG: CPT | Mod: CPTII,,, | Performed by: FAMILY MEDICINE

## 2023-05-25 PROCEDURE — 1111F PR DISCHARGE MEDS RECONCILED W/ CURRENT OUTPATIENT MED LIST: ICD-10-PCS | Mod: CPTII,,, | Performed by: FAMILY MEDICINE

## 2023-05-25 PROCEDURE — 3008F BODY MASS INDEX DOCD: CPT | Mod: CPTII,,, | Performed by: FAMILY MEDICINE

## 2023-05-25 PROCEDURE — 83519 RIA NONANTIBODY: CPT | Mod: 90

## 2023-05-25 PROCEDURE — 3074F PR MOST RECENT SYSTOLIC BLOOD PRESSURE < 130 MM HG: ICD-10-PCS | Mod: CPTII,,, | Performed by: FAMILY MEDICINE

## 2023-05-25 PROCEDURE — 1160F RVW MEDS BY RX/DR IN RCRD: CPT | Mod: CPTII,,, | Performed by: FAMILY MEDICINE

## 2023-05-25 PROCEDURE — 1111F DSCHRG MED/CURRENT MED MERGE: CPT | Mod: CPTII,,, | Performed by: FAMILY MEDICINE

## 2023-05-25 PROCEDURE — 36415 COLL VENOUS BLD VENIPUNCTURE: CPT

## 2023-05-25 PROCEDURE — 1159F PR MEDICATION LIST DOCUMENTED IN MEDICAL RECORD: ICD-10-PCS | Mod: CPTII,,, | Performed by: FAMILY MEDICINE

## 2023-05-25 PROCEDURE — 99214 OFFICE O/P EST MOD 30 MIN: CPT | Mod: PBBFAC | Performed by: FAMILY MEDICINE

## 2023-05-25 PROCEDURE — 86803 HEPATITIS C AB TEST: CPT

## 2023-05-25 PROCEDURE — 3078F PR MOST RECENT DIASTOLIC BLOOD PRESSURE < 80 MM HG: ICD-10-PCS | Mod: CPTII,,, | Performed by: FAMILY MEDICINE

## 2023-05-25 PROCEDURE — 99215 OFFICE O/P EST HI 40 MIN: CPT | Mod: S$PBB,,, | Performed by: FAMILY MEDICINE

## 2023-05-25 PROCEDURE — 1160F PR REVIEW ALL MEDS BY PRESCRIBER/CLIN PHARMACIST DOCUMENTED: ICD-10-PCS | Mod: CPTII,,, | Performed by: FAMILY MEDICINE

## 2023-05-25 RX ORDER — ATORVASTATIN CALCIUM 40 MG/1
40 TABLET, FILM COATED ORAL DAILY
COMMUNITY
End: 2023-09-11

## 2023-05-25 NOTE — TELEPHONE ENCOUNTER
Pt has appt today. Pt called in regards of vaginal cream that Dr. Nicole Georges prescribed in the past. Pt states it helped with vaginal dryness and most important it eased  her exam. States she won't be able to tolerate exam today if she doesn't use the cream. Please advise thanks

## 2023-05-25 NOTE — TELEPHONE ENCOUNTER
----- Message from Alea Blakely sent at 5/25/2023 10:32 AM CDT -----  Regarding: Please call pt  Pt called and stated she has a question about a medication that her pcp is recommending. The medication is PLAVIX. Pt would like a call back and can be reached @ [phone]        Thank You  Rita VILLALBA

## 2023-05-25 NOTE — TELEPHONE ENCOUNTER
Pt. States she was recently seen by her PCP and they are considering adding plavix to her regimen. She states it was mentioned by cardio, however pt states that cardio told her it was OTC. Patient was told by PCP to consult with Neurology to see if it is recommended to take the plavix after being administered thrombolytics in hospital to prevent stroke. Please advise.

## 2023-05-25 NOTE — PROGRESS NOTES
"Patient Name: Remi Baptiste   : 1970  MRN: [id number]     SUBJECTIVE:  Remi Baptiste is a 53 y.o. female here for Follow-up (Patient is here for a post hospital encounter. She has questions on atorvastatin as she is scared to take it because of the side effects. Also, the cardiologist wanted her to discuss myoasthenia gravis.)  .    HPI  Here for hospital follow up.  "history of HLD, anxiety, depression, irritable bowel disease, who presented to the emergency department with left-sided 2nd intercostal chest pain, SOB for approximately 1 week. chest pain was reproducible. She also noted that on the day of admission she had decreased sensation of the LUE & LLE weakness  She was seen in the ER by Neurology for the weakness and a CT and CTA were done which demonstrated no significant abnormalities, she was given TNKase at 1:45 a.m. and her NIH stroke scale at that time was 6 for drift of the left arm, drift of the left leg, sensory abnormalities and mild dysarthria. MRI at the 24 hour jose s/p TNKase with no acute findings no concern for stroke and no concern for hemorrhage.  Was notified by ICU nurse the Neurology recommended discharge from the ICU. Neurology recommended discharging on aspirin daily, high dose statin 40 mg.    She had an appointment follow-up with Neurology 2 days ago who agreed with the CVA workup being negative.  They recommended the high dose statin again but patient refuses to take it despite Cardiology and Neurology recommending it.  Discussed with her again today for an extensive time going through risks and benefits, but she still refused to take it because she has looked into the side effects on the Internet and as per patient "the most common one was having a stroke from a statin".  Patient states that since discharge, she feels much better, there is no more weakness of the left side of her body, but she continues to have the left eyelid weakness.    Patient having workup from " "[facility]- no records available.  Per patient, they mentioned to her that she should be worked up for myasthenia gravis. Following with them for "a leaky valve"   Started her on propranolol for palpitations.   Was tasted for MS twice and negative- per patient.    Shingles refused.   Colon cancer screening: clearance from heart doctor.  Patient states she did have a clearance, but needs to call to reschedule.    ALLERGIES:   Review of patient's allergies indicates:   Allergen Reactions    Hydrocodone-guaifenesin Hives    Hydrocodone-acetaminophen     Penicillins Rash         ROS:  Review of Systems   Constitutional:  Negative for chills, fever and weight loss.   HENT:  Negative for congestion.    Eyes:  Negative for pain.   Respiratory:  Negative for cough and shortness of breath.    Cardiovascular:  Negative for chest pain, palpitations and leg swelling.   Gastrointestinal:  Negative for abdominal pain, blood in stool, nausea and vomiting.   Genitourinary:  Negative for dysuria and hematuria.   Skin:  Negative for rash.   Neurological:  Positive for focal weakness (left eyelid, chronic issue). Negative for dizziness and headaches.   Psychiatric/Behavioral:  Negative for depression. The patient is not nervous/anxious.        OBJECTIVE:  Vital signs  Vitals:    05/25/23 0928   BP: 106/70   Pulse: 62   Resp: 18   Temp: 98 °F (36.7 °C)   TempSrc: Oral   SpO2: 97%   Weight: 60.8 kg (134 lb)   Height: 5' 4" (1.626 m)      Body mass index is 23 kg/m².    PHYSICAL EXAM:   Physical Exam  Vitals reviewed.   Constitutional:       General: She is not in acute distress (but very nervous).     Appearance: Normal appearance. She is not ill-appearing.   HENT:      Head: Normocephalic and atraumatic.      Right Ear: External ear normal.      Left Ear: External ear normal.      Nose: Nose normal. No rhinorrhea.      Mouth/Throat:      Mouth: Mucous membranes are moist.   Eyes:      General: No scleral icterus.        Right eye: " No discharge.         Left eye: No discharge.      Conjunctiva/sclera: Conjunctivae normal.      Pupils: Pupils are equal, round, and reactive to light.   Cardiovascular:      Rate and Rhythm: Normal rate and regular rhythm.      Heart sounds: No murmur heard.  Pulmonary:      Effort: Pulmonary effort is normal. No respiratory distress.      Breath sounds: No wheezing, rhonchi or rales.   Abdominal:      General: Bowel sounds are normal. There is no distension.      Palpations: Abdomen is soft.      Tenderness: There is no abdominal tenderness.   Musculoskeletal:      Cervical back: Normal range of motion and neck supple. No rigidity.      Right lower leg: No edema.      Left lower leg: No edema.   Skin:     General: Skin is warm.      Findings: No rash.   Neurological:      Mental Status: She is alert and oriented to person, place, and time.      Sensory: No sensory deficit.      Motor: Weakness (left eyelid. otherwise normal strength of UE and LE) present.   Psychiatric:         Mood and Affect: Mood is anxious.         Behavior: Behavior is agitated (when discussing statin use and the recommended protocol.).        ASSESSMENT/PLAN:  1. Weakness  -     Acetylcholine Receptor, Binding; Future; Expected date: 05/25/2023  -     CBC Auto Differential; Future; Expected date: 08/25/2023  -     Comprehensive Metabolic Panel; Future; Expected date: 08/25/2023  -     TSH; Future; Expected date: 08/25/2023    2. Stroke aborted by administration of thrombolytic agent  -     CBC Auto Differential; Future; Expected date: 08/25/2023  -     Comprehensive Metabolic Panel; Future; Expected date: 08/25/2023  -     Lipid Panel; Future; Expected date: 08/25/2023  -     TSH; Future; Expected date: 08/25/2023    3. Hyperlipidemia, unspecified hyperlipidemia type  -     Lipid Panel; Future; Expected date: 08/25/2023  -     TSH; Future; Expected date: 08/25/2023    4. Encounter for screening mammogram for malignant neoplasm of breast  -      Mammo Digital Screening Bilat w/ Refugio; Future; Expected date: 05/25/2023    5. Encounter for screening for HIV  -     HIV 1/2 Ag/Ab (4th Gen); Future; Expected date: 05/25/2023    6. Encounter for hepatitis C screening test for low risk patient  -     Hepatitis C Antibody; Future; Expected date: 05/25/2023       PLAN  Had a lengthy discussion with the patient for at least 30 minutes about her hospital admission, the workup that was done there and and neurology recommendations.  Discussed with patient that I agree with neurology and cardiology recommendations regarding being on atorvastatin 40 mg, but that depends on patient's decision and she would prefer not to take it.  Also discussed the questionable stroke that was resolved with the thrombolytic agent versus TIA, and if Plavix is needed.  Patient states that even cardiology mentioned Plavix to her, but advised patient to reach out to Neurology and ask for their recommendations.  If patient had TIA, that plavix would be recommended with aspirin.  We will check for myasthenia gravis differential diagnosis given the weakness of the eyelid.  Patient states she has had workup for multiple sclerosis and was negative.  Will also need medical records from Cardiology.  Recheck lipid panel  Updated preventative healthcare.  Refusing shingles vaccine.  Mammogram ordered.  Regarding colonoscopy, patient will reach out to them to reschedule, if not possible we will refer again.    I spent a total of 51 minutes on the day of the visit.This includes face to face time and non-face to face time preparing to see the patient (eg, review of tests), obtaining and/or reviewing separately obtained history, documenting clinical information in the electronic or other health record, independently interpreting results and communicating results to the patient/family/caregiver, or care coordinator.      Previous medical history/lab work/radiology reviewed and considered during medical  management decisions.   Medication list reviewed and medication reconciliation performed.  Patient was provided  and care about his/her current diagnosis (es) and medications including risk/benefit and side effects/adverse events, over the counter medication uses/doses, home self-care and contact precautions,  and red flags and indications for when to seek immediate medical attention.   Patient was advised to continue compliance with current medication list and medical recommendations.  Recommended/ Advised continued compliance with recommended eating habits/ diets for medical conditions and exercise 150 minutes/ week (if possible) for medical condition (s).        RESULTS:  Recent Results (from the past 1008 hour(s))   POCT glucose    Collection Time: 04/28/23  1:07 AM   Result Value Ref Range    POCT Glucose 92 70 - 110 mg/dL   ISTAT PROCEDURE    Collection Time: 04/28/23  1:12 AM   Result Value Ref Range    POC PTWBT 14.1 9.7 - 14.3 sec    POC PTINR 1.2 0.9 - 1.2    Sample CAPILLARY    ISTAT CHEM8    Collection Time: 04/28/23  1:29 AM   Result Value Ref Range    POC Glucose 88 70 - 110 mg/dL    POC BUN 21 6 - 30 mg/dL    POC Creatinine 0.7 0.5 - 1.4 mg/dL    POC Sodium 141 136 - 145 mmol/L    POC Potassium 4.3 3.5 - 5.1 mmol/L    POC Chloride 105 95 - 110 mmol/L    POC TCO2 (MEASURED) 28 23 - 29 mmol/L    POC Anion Gap 14 8 - 16 mmol/L    POC Ionized Calcium 1.23 1.06 - 1.42 mmol/L    POC Hematocrit 36 36 - 54 %PCV    POC HEMOGLOBIN 12 g/dL    Sample VENOUS    Comprehensive metabolic panel    Collection Time: 04/28/23  1:39 AM   Result Value Ref Range    Sodium Level 140 136 - 145 mmol/L    Potassium Level 4.0 3.5 - 5.1 mmol/L    Chloride 106 98 - 107 mmol/L    Carbon Dioxide 26 22 - 29 mmol/L    Glucose Level 92 74 - 100 mg/dL    Blood Urea Nitrogen 17.0 9.8 - 20.1 mg/dL    Creatinine 0.70 0.55 - 1.02 mg/dL    Calcium Level Total 9.5 8.4 - 10.2 mg/dL    Protein Total 6.1 (L) 6.4 - 8.3 gm/dL    Albumin  Level 3.9 3.5 - 5.0 g/dL    Globulin 2.2 (L) 2.4 - 3.5 gm/dL    Albumin/Globulin Ratio 1.8 1.1 - 2.0 ratio    Bilirubin Total 0.4 <=1.5 mg/dL    Alkaline Phosphatase 51 40 - 150 unit/L    Alanine Aminotransferase 19 0 - 55 unit/L    Aspartate Aminotransferase 16 5 - 34 unit/L    eGFR >60 mls/min/1.73/m2   APTT    Collection Time: 04/28/23  1:39 AM   Result Value Ref Range    PTT 27.4 23.2 - 33.7 seconds   Troponin I    Collection Time: 04/28/23  1:39 AM   Result Value Ref Range    Troponin-I <0.010 0.000 - 0.045 ng/mL   Protime-INR    Collection Time: 04/28/23  1:39 AM   Result Value Ref Range    PT 12.4 (L) 12.5 - 14.5 seconds    INR 0.93 0.00 - 1.30   Ethanol    Collection Time: 04/28/23  1:39 AM   Result Value Ref Range    Ethanol Level <10.0 <=10.0 mg/dL   D dimer, quantitative    Collection Time: 04/28/23  1:39 AM   Result Value Ref Range    D-Dimer <0.27 0.00 - 0.50 ug/mL FEU   CBC with Differential    Collection Time: 04/28/23  1:39 AM   Result Value Ref Range    WBC 5.3 4.5 - 11.5 x10(3)/mcL    RBC 4.21 4.20 - 5.40 x10(6)/mcL    Hgb 13.4 12.0 - 16.0 g/dL    Hct 39.8 37.0 - 47.0 %    MCV 94.5 (H) 80.0 - 94.0 fL    MCH 31.8 (H) 27.0 - 31.0 pg    MCHC 33.7 33.0 - 36.0 g/dL    RDW 11.9 11.5 - 17.0 %    Platelet 237 130 - 400 x10(3)/mcL    MPV 9.4 7.4 - 10.4 fL    Neut % 28.5 %    Lymph % 58.0 %    Mono % 9.3 %    Eos % 3.4 %    Basophil % 0.6 %    Lymph # 3.06 0.6 - 4.6 x10(3)/mcL    Neut # 1.51 (L) 2.1 - 9.2 x10(3)/mcL    Mono # 0.49 0.1 - 1.3 x10(3)/mcL    Eos # 0.18 0 - 0.9 x10(3)/mcL    Baso # 0.03 0 - 0.2 x10(3)/mcL    IG# 0.01 0 - 0.04 x10(3)/mcL    IG% 0.2 %    NRBC% 0.0 %   Urinalysis, Reflex to Urine Culture    Collection Time: 04/28/23  2:03 AM    Specimen: Urine   Result Value Ref Range    Color, UA Yellow Yellow, Light-Yellow, Dark Yellow, Mckenzie, Straw    Appearance, UA Clear Clear    Specific Gravity, UA >=1.040 (H) 1.005 - 1.030    pH, UA 7.0 5.0 - 8.5    Protein, UA Negative Negative mg/dL     Glucose, UA Negative Negative, Normal mg/dL    Ketones, UA Negative Negative mg/dL    Blood, UA Negative Negative unit/L    Bilirubin, UA Negative Negative mg/dL    Urobilinogen, UA 0.2 0.2, 1.0, Normal mg/dL    Nitrites, UA Negative Negative    Leukocyte Esterase, UA Negative Negative unit/L   Pregnancy, urine rapid    Collection Time: 04/28/23  2:03 AM   Result Value Ref Range    Beta hCG Qualitative, Urine Negative Negative   Drug Screen, Urine    Collection Time: 04/28/23  2:03 AM   Result Value Ref Range    Amphetamines, Urine Negative Negative    Barbituates, Urine Negative Negative    Benzodiazepine, Urine Negative Negative    Cannabinoids, Urine Negative Negative    Cocaine, Urine Negative Negative    Fentanyl, Urine Negative Negative    MDMA, Urine Negative Negative    Opiates, Urine Negative Negative    Phencyclidine, Urine Negative Negative    pH, Urine 7.0 3.0 - 11.0   Urinalysis, Microscopic    Collection Time: 04/28/23  2:03 AM   Result Value Ref Range    RBC, UA <5 <=5 /HPF    WBC, UA <5 <=5 /HPF    Squamous Epithelial Cells, UA <5 <=5 /HPF    Bacteria, UA None Seen None Seen, Rare, Occasional /HPF   Lipid panel    Collection Time: 04/28/23  5:37 AM   Result Value Ref Range    Cholesterol Total 198 <=200 mg/dL    HDL Cholesterol 81 (H) 35 - 60 mg/dL    Triglyceride 53 37 - 140 mg/dL    Cholesterol/HDL Ratio 2 0 - 5    Very Low Density Lipoprotein 11     LDL Cholesterol 106.00 50.00 - 140.00 mg/dL   Hemoglobin A1C    Collection Time: 04/28/23  5:37 AM   Result Value Ref Range    Hemoglobin A1c 5.0 <=7.0 %    Estimated Average Glucose 96.8 mg/dL   TSH    Collection Time: 04/28/23  5:37 AM   Result Value Ref Range    Thyroid Stimulating Hormone 2.479 0.350 - 4.940 uIU/mL   High sensitivity CRP    Collection Time: 04/28/23  5:37 AM   Result Value Ref Range    C-Reactive Protein High Sensitivity 0.40 <=5.00 mg/L   CBC with Differential    Collection Time: 04/28/23  5:37 AM   Result Value Ref Range    WBC  4.7 4.5 - 11.5 x10(3)/mcL    RBC 4.01 (L) 4.20 - 5.40 x10(6)/mcL    Hgb 12.8 12.0 - 16.0 g/dL    Hct 38.2 37.0 - 47.0 %    MCV 95.3 (H) 80.0 - 94.0 fL    MCH 31.9 (H) 27.0 - 31.0 pg    MCHC 33.5 33.0 - 36.0 g/dL    RDW 12.0 11.5 - 17.0 %    Platelet 205 130 - 400 x10(3)/mcL    MPV 10.8 (H) 7.4 - 10.4 fL    Neut % 46.9 %    Lymph % 43.0 %    Mono % 7.4 %    Eos % 2.3 %    Basophil % 0.4 %    Lymph # 2.02 0.6 - 4.6 x10(3)/mcL    Neut # 2.20 2.1 - 9.2 x10(3)/mcL    Mono # 0.35 0.1 - 1.3 x10(3)/mcL    Eos # 0.11 0 - 0.9 x10(3)/mcL    Baso # 0.02 0 - 0.2 x10(3)/mcL    IG# 0.00 0 - 0.04 x10(3)/mcL    IG% 0.0 %    NRBC% 0.0 %   Echo Saline Bubble? Yes    Collection Time: 04/28/23 10:08 AM   Result Value Ref Range    BSA 1.61 m2    TDI SEPTAL 0.09 m/s    LV LATERAL E/E' RATIO 5.92 m/s    LV SEPTAL E/E' RATIO 8.56 m/s    Right Atrial Pressure (from IVC) 3 mmHg    EF 65 %    Left Ventricular Outflow Tract Mean Velocity 0.79 cm/s    Left Ventricular Outflow Tract Mean Gradient 3.00 mmHg    TDI LATERAL 0.13 m/s    LVIDd 3.86 3.5 - 6.0 cm    IVS 0.56 (A) 0.6 - 1.1 cm    Posterior Wall 0.76 0.6 - 1.1 cm    LVIDs 2.56 2.1 - 4.0 cm    FS 34 28 - 44 %    Sinus 3.10 cm    LV mass 68.39 g    LA size 3.10 cm    TAPSE 2.50 cm    Left Ventricle Relative Wall Thickness 0.39 cm    AV mean gradient 5 mmHg    AV valve area 2.34 cm2    AV Velocity Ratio 0.85     AV index (prosthetic) 0.83     MV mean gradient 2 mmHg    MV valve area p 1/2 method 2.72 cm2    MV valve area by continuity eq 2.52 cm2    E/A ratio 1.17     Mean e' 0.11 m/s    E wave deceleration time 219.00 msec    LVOT diameter 1.90 cm    LVOT area 2.8 cm2    LVOT peak adama 1.22 m/s    LVOT peak VTI 27.40 cm    Ao peak adama 1.44 m/s    Ao VTI 33.2 cm    LVOT stroke volume 77.65 cm3    AV peak gradient 8 mmHg    MV peak gradient 4 mmHg    TV rest pulmonary artery pressure 16 mmHg    E/E' ratio 7.00 m/s    MV Peak E Adama 0.77 m/s    TR Max Adama 1.83 m/s    MV VTI 30.8 cm    MV stenosis  pressure 1/2 time 81.00 ms    MV Peak A Adama 0.66 m/s    LV Systolic Volume 23.70 mL    LV Systolic Volume Index 14.2 mL/m2    LV Diastolic Volume 64.30 mL    LV Diastolic Volume Index 38.50 mL/m2    LV Mass Index 41 g/m2    Triscuspid Valve Regurgitation Peak Gradient 13 mmHg    LA Volume Index (Mod) 12.5 mL/m2    LA volume (mod) 20.90 cm3    Redding's Biplane MOD Ejection Fraction 7 %   CV Ultrasound Bilateral Doppler Carotid    Collection Time: 04/28/23 10:26 AM   Result Value Ref Range    Left ICA/CCA ratio 0.54     Right ICA/CCA ratio 0.98     Left Highest ICA 66.00     Left Highest      Right Highest ICA 88.00     Right Highest CCA 90     Right Highest EDV 30.00     LT Highest EDV 29.00     Right CCA prox sys 74 cm/s    Right CCA prox fall 13 cm/s    Right CCA dist sys 90 cm/s    Right CCA dist fall 19 cm/s    Right ICA prox sys 88 cm/s    Right ICA prox fall 29 cm/s    Right ICA mid sys 54 cm/s    Right ICA mid fall 19 cm/s    Right ICA dist sys 83 cm/s    Right ICA dist fall 30 cm/s    Right ECA sys 58 cm/s    Right vertebral sys 46 cm/s    Left CCA prox sys 138 cm/s    Left CCA prox fall 27 cm/s    Left CCA dist sys 122 cm/s    Left CCA dist fall 29 cm/s    Left ICA prox sys 66 cm/s    Left ICA prox fall 16 cm/s    Left ICA mid sys 46 cm/s    Left ICA mid fall 29 cm/s    Left ICA dist sys 48 cm/s    Left ICA dist fall 19 cm/s    Left ECA sys 83 cm/s    Left vertebral sys 60 cm/s    Right vertebral fall 12 cm/s    Right ECA fall 5 cm/s    Left vertebral fall 14 cm/s    Left ECA fall 9 cm/s   Comprehensive metabolic panel    Collection Time: 04/29/23  1:25 AM   Result Value Ref Range    Sodium Level 142 136 - 145 mmol/L    Potassium Level 3.8 3.5 - 5.1 mmol/L    Chloride 111 (H) 98 - 107 mmol/L    Carbon Dioxide 23 22 - 29 mmol/L    Glucose Level 89 74 - 100 mg/dL    Blood Urea Nitrogen 13.0 9.8 - 20.1 mg/dL    Creatinine 0.61 0.55 - 1.02 mg/dL    Calcium Level Total 9.3 8.4 - 10.2 mg/dL    Protein  Total 5.5 (L) 6.4 - 8.3 gm/dL    Albumin Level 3.6 3.5 - 5.0 g/dL    Globulin 1.9 (L) 2.4 - 3.5 gm/dL    Albumin/Globulin Ratio 1.9 1.1 - 2.0 ratio    Bilirubin Total 0.7 <=1.5 mg/dL    Alkaline Phosphatase 42 40 - 150 unit/L    Alanine Aminotransferase 17 0 - 55 unit/L    Aspartate Aminotransferase 15 5 - 34 unit/L    eGFR >60 mls/min/1.73/m2   CBC with Differential    Collection Time: 04/29/23  1:25 AM   Result Value Ref Range    WBC 4.3 (L) 4.5 - 11.5 x10(3)/mcL    RBC 4.09 (L) 4.20 - 5.40 x10(6)/mcL    Hgb 12.8 12.0 - 16.0 g/dL    Hct 39.4 37.0 - 47.0 %    MCV 96.3 (H) 80.0 - 94.0 fL    MCH 31.3 (H) 27.0 - 31.0 pg    MCHC 32.5 (L) 33.0 - 36.0 g/dL    RDW 11.9 11.5 - 17.0 %    Platelet 213 130 - 400 x10(3)/mcL    MPV 9.4 7.4 - 10.4 fL    Neut % 37.8 %    Lymph % 47.9 %    Mono % 9.4 %    Eos % 4.0 %    Basophil % 0.7 %    Lymph # 2.04 0.6 - 4.6 x10(3)/mcL    Neut # 1.61 (L) 2.1 - 9.2 x10(3)/mcL    Mono # 0.40 0.1 - 1.3 x10(3)/mcL    Eos # 0.17 0 - 0.9 x10(3)/mcL    Baso # 0.03 0 - 0.2 x10(3)/mcL    IG# 0.01 0 - 0.04 x10(3)/mcL    IG% 0.2 %    NRBC% 0.0 %   POCT glucose    Collection Time: 04/29/23  1:36 PM   Result Value Ref Range    POCT Glucose 98 70 - 110 mg/dL   HIV 1/2 Ag/Ab (4th Gen)    Collection Time: 05/25/23 10:51 AM   Result Value Ref Range    HIV Nonreactive Nonreactive   Hepatitis C Antibody    Collection Time: 05/25/23 10:51 AM   Result Value Ref Range    Hepatitis C Antibody Nonreactive Nonreactive         Follow Up:  Follow up in about 3 months (around 8/25/2023) for TIA, hyperlipidemia.  .     [unfilled]    This note was created with the assistance of a voice recognition software or phone dictation. There may be transcription errors as a result of using this technology however minimal. Effort has been made to assure accuracy of transcription but any obvious errors or omissions should be clarified with the author of the document

## 2023-05-26 ENCOUNTER — HOSPITAL ENCOUNTER (OUTPATIENT)
Dept: RADIOLOGY | Facility: HOSPITAL | Age: 53
Discharge: HOME | End: 2023-05-26
Attending: FAMILY MEDICINE
Payer: MEDICAID

## 2023-05-26 DIAGNOSIS — Z12.31 ENCOUNTER FOR SCREENING MAMMOGRAM FOR MALIGNANT NEOPLASM OF BREAST: ICD-10-CM

## 2023-05-26 PROCEDURE — 77067 SCR MAMMO BI INCL CAD: CPT | Mod: TC

## 2023-05-26 PROCEDURE — 77067 SCR MAMMO BI INCL CAD: CPT | Mod: 26,,, | Performed by: RADIOLOGY

## 2023-05-26 PROCEDURE — 77063 BREAST TOMOSYNTHESIS BI: CPT | Mod: 26,,, | Performed by: RADIOLOGY

## 2023-05-26 PROCEDURE — 77063 MAMMO DIGITAL SCREENING BILAT WITH TOMO: ICD-10-PCS | Mod: 26,,, | Performed by: RADIOLOGY

## 2023-05-26 PROCEDURE — 77067 MAMMO DIGITAL SCREENING BILAT WITH TOMO: ICD-10-PCS | Mod: 26,,, | Performed by: RADIOLOGY

## 2023-05-28 LAB — ACHR BIND AB SER-SCNC: 0 NMOL/L

## 2023-05-31 ENCOUNTER — OFFICE VISIT (OUTPATIENT)
Dept: FAMILY MEDICINE | Facility: CLINIC | Age: 53
End: 2023-05-31
Payer: MEDICAID

## 2023-05-31 VITALS
BODY MASS INDEX: 24.99 KG/M2 | HEART RATE: 68 BPM | HEIGHT: 64 IN | TEMPERATURE: 98 F | SYSTOLIC BLOOD PRESSURE: 96 MMHG | WEIGHT: 146.38 LBS | OXYGEN SATURATION: 99 % | DIASTOLIC BLOOD PRESSURE: 64 MMHG | RESPIRATION RATE: 18 BRPM

## 2023-05-31 DIAGNOSIS — F43.21 GRIEF: Primary | ICD-10-CM

## 2023-05-31 DIAGNOSIS — Z59.6 LOW INCOME: ICD-10-CM

## 2023-05-31 DIAGNOSIS — F41.1 GAD (GENERALIZED ANXIETY DISORDER): ICD-10-CM

## 2023-05-31 DIAGNOSIS — F43.10 PTSD (POST-TRAUMATIC STRESS DISORDER): ICD-10-CM

## 2023-05-31 DIAGNOSIS — F32.0 MILD MAJOR DEPRESSION: ICD-10-CM

## 2023-05-31 PROCEDURE — 1159F PR MEDICATION LIST DOCUMENTED IN MEDICAL RECORD: ICD-10-PCS | Mod: CPTII,,, | Performed by: NURSE PRACTITIONER

## 2023-05-31 PROCEDURE — 90838 PSYTX W PT W E/M 60 MIN: CPT | Mod: S$PBB,SA,HB, | Performed by: NURSE PRACTITIONER

## 2023-05-31 PROCEDURE — 90838 PSYTX W PT W E/M 60 MIN: CPT | Mod: PBBFAC | Performed by: NURSE PRACTITIONER

## 2023-05-31 PROCEDURE — 90838 PR PSYCHOTHERAPY W/PATIENT W/E&M, 60 MIN (ADD ON): ICD-10-PCS | Mod: S$PBB,SA,HB, | Performed by: NURSE PRACTITIONER

## 2023-05-31 PROCEDURE — 3074F PR MOST RECENT SYSTOLIC BLOOD PRESSURE < 130 MM HG: ICD-10-PCS | Mod: CPTII,,, | Performed by: NURSE PRACTITIONER

## 2023-05-31 PROCEDURE — 3078F DIAST BP <80 MM HG: CPT | Mod: CPTII,,, | Performed by: NURSE PRACTITIONER

## 2023-05-31 PROCEDURE — 99214 PR OFFICE/OUTPT VISIT, EST, LEVL IV, 30-39 MIN: ICD-10-PCS | Mod: S$PBB,SA,HB, | Performed by: NURSE PRACTITIONER

## 2023-05-31 PROCEDURE — 99214 OFFICE O/P EST MOD 30 MIN: CPT | Mod: PBBFAC | Performed by: NURSE PRACTITIONER

## 2023-05-31 PROCEDURE — 3074F SYST BP LT 130 MM HG: CPT | Mod: CPTII,,, | Performed by: NURSE PRACTITIONER

## 2023-05-31 PROCEDURE — 1159F MED LIST DOCD IN RCRD: CPT | Mod: CPTII,,, | Performed by: NURSE PRACTITIONER

## 2023-05-31 PROCEDURE — 99214 OFFICE O/P EST MOD 30 MIN: CPT | Mod: S$PBB,SA,HB, | Performed by: NURSE PRACTITIONER

## 2023-05-31 PROCEDURE — 3008F BODY MASS INDEX DOCD: CPT | Mod: CPTII,,, | Performed by: NURSE PRACTITIONER

## 2023-05-31 PROCEDURE — 3008F PR BODY MASS INDEX (BMI) DOCUMENTED: ICD-10-PCS | Mod: CPTII,,, | Performed by: NURSE PRACTITIONER

## 2023-05-31 PROCEDURE — 3078F PR MOST RECENT DIASTOLIC BLOOD PRESSURE < 80 MM HG: ICD-10-PCS | Mod: CPTII,,, | Performed by: NURSE PRACTITIONER

## 2023-05-31 SDOH — SOCIAL DETERMINANTS OF HEALTH (SDOH): LOW INCOME: Z59.6
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
## 2023-05-31 NOTE — PATIENT INSTRUCTIONS
Meds as directed  Keep all healthcare appointments  Continue to work in [employer] Workbook. Bring to next appointment  Locate [employer] adrián and download to phone  Utilize self-interventions from patient education materials  Call [facility] or go to nearest ER if overwhelmed  Walk 4 times/week for 30 minutes   Clinic 4 weeks

## 2023-06-22 ENCOUNTER — HOSPITAL ENCOUNTER (EMERGENCY)
Facility: HOSPITAL | Age: 53
Discharge: HOME | End: 2023-06-22
Attending: EMERGENCY MEDICINE
Payer: MEDICAID

## 2023-06-22 VITALS
DIASTOLIC BLOOD PRESSURE: 62 MMHG | HEART RATE: 74 BPM | SYSTOLIC BLOOD PRESSURE: 113 MMHG | BODY MASS INDEX: 23.04 KG/M2 | WEIGHT: 134.94 LBS | TEMPERATURE: 98 F | OXYGEN SATURATION: 99 % | RESPIRATION RATE: 20 BRPM | HEIGHT: 64 IN

## 2023-06-22 DIAGNOSIS — S46.811A STRAIN OF RIGHT TRAPEZIUS MUSCLE, INITIAL ENCOUNTER: Primary | ICD-10-CM

## 2023-06-22 DIAGNOSIS — R07.9 RIGHT-SIDED CHEST PAIN: ICD-10-CM

## 2023-06-22 PROCEDURE — 63600175 PHARM REV CODE 636 W HCPCS: Performed by: NURSE PRACTITIONER

## 2023-06-22 PROCEDURE — 96372 THER/PROPH/DIAG INJ SC/IM: CPT | Performed by: NURSE PRACTITIONER

## 2023-06-22 PROCEDURE — 99284 EMERGENCY DEPT VISIT MOD MDM: CPT | Mod: 25

## 2023-06-22 PROCEDURE — 25000003 PHARM REV CODE 250: Performed by: NURSE PRACTITIONER

## 2023-06-22 PROCEDURE — 93005 ELECTROCARDIOGRAM TRACING: CPT

## 2023-06-22 RX ORDER — DEXAMETHASONE SODIUM PHOSPHATE 4 MG/ML
8 INJECTION, SOLUTION INTRA-ARTICULAR; INTRALESIONAL; INTRAMUSCULAR; INTRAVENOUS; SOFT TISSUE
Status: COMPLETED | OUTPATIENT
Start: 2023-06-22 | End: 2023-06-22

## 2023-06-22 RX ORDER — BACLOFEN 10 MG/1
10 TABLET ORAL
Status: COMPLETED | OUTPATIENT
Start: 2023-06-22 | End: 2023-06-22

## 2023-06-22 RX ORDER — BACLOFEN 10 MG/1
10 TABLET ORAL 3 TIMES DAILY PRN
Qty: 21 TABLET | Refills: 0 | Status: SHIPPED | OUTPATIENT
Start: 2023-06-22 | End: 2023-09-11

## 2023-06-22 RX ORDER — GABAPENTIN 300 MG/1
300 CAPSULE ORAL DAILY
Qty: 14 CAPSULE | Refills: 0 | Status: SHIPPED | OUTPATIENT
Start: 2023-06-22 | End: 2023-09-11

## 2023-06-22 RX ADMIN — DEXAMETHASONE SODIUM PHOSPHATE 8 MG: 4 INJECTION, SOLUTION INTRA-ARTICULAR; INTRALESIONAL; INTRAMUSCULAR; INTRAVENOUS; SOFT TISSUE at 07:06

## 2023-06-22 RX ADMIN — BACLOFEN 10 MG: 10 TABLET ORAL at 07:06

## 2023-06-22 NOTE — Clinical Note
"Remi"Santi Baptiste was seen and treated in our emergency department on 6/22/2023.  She may return to work on 06/25/2023.  No heavy lifting for next 7 days.     If you have any questions or concerns, please don't hesitate to call.      Tres Ramachandran Jr., FNP"

## 2023-06-23 NOTE — ED PROVIDER NOTES
"Encounter Date: 6/22/2023       History     Chief Complaint   Patient presents with    Spasms     Right chest, axilla, and upper back spasms after lifting object at workplace 2 days prior     Pt is a 53 y.o. female who presents to the [facility] ED complaining of Rt upper chest and shoulder pain after heavy lifting at work 2 days ago. 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 Hx of anxiety.    Review of patient's allergies indicates:   Allergen Reactions    Hydrocodone-guaifenesin Hives    Hydrocodone-acetaminophen     Penicillins Rash     Past Medical History:   Diagnosis Date    Anxiety     Depression     Stroke      Past Surgical History:   Procedure Laterality Date    BLADDER SUSPENSION      OVARIAN CYST REMOVAL      TONSILLECTOMY       Family History   Problem Relation Age of Onset    Hypertension Mother     Arthritis Mother     COPD Mother     Hypertension Father     Cancer Maternal Grandfather     Cancer Paternal Grandmother      Social History     Tobacco Use    Smoking status: Never     Passive exposure: Never    Smokeless tobacco: Never   Substance Use Topics    Alcohol use: Yes     Comment: Occasionally    Drug use: Never     Review of Systems   Constitutional:  Negative for chills, diaphoresis, fatigue and fever.   HENT:  Negative for facial swelling, rhinorrhea, sinus pressure, sinus pain, sore throat and trouble swallowing.    Respiratory:  Negative for cough, chest tightness, shortness of breath and wheezing.    Cardiovascular:  Positive for chest pain. Negative for palpitations and leg swelling.   Gastrointestinal:  Negative for abdominal pain, diarrhea, nausea and vomiting.   Genitourinary:  Negative for dysuria, flank pain, frequency, hematuria and urgency.   Musculoskeletal:  Positive for myalgias. Negative for " arthralgias, back pain and joint swelling.   Skin:  Negative for color change and rash.   Neurological:  Negative for dizziness, syncope, weakness and light-headedness.   Hematological:  Does not bruise/bleed easily.   All other systems reviewed and are negative.    Physical Exam     Initial Vitals [06/22/23 1903]   BP Pulse Resp Temp SpO2   113/62 74 20 98.1 °F (36.7 °C) 99 %      MAP       --         Physical Exam    Nursing note and vitals reviewed.  Constitutional: She appears well-developed and well-nourished.   HENT:   Head: Normocephalic and atraumatic.   Nose: Nose normal.   Mouth/Throat: Oropharynx is clear and moist.   Eyes: Conjunctivae and EOM are normal. Pupils are equal, round, and reactive to light.   Neck: Neck supple.   Normal range of motion.  Cardiovascular:  Normal rate, regular rhythm, normal heart sounds and intact distal pulses.           Pulmonary/Chest: Effort normal and breath sounds normal. No respiratory distress. She has no wheezes. She has no rhonchi. She has no rales. Chest wall is not dull to percussion. She exhibits tenderness. She exhibits no crepitus, no edema, no deformity and no retraction.     Abdominal: Abdomen is soft and flat. Bowel sounds are normal. She exhibits no distension. There is no abdominal tenderness. There is no rebound, no guarding, no tenderness at McBurney's point and negative Dunn's sign. negative psoas sign  Musculoskeletal:         General: Normal range of motion.      Right shoulder: Tenderness present. No swelling or deformity. Normal strength. Normal pulse.        Arms:       Cervical back: Normal range of motion and neck supple.     Neurological: She is alert and oriented to person, place, and time. She has normal strength and normal reflexes.   Skin: Skin is warm and dry. Capillary refill takes less than 2 seconds.   Psychiatric: She has a normal mood and affect. Her speech is normal and behavior is normal. Judgment and thought content normal.        ED Course   Procedures  Labs Reviewed - No data to display  EKG Readings: (Independently Interpreted)   Initial Reading: No STEMI. Rhythm: Normal Sinus Rhythm. Heart Rate: 64.   Normal sinus rhythm.      Imaging Results              X-Ray Chest PA And Lateral (Final result)  Result time 06/22/23 20:16:52      Final result by Sam Hernández MD (06/22/23 20:16:52)                   Impression:      No acute cardiopulmonary process identified.      Electronically signed by: Sam Hernández  Date:    06/22/2023  Time:    20:16               Narrative:    EXAMINATION:  XR CHEST PA AND LATERAL    CLINICAL HISTORY:  Chest pain, unspecified    TECHNIQUE:  Two-view    COMPARISON:  April 28, 2023..    FINDINGS:  Cardiopericardial silhouette is within normal limits. Lungs are without dense focal or segmental consolidation, congestion, pleural effusion or pneumothorax.                                       Medications   dexAMETHasone injection 8 mg (8 mg Intramuscular Given 6/22/23 1936)   baclofen tablet 10 mg (10 mg Oral Given 6/22/23 1936)     Medical Decision Making:   Differential Diagnosis:   Muscle strain  Fracture    Clinical Tests:   Radiological Study: Ordered and Reviewed  Medical Tests: Ordered and Reviewed  ED Management:  8:26 PM Reassessed patient at this time. Reports condition has improved. Discussed with patient all pertinent ED information and results. Discussed diagnosis and treatment plan with patient. Follow up instructions and return to ED instruction have been given. All questions and concerns were addressed at this time. Patient voices understanding of information and instructions. Patient is comfortable with plan and discharge. Patient is stable for discharge.                           Clinical Impression:   Final diagnoses:  [R07.9] Right-sided chest pain  [S46.811A] Strain of right trapezius muscle, initial encounter (Primary)        ED Disposition Condition    Discharge Stable          ED  Prescriptions       Medication Sig Dispense Start Date End Date Auth. Provider    baclofen (LIORESAL) 10 MG tablet Take 1 tablet (10 mg total) by mouth 3 (three) times daily as needed (muscle spasms). 21 tablet 6/22/2023 -- JOAQUINA Mcclain Jr.    gabapentin (NEURONTIN) 300 MG capsule Take 1 capsule (300 mg total) by mouth once daily. for 14 days 14 capsule 6/22/2023 7/6/2023 JOAQUINA Mcclain Jr.          Follow-up Information       Follow up With Specialties Details Why Contact Info    Raegan Swan MD Family Medicine In 3 days  [address] [facility] [address]  [phone]      [facility] Emergency Medicine In 3 days As needed, If symptoms worsen [address] [facility] [id number] [phone]             JOAQUINA Mcclain Jr.  06/22/23 2028

## 2023-07-04 ENCOUNTER — HOSPITAL ENCOUNTER (EMERGENCY)
Facility: HOSPITAL | Age: 53
Discharge: HOME | End: 2023-07-04
Attending: EMERGENCY MEDICINE
Payer: MEDICAID

## 2023-07-04 VITALS
TEMPERATURE: 99 F | HEART RATE: 68 BPM | RESPIRATION RATE: 16 BRPM | OXYGEN SATURATION: 98 % | SYSTOLIC BLOOD PRESSURE: 119 MMHG | DIASTOLIC BLOOD PRESSURE: 56 MMHG

## 2023-07-04 DIAGNOSIS — K76.89 LIVER CYST: ICD-10-CM

## 2023-07-04 DIAGNOSIS — R10.32 LEFT LOWER QUADRANT ABDOMINAL PAIN: Primary | ICD-10-CM

## 2023-07-04 DIAGNOSIS — K57.92 DIVERTICULITIS: ICD-10-CM

## 2023-07-04 LAB
ALBUMIN SERPL-MCNC: 3.9 G/DL (ref 3.5–5)
ALBUMIN/GLOB SERPL: 1.3 RATIO (ref 1.1–2)
ALP SERPL-CCNC: 53 UNIT/L (ref 40–150)
ALT SERPL-CCNC: 21 UNIT/L (ref 0–55)
APPEARANCE UR: CLEAR
AST SERPL-CCNC: 21 UNIT/L (ref 5–34)
BACTERIA #/AREA URNS AUTO: NORMAL /HPF
BASOPHILS # BLD AUTO: 0.03 X10(3)/MCL
BASOPHILS NFR BLD AUTO: 0.3 %
BILIRUB UR QL STRIP.AUTO: NEGATIVE MG/DL
BILIRUBIN DIRECT+TOT PNL SERPL-MCNC: 0.5 MG/DL
BUN SERPL-MCNC: 14.9 MG/DL (ref 9.8–20.1)
CALCIUM SERPL-MCNC: 9.9 MG/DL (ref 8.4–10.2)
CHLORIDE SERPL-SCNC: 108 MMOL/L (ref 98–107)
CO2 SERPL-SCNC: 26 MMOL/L (ref 22–29)
COLOR UR: YELLOW
CREAT SERPL-MCNC: 0.71 MG/DL (ref 0.55–1.02)
EOSINOPHIL # BLD AUTO: 0.15 X10(3)/MCL (ref 0–0.9)
EOSINOPHIL NFR BLD AUTO: 1.4 %
ERYTHROCYTE [DISTWIDTH] IN BLOOD BY AUTOMATED COUNT: 12.1 % (ref 11.5–17)
GFR SERPLBLD CREATININE-BSD FMLA CKD-EPI: >60 MLS/MIN/1.73/M2
GLOBULIN SER-MCNC: 3.1 GM/DL (ref 2.4–3.5)
GLUCOSE SERPL-MCNC: 91 MG/DL (ref 74–100)
GLUCOSE UR QL STRIP.AUTO: NEGATIVE MG/DL
HCT VFR BLD AUTO: 40.4 % (ref 37–47)
HGB BLD-MCNC: 13.4 G/DL (ref 12–16)
IMM GRANULOCYTES # BLD AUTO: 0.02 X10(3)/MCL (ref 0–0.04)
IMM GRANULOCYTES NFR BLD AUTO: 0.2 %
KETONES UR QL STRIP.AUTO: NEGATIVE MG/DL
LEUKOCYTE ESTERASE UR QL STRIP.AUTO: NEGATIVE UNIT/L
LIPASE SERPL-CCNC: 15 U/L
LYMPHOCYTES # BLD AUTO: 1.67 X10(3)/MCL (ref 0.6–4.6)
LYMPHOCYTES NFR BLD AUTO: 15.5 %
MCH RBC QN AUTO: 32.1 PG (ref 27–31)
MCHC RBC AUTO-ENTMCNC: 33.2 G/DL (ref 33–36)
MCV RBC AUTO: 96.7 FL (ref 80–94)
MONOCYTES # BLD AUTO: 0.82 X10(3)/MCL (ref 0.1–1.3)
MONOCYTES NFR BLD AUTO: 7.6 %
NEUTROPHILS # BLD AUTO: 8.11 X10(3)/MCL (ref 2.1–9.2)
NEUTROPHILS NFR BLD AUTO: 75 %
NITRITE UR QL STRIP.AUTO: NEGATIVE
NRBC BLD AUTO-RTO: 0 %
PH UR STRIP.AUTO: 8 [PH]
PLATELET # BLD AUTO: 314 X10(3)/MCL (ref 130–400)
PMV BLD AUTO: 9.7 FL (ref 7.4–10.4)
POTASSIUM SERPL-SCNC: 4.8 MMOL/L (ref 3.5–5.1)
PROT SERPL-MCNC: 7 GM/DL (ref 6.4–8.3)
PROT UR QL STRIP.AUTO: NEGATIVE MG/DL
RBC # BLD AUTO: 4.18 X10(6)/MCL (ref 4.2–5.4)
RBC #/AREA URNS AUTO: <5 /HPF
RBC UR QL AUTO: NEGATIVE UNIT/L
SODIUM SERPL-SCNC: 142 MMOL/L (ref 136–145)
SP GR UR STRIP.AUTO: 1.01 (ref 1–1.03)
SQUAMOUS #/AREA URNS AUTO: <5 /HPF
UROBILINOGEN UR STRIP-ACNC: 0.2 MG/DL
WBC # SPEC AUTO: 10.8 X10(3)/MCL (ref 4.5–11.5)
WBC #/AREA URNS AUTO: <5 /HPF

## 2023-07-04 PROCEDURE — 85025 COMPLETE CBC W/AUTO DIFF WBC: CPT | Performed by: NURSE PRACTITIONER

## 2023-07-04 PROCEDURE — 83690 ASSAY OF LIPASE: CPT | Performed by: NURSE PRACTITIONER

## 2023-07-04 PROCEDURE — 99285 EMERGENCY DEPT VISIT HI MDM: CPT | Mod: 25

## 2023-07-04 PROCEDURE — 63600175 PHARM REV CODE 636 W HCPCS

## 2023-07-04 PROCEDURE — 80053 COMPREHEN METABOLIC PANEL: CPT | Performed by: NURSE PRACTITIONER

## 2023-07-04 PROCEDURE — 81001 URINALYSIS AUTO W/SCOPE: CPT | Performed by: NURSE PRACTITIONER

## 2023-07-04 PROCEDURE — 63600175 PHARM REV CODE 636 W HCPCS: Performed by: NURSE PRACTITIONER

## 2023-07-04 PROCEDURE — 25500020 PHARM REV CODE 255

## 2023-07-04 PROCEDURE — 96375 TX/PRO/DX INJ NEW DRUG ADDON: CPT

## 2023-07-04 PROCEDURE — 96365 THER/PROPH/DIAG IV INF INIT: CPT

## 2023-07-04 RX ORDER — CIPROFLOXACIN 2 MG/ML
400 INJECTION, SOLUTION INTRAVENOUS
Status: COMPLETED | OUTPATIENT
Start: 2023-07-04 | End: 2023-07-04

## 2023-07-04 RX ORDER — KETOROLAC TROMETHAMINE 30 MG/ML
30 INJECTION, SOLUTION INTRAMUSCULAR; INTRAVENOUS
Status: COMPLETED | OUTPATIENT
Start: 2023-07-04 | End: 2023-07-04

## 2023-07-04 RX ORDER — METOCLOPRAMIDE HYDROCHLORIDE 5 MG/ML
10 INJECTION INTRAMUSCULAR; INTRAVENOUS
Status: COMPLETED | OUTPATIENT
Start: 2023-07-04 | End: 2023-07-04

## 2023-07-04 RX ORDER — ONDANSETRON 4 MG/1
4 TABLET, FILM COATED ORAL EVERY 6 HOURS PRN
Qty: 12 TABLET | Refills: 0 | Status: SHIPPED | OUTPATIENT
Start: 2023-07-04 | End: 2023-08-28 | Stop reason: SDUPTHER

## 2023-07-04 RX ORDER — METOCLOPRAMIDE 10 MG/1
10 TABLET ORAL EVERY 6 HOURS PRN
Qty: 12 TABLET | Refills: 0 | Status: SHIPPED | OUTPATIENT
Start: 2023-07-04 | End: 2023-08-28

## 2023-07-04 RX ORDER — TRAMADOL HYDROCHLORIDE 50 MG/1
50 TABLET ORAL EVERY 6 HOURS PRN
Qty: 12 TABLET | Refills: 0 | Status: SHIPPED | OUTPATIENT
Start: 2023-07-04 | End: 2023-09-11

## 2023-07-04 RX ORDER — METRONIDAZOLE 500 MG/1
500 TABLET ORAL EVERY 8 HOURS
Qty: 21 TABLET | Refills: 0 | Status: SHIPPED | OUTPATIENT
Start: 2023-07-04 | End: 2023-07-11

## 2023-07-04 RX ORDER — METRONIDAZOLE 500 MG/100ML
500 INJECTION, SOLUTION INTRAVENOUS
Status: COMPLETED | OUTPATIENT
Start: 2023-07-04 | End: 2023-07-04

## 2023-07-04 RX ORDER — MORPHINE SULFATE 4 MG/ML
4 INJECTION, SOLUTION INTRAMUSCULAR; INTRAVENOUS
Status: COMPLETED | OUTPATIENT
Start: 2023-07-04 | End: 2023-07-04

## 2023-07-04 RX ORDER — CIPROFLOXACIN 500 MG/1
500 TABLET ORAL 2 TIMES DAILY
Qty: 14 TABLET | Refills: 0 | Status: SHIPPED | OUTPATIENT
Start: 2023-07-04 | End: 2023-07-11

## 2023-07-04 RX ORDER — ONDANSETRON 2 MG/ML
4 INJECTION INTRAMUSCULAR; INTRAVENOUS
Status: COMPLETED | OUTPATIENT
Start: 2023-07-04 | End: 2023-07-04

## 2023-07-04 RX ADMIN — IOPAMIDOL 100 ML: 755 INJECTION, SOLUTION INTRAVENOUS at 07:07

## 2023-07-04 RX ADMIN — CIPROFLOXACIN 400 MG: 2 INJECTION, SOLUTION INTRAVENOUS at 08:07

## 2023-07-04 RX ADMIN — KETOROLAC TROMETHAMINE 30 MG: 30 INJECTION, SOLUTION INTRAMUSCULAR; INTRAVENOUS at 04:07

## 2023-07-04 RX ADMIN — METOCLOPRAMIDE 10 MG: 5 INJECTION, SOLUTION INTRAMUSCULAR; INTRAVENOUS at 09:07

## 2023-07-04 RX ADMIN — MORPHINE SULFATE 4 MG: 4 INJECTION, SOLUTION INTRAMUSCULAR; INTRAVENOUS at 08:07

## 2023-07-04 RX ADMIN — SODIUM CHLORIDE, POTASSIUM CHLORIDE, SODIUM LACTATE AND CALCIUM CHLORIDE 1000 ML: 600; 310; 30; 20 INJECTION, SOLUTION INTRAVENOUS at 05:07

## 2023-07-04 RX ADMIN — ONDANSETRON 4 MG: 2 INJECTION INTRAMUSCULAR; INTRAVENOUS at 04:07

## 2023-07-04 RX ADMIN — METRONIDAZOLE 500 MG: 500 SOLUTION INTRAVENOUS at 08:07

## 2023-07-04 NOTE — FIRST PROVIDER EVALUATION
Medical screening examination initiated.  I have conducted a focused provider triage encounter, findings are as follows:    Brief history of present illness:  Patient states intermittent lower abdominal pain x 2 days. States nausea.     There were no vitals filed for this visit.    Pertinent physical exam:  Awake, alert, ambulatory      Brief workup plan:  Labs    Preliminary workup initiated; this workup will be continued and followed by the physician or advanced practice provider that is assigned to the patient when roomed.

## 2023-07-04 NOTE — ED PROVIDER NOTES
Encounter Date: 7/4/2023       History     Chief Complaint   Patient presents with    Abdominal Pain     Pt to ER via POV for abd pain.  Started 2 days ago.  Intermittent with severity. Also nausea.       53 y.o. White female with a history of anxiety, depression, and CVA presents to Emergency Department with a chief complaint of abdominal pain. Symptoms began 2 days ago and have been constant since onset. Associated symptoms include nausea. Symptoms are aggravated with palpation and there are no alleviating factors. The patient denies CP, SOB, fever, vomiting, or dizziness. Last bowel movement on yesterday. No other reported symptoms at this time      The history is provided by the patient. No  was used.   Abdominal Pain  The current episode started two days ago. The onset of the illness was abrupt. The problem has not changed since onset.The abdominal pain is located in the suprapubic region and LLQ. The abdominal pain does not radiate. The other symptoms of the illness include nausea. The other symptoms of the illness do not include fever, fatigue, shortness of breath, vomiting or dysuria.   Nausea began 2 days ago.   The patient has not had a change in bowel habit. Symptoms associated with the illness do not include chills, heartburn or hematuria.   Review of patient's allergies indicates:   Allergen Reactions    Hydrocodone-guaifenesin Hives    Hydrocodone-acetaminophen     Penicillins Rash     Past Medical History:   Diagnosis Date    Anxiety     Depression     Endometrial cyst of ovary     Stroke      Past Surgical History:   Procedure Laterality Date    BLADDER SUSPENSION      OVARIAN CYST REMOVAL      TONSILLECTOMY       Family History   Problem Relation Age of Onset    Hypertension Mother     Arthritis Mother     COPD Mother     Hypertension Father     Cancer Maternal Grandfather     Cancer Paternal Grandmother      Social History     Tobacco Use    Smoking status: Never     Passive  exposure: Never    Smokeless tobacco: Never   Substance Use Topics    Alcohol use: Yes     Comment: Occasionally    Drug use: Never     Review of Systems   Constitutional:  Negative for chills, fatigue and fever.   Eyes:  Negative for photophobia and visual disturbance.   Respiratory:  Negative for cough, shortness of breath, wheezing and stridor.    Cardiovascular:  Negative for chest pain, palpitations and leg swelling.   Gastrointestinal:  Positive for abdominal pain and nausea. Negative for heartburn and vomiting.   Genitourinary:  Negative for dysuria and hematuria.   All other systems reviewed and are negative.    Physical Exam     Initial Vitals [07/04/23 1608]   BP Pulse Resp Temp SpO2   (!) 112/58 97 18 99.3 °F (37.4 °C) 98 %      MAP       --         Physical Exam    Nursing note and vitals reviewed.  Constitutional: She appears well-developed and well-nourished. She is not diaphoretic. She is cooperative.  Non-toxic appearance. No distress.   HENT:   Head: Normocephalic and atraumatic.   Right Ear: External ear normal.   Left Ear: External ear normal.   Nose: Nose normal.   Mouth/Throat: Oropharynx is clear and moist. No oropharyngeal exudate.   Eyes: Conjunctivae and EOM are normal. Pupils are equal, round, and reactive to light.   Neck: Neck supple. No JVD present.   Normal range of motion.  Cardiovascular:  Normal rate, regular rhythm, S1 normal, S2 normal, normal heart sounds, intact distal pulses and normal pulses.           Pulmonary/Chest: Effort normal and breath sounds normal. No stridor. No respiratory distress. She has no wheezes. She has no rhonchi.   Abdominal: Abdomen is soft. Bowel sounds are normal. She exhibits no distension. There is abdominal tenderness in the left lower quadrant.   Musculoskeletal:         General: No tenderness or edema. Normal range of motion.      Cervical back: Normal range of motion and neck supple.     Neurological: She is alert and oriented to person, place, and  time. She has normal strength. No sensory deficit. GCS score is 15. GCS eye subscore is 4. GCS verbal subscore is 5. GCS motor subscore is 6.   Skin: Skin is warm and dry. Capillary refill takes less than 2 seconds. No rash noted. No erythema.   Psychiatric: She has a normal mood and affect. Thought content normal.       ED Course   Procedures  Labs Reviewed   COMPREHENSIVE METABOLIC PANEL - Abnormal; Notable for the following components:       Result Value    Chloride 108 (*)     All other components within normal limits   CBC WITH DIFFERENTIAL - Abnormal; Notable for the following components:    RBC 4.18 (*)     MCV 96.7 (*)     MCH 32.1 (*)     All other components within normal limits   URINALYSIS, REFLEX TO URINE CULTURE - Normal   LIPASE - Normal   URINALYSIS, MICROSCOPIC - Normal   CBC W/ AUTO DIFFERENTIAL    Narrative:     The following orders were created for panel order CBC Auto Differential.  Procedure                               Abnormality         Status                     ---------                               -----------         ------                     CBC with Differential[[id number]]        Abnormal            Final result                 Please view results for these tests on the individual orders.          Imaging Results              CT Abdomen Pelvis With Contrast (Final result)  Result time 07/04/23 19:44:55      Final result by Sam Hernández MD (07/04/23 19:44:55)                   Impression:      1. Sigmoid colon acute diverticulitis without abscess, pneumoperitoneum bowel obstruction.  Attention to follow-up exams..    2. Details of other findings above.      Electronically signed by: Sam Hernández  Date:    07/04/2023  Time:    19:44               Narrative:    EXAMINATION:  CT ABDOMEN PELVIS WITH CONTRAST    CLINICAL HISTORY:  Abdominal pain, acute, nonlocalized;    TECHNIQUE:  Multidetector axial images were obtained of the abdomen and pelvis following the administration of IV  contrast. Oral contrast was not administered.    Dose length product of 309 mGycm. Automated exposure control was utilized to minimize radiation dose.    COMPARISON:  CT chest May 28, 2023.  Ultrasound abdomen June 24, 2020.    FINDINGS:  Included portion of the lungs are without suspicious nodularity, acute air space infiltrates or fluid within the pleural spaces.    Left hepatic lobe are remarkable stable up to 9 mm hypodensities which are favored to be cysts.  Hepatic volume is within normal limits and there is no intraperitoneal free fluid indicative of ascites..  Gallbladder is decompressed without intraluminal calcified calculus..  No biliary dilation identified. Pancreatic unremarkable attenuation without acute peripancreatic phlegmons. Main pancreatic duct is not dilated. Spleen is of normal size without focal lesion.    The adrenal glands appear within normal limits. The kidneys are unremarkable in size and contour. No solid or cystic renal lesion identified. There is no hydronephrosis. No perinephric fluid strandings or collections identified.  There are retroperitoneal periaortic up to 1.0 cm lymph nodes.    Stomach is partially decompressed with residual ingested meal.  There is no abnormal dilatation of loops of small bowel and there is no focal or generalized mural thickening.  Appendix is unremarkable.  Colonic fecal loading throughout.  Sigmoid colon is remarkable for mural thickening in association with diverticulosis coli and surrounding hazy inflammatory standings.  Findings are consistent with acute diverticulitis.  Attention to follow-up exam to ensure sigmoid mural thickening improves and resolves.  There is no abscess, pneumoperitoneum or bowel obstruction.    Urinary bladder appears within normal limits. There is no pelvic free fluid.    No acute or otherwise osseous abnormality identified.                                       Medications   ondansetron injection 4 mg (4 mg Intravenous Given  7/4/23 1622)   ketorolac injection 30 mg (30 mg Intravenous Given 7/4/23 1624)   lactated ringers bolus 1,000 mL (1,000 mLs Intravenous New Bag 7/4/23 1715)   iopamidoL (ISOVUE-370) injection 100 mL (100 mLs Intravenous Given 7/4/23 1926)   morphine injection 4 mg (4 mg Intravenous Given 7/4/23 2028)   ciprofloxacin (CIPRO)400mg/200ml D5W IVPB 400 mg (400 mg Intravenous New Bag 7/4/23 2015)   metronidazole IVPB 500 mg (0 mg Intravenous Stopped 7/4/23 2154)   metoclopramide HCl injection 10 mg (10 mg Intravenous Given 7/4/23 2124)     Medical Decision Making:   Initial Assessment:   Patient awake, alert, has non-labored breathing, and follows commands appropriately. C/o abdominal pain and nausea. Symptoms began 2 days ago. Afebrile. NAD noted.   Differential Diagnosis:   UTI, Abdominal Pain, Viral Gastroenteritis   Clinical Tests:   Lab Tests: Ordered and Reviewed  Radiological Study: Ordered and Reviewed  ED Management:  Co-morbidities and/or factors adding to the complexity or risk for the patient?: none  Differential diagnoses: UTI, Abdominal Pain, Viral Gastroenteritis   Decision to obtain previous or outside records?: I did not review records.   Chart Review (nursing home, outside records, CareEverywhere): none  Review of RX medications/new RX prescribed by me?: Prescribed Tramadol, Zofran, Reglan, Cipro, and Flagyl. Cautioned on side effects.   Labs/imaging/other tests obtained/considered (risk/benefits of testing discussed): cbc, cmp, ua, lipase, ct  Labs/tests intepretation: Labs unremarkable. CT- . Sigmoid colon acute diverticulitis without abscess, pneumoperitoneum bowel obstruction. Attention to follow-up exams.. 2. Details of other findings above. Informed patient of results.   My independent imaging interpretation: none  Treatment/interventions, IV fluids, IV medications: IV fluids, Cipro, Flagyl, Zofran, Reglan, and Morphine given with improvement of symptoms.   Complex management (IV controlled  substances, went to OR, DNR, meds requiring monitoring, transfer, etc)?: none  Workup/treatment affected by social determinants of health?:none   Point of care US done/interpretation: none  Consults/radiologist/EMS/social work/family discussion/alternate history: none  Advanced care planning/end of life discussion: none  Shared decision making: Discussed plan of care and interventions with patient. Agreed to and aware of plan of care. Comfortable being discharged home.   ETOH/smoking/drug cessation discussion: none  Dispo: Patient discharged home. Patient denies new or additional complaints; no further tests indicated at this time. Verbalized understanding of instructions. No emergent or apparent distress noted prior to discharge. To follow up with PCP in 1 week as needed. Strict ER return precautions given.       Other:   I discussed test(s) with the performing physician.           ED Course as of 07/04/23 2300   Tue Jul 04, 2023 2252 Patient resting comfortably. Reports symptoms have improved.  [JA]      ED Course User Index  [JA] Tracey Van NP                 Clinical Impression:   Final diagnoses:  [R10.32] Left lower quadrant abdominal pain (Primary)  [K57.92] Diverticulitis  [K76.89] Liver cyst        ED Disposition Condition    Discharge Stable          ED Prescriptions       Medication Sig Dispense Start Date End Date Auth. Provider    metroNIDAZOLE (FLAGYL) 500 MG tablet Take 1 tablet (500 mg total) by mouth every 8 (eight) hours. for 7 days 21 tablet 7/4/2023 7/11/2023 Tracey Van NP    ciprofloxacin HCl (CIPRO) 500 MG tablet Take 1 tablet (500 mg total) by mouth 2 (two) times daily. for 7 days 14 tablet 7/4/2023 7/11/2023 Tracey Van NP    ondansetron (ZOFRAN) 4 MG tablet Take 1 tablet (4 mg total) by mouth every 6 (six) hours as needed for Nausea. 12 tablet 7/4/2023 -- Tracey Van NP    metoclopramide HCl (REGLAN) 10 MG tablet Take 1 tablet (10 mg total) by mouth every 6  (six) hours as needed (Take as needed every 6 hours for nausea and/or vomiting.). 12 tablet 7/4/2023 -- Tracey Van NP    traMADoL (ULTRAM) 50 mg tablet Take 1 tablet (50 mg total) by mouth every 6 (six) hours as needed for Pain. 12 tablet 7/4/2023 -- Tracey Van NP          Follow-up Information       Follow up With Specialties Details Why Contact Info    Raegan Swan MD Family Medicine Call in 1 week If symptoms worsen, As needed [address] [facility] [address]  [phone]      [facility] - Emergency Dept Emergency Medicine Go to  If symptoms worsen, As needed [address] [facility] [id number] [phone]             Tracey Van NP  07/04/23 [id number]

## 2023-07-05 NOTE — DISCHARGE INSTRUCTIONS
Thanks for letting us take care of you today!  It is our goal to give you courteous care and to keep you comfortable and informed, if you have any questions before you leave I will be happy to try and answer them.    Here is some advice after your visit:      Your visit in the emergency department is NOT definitive care - please follow-up with your primary care doctor and/or specialist within 1 week.  Please return if you have any worsening in your condition or if you have any other concerns.    If you had radiology exams like an XRAY or CT in the emergency Department the interpreation on them may be preliminary - there may be less time sensitive findings on the reports please obtain these reports within 24 hours from the hospital or by using your out on your mobile phone to access records.  Bring these to your primary care doctor and/or specialist for further review of incidental findings.    Please review any LAB WORK from your visit today with your primary care physician.    If you were prescribed OPIATE PAIN MEDICATION - please understand of these medications can be addictive, you may fill less of the prescription was written for, you do not have to take the full prescription.  You may discard what you do not use.  Please seek help if you feel you are having problems with addiction.  Do not drive or operate heavy machinery if you are taking sedating medications.  Do not mix these medications with alcohol..    Please take the full course of  any ANTIBIOTICS you were prescribed - incomplete courses of antibiotics can cause resistance to antibiotics in the future which will make it difficult to treat any infections you may have.    Do not drink while taking Flagyl.

## 2023-07-09 ENCOUNTER — HOSPITAL ENCOUNTER (EMERGENCY)
Facility: HOSPITAL | Age: 53
Discharge: HOME | End: 2023-07-09
Attending: EMERGENCY MEDICINE
Payer: MEDICAID

## 2023-07-09 VITALS
TEMPERATURE: 98 F | DIASTOLIC BLOOD PRESSURE: 63 MMHG | WEIGHT: 130 LBS | RESPIRATION RATE: 16 BRPM | OXYGEN SATURATION: 100 % | HEART RATE: 59 BPM | SYSTOLIC BLOOD PRESSURE: 117 MMHG | BODY MASS INDEX: 22.31 KG/M2

## 2023-07-09 DIAGNOSIS — R53.1 WEAKNESS: ICD-10-CM

## 2023-07-09 DIAGNOSIS — B37.31 VAGINAL YEAST INFECTION: ICD-10-CM

## 2023-07-09 DIAGNOSIS — R10.32 LLQ ABDOMINAL PAIN: Primary | ICD-10-CM

## 2023-07-09 LAB
ALBUMIN SERPL-MCNC: 3.8 G/DL (ref 3.5–5)
ALBUMIN/GLOB SERPL: 1.6 RATIO (ref 1.1–2)
ALP SERPL-CCNC: 47 UNIT/L (ref 40–150)
ALT SERPL-CCNC: 22 UNIT/L (ref 0–55)
APPEARANCE UR: ABNORMAL
AST SERPL-CCNC: 19 UNIT/L (ref 5–34)
BACTERIA #/AREA URNS AUTO: NORMAL /HPF
BASOPHILS # BLD AUTO: 0.03 X10(3)/MCL
BASOPHILS NFR BLD AUTO: 0.7 %
BILIRUB UR QL STRIP.AUTO: NEGATIVE MG/DL
BILIRUBIN DIRECT+TOT PNL SERPL-MCNC: 0.5 MG/DL
BUN SERPL-MCNC: 14.3 MG/DL (ref 9.8–20.1)
CALCIUM SERPL-MCNC: 9.6 MG/DL (ref 8.4–10.2)
CHLORIDE SERPL-SCNC: 104 MMOL/L (ref 98–107)
CO2 SERPL-SCNC: 25 MMOL/L (ref 22–29)
COLOR UR: YELLOW
CREAT SERPL-MCNC: 0.67 MG/DL (ref 0.55–1.02)
EOSINOPHIL # BLD AUTO: 0.09 X10(3)/MCL (ref 0–0.9)
EOSINOPHIL NFR BLD AUTO: 2.1 %
ERYTHROCYTE [DISTWIDTH] IN BLOOD BY AUTOMATED COUNT: 11.8 % (ref 11.5–17)
GFR SERPLBLD CREATININE-BSD FMLA CKD-EPI: >60 MLS/MIN/1.73/M2
GLOBULIN SER-MCNC: 2.4 GM/DL (ref 2.4–3.5)
GLUCOSE SERPL-MCNC: 96 MG/DL (ref 74–100)
GLUCOSE UR QL STRIP.AUTO: NEGATIVE MG/DL
HCT VFR BLD AUTO: 38 % (ref 37–47)
HGB BLD-MCNC: 12.5 G/DL (ref 12–16)
IMM GRANULOCYTES # BLD AUTO: 0 X10(3)/MCL (ref 0–0.04)
IMM GRANULOCYTES NFR BLD AUTO: 0 %
KETONES UR QL STRIP.AUTO: NEGATIVE MG/DL
LEUKOCYTE ESTERASE UR QL STRIP.AUTO: NEGATIVE UNIT/L
LYMPHOCYTES # BLD AUTO: 1.7 X10(3)/MCL (ref 0.6–4.6)
LYMPHOCYTES NFR BLD AUTO: 38.9 %
MCH RBC QN AUTO: 31 PG (ref 27–31)
MCHC RBC AUTO-ENTMCNC: 32.9 G/DL (ref 33–36)
MCV RBC AUTO: 94.3 FL (ref 80–94)
MONOCYTES # BLD AUTO: 0.34 X10(3)/MCL (ref 0.1–1.3)
MONOCYTES NFR BLD AUTO: 7.8 %
NEUTROPHILS # BLD AUTO: 2.21 X10(3)/MCL (ref 2.1–9.2)
NEUTROPHILS NFR BLD AUTO: 50.5 %
NITRITE UR QL STRIP.AUTO: NEGATIVE
NRBC BLD AUTO-RTO: 0 %
PH UR STRIP.AUTO: 8.5 [PH]
PLATELET # BLD AUTO: 306 X10(3)/MCL (ref 130–400)
PMV BLD AUTO: 9.5 FL (ref 7.4–10.4)
POTASSIUM SERPL-SCNC: 4.4 MMOL/L (ref 3.5–5.1)
PROT SERPL-MCNC: 6.2 GM/DL (ref 6.4–8.3)
PROT UR QL STRIP.AUTO: NEGATIVE MG/DL
RBC # BLD AUTO: 4.03 X10(6)/MCL (ref 4.2–5.4)
RBC #/AREA URNS AUTO: <5 /HPF
RBC UR QL AUTO: NEGATIVE UNIT/L
SODIUM SERPL-SCNC: 136 MMOL/L (ref 136–145)
SP GR UR STRIP.AUTO: 1.01 (ref 1–1.03)
SQUAMOUS #/AREA URNS AUTO: <5 /HPF
TROPONIN I SERPL-MCNC: <0.01 NG/ML (ref 0–0.04)
UROBILINOGEN UR STRIP-ACNC: 0.2 MG/DL
WBC # SPEC AUTO: 4.37 X10(3)/MCL (ref 4.5–11.5)
WBC #/AREA URNS AUTO: <5 /HPF

## 2023-07-09 PROCEDURE — 25500020 PHARM REV CODE 255: Performed by: EMERGENCY MEDICINE

## 2023-07-09 PROCEDURE — 96374 THER/PROPH/DIAG INJ IV PUSH: CPT | Mod: 59

## 2023-07-09 PROCEDURE — 93010 ELECTROCARDIOGRAM REPORT: CPT | Mod: ,,, | Performed by: INTERNAL MEDICINE

## 2023-07-09 PROCEDURE — 85025 COMPLETE CBC W/AUTO DIFF WBC: CPT | Performed by: PHYSICIAN ASSISTANT

## 2023-07-09 PROCEDURE — 93005 ELECTROCARDIOGRAM TRACING: CPT

## 2023-07-09 PROCEDURE — 81001 URINALYSIS AUTO W/SCOPE: CPT | Performed by: PHYSICIAN ASSISTANT

## 2023-07-09 PROCEDURE — 93010 EKG 12-LEAD: ICD-10-PCS | Mod: ,,, | Performed by: INTERNAL MEDICINE

## 2023-07-09 PROCEDURE — 96361 HYDRATE IV INFUSION ADD-ON: CPT

## 2023-07-09 PROCEDURE — 80053 COMPREHEN METABOLIC PANEL: CPT | Performed by: PHYSICIAN ASSISTANT

## 2023-07-09 PROCEDURE — 96375 TX/PRO/DX INJ NEW DRUG ADDON: CPT

## 2023-07-09 PROCEDURE — 84484 ASSAY OF TROPONIN QUANT: CPT | Performed by: PHYSICIAN ASSISTANT

## 2023-07-09 PROCEDURE — 63600175 PHARM REV CODE 636 W HCPCS

## 2023-07-09 PROCEDURE — 99285 EMERGENCY DEPT VISIT HI MDM: CPT | Mod: 25

## 2023-07-09 RX ORDER — FLUCONAZOLE 150 MG/1
150 TABLET ORAL DAILY
Qty: 1 TABLET | Refills: 0 | Status: SHIPPED | OUTPATIENT
Start: 2023-07-09 | End: 2023-07-10

## 2023-07-09 RX ORDER — METOCLOPRAMIDE HYDROCHLORIDE 5 MG/ML
10 INJECTION INTRAMUSCULAR; INTRAVENOUS
Status: COMPLETED | OUTPATIENT
Start: 2023-07-09 | End: 2023-07-09

## 2023-07-09 RX ORDER — PROMETHAZINE HYDROCHLORIDE 25 MG/ML
25 INJECTION, SOLUTION INTRAMUSCULAR; INTRAVENOUS
Status: DISCONTINUED | OUTPATIENT
Start: 2023-07-09 | End: 2023-07-09

## 2023-07-09 RX ORDER — ONDANSETRON 2 MG/ML
4 INJECTION INTRAMUSCULAR; INTRAVENOUS
Status: COMPLETED | OUTPATIENT
Start: 2023-07-09 | End: 2023-07-09

## 2023-07-09 RX ADMIN — SODIUM CHLORIDE, POTASSIUM CHLORIDE, SODIUM LACTATE AND CALCIUM CHLORIDE 1000 ML: 600; 310; 30; 20 INJECTION, SOLUTION INTRAVENOUS at 04:07

## 2023-07-09 RX ADMIN — IOPAMIDOL 100 ML: 755 INJECTION, SOLUTION INTRAVENOUS at 06:07

## 2023-07-09 RX ADMIN — ONDANSETRON 4 MG: 2 INJECTION INTRAMUSCULAR; INTRAVENOUS at 04:07

## 2023-07-09 RX ADMIN — METOCLOPRAMIDE 10 MG: 5 INJECTION, SOLUTION INTRAMUSCULAR; INTRAVENOUS at 06:07

## 2023-07-09 NOTE — ED PROVIDER NOTES
Encounter Date: 7/9/2023       History     Chief Complaint   Patient presents with    Abdominal Pain     Reports abdominal pain in left lower quadrant. Has burning/pain in vagina and urinary frequency. States is weak/lightheaded. Hx of diverticulitis.      The patient is a 53 y.o. female with a history of CVA, anxiety, and depression who presents to the Emergency Department with a chief complaint of LLQ abdominal pain.  Patient states that she was seen here days ago and diagnosed with acute diverticulitis.  She states that she was sent home on oral antibiotics which she has been taking as prescribed.  Patient states that last night she had episode of severe left lower quadrant abdominal pain which has since improved.  She states that she is now having generalized weakness and urinary frequency which has been present for the last 2 days.  Symptoms began 2 days ago and have been intermittent since onset. Her pain is currently rated as a 4/10 in severity and described as aching with no radiation. Associated symptoms include nausea. Symptoms are aggravated with movement and there are no alleviating factors. The patient denies chest pain, shortness of breath, fever, or chills. She reports taking nothing prior to arrival with no relief of symptoms.  Patient also reports having a burning vaginal pain.  She denies dysuria, burning with urination, or vaginal discharge  No other reported symptoms at this time.      The history is provided by the patient. No  was used.   Abdominal Pain  The current episode started several days ago. The onset of the illness was gradual. The problem has not changed since onset.The abdominal pain is located in the LLQ. The abdominal pain does not radiate. The severity of the abdominal pain is 4/10. The abdominal pain is relieved by nothing. The other symptoms of the illness include nausea. The other symptoms of the illness do not include fever, shortness of breath, vomiting or  dysuria.   Nausea began 3 to 5 days ago.   The patient states that she believes she is currently not pregnant. The patient has not had a change in bowel habit. Symptoms associated with the illness do not include chills or back pain. Significant associated medical issues include diverticulitis.   Review of patient's allergies indicates:   Allergen Reactions    Hydrocodone-guaifenesin Hives    Hydrocodone-acetaminophen     Penicillins Rash     Past Medical History:   Diagnosis Date    Anxiety     Depression     Endometrial cyst of ovary     Stroke      Past Surgical History:   Procedure Laterality Date    BLADDER SUSPENSION      OVARIAN CYST REMOVAL      TONSILLECTOMY       Family History   Problem Relation Age of Onset    Hypertension Mother     Arthritis Mother     COPD Mother     Hypertension Father     Cancer Maternal Grandfather     Cancer Paternal Grandmother      Social History     Tobacco Use    Smoking status: Never     Passive exposure: Never    Smokeless tobacco: Never   Substance Use Topics    Alcohol use: Yes     Comment: Occasionally    Drug use: Never     Review of Systems   Constitutional:  Negative for activity change, chills and fever.   HENT:  Negative for congestion, nosebleeds, postnasal drip and sore throat.    Respiratory:  Negative for shortness of breath.    Cardiovascular:  Negative for chest pain.   Gastrointestinal:  Positive for abdominal pain and nausea. Negative for vomiting.   Genitourinary:  Negative for dysuria.   Musculoskeletal:  Negative for back pain.   Skin:  Negative for rash.   Neurological:  Positive for weakness. Negative for dizziness and numbness.   Hematological:  Does not bruise/bleed easily.   Psychiatric/Behavioral:  Negative for behavioral problems.    All other systems reviewed and are negative.    Physical Exam     Initial Vitals [07/09/23 1550]   BP Pulse Resp Temp SpO2   (!) 121/46 (!) 57 18 97.5 °F (36.4 °C) 97 %      MAP       --         Physical Exam    Nursing  note and vitals reviewed.  Constitutional: She appears well-developed and well-nourished.   HENT:   Head: Normocephalic.   Right Ear: Hearing and tympanic membrane normal.   Left Ear: Hearing and tympanic membrane normal.   Mouth/Throat: Uvula is midline, oropharynx is clear and moist and mucous membranes are normal.   Eyes: Conjunctivae and EOM are normal. Pupils are equal, round, and reactive to light.   Cardiovascular:  Normal rate, regular rhythm, normal heart sounds and normal pulses.           Pulmonary/Chest: Effort normal and breath sounds normal.   Abdominal: Abdomen is soft. Bowel sounds are normal. There is no abdominal tenderness.   Genitourinary:    Genitourinary Comments: Mild vaginal erythema with whitish discharge consistent with vaginal candida        Lymphadenopathy:     She has no cervical adenopathy.   Neurological: She is alert. GCS eye subscore is 4. GCS verbal subscore is 5. GCS motor subscore is 6.   Skin: Skin is warm, dry and intact. Capillary refill takes less than 2 seconds.   Psychiatric: Her mood appears anxious.       ED Course   Procedures  Labs Reviewed   COMPREHENSIVE METABOLIC PANEL - Abnormal; Notable for the following components:       Result Value    Protein Total 6.2 (*)     All other components within normal limits   URINALYSIS, REFLEX TO URINE CULTURE - Abnormal; Notable for the following components:    Appearance, UA Cloudy (*)     All other components within normal limits   CBC WITH DIFFERENTIAL - Abnormal; Notable for the following components:    WBC 4.37 (*)     RBC 4.03 (*)     MCV 94.3 (*)     MCHC 32.9 (*)     All other components within normal limits   TROPONIN I - Normal   URINALYSIS, MICROSCOPIC - Normal   CBC W/ AUTO DIFFERENTIAL    Narrative:     The following orders were created for panel order CBC auto differential.  Procedure                               Abnormality         Status                     ---------                               -----------          ------                     CBC with Differential[[id number]]        Abnormal            Final result                 Please view results for these tests on the individual orders.        ECG Results              EKG 12-lead (Final result)  Result time 07/09/23 17:43:09      Final result by [facility] (07/09/23 17:43:09)                   Narrative:    Test Reason : R53.1,    Vent. Rate : 049 BPM     Atrial Rate : 049 BPM     P-R Int : 176 ms          QRS Dur : 080 ms      QT Int : 412 ms       P-R-T Axes : 047 082 073 degrees     QTc Int : 372 ms    Sinus bradycardia  Otherwise normal ECG    When compared with ECG of 22-JUN-2023 19:22,  No significant change was found  Confirmed by Jesus Alberto Schofield MD ([id number]) on 7/9/2023 5:42:58 PM    Referred By:             Confirmed By:Jesus Alberto Schofield MD                                  Imaging Results              CT Abdomen Pelvis With Contrast (Preliminary result)  Result time 07/09/23 18:31:05      Preliminary result by Molina Ken MD (07/09/23 18:31:05)                   Narrative:    START OF REPORT:  Technique: CT of the abdomen and pelvis was performed with axial images as well as sagittal and coronal reconstruction images with intravenous contrast.    Comparison: Comparison is with study [id number] 19:12:08.    Clinical History: Llq pain.    Dosage Information: Automated Exposure Control was utilized.    Findings:  Lines and Tubes: None.  Thorax:  Lungs: Stable mild streaky opacity is present at the visualized lung bases, consistent with nonspecific dependent changes scarring and/or atelectasis. No focal infiltrate or consolidation is seen.  Heart: The heart size is within normal limits.  Abdomen:  Abdominal Wall: No abdominal wall pathology is seen.  Liver:  Trauma: Stable few small hypodense foci are again seen in the left liver lobe wth sizes ranging from 5.3 - 7.0 mm. These may reflect indeterminate hepatic nodules and/or cysts.  Biliary System: No  intrahepatic or extrahepatic biliary duct dilatation is seen.  Gallbladder: The gallbladder appears unremarkable.  Pancreas: The pancreas appears unremarkable.  Spleen: The spleen appears unremarkable.  Adrenals: The adrenal glands appear unremarkable.  Kidneys: The right kidney appears unremarkable with no stones cysts masses or hydronephrosis. A single stone still measuring 3.0 mm is again seen on Image 65, Series 4 in the upper pole of the left kidney, unchanged from prior study. The left kidney otherwise appears unremarkable with no cysts masses or hydronephrosis identified.  Aorta: The visualized abdominal aorta appears unremarkable.  IVC: Unremarkable.  Bowel:  Esophagus: The visualized distal esophagus appears unremarkable.  Stomach: The stomach appears unremarkable.  Duodenum: Unremarkable appearing duodenum.  Small Bowel: The small bowel appears unremarkable.  Colon: Nondistended. A few diverticula are again seen in the sigmoid colon.  Appendix: The appendix appears unremarkable and is partially seen on Image 82, Series 2 through Image 88, Series 2.  Peritoneum: No intraperitoneal free air or ascites is seen.    Pelvis:  Bladder: The bladder is nondistended but appears otherwise unremarkable.  Female:  Uterus: The uterus appears unremarkable for age.  Ovaries: The ovaries appear unremarkable with probable physiologic cysts. No adnexal masses are seen.    Bony structures:  Dorsal Spine: There is again mild multilevel spondylosis of the visualized dorsal spine.  Bony Pelvis: The visualized bony structures of the pelvis appear unremarkable.      Impression:  1. No acute intraabdominal or pelvic solid organ or bowel pathology identified. Details and other findings as discussed above.                                         CT Head Without Contrast (Preliminary result)  Result time 07/09/23 18:23:39      Preliminary result by Molina Ken MD (07/09/23 18:23:39)                   Narrative:    START OF  REPORT:  Technique: CT of the head was performed without intravenous contrast with axial as well as coronal and sagittal images.    Comparison: None.    Dosage Information: Automated exposure control was utilized.    Clinical history: Headache.    Findings:  Hemorrhage: No acute intracranial hemorrhage is seen.  CSF spaces: The ventricles sulci and basal cisterns are within normal limits.  Brain parenchyma: Unremarkable with preservation of the grey white junction throughout.  Cerebellum: Unremarkable.  Sella and skull base: The sella appears to be within normal limits for age.  Intracranial calcifications: Incidental note is made of bilateral choroid plexus calcification. Incidental note is made of some pineal region calcification.  Calvarium: No acute linear or depressed skull fracture is seen.    Maxillofacial Structures:  Paranasal sinuses: The visualized paranasal sinuses appear clear with no mucoperiosteal thickening or air fluid levels identified.  Orbits: The orbits appear unremarkable.  Zygomatic arches: The zygomatic arches are intact and unremarkable.  Temporal bones and mastoids: The temporal bones and mastoids appear unremarkable.  TMJ: The mandibular condyles appear normally placed with respect to the mandibular fossa.      Impression:  1. Unremarkable noncontrast CT of the head. Details as above.                                         Medications   lactated ringers bolus 1,000 mL (0 mLs Intravenous Stopped 7/9/23 1751)   ondansetron injection 4 mg (4 mg Intravenous Given 7/9/23 1650)   metoclopramide HCl injection 10 mg (10 mg Intravenous Given 7/9/23 1805)   iopamidoL (ISOVUE-370) injection 100 mL (100 mLs Intravenous Given 7/9/23 1833)     Medical Decision Making:   Initial Assessment:   The patient is a 53 y.o. female with a history of CVA, anxiety, and depression who presents to the Emergency Department with a chief complaint of LLQ abdominal pain.  Patient states that she was seen here days ago  and diagnosed with acute diverticulitis.  She states that she was sent home on oral antibiotics which she has been taking as prescribed.  Patient states that last night she had episode of severe left lower quadrant abdominal pain which has since improved.  She states that she is now having generalized weakness and urinary frequency which has been present for the last 2 days.  Symptoms began 2 days ago and have been intermittent since onset. Her pain is currently rated as a 4/10 in severity and described as aching with no radiation. Associated symptoms include nausea. Symptoms are aggravated with movement and there are no alleviating factors. The patient denies chest pain, shortness of breath, fever, or chills. She reports taking nothing prior to arrival with no relief of symptoms. No other reported symptoms at this time.    Differential Diagnosis:   Differential diagnoses include but are not limited to complicated diverticulitis, perforated diverticulitis, colitis, urinary tract infection, dehydration, electrolyte derangement, cardiac arrhythmia  Clinical Tests:   Lab Tests: Reviewed and Ordered  Radiological Study: Ordered and Reviewed  Medical Tests: Ordered and Reviewed  ED Management:  MDM  History obtained by patient.   Co-morbidities and/or factors adding to the complexity or risk for the patient?: none  Differential diagnoses: Differential diagnoses include but are not limited to complicated diverticulitis, perforated diverticulitis, colitis, urinary tract infection, dehydration, electrolyte derangement, cardiac arrhythmia  Decision to obtain previous or outside records?: no  Chart Review (nursing home, outside records, CareEverywhere): none  Review of RX medications/new RX prescribed by me?: none  My EKG Interpretation: see above  Labs/imaging/other tests obtained/considered (risk/benefits of testing discussed):  CBC, CMP, CT abdomen and pelvis, CT head  Labs/tests intepretation:  Labs unremarkable; imaging  is with no acute findings  My independent imaging interpretation:  None  Treatment/interventions, IV fluids, IV medications:  IV fluids, Zofran, Reglan  Complex management (IV controlled substances, went to OR, DNR, meds requiring monitoring, transfer, etc)?: none  Workup/treatment affected by social determinants of health?: none   Point of care US done/interpretation: none  Consults/radiologist/EMS/social work/family discussion/alternate history: none  Advanced care planning/end of life discussion: none  Shared decision making: Shared decision making with patient  ETOH/smoking/drug cessation discussion: none  Dispo: discharge home.  Patient stable at time of discharge.  I have discussed results in detail with the patient including follow up.  She is amenable to plan and ready for discharge home.           ED Course as of 07/09/23 1945   Sun Jul 09, 2023 1932 Discussed results in great detail with the patient.  CT scan of abdomen is without any acute abnormality.  Patient has GI appointment scheduled for Thursday of this week.  Will discharge patient home with strict ER return precautions [LM]      ED Course User Index  [LM] Gary Barnard NP                 Clinical Impression:   Final diagnoses:  [R53.1] Weakness  [R10.32] LLQ abdominal pain (Primary)  [B37.31] Vaginal yeast infection        ED Disposition Condition    Discharge Stable          ED Prescriptions       Medication Sig Dispense Start Date End Date Auth. Provider    fluconazole (DIFLUCAN) 150 MG Tab Take 1 tablet (150 mg total) by mouth once daily. for 1 day 1 tablet 7/9/2023 7/10/2023 Gary Barnard NP          Follow-up Information       Follow up With Specialties Details Why Contact Info    Raegan Swan MD Family Medicine Schedule an appointment as soon as possible for a visit   [id number] [facility] [id number]  [phone]               Gary Barnard NP  07/09/23 1945

## 2023-07-09 NOTE — FIRST PROVIDER EVALUATION
Medical screening examination initiated.  I have conducted a focused provider triage encounter, findings are as follows:    Chief Complaint   Patient presents with    Abdominal Pain     Reports abdominal pain in left lower quadrant. Has burning/pain in vagina and urinary frequency. States is weak/lightheaded. Hx of diverticulitis.      Brief history of present illness:  53 y.o. female presents to the ED with nausea, dysuria, headaches, generalized weakness, and continued LLQ abdominal pain since being in the ED on 7/4. Patient was diagnosed with diverticulitis and was sent home with abx.     Vitals:    07/09/23 1550   BP: (!) 121/46   Pulse: (!) 57   Resp: 18   Temp: 97.5 °F (36.4 °C)   TempSrc: Oral   SpO2: 97%   Weight: 59 kg (130 lb)     Pertinent physical exam:  Awake, alert, ambulatory, non-labored respirations    Brief workup plan:  labs, UA, EKG    Preliminary workup initiated; this workup will be continued and followed by the physician or advanced practice provider that is assigned to the patient when roomed.

## 2023-07-20 ENCOUNTER — OFFICE VISIT (OUTPATIENT)
Dept: FAMILY MEDICINE | Facility: CLINIC | Age: 53
End: 2023-07-20
Payer: MEDICAID

## 2023-07-20 VITALS
DIASTOLIC BLOOD PRESSURE: 58 MMHG | BODY MASS INDEX: 22.53 KG/M2 | WEIGHT: 132 LBS | RESPIRATION RATE: 18 BRPM | HEIGHT: 64 IN | HEART RATE: 71 BPM | SYSTOLIC BLOOD PRESSURE: 101 MMHG | TEMPERATURE: 98 F | OXYGEN SATURATION: 96 %

## 2023-07-20 DIAGNOSIS — L91.8 SKIN TAGS, MULTIPLE ACQUIRED: ICD-10-CM

## 2023-07-20 DIAGNOSIS — K57.92 ACUTE DIVERTICULITIS: Primary | ICD-10-CM

## 2023-07-20 PROCEDURE — 99215 OFFICE O/P EST HI 40 MIN: CPT | Mod: PBBFAC | Performed by: FAMILY MEDICINE

## 2023-07-20 PROCEDURE — 3044F PR MOST RECENT HEMOGLOBIN A1C LEVEL <7.0%: ICD-10-PCS | Mod: CPTII,,, | Performed by: FAMILY MEDICINE

## 2023-07-20 PROCEDURE — 1159F MED LIST DOCD IN RCRD: CPT | Mod: CPTII,,, | Performed by: FAMILY MEDICINE

## 2023-07-20 PROCEDURE — 3008F PR BODY MASS INDEX (BMI) DOCUMENTED: ICD-10-PCS | Mod: CPTII,,, | Performed by: FAMILY MEDICINE

## 2023-07-20 PROCEDURE — 3044F HG A1C LEVEL LT 7.0%: CPT | Mod: CPTII,,, | Performed by: FAMILY MEDICINE

## 2023-07-20 PROCEDURE — 1159F PR MEDICATION LIST DOCUMENTED IN MEDICAL RECORD: ICD-10-PCS | Mod: CPTII,,, | Performed by: FAMILY MEDICINE

## 2023-07-20 PROCEDURE — 3074F SYST BP LT 130 MM HG: CPT | Mod: CPTII,,, | Performed by: FAMILY MEDICINE

## 2023-07-20 PROCEDURE — 1160F PR REVIEW ALL MEDS BY PRESCRIBER/CLIN PHARMACIST DOCUMENTED: ICD-10-PCS | Mod: CPTII,,, | Performed by: FAMILY MEDICINE

## 2023-07-20 PROCEDURE — 3078F DIAST BP <80 MM HG: CPT | Mod: CPTII,,, | Performed by: FAMILY MEDICINE

## 2023-07-20 PROCEDURE — 99213 OFFICE O/P EST LOW 20 MIN: CPT | Mod: S$PBB,,, | Performed by: FAMILY MEDICINE

## 2023-07-20 PROCEDURE — 1160F RVW MEDS BY RX/DR IN RCRD: CPT | Mod: CPTII,,, | Performed by: FAMILY MEDICINE

## 2023-07-20 PROCEDURE — 3078F PR MOST RECENT DIASTOLIC BLOOD PRESSURE < 80 MM HG: ICD-10-PCS | Mod: CPTII,,, | Performed by: FAMILY MEDICINE

## 2023-07-20 PROCEDURE — 3074F PR MOST RECENT SYSTOLIC BLOOD PRESSURE < 130 MM HG: ICD-10-PCS | Mod: CPTII,,, | Performed by: FAMILY MEDICINE

## 2023-07-20 PROCEDURE — 3008F BODY MASS INDEX DOCD: CPT | Mod: CPTII,,, | Performed by: FAMILY MEDICINE

## 2023-07-20 PROCEDURE — 99213 PR OFFICE/OUTPT VISIT, EST, LEVL III, 20-29 MIN: ICD-10-PCS | Mod: S$PBB,,, | Performed by: FAMILY MEDICINE

## 2023-07-20 NOTE — PROGRESS NOTES
Patient Name: Remi Baptiste   : 1970  MRN: [id number]     SUBJECTIVE:  Remi Baptiste is a 53 y.o. female here for Follow-up  .    HPI  Here for ER follow-up.  Seen in ER 2 times 2 weeks ago for left lower quadrant abdominal pain.  The 1st time was on 2023.  She had CT abdomen done which showed diverticulitis non perforated.  She was discharged home on ciprofloxacin, Flagyl, tramadol and metoclopramide/Zofran.  Then 5 days later, on 2023 presented to ER for weakness/lightheadedness and urinary frequency.  They repeated a CT abdomen which was without any acute abnormalities.  She was discharged home on Diflucan for yeast infection and advised to follow up with GI that she had an appointment with.  Just saw him and was told to have colonoscopy in 3 months. Was given another week of flagyl.   Had normal BM yesterday. No blood in stools.    Today patient states that she feels much better.  Abdominal pain improving/resolving.  Finished antibiotic regimen.    Following with Neurology, Neurosurgery and behavioral health.  Discussed incidental findings of pineal calcifications and she will discuss more with neurologist that she has an appointment next month.      ALLERGIES:   Review of patient's allergies indicates:   Allergen Reactions    Hydrocodone-guaifenesin Hives    Hydrocodone-acetaminophen     Penicillins Rash         ROS:  Review of Systems   Constitutional:  Negative for chills, fever and weight loss.   HENT:  Negative for congestion.    Respiratory:  Negative for cough and shortness of breath.    Cardiovascular:  Negative for chest pain, palpitations and leg swelling.   Gastrointestinal:  Negative for abdominal pain (resolving), blood in stool, diarrhea, nausea and vomiting.   Genitourinary:  Negative for dysuria and hematuria.   Neurological:  Negative for dizziness and headaches.   Psychiatric/Behavioral:  Negative for depression. The patient is not nervous/anxious.        OBJECTIVE:  Vital  "signs  Vitals:    07/20/23 1315   BP: (!) 101/58   Pulse: 71   Resp: 18   Temp: 98.1 °F (36.7 °C)   TempSrc: Oral   SpO2: 96%   Weight: 59.9 kg (132 lb)   Height: 5' 4" (1.626 m)      Body mass index is 22.66 kg/m².    PHYSICAL EXAM:   Physical Exam  Vitals reviewed.   Constitutional:       General: She is not in acute distress.     Appearance: Normal appearance. She is not ill-appearing.   HENT:      Head: Normocephalic and atraumatic.      Right Ear: External ear normal.      Left Ear: External ear normal.      Nose: Nose normal. No rhinorrhea.      Mouth/Throat:      Mouth: Mucous membranes are moist.   Eyes:      General: No scleral icterus.        Right eye: No discharge.         Left eye: No discharge.      Conjunctiva/sclera: Conjunctivae normal.      Pupils: Pupils are equal, round, and reactive to light.   Cardiovascular:      Rate and Rhythm: Normal rate and regular rhythm.   Pulmonary:      Effort: Pulmonary effort is normal. No respiratory distress.      Breath sounds: No wheezing, rhonchi or rales.   Abdominal:      General: Bowel sounds are normal. There is no distension.      Palpations: Abdomen is soft.      Tenderness: There is no abdominal tenderness.   Musculoskeletal:      Cervical back: Normal range of motion and neck supple. No rigidity.      Right lower leg: No edema.      Left lower leg: No edema.   Skin:     General: Skin is warm.      Coloration: Skin is not pale.      Findings: No rash.      Comments: Neck- skin tags   Neurological:      General: No focal deficit present.      Mental Status: She is alert and oriented to person, place, and time.   Psychiatric:         Mood and Affect: Mood normal.         Behavior: Behavior normal.        ASSESSMENT/PLAN:  1. Acute diverticulitis    2. Skin tags, multiple acquired  -     Ambulatory referral/consult to Family Practice; Future; Expected date: 07/27/2023       Discussed ER visit and patient feeling much better, almost completely resolved " symptoms.  Also saw GI.  Continue to follow-up with them.  Discussed that if any new symptoms develop or worsening of symptoms to go to the emergency room.  Patient understanding and agreeable.  Regarding skin tags, referred next  door to have them removed.      I spent a total of 30 minutes on the day of the visit.This includes face to face time and non-face to face time preparing to see the patient (eg, review of tests), obtaining and/or reviewing separately obtained history, documenting clinical information in the electronic or other health record, independently interpreting results and communicating results to the patient/family/caregiver, or care coordinator.      Previous medical history/lab work/radiology reviewed and considered during medical management decisions.   Medication list reviewed and medication reconciliation performed.  Patient was provided  and care about his/her current diagnosis (es) and medications including risk/benefit and side effects/adverse events, over the counter medication uses/doses, home self-care and contact precautions,  and red flags and indications for when to seek immediate medical attention.   Patient was advised to continue compliance with current medication list and medical recommendations.  Recommended/ Advised continued compliance with recommended eating habits/ diets for medical conditions and exercise 150 minutes/ week (if possible) for medical condition (s).        RESULTS:  Recent Results (from the past 1008 hour(s))   Comprehensive Metabolic Panel    Collection Time: 07/04/23  4:26 PM   Result Value Ref Range    Sodium Level 142 136 - 145 mmol/L    Potassium Level 4.8 3.5 - 5.1 mmol/L    Chloride 108 (H) 98 - 107 mmol/L    Carbon Dioxide 26 22 - 29 mmol/L    Glucose Level 91 74 - 100 mg/dL    Blood Urea Nitrogen 14.9 9.8 - 20.1 mg/dL    Creatinine 0.71 0.55 - 1.02 mg/dL    Calcium Level Total 9.9 8.4 - 10.2 mg/dL    Protein Total 7.0 6.4 - 8.3 gm/dL    Albumin  Level 3.9 3.5 - 5.0 g/dL    Globulin 3.1 2.4 - 3.5 gm/dL    Albumin/Globulin Ratio 1.3 1.1 - 2.0 ratio    Bilirubin Total 0.5 <=1.5 mg/dL    Alkaline Phosphatase 53 40 - 150 unit/L    Alanine Aminotransferase 21 0 - 55 unit/L    Aspartate Aminotransferase 21 5 - 34 unit/L    eGFR >60 mls/min/1.73/m2   Lipase    Collection Time: 07/04/23  4:26 PM   Result Value Ref Range    Lipase Level 15 <=60 U/L   CBC with Differential    Collection Time: 07/04/23  4:26 PM   Result Value Ref Range    WBC 10.80 4.50 - 11.50 x10(3)/mcL    RBC 4.18 (L) 4.20 - 5.40 x10(6)/mcL    Hgb 13.4 12.0 - 16.0 g/dL    Hct 40.4 37.0 - 47.0 %    MCV 96.7 (H) 80.0 - 94.0 fL    MCH 32.1 (H) 27.0 - 31.0 pg    MCHC 33.2 33.0 - 36.0 g/dL    RDW 12.1 11.5 - 17.0 %    Platelet 314 130 - 400 x10(3)/mcL    MPV 9.7 7.4 - 10.4 fL    Neut % 75.0 %    Lymph % 15.5 %    Mono % 7.6 %    Eos % 1.4 %    Basophil % 0.3 %    Lymph # 1.67 0.6 - 4.6 x10(3)/mcL    Neut # 8.11 2.1 - 9.2 x10(3)/mcL    Mono # 0.82 0.1 - 1.3 x10(3)/mcL    Eos # 0.15 0 - 0.9 x10(3)/mcL    Baso # 0.03 <=0.2 x10(3)/mcL    IG# 0.02 0 - 0.04 x10(3)/mcL    IG% 0.2 %    NRBC% 0.0 %   Urinalysis, Reflex to Urine Culture    Collection Time: 07/04/23  5:31 PM    Specimen: Urine, Clean Catch   Result Value Ref Range    Color, UA Yellow Yellow, Light-Yellow, Dark Yellow, Mckenzie, Straw    Appearance, UA Clear Clear    Specific Gravity, UA 1.014 1.005 - 1.030    pH, UA 8.0 5.0 - 8.5    Protein, UA Negative Negative mg/dL    Glucose, UA Negative Negative, Normal mg/dL    Ketones, UA Negative Negative mg/dL    Blood, UA Negative Negative unit/L    Bilirubin, UA Negative Negative mg/dL    Urobilinogen, UA 0.2 0.2, 1.0, Normal mg/dL    Nitrites, UA Negative Negative    Leukocyte Esterase, UA Negative Negative unit/L   Urinalysis, Microscopic    Collection Time: 07/04/23  5:31 PM   Result Value Ref Range    RBC, UA <5 <=5 /HPF    WBC, UA <5 <=5 /HPF    Squamous Epithelial Cells, UA <5 <=5 /HPF    Bacteria, UA  None Seen None Seen, Rare, Occasional /HPF   Urinalysis, Reflex to Urine Culture    Collection Time: 07/09/23  4:13 PM    Specimen: Urine   Result Value Ref Range    Color, UA Yellow Yellow, Light-Yellow, Dark Yellow, Mckenzie, Straw    Appearance, UA Cloudy (A) Clear    Specific Gravity, UA 1.015 1.005 - 1.030    pH, UA 8.5 5.0 - 8.5    Protein, UA Negative Negative mg/dL    Glucose, UA Negative Negative, Normal mg/dL    Ketones, UA Negative Negative mg/dL    Blood, UA Negative Negative unit/L    Bilirubin, UA Negative Negative mg/dL    Urobilinogen, UA 0.2 0.2, 1.0, Normal mg/dL    Nitrites, UA Negative Negative    Leukocyte Esterase, UA Negative Negative unit/L   Urinalysis, Microscopic    Collection Time: 07/09/23  4:13 PM   Result Value Ref Range    RBC, UA <5 <=5 /HPF    WBC, UA <5 <=5 /HPF    Squamous Epithelial Cells, UA <5 <=5 /HPF    Bacteria, UA None Seen None Seen, Rare, Occasional /HPF   Comprehensive metabolic panel    Collection Time: 07/09/23  4:14 PM   Result Value Ref Range    Sodium Level 136 136 - 145 mmol/L    Potassium Level 4.4 3.5 - 5.1 mmol/L    Chloride 104 98 - 107 mmol/L    Carbon Dioxide 25 22 - 29 mmol/L    Glucose Level 96 74 - 100 mg/dL    Blood Urea Nitrogen 14.3 9.8 - 20.1 mg/dL    Creatinine 0.67 0.55 - 1.02 mg/dL    Calcium Level Total 9.6 8.4 - 10.2 mg/dL    Protein Total 6.2 (L) 6.4 - 8.3 gm/dL    Albumin Level 3.8 3.5 - 5.0 g/dL    Globulin 2.4 2.4 - 3.5 gm/dL    Albumin/Globulin Ratio 1.6 1.1 - 2.0 ratio    Bilirubin Total 0.5 <=1.5 mg/dL    Alkaline Phosphatase 47 40 - 150 unit/L    Alanine Aminotransferase 22 0 - 55 unit/L    Aspartate Aminotransferase 19 5 - 34 unit/L    eGFR >60 mls/min/1.73/m2   Troponin I    Collection Time: 07/09/23  4:14 PM   Result Value Ref Range    Troponin-I <0.010 0.000 - 0.045 ng/mL   CBC with Differential    Collection Time: 07/09/23  4:14 PM   Result Value Ref Range    WBC 4.37 (L) 4.50 - 11.50 x10(3)/mcL    RBC 4.03 (L) 4.20 - 5.40 x10(6)/mcL     Hgb 12.5 12.0 - 16.0 g/dL    Hct 38.0 37.0 - 47.0 %    MCV 94.3 (H) 80.0 - 94.0 fL    MCH 31.0 27.0 - 31.0 pg    MCHC 32.9 (L) 33.0 - 36.0 g/dL    RDW 11.8 11.5 - 17.0 %    Platelet 306 130 - 400 x10(3)/mcL    MPV 9.5 7.4 - 10.4 fL    Neut % 50.5 %    Lymph % 38.9 %    Mono % 7.8 %    Eos % 2.1 %    Basophil % 0.7 %    Lymph # 1.70 0.6 - 4.6 x10(3)/mcL    Neut # 2.21 2.1 - 9.2 x10(3)/mcL    Mono # 0.34 0.1 - 1.3 x10(3)/mcL    Eos # 0.09 0 - 0.9 x10(3)/mcL    Baso # 0.03 <=0.2 x10(3)/mcL    IG# 0.00 0 - 0.04 x10(3)/mcL    IG% 0.0 %    NRBC% 0.0 %         Follow Up:  Follow up in about 6 months (around 1/20/2024) for can cancel next appt.     [unfilled]    This note was created with the assistance of a voice recognition software or phone dictation. There may be transcription errors as a result of using this technology however minimal. Effort has been made to assure accuracy of transcription but any obvious errors or omissions should be clarified with the author of the document

## 2023-07-31 PROBLEM — I63.9 WEAKNESS DUE TO ACUTE STROKE: Status: RESOLVED | Noted: 2023-04-29 | Resolved: 2023-07-31

## 2023-07-31 PROBLEM — R53.1 WEAKNESS DUE TO ACUTE STROKE: Status: RESOLVED | Noted: 2023-04-29 | Resolved: 2023-07-31

## 2023-08-22 DIAGNOSIS — Z87.19 HX OF DIVERTICULITIS OF COLON: Primary | ICD-10-CM

## 2023-08-28 ENCOUNTER — OFFICE VISIT (OUTPATIENT)
Dept: FAMILY MEDICINE | Facility: CLINIC | Age: 53
End: 2023-08-28
Payer: MEDICAID

## 2023-08-28 VITALS
DIASTOLIC BLOOD PRESSURE: 57 MMHG | BODY MASS INDEX: 22.36 KG/M2 | SYSTOLIC BLOOD PRESSURE: 93 MMHG | HEART RATE: 63 BPM | OXYGEN SATURATION: 96 % | TEMPERATURE: 98 F | HEIGHT: 64 IN | WEIGHT: 131 LBS

## 2023-08-28 DIAGNOSIS — R10.2 VAGINAL PAIN: Primary | ICD-10-CM

## 2023-08-28 DIAGNOSIS — R10.9 ABDOMINAL PRESSURE: ICD-10-CM

## 2023-08-28 DIAGNOSIS — R11.0 NAUSEA: ICD-10-CM

## 2023-08-28 PROBLEM — G45.9 BRAIN TIA: Status: RESOLVED | Noted: 2023-05-24 | Resolved: 2023-08-28

## 2023-08-28 LAB
APPEARANCE UR: CLEAR
BACTERIA #/AREA URNS AUTO: ABNORMAL /HPF
BILIRUB UR QL STRIP.AUTO: NEGATIVE
CLUE CELLS VAG QL WET PREP: NORMAL
COLOR UR: ABNORMAL
GLUCOSE UR QL STRIP.AUTO: NORMAL
HYALINE CASTS #/AREA URNS LPF: ABNORMAL /LPF
KETONES UR QL STRIP.AUTO: NEGATIVE
LEUKOCYTE ESTERASE UR QL STRIP.AUTO: NEGATIVE
MUCOUS THREADS URNS QL MICRO: ABNORMAL /LPF
NITRITE UR QL STRIP.AUTO: NEGATIVE
PH UR STRIP.AUTO: 6 [PH]
PROT UR QL STRIP.AUTO: NEGATIVE
RBC #/AREA URNS AUTO: ABNORMAL /HPF
RBC UR QL AUTO: NEGATIVE
SP GR UR STRIP.AUTO: 1.01 (ref 1–1.03)
SQUAMOUS #/AREA URNS LPF: ABNORMAL /HPF
T VAGINALIS VAG QL WET PREP: NORMAL
UROBILINOGEN UR STRIP-ACNC: NORMAL
WBC #/AREA URNS AUTO: ABNORMAL /HPF
WBC #/AREA VAG WET PREP: NORMAL
YEAST SPEC QL WET PREP: NORMAL

## 2023-08-28 PROCEDURE — 3074F SYST BP LT 130 MM HG: CPT | Mod: CPTII,,, | Performed by: FAMILY MEDICINE

## 2023-08-28 PROCEDURE — 1159F MED LIST DOCD IN RCRD: CPT | Mod: CPTII,,, | Performed by: FAMILY MEDICINE

## 2023-08-28 PROCEDURE — 3044F HG A1C LEVEL LT 7.0%: CPT | Mod: CPTII,,, | Performed by: FAMILY MEDICINE

## 2023-08-28 PROCEDURE — 3078F PR MOST RECENT DIASTOLIC BLOOD PRESSURE < 80 MM HG: ICD-10-PCS | Mod: CPTII,,, | Performed by: FAMILY MEDICINE

## 2023-08-28 PROCEDURE — 1160F PR REVIEW ALL MEDS BY PRESCRIBER/CLIN PHARMACIST DOCUMENTED: ICD-10-PCS | Mod: CPTII,,, | Performed by: FAMILY MEDICINE

## 2023-08-28 PROCEDURE — 99214 OFFICE O/P EST MOD 30 MIN: CPT | Mod: S$PBB,,, | Performed by: FAMILY MEDICINE

## 2023-08-28 PROCEDURE — 3044F PR MOST RECENT HEMOGLOBIN A1C LEVEL <7.0%: ICD-10-PCS | Mod: CPTII,,, | Performed by: FAMILY MEDICINE

## 2023-08-28 PROCEDURE — 81001 URINALYSIS AUTO W/SCOPE: CPT | Performed by: FAMILY MEDICINE

## 2023-08-28 PROCEDURE — 3008F PR BODY MASS INDEX (BMI) DOCUMENTED: ICD-10-PCS | Mod: CPTII,,, | Performed by: FAMILY MEDICINE

## 2023-08-28 PROCEDURE — 99214 PR OFFICE/OUTPT VISIT, EST, LEVL IV, 30-39 MIN: ICD-10-PCS | Mod: S$PBB,,, | Performed by: FAMILY MEDICINE

## 2023-08-28 PROCEDURE — 99215 OFFICE O/P EST HI 40 MIN: CPT | Mod: PBBFAC | Performed by: FAMILY MEDICINE

## 2023-08-28 PROCEDURE — 3074F PR MOST RECENT SYSTOLIC BLOOD PRESSURE < 130 MM HG: ICD-10-PCS | Mod: CPTII,,, | Performed by: FAMILY MEDICINE

## 2023-08-28 PROCEDURE — 87210 SMEAR WET MOUNT SALINE/INK: CPT | Performed by: FAMILY MEDICINE

## 2023-08-28 PROCEDURE — 3078F DIAST BP <80 MM HG: CPT | Mod: CPTII,,, | Performed by: FAMILY MEDICINE

## 2023-08-28 PROCEDURE — 1160F RVW MEDS BY RX/DR IN RCRD: CPT | Mod: CPTII,,, | Performed by: FAMILY MEDICINE

## 2023-08-28 PROCEDURE — 1159F PR MEDICATION LIST DOCUMENTED IN MEDICAL RECORD: ICD-10-PCS | Mod: CPTII,,, | Performed by: FAMILY MEDICINE

## 2023-08-28 PROCEDURE — 3008F BODY MASS INDEX DOCD: CPT | Mod: CPTII,,, | Performed by: FAMILY MEDICINE

## 2023-08-28 RX ORDER — ONDANSETRON 4 MG/1
4 TABLET, FILM COATED ORAL EVERY 6 HOURS PRN
Qty: 12 TABLET | Refills: 0 | Status: SHIPPED | OUTPATIENT
Start: 2023-08-28 | End: 2024-02-28 | Stop reason: SDUPTHER

## 2023-08-28 RX ORDER — ONDANSETRON 4 MG/1
4 TABLET, FILM COATED ORAL EVERY 6 HOURS PRN
Qty: 12 TABLET | Refills: 0 | Status: SHIPPED | OUTPATIENT
Start: 2023-08-28 | End: 2023-08-28 | Stop reason: SDUPTHER

## 2023-08-28 NOTE — PROGRESS NOTES
"  Patient Name: Remi Baptiste   : 1970  MRN: [id number]     SUBJECTIVE:  Remi Baptiste is a 53 y.o. female here for Referral (Gastro) and Vaginal Pain (3-4 days)  .    HPI  Here for above issues.  Regarding hx of diverticulitis, pt was already referred to GI few days ago. Resolved episode but has intermittent abdominal pain, left sided. Sometimes nausea throughout the day. Awaiting their call. Eating well without issues.    Vaginal pain for the past few days. No burning sensation but shooting/needle stick senstation.  No hx of herpes. Not sexually active either. When bending or sitting, has pressure feeling. Never pregnant. LMP 3 years ago. No vaginal bleeding. No discharge.  No dysuria.   Bp low today, asymptomatic. Takes propranolol 10 mg once instead if twice for palpitations. Took it this morning.      ALLERGIES:   Review of patient's allergies indicates:   Allergen Reactions    Hydrocodone-guaifenesin Hives    Hydrocodone-acetaminophen     Penicillins Rash         ROS:  Review of Systems   Constitutional:  Negative for chills and fever.   HENT:  Negative for congestion.    Respiratory:  Negative for cough and shortness of breath.    Cardiovascular:  Negative for chest pain, palpitations and leg swelling.   Gastrointestinal:  Positive for abdominal pain (intermittent) and nausea. Negative for blood in stool, diarrhea and vomiting.   Genitourinary:  Negative for dysuria, frequency and hematuria.        Vaginal pain   Neurological:  Negative for dizziness and headaches.   Psychiatric/Behavioral:  Negative for depression. The patient is not nervous/anxious.          OBJECTIVE:  Vital signs  Vitals:    23 1437 23 1442   BP: (!) 89/41 (!) 93/57   BP Location: Left arm Right arm   Patient Position: Sitting Sitting   BP Method: Medium (Automatic) Medium (Automatic)   Pulse: 63    Temp: 97.5 °F (36.4 °C)    TempSrc: Oral    SpO2: 96%    Weight: 59.4 kg (131 lb)    Height: 5' 4" (1.626 m)       Body " mass index is 22.49 kg/m².    PHYSICAL EXAM:   Physical Exam  Vitals reviewed. Exam conducted with a chaperone present.   Constitutional:       General: She is not in acute distress.     Appearance: Normal appearance. She is not ill-appearing.   HENT:      Head: Normocephalic and atraumatic.      Right Ear: External ear normal.      Left Ear: External ear normal.      Nose: Nose normal. No rhinorrhea.      Mouth/Throat:      Mouth: Mucous membranes are moist.   Eyes:      General: No scleral icterus.        Right eye: No discharge.         Left eye: No discharge.      Conjunctiva/sclera: Conjunctivae normal.      Pupils: Pupils are equal, round, and reactive to light.   Cardiovascular:      Rate and Rhythm: Normal rate and regular rhythm.   Pulmonary:      Effort: Pulmonary effort is normal. No respiratory distress.      Breath sounds: No wheezing, rhonchi or rales.   Abdominal:      General: Bowel sounds are normal. There is no distension.      Palpations: Abdomen is soft.      Tenderness: There is no abdominal tenderness.   Genitourinary:     General: Normal vulva.      Pubic Area: No rash.       Labia:         Right: No rash.         Left: No rash.       Vagina: Tenderness present.      Comments: Painful vaginal exam, could not perform a full pelvic exam due to pain.  Musculoskeletal:      Cervical back: Normal range of motion and neck supple. No rigidity or tenderness.      Right lower leg: No edema.      Left lower leg: No edema.   Skin:     General: Skin is warm.      Findings: No rash.   Neurological:      General: No focal deficit present.      Mental Status: She is alert and oriented to person, place, and time.   Psychiatric:         Mood and Affect: Mood normal.         Behavior: Behavior normal.          ASSESSMENT/PLAN:  1. Vaginal pain  -     Cancel: US Pelvis Complete Non OB; Future; Expected date: 08/28/2023  -     Wet Prep, Genital  -     US Pelvis Comp with Transvag NON-OB (xpd; Future; Expected  date: 08/28/2023    2. Abdominal pressure  -     Urinalysis, Reflex to Urine Culture  -     Cancel: US Pelvis Complete Non OB; Future; Expected date: 08/28/2023  -     Wet Prep, Genital  -     US Pelvis Comp with Transvag NON-OB (xpd; Future; Expected date: 08/28/2023    3. Nausea  -     ondansetron (ZOFRAN) 4 MG tablet; Take 1 tablet (4 mg total) by mouth every 6 (six) hours as needed for Nausea.  Dispense: 12 tablet; Refill: 0       Plan  -regarding vaginal pain, pelvic exam as per HPI, could not be fully completed because of pain.  Check pelvic/transvaginal ultrasound to further evaluate for any prolapse or any abnormalities.  Swab collected for wet prep.  -having intermittent abdominal pressure, will also send urine for culture.  -Zofran as needed for the nausea until she sees her GI doctor.      Previous medical history/lab work/radiology reviewed and considered during medical management decisions.   Medication list reviewed and medication reconciliation performed.  Patient was provided  and care about his/her current diagnosis (es) and medications including risk/benefit and side effects/adverse events, over the counter medication uses/doses, home self-care and contact precautions,  and red flags and indications for when to seek immediate medical attention.   Patient was advised to continue compliance with current medication list and medical recommendations.  Recommended/ Advised continued compliance with recommended eating habits/ diets for medical conditions and exercise 150 minutes/ week (if possible) for medical condition (s).        RESULTS:  No results found for this or any previous visit (from the past 1008 hour(s)).      Follow Up:  Follow up for as scheduled.     [unfilled]    This note was created with the assistance of a voice recognition software or phone dictation. There may be transcription errors as a result of using this technology however minimal. Effort has been made to  assure accuracy of transcription but any obvious errors or omissions should be clarified with the author of the document

## 2023-08-28 NOTE — PROGRESS NOTES
Please let the patient know that the urine not show any findings of urinary tract infection.  Also wet prep/swab was negative.  Thanks

## 2023-08-29 ENCOUNTER — TELEPHONE (OUTPATIENT)
Dept: FAMILY MEDICINE | Facility: CLINIC | Age: 53
End: 2023-08-29
Payer: MEDICAID

## 2023-08-29 NOTE — TELEPHONE ENCOUNTER
----- Message from Raegan Swan MD sent at 8/28/2023  4:43 PM CDT -----  Please let the patient know that the urine not show any findings of urinary tract infection.  Also wet prep/swab was negative.  Thanks

## 2023-08-29 NOTE — TELEPHONE ENCOUNTER
Called patient to give results. Patient verbalized understanding. No additional questions at this time.

## 2023-09-01 ENCOUNTER — HOSPITAL ENCOUNTER (OUTPATIENT)
Dept: RADIOLOGY | Facility: HOSPITAL | Age: 53
Discharge: HOME | End: 2023-09-01
Attending: FAMILY MEDICINE
Payer: MEDICAID

## 2023-09-01 DIAGNOSIS — R10.9 ABDOMINAL PRESSURE: ICD-10-CM

## 2023-09-01 DIAGNOSIS — R10.2 VAGINAL PAIN: ICD-10-CM

## 2023-09-01 PROCEDURE — 76856 US EXAM PELVIC COMPLETE: CPT | Mod: TC

## 2023-09-05 DIAGNOSIS — R10.2 VAGINAL PAIN: Primary | ICD-10-CM

## 2023-09-05 RX ORDER — DOXYCYCLINE 100 MG/1
100 CAPSULE ORAL 2 TIMES DAILY
Qty: 20 CAPSULE | Refills: 0 | Status: SHIPPED | OUTPATIENT
Start: 2023-09-05 | End: 2024-02-28

## 2023-09-08 ENCOUNTER — TELEPHONE (OUTPATIENT)
Dept: FAMILY MEDICINE | Facility: CLINIC | Age: 53
End: 2023-09-08
Payer: MEDICAID

## 2023-09-08 NOTE — TELEPHONE ENCOUNTER
Called patient via phone call and patient understands results given. No further questions at this time.       ----- Message from Raegan Swan MD sent at 9/5/2023  3:56 PM CDT -----  Please let the patient know that the ultrasound pelvis/transvaginal was overall normal, except for some microcalcifications identified along the endometrium that could represent prior instrumentation versus infection.  Given patient having vaginal pain, please let her know that I sent some doxycycline at the pharmacy to treat any underlying infection.  Please advise patient to let us know if she had any improvement.  Thanks

## 2023-09-11 ENCOUNTER — OFFICE VISIT (OUTPATIENT)
Dept: NEUROLOGY | Facility: CLINIC | Age: 53
End: 2023-09-11
Payer: MEDICAID

## 2023-09-11 VITALS
HEIGHT: 64 IN | WEIGHT: 125 LBS | BODY MASS INDEX: 21.34 KG/M2 | SYSTOLIC BLOOD PRESSURE: 100 MMHG | DIASTOLIC BLOOD PRESSURE: 65 MMHG | HEART RATE: 64 BPM | OXYGEN SATURATION: 97 %

## 2023-09-11 DIAGNOSIS — I63.9 STROKE ABORTED BY ADMINISTRATION OF THROMBOLYTIC AGENT: ICD-10-CM

## 2023-09-11 DIAGNOSIS — M54.16 LUMBAR RADICULOPATHY: ICD-10-CM

## 2023-09-11 DIAGNOSIS — M50.30 DEGENERATIVE DISC DISEASE, CERVICAL: Primary | ICD-10-CM

## 2023-09-11 DIAGNOSIS — M54.12 CERVICAL RADICULOPATHY: ICD-10-CM

## 2023-09-11 PROCEDURE — 1159F MED LIST DOCD IN RCRD: CPT | Mod: CPTII,,, | Performed by: NURSE PRACTITIONER

## 2023-09-11 PROCEDURE — 1160F RVW MEDS BY RX/DR IN RCRD: CPT | Mod: CPTII,,, | Performed by: NURSE PRACTITIONER

## 2023-09-11 PROCEDURE — 3078F DIAST BP <80 MM HG: CPT | Mod: CPTII,,, | Performed by: NURSE PRACTITIONER

## 2023-09-11 PROCEDURE — 3074F SYST BP LT 130 MM HG: CPT | Mod: CPTII,,, | Performed by: NURSE PRACTITIONER

## 2023-09-11 PROCEDURE — 99214 OFFICE O/P EST MOD 30 MIN: CPT | Mod: S$PBB,,, | Performed by: NURSE PRACTITIONER

## 2023-09-11 PROCEDURE — 3008F BODY MASS INDEX DOCD: CPT | Mod: CPTII,,, | Performed by: NURSE PRACTITIONER

## 2023-09-11 PROCEDURE — 3044F PR MOST RECENT HEMOGLOBIN A1C LEVEL <7.0%: ICD-10-PCS | Mod: CPTII,,, | Performed by: NURSE PRACTITIONER

## 2023-09-11 PROCEDURE — 1159F PR MEDICATION LIST DOCUMENTED IN MEDICAL RECORD: ICD-10-PCS | Mod: CPTII,,, | Performed by: NURSE PRACTITIONER

## 2023-09-11 PROCEDURE — 3008F PR BODY MASS INDEX (BMI) DOCUMENTED: ICD-10-PCS | Mod: CPTII,,, | Performed by: NURSE PRACTITIONER

## 2023-09-11 PROCEDURE — 99214 PR OFFICE/OUTPT VISIT, EST, LEVL IV, 30-39 MIN: ICD-10-PCS | Mod: S$PBB,,, | Performed by: NURSE PRACTITIONER

## 2023-09-11 PROCEDURE — 3078F PR MOST RECENT DIASTOLIC BLOOD PRESSURE < 80 MM HG: ICD-10-PCS | Mod: CPTII,,, | Performed by: NURSE PRACTITIONER

## 2023-09-11 PROCEDURE — 3044F HG A1C LEVEL LT 7.0%: CPT | Mod: CPTII,,, | Performed by: NURSE PRACTITIONER

## 2023-09-11 PROCEDURE — 1160F PR REVIEW ALL MEDS BY PRESCRIBER/CLIN PHARMACIST DOCUMENTED: ICD-10-PCS | Mod: CPTII,,, | Performed by: NURSE PRACTITIONER

## 2023-09-11 PROCEDURE — 3074F PR MOST RECENT SYSTOLIC BLOOD PRESSURE < 130 MM HG: ICD-10-PCS | Mod: CPTII,,, | Performed by: NURSE PRACTITIONER

## 2023-09-11 PROCEDURE — 99214 OFFICE O/P EST MOD 30 MIN: CPT | Mod: PBBFAC | Performed by: NURSE PRACTITIONER

## 2023-09-11 NOTE — PROGRESS NOTES
"[facility] Neurology Initial Office Visit Note    Initial Visit Date: 1/19/2023  Last Visit Date: 5/23/2023  Current Visit Date:  09/11/2023    Chief Complaint:     Chief Complaint   Patient presents with    Transient Ischemic Attack     Patient reports she is stable. She reports she had a CT scan of the head on 07/09/2023 and would like a review.       History of Present Illness:      This is 53 y.o. female with history of PTSD, DJD, CVA 2023 no deficits, who was referred for right leg paresthesia of unknown chronicity. She states that she has been having bilateral hand paresthesia, upon awakening, along with neck pain. She also notes right leg numbness and paresthesia. Denied any UI/SI and saddle anesthesia. 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 She has not been doing home exercises.     Today, Pt states she will be seeing a different neurosurgeon to review imaging and discuss intervention or surgical options. Continues w/neck pain that now radiates down bilat upper ext w/loss of sensation throughout the day. Pain scale today 3/10. On a bad day 8/10. Takes Tylenol PRN for minor relief.    Other Hx:  ED on 4/28/2023 for L sided weakness including inability to open OS, difficulty ambulating, states she could not think clearly. States she was at home watching TV. Went to  and was unable to pull up her pants. Significant other transported to [facility] ED. Was told by stroke MD at [facility] she had a stroke. Dr. Mitchell approved TPA administration, MRI brain negative followed TPA administration. States symptoms lasted less than 24 hrs. Feels back to baseline today. Denies smoking, ETOH  intake, is a vegetarian. Is not currently taking statin as she read on internet it could cause a stroke. Is followed by [facility] in [facility] for "leaky valve".     Medications:     Current Outpatient Medications on File Prior to Visit   Medication Sig Dispense Refill    aspirin (ECOTRIN) 81 MG EC tablet Take 1 tablet (81 mg total) by " mouth once daily. 90 tablet 3    doxycycline (VIBRAMYCIN) 100 MG Cap Take 1 capsule (100 mg total) by mouth 2 (two) times daily. 20 capsule 0    ondansetron (ZOFRAN) 4 MG tablet Take 1 tablet (4 mg total) by mouth every 6 (six) hours as needed for Nausea. 12 tablet 0    propranoloL (INDERAL) 10 MG tablet Take 10 mg by mouth once daily.      tumeric-ging-olive-oreg-capryl 100 mg-150 mg- 50 mg-150 mg Cap Take 1 capsule by mouth Daily.      vitamin E 100 UNIT capsule Take 100 Units by mouth once daily.      fish,bora,flax oils-om3,6,9no1 (OMEGA 3-6-9) 1,200 mg Cap Take 1 capsule by mouth Daily.      [DISCONTINUED] atorvastatin (LIPITOR) 40 MG tablet Take 40 mg by mouth once daily.      [DISCONTINUED] baclofen (LIORESAL) 10 MG tablet Take 1 tablet (10 mg total) by mouth 3 (three) times daily as needed (muscle spasms). (Patient not taking: Reported on 8/28/2023) 21 tablet 0    [DISCONTINUED] gabapentin (NEURONTIN) 300 MG capsule Take 1 capsule (300 mg total) by mouth once daily. for 14 days (Patient not taking: Reported on 9/11/2023) 14 capsule 0    [DISCONTINUED] traMADoL (ULTRAM) 50 mg tablet Take 1 tablet (50 mg total) by mouth every 6 (six) hours as needed for Pain. (Patient not taking: Reported on 8/28/2023) 12 tablet 0     No current facility-administered medications on file prior to visit.     Labs:     Results for orders placed or performed in visit on 08/28/23   Wet Prep, Genital    Specimen: Cervicovaginal; Genital   Result Value Ref Range    WBC, Wet Prep None Seen None Seen    Clue Cells, Wet Prep None Seen None Seen    Trichomonas, Wet Prep None Seen None Seen    Yeast, Wet Prep None Seen None Seen   Urinalysis, Reflex to Urine Culture    Specimen: Urine   Result Value Ref Range    Color, UA Light-Yellow Yellow, Light-Yellow, Dark Yellow, Mckenzie, Straw    Appearance, UA Clear Clear    Specific Gravity, UA 1.014 1.005 - 1.030    pH, UA 6.0 5.0 - 8.5    Protein, UA Negative Negative    Glucose, UA Normal  Negative, Normal    Ketones, UA Negative Negative    Blood, UA Negative Negative    Bilirubin, UA Negative Negative    Urobilinogen, UA Normal 0.2, 1.0, Normal    Nitrites, UA Negative Negative    Leukocyte Esterase, UA Negative Negative    WBC, UA None Seen None Seen, 0-2, 3-5, 0-5 /HPF    Bacteria, UA None Seen None Seen /HPF    Squamous Epithelial Cells, UA Trace (A) None Seen /HPF    Mucous, UA Trace (A) None Seen /LPF    Hyaline Casts, UA None Seen None Seen /lpf    RBC, UA 0-5 None Seen, 0-2, 3-5, 0-5 /HPF       Studies:      [facility]: 7/9/2023 - no acute intracranial findings    MRI C and L-spine without contrast 2022: Multilevel DJD with severe left neuroforaminal narrowing at C4-C5, C5-C6, C6-C7.  Mild-to-moderate right L3-L4 neural foraminal narrowing.    MRI brain without without contrast 10/2022:  I have reviewed the study independently and with the patient.  Chronic microvascular changes.    Review of Systems:     Review of Systems   All other systems reviewed and are negative.      Physical Exams:     Vitals:    09/11/23 1443   BP: 100/65   Pulse: 64       Physical Exam  Vitals and nursing note reviewed.   Constitutional:       Appearance: Normal appearance.   HENT:      Head: Normocephalic and atraumatic.      Nose: Nose normal.      Mouth/Throat:      Mouth: Mucous membranes are moist.      Pharynx: Oropharynx is clear.   Eyes:      Conjunctiva/sclera: Conjunctivae normal.   Cardiovascular:      Rate and Rhythm: Normal rate and regular rhythm.      Pulses: Normal pulses.   Pulmonary:      Effort: Pulmonary effort is normal.      Breath sounds: Normal breath sounds.   Abdominal:      General: Abdomen is flat.   Musculoskeletal:         General: Normal range of motion.      Cervical back: Normal range of motion.   Skin:     General: Skin is warm.   Neurological:      Mental Status: She is alert.     Comprehensive Neurological Exam:  Mental Status: Alert Oriented to Self, Date, and Place. Naming,  "repetition, reading, and writing wnl. Comprehension wnl. No dysarthria.   CN II - XII: LUCI, No APD, VA grossly intact to finger counting at 6 ft, VFFC, No ptosis OU, EOMI without nystagmus, LT/Temp symmetric in CN V1-3 distribution, Hearing grossly intact, Face Symmetric, Tongue and Uvula midline, Trapezius symmetric bilateral.   Motor: tone and bulk wnl throughout, no abnormal involuntary or voluntary movements, 5/5 to confrontation, Fine finger movements wnl b/l, No pronator drift. Pain to L side of neck when turning head to R or w/R ear to shoulder tilt  Sensory: LT, Proprioception, Vibration, PP, Temp symmetric. No sensory simultagnosia.   Reflexes: 2+ throughout, plantar reflexes downward bilateral.   Cerebellar: FNF wnl b/l, RAHM wnl b/l,   Romberg: Negative  Gait: normal. Heel Gait, Toe Gait, Tandem Gait wnl.     Assessment:     This is 53 y.o. female with history of  PTSD, DJD, CVA 2023 no deficits, who was referred for Cervical and Lumbar DJD. Recent admission for weakness. Was evaluated by neurosurgeon who did not recommend sx. CVA workup  negative. Is followed by [facility] in [facility] for "leaky valve". Denies smoking, ETOH intake, is a vegetarian. Is not currently taking statin. Has participated in PT in the past w/adequate pain relief.     Problem List Items Addressed This Visit          Neuro    Degenerative disc disease, cervical - Primary    Relevant Orders    Ambulatory referral/consult to Physical/Occupational Therapy    Cervical radiculopathy    Relevant Orders    Ambulatory referral/consult to Physical/Occupational Therapy    Lumbar radiculopathy    Stroke aborted by administration of thrombolytic agent     Plan:     [] f/u w/[facility]  [] keep appt w/neurosurgery  [] referral to [facility] Physical Therapy    RTC 6 mth    I have explained the treatment plan, diagnosis, and prognosis to patient. All questions are answered to the best of my knowledge.     Face to face time 30 minutes, including documentation, " chart review, counseling, education, review of test results, relevant medical records, and coordination of care.     I have explained the treatment plan, diagnosis, and prognosis to patient. All questions are answered to the best of my knowledge.     09/11/2023

## 2023-09-15 ENCOUNTER — PATIENT MESSAGE (OUTPATIENT)
Dept: FAMILY MEDICINE | Facility: CLINIC | Age: 53
End: 2023-09-15
Payer: MEDICAID

## 2023-09-21 ENCOUNTER — PATIENT MESSAGE (OUTPATIENT)
Dept: FAMILY MEDICINE | Facility: CLINIC | Age: 53
End: 2023-09-21
Payer: MEDICAID

## 2023-09-22 ENCOUNTER — TELEPHONE (OUTPATIENT)
Dept: FAMILY MEDICINE | Facility: CLINIC | Age: 53
End: 2023-09-22
Payer: MEDICAID

## 2023-09-22 ENCOUNTER — OFFICE VISIT (OUTPATIENT)
Dept: FAMILY MEDICINE | Facility: CLINIC | Age: 53
End: 2023-09-22
Payer: MEDICAID

## 2023-09-22 VITALS
DIASTOLIC BLOOD PRESSURE: 63 MMHG | OXYGEN SATURATION: 99 % | HEART RATE: 64 BPM | TEMPERATURE: 98 F | RESPIRATION RATE: 18 BRPM | BODY MASS INDEX: 21.46 KG/M2 | WEIGHT: 125 LBS | SYSTOLIC BLOOD PRESSURE: 102 MMHG

## 2023-09-22 DIAGNOSIS — R00.2 PALPITATION: ICD-10-CM

## 2023-09-22 DIAGNOSIS — K62.5 RECTAL BLEEDING: ICD-10-CM

## 2023-09-22 DIAGNOSIS — K59.00 CONSTIPATION, UNSPECIFIED CONSTIPATION TYPE: ICD-10-CM

## 2023-09-22 DIAGNOSIS — N64.4 BREAST PAIN, RIGHT: Primary | ICD-10-CM

## 2023-09-22 PROCEDURE — 3074F SYST BP LT 130 MM HG: CPT | Mod: CPTII,,, | Performed by: FAMILY MEDICINE

## 2023-09-22 PROCEDURE — 3078F PR MOST RECENT DIASTOLIC BLOOD PRESSURE < 80 MM HG: ICD-10-PCS | Mod: CPTII,,, | Performed by: FAMILY MEDICINE

## 2023-09-22 PROCEDURE — 99214 OFFICE O/P EST MOD 30 MIN: CPT | Mod: S$PBB,,, | Performed by: FAMILY MEDICINE

## 2023-09-22 PROCEDURE — 99214 PR OFFICE/OUTPT VISIT, EST, LEVL IV, 30-39 MIN: ICD-10-PCS | Mod: S$PBB,,, | Performed by: FAMILY MEDICINE

## 2023-09-22 PROCEDURE — 3008F BODY MASS INDEX DOCD: CPT | Mod: CPTII,,, | Performed by: FAMILY MEDICINE

## 2023-09-22 PROCEDURE — 3078F DIAST BP <80 MM HG: CPT | Mod: CPTII,,, | Performed by: FAMILY MEDICINE

## 2023-09-22 PROCEDURE — 3074F PR MOST RECENT SYSTOLIC BLOOD PRESSURE < 130 MM HG: ICD-10-PCS | Mod: CPTII,,, | Performed by: FAMILY MEDICINE

## 2023-09-22 PROCEDURE — 1159F PR MEDICATION LIST DOCUMENTED IN MEDICAL RECORD: ICD-10-PCS | Mod: CPTII,,, | Performed by: FAMILY MEDICINE

## 2023-09-22 PROCEDURE — 99214 OFFICE O/P EST MOD 30 MIN: CPT | Mod: PBBFAC | Performed by: FAMILY MEDICINE

## 2023-09-22 PROCEDURE — 3044F PR MOST RECENT HEMOGLOBIN A1C LEVEL <7.0%: ICD-10-PCS | Mod: CPTII,,, | Performed by: FAMILY MEDICINE

## 2023-09-22 PROCEDURE — 3008F PR BODY MASS INDEX (BMI) DOCUMENTED: ICD-10-PCS | Mod: CPTII,,, | Performed by: FAMILY MEDICINE

## 2023-09-22 PROCEDURE — 3044F HG A1C LEVEL LT 7.0%: CPT | Mod: CPTII,,, | Performed by: FAMILY MEDICINE

## 2023-09-22 PROCEDURE — 1159F MED LIST DOCD IN RCRD: CPT | Mod: CPTII,,, | Performed by: FAMILY MEDICINE

## 2023-09-22 RX ORDER — PROPRANOLOL HYDROCHLORIDE 10 MG/1
10 TABLET ORAL DAILY
Qty: 90 TABLET | Refills: 0 | Status: SHIPPED | OUTPATIENT
Start: 2023-09-22 | End: 2024-02-28

## 2023-09-22 RX ORDER — POLYETHYLENE GLYCOL 3350 17 G/17G
17 POWDER, FOR SOLUTION ORAL DAILY
Qty: 850 G | Refills: 0 | Status: SHIPPED | OUTPATIENT
Start: 2023-09-22 | End: 2024-02-28

## 2023-09-22 NOTE — PROGRESS NOTES
Patient Name: Remi Baptiste   : 1970  MRN: [id number]     SUBJECTIVE:  Remi Baptiste is a 53 y.o. female here for Follow-up (The patient states that she has right breast pain for a week with nipple discoloration, and bleeding from her rectum last night and this morning.)  .    HPI  Here for right breast pain for the past week.  Mostly nipple pain, very sensitive even to put the bra on. kind of swollen and looked deformed and discolored/yellowish in the first few days. No trauma. No warmth or hot or redness of the skin. Looks better now and pain has subsided a bit. No nipple discharge. Menopause at least 3 years ago.  Last mammogram 4 months ago, normal.  No family of breast cancer but mother does not do mammograms.    Last night constipated, bloated, sitting in the toilet for a long time. Then finally had BM, but very hard stools and noticed blood with wiping. The same today morning, with wiping only. No blood in the toilet, no active bleeding  In 7 days has appt with GI for diverticulitis. No abdominal pain currently.  No more recent black stools.     No vaccines for today- patient states she will think about them next time      ALLERGIES:   Review of patient's allergies indicates:   Allergen Reactions    Hydrocodone-guaifenesin Hives    Hydrocodone-acetaminophen     Penicillins Rash         ROS:  Review of Systems   Constitutional:  Negative for chills and fever.   HENT:  Negative for congestion.    Eyes:  Negative for blurred vision.   Respiratory:  Negative for cough and shortness of breath.    Cardiovascular:  Negative for chest pain, palpitations and leg swelling.   Gastrointestinal:  Positive for blood in stool. Negative for abdominal pain, diarrhea, melena, nausea and vomiting.   Genitourinary:  Negative for dysuria and hematuria.   Neurological:  Negative for dizziness and headaches.   Psychiatric/Behavioral:  Negative for depression. The patient is nervous/anxious.          OBJECTIVE:  Vital  signs  Vitals:    09/22/23 0846   BP: 102/63   Pulse: 64   Resp: 18   Temp: 98.2 °F (36.8 °C)   TempSrc: Oral   SpO2: 99%   Weight: 56.7 kg (125 lb)      Body mass index is 21.46 kg/m².    PHYSICAL EXAM:   Physical Exam  Vitals reviewed. Exam conducted with a chaperone present (YESSY Díaz).   Constitutional:       General: She is not in acute distress.     Appearance: Normal appearance. She is not ill-appearing.   HENT:      Head: Normocephalic and atraumatic.      Right Ear: External ear normal.      Left Ear: External ear normal.      Mouth/Throat:      Mouth: Mucous membranes are moist.   Eyes:      General: No scleral icterus.        Right eye: No discharge.         Left eye: No discharge.      Conjunctiva/sclera: Conjunctivae normal.      Pupils: Pupils are equal, round, and reactive to light.   Cardiovascular:      Rate and Rhythm: Normal rate and regular rhythm.   Pulmonary:      Effort: Pulmonary effort is normal. No respiratory distress.      Breath sounds: No wheezing, rhonchi or rales.   Chest:   Breasts:     Right: No skin change.      Left: No skin change.          Comments: Arrow: area of being highly tender with questionable mass/fatty tissue. No nipple pain/discoloration today  Abdominal:      General: Bowel sounds are normal. There is no distension.      Palpations: Abdomen is soft.      Tenderness: There is no abdominal tenderness.   Musculoskeletal:      Cervical back: Normal range of motion and neck supple. No rigidity or tenderness.      Right lower leg: No edema.      Left lower leg: No edema.   Lymphadenopathy:      Upper Body:      Right upper body: No axillary adenopathy.      Left upper body: No axillary adenopathy.   Skin:     General: Skin is warm.   Neurological:      General: No focal deficit present.      Mental Status: She is alert and oriented to person, place, and time.   Psychiatric:         Mood and Affect: Mood normal.         Behavior: Behavior normal.           ASSESSMENT/PLAN:  1. Breast pain, right  -     Mammo Digital Diagnostic Bilat with Refugio; Future; Expected date: 09/22/2023  -     US Breast Right Limited; Future; Expected date: 09/22/2023    2. Constipation, unspecified constipation type  -     CBC Auto Differential; Future; Expected date: 09/22/2023  -     polyethylene glycol (GLYCOLAX) 17 gram/dose powder; Take 17 g by mouth once daily.  Dispense: 850 g; Refill: 0    3. Rectal bleeding    4. Palpitation  -     propranoloL (INDERAL) 10 MG tablet; Take 1 tablet (10 mg total) by mouth once daily.  Dispense: 90 tablet; Refill: 0       PLAN  - right breast pain with no skin changes or any findings for infection.  Will need further evaluation given also age with diagnostic mammogram and ultrasound.  -regarding constipation with subsequent rectal bleeding likely due to being constipated/possible hemorrhoids, start Glycolax to help with it until she sees GI in a week.  No abdominal pain otherwise no nausea or vomiting.  -check CBC to monitor hemoglobin.  No melena.  No active bleeding, only with wiping.  Discussed with patient if she develops any active dripping of the blood or melena or have new symptoms to go to ER.  Patient voiced understanding.  -propranolol for palpitations.  Well-controlled.      Previous medical history/lab work/radiology reviewed and considered during medical management decisions.   Medication list reviewed and medication reconciliation performed.  Patient was provided  and care about his/her current diagnosis (es) and medications including risk/benefit and side effects/adverse events, over the counter medication uses/doses, home self-care and contact precautions,  and red flags and indications for when to seek immediate medical attention.   Patient was advised to continue compliance with current medication list and medical recommendations.  Recommended/ Advised continued compliance with recommended eating habits/ diets for medical  conditions and exercise 150 minutes/ week (if possible) for medical condition (s).        RESULTS:  Recent Results (from the past 1008 hour(s))   Urinalysis, Reflex to Urine Culture    Collection Time: 08/28/23  3:40 PM    Specimen: Urine   Result Value Ref Range    Color, UA Light-Yellow Yellow, Light-Yellow, Dark Yellow, Mckenzie, Straw    Appearance, UA Clear Clear    Specific Gravity, UA 1.014 1.005 - 1.030    pH, UA 6.0 5.0 - 8.5    Protein, UA Negative Negative    Glucose, UA Normal Negative, Normal    Ketones, UA Negative Negative    Blood, UA Negative Negative    Bilirubin, UA Negative Negative    Urobilinogen, UA Normal 0.2, 1.0, Normal    Nitrites, UA Negative Negative    Leukocyte Esterase, UA Negative Negative    WBC, UA None Seen None Seen, 0-2, 3-5, 0-5 /HPF    Bacteria, UA None Seen None Seen /HPF    Squamous Epithelial Cells, UA Trace (A) None Seen /HPF    Mucous, UA Trace (A) None Seen /LPF    Hyaline Casts, UA None Seen None Seen /lpf    RBC, UA 0-5 None Seen, 0-2, 3-5, 0-5 /HPF   Wet Prep, Genital    Collection Time: 08/28/23  3:40 PM    Specimen: Cervicovaginal; Genital   Result Value Ref Range    WBC, Wet Prep None Seen None Seen    Clue Cells, Wet Prep None Seen None Seen    Trichomonas, Wet Prep None Seen None Seen    Yeast, Wet Prep None Seen None Seen   Comprehensive Metabolic Panel    Collection Time: 09/22/23  9:46 AM   Result Value Ref Range    Sodium Level 142 136 - 145 mmol/L    Potassium Level 4.0 3.5 - 5.1 mmol/L    Chloride 108 (H) 98 - 107 mmol/L    Carbon Dioxide 28 22 - 29 mmol/L    Glucose Level 99 74 - 100 mg/dL    Blood Urea Nitrogen 13.2 9.8 - 20.1 mg/dL    Creatinine 0.65 0.55 - 1.02 mg/dL    Calcium Level Total 9.9 8.4 - 10.2 mg/dL    Protein Total 7.2 6.4 - 8.3 gm/dL    Albumin Level 4.3 3.5 - 5.0 g/dL    Globulin 2.9 2.4 - 3.5 gm/dL    Albumin/Globulin Ratio 1.5 1.1 - 2.0 ratio    Bilirubin Total 0.6 <=1.5 mg/dL    Alkaline Phosphatase 53 40 - 150 unit/L    Alanine  Aminotransferase 23 0 - 55 unit/L    Aspartate Aminotransferase 20 5 - 34 unit/L    eGFR >60 mls/min/1.73/m2   Lipid Panel    Collection Time: 09/22/23  9:46 AM   Result Value Ref Range    Cholesterol Total 231 (H) <=200 mg/dL    HDL Cholesterol 96 (H) 35 - 60 mg/dL    Triglyceride 55 37 - 140 mg/dL    Cholesterol/HDL Ratio 2 0 - 5    Very Low Density Lipoprotein 11     LDL Cholesterol 124.00 50.00 - 140.00 mg/dL   TSH    Collection Time: 09/22/23  9:46 AM   Result Value Ref Range    Thyroid Stimulating Hormone 1.355 0.350 - 4.940 uIU/mL   CBC with Differential    Collection Time: 09/22/23  9:46 AM   Result Value Ref Range    WBC 6.61 4.50 - 11.50 x10(3)/mcL    RBC 4.25 4.20 - 5.40 x10(6)/mcL    Hgb 13.5 12.0 - 16.0 g/dL    Hct 40.4 37.0 - 47.0 %    MCV 95.1 (H) 80.0 - 94.0 fL    MCH 31.8 (H) 27.0 - 31.0 pg    MCHC 33.4 33.0 - 36.0 g/dL    RDW 12.6 11.5 - 17.0 %    Platelet 246 130 - 400 x10(3)/mcL    MPV 9.3 7.4 - 10.4 fL    Neut % 60.8 %    Lymph % 27.4 %    Mono % 8.6 %    Eos % 2.4 %    Basophil % 0.5 %    Lymph # 1.81 0.6 - 4.6 x10(3)/mcL    Neut # 4.02 2.1 - 9.2 x10(3)/mcL    Mono # 0.57 0.1 - 1.3 x10(3)/mcL    Eos # 0.16 0 - 0.9 x10(3)/mcL    Baso # 0.03 <=0.2 x10(3)/mcL    IG# 0.02 0 - 0.04 x10(3)/mcL    IG% 0.3 %    NRBC% 0.0 %         Follow Up:  Follow up in about 6 months (around 3/22/2024).     [unfilled]    This note was created with the assistance of a voice recognition software or phone dictation. There may be transcription errors as a result of using this technology however minimal. Effort has been made to assure accuracy of transcription but any obvious errors or omissions should be clarified with the author of the document

## 2023-09-22 NOTE — TELEPHONE ENCOUNTER
Called patient via phone call and patient understands results given. No further questions at this time.     ----- Message from Raegan Swan MD sent at 9/22/2023 11:17 AM CDT -----  Labs normal and stable, hemoglobin normal, except for worsened lipid panel since 4 months ago.  Please advise patient to reconsider resuming statin.  Avoid fatty and fried food.  Thanks

## 2023-10-10 ENCOUNTER — HOSPITAL ENCOUNTER (OUTPATIENT)
Dept: RADIOLOGY | Facility: HOSPITAL | Age: 53
Discharge: HOME | End: 2023-10-10
Attending: FAMILY MEDICINE
Payer: MEDICAID

## 2023-10-10 VITALS — WEIGHT: 137 LBS | BODY MASS INDEX: 23.39 KG/M2 | HEIGHT: 64 IN

## 2023-10-10 DIAGNOSIS — N64.4 BREAST PAIN, RIGHT: ICD-10-CM

## 2023-10-10 DIAGNOSIS — N64.4 BREAST PAIN: Primary | ICD-10-CM

## 2023-10-10 PROCEDURE — 76641 ULTRASOUND BREAST COMPLETE: CPT | Mod: TC,50

## 2023-10-10 PROCEDURE — 77066 DX MAMMO INCL CAD BI: CPT | Mod: 26,,, | Performed by: RADIOLOGY

## 2023-10-10 PROCEDURE — 77066 MAMMO DIGITAL DIAGNOSTIC BILAT WITH TOMO: ICD-10-PCS | Mod: 26,,, | Performed by: RADIOLOGY

## 2023-10-10 PROCEDURE — 76641 US BREAST BILATERAL COMPLETE: ICD-10-PCS | Mod: 26,50,, | Performed by: RADIOLOGY

## 2023-10-10 PROCEDURE — 77062 BREAST TOMOSYNTHESIS BI: CPT | Mod: 26,,, | Performed by: RADIOLOGY

## 2023-10-10 PROCEDURE — 76641 ULTRASOUND BREAST COMPLETE: CPT | Mod: 26,50,, | Performed by: RADIOLOGY

## 2023-10-10 PROCEDURE — 77062 BREAST TOMOSYNTHESIS BI: CPT | Mod: TC

## 2023-10-10 PROCEDURE — 77062 MAMMO DIGITAL DIAGNOSTIC BILAT WITH TOMO: ICD-10-PCS | Mod: 26,,, | Performed by: RADIOLOGY

## 2024-01-09 ENCOUNTER — HOSPITAL ENCOUNTER (EMERGENCY)
Facility: HOSPITAL | Age: 54
Discharge: HOME | End: 2024-01-09
Attending: EMERGENCY MEDICINE
Payer: MEDICAID

## 2024-01-09 VITALS
SYSTOLIC BLOOD PRESSURE: 130 MMHG | OXYGEN SATURATION: 100 % | TEMPERATURE: 99 F | HEART RATE: 74 BPM | WEIGHT: 120 LBS | HEIGHT: 64 IN | RESPIRATION RATE: 18 BRPM | DIASTOLIC BLOOD PRESSURE: 57 MMHG | BODY MASS INDEX: 20.49 KG/M2
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
## 2024-01-09 DIAGNOSIS — T14.90XA TRAUMA: ICD-10-CM

## 2024-01-09 DIAGNOSIS — S62.164A CLOSED NONDISPLACED FRACTURE OF PISIFORM OF RIGHT WRIST, INITIAL ENCOUNTER: Primary | ICD-10-CM

## 2024-01-09 PROCEDURE — 99284 EMERGENCY DEPT VISIT MOD MDM: CPT

## 2024-01-09 NOTE — ED PROVIDER NOTES
"Encounter Date: 1/9/2024       History     Chief Complaint   Patient presents with    Recheck     Pt complaint of fall from family barstool yesterday with evaluation at Urgent care. Pt  presents with brace to right wrist diagnosed with "nondisplaced pisiform fracture." Pt expresses frustation at follow up process as she  told by nurse in DR Marie office,  whom insisted that pt be seen in  ED in order to get a referral to orthopedist. (2) Pt complaint pain to lateral right foot noticed today     53yoWF presents to ER with neck pain, headaches, right wrist pain, right elbow pain and right foot pain. States she fell from sitting height off [employer] 24-28hours ago. Evaluate and treated in urgent care yesterday for RIGHT non-displaced pisiform fracture and diagnosed with a concussion. Patient here with above complaints. Patient has RIGHT wrist brace, DME in place at time of visit.     The history is provided by the patient. No  was used.     Review of patient's allergies indicates:   Allergen Reactions    Hydrocodone-guaifenesin Hives    Hydrocodone-acetaminophen     Penicillins Rash     Past Medical History:   Diagnosis Date    Anxiety     Depression     Diverticulitis     Endometrial cyst of ovary     Stroke      Past Surgical History:   Procedure Laterality Date    BLADDER SUSPENSION      OVARIAN CYST REMOVAL      TONSILLECTOMY       Family History   Problem Relation Age of Onset    Hypertension Mother     Arthritis Mother     COPD Mother     Hypertension Father     Colon cancer Maternal Grandfather 70    Colon cancer Paternal Grandmother 70     Social History     Tobacco Use    Smoking status: Never     Passive exposure: Never    Smokeless tobacco: Never   Substance Use Topics    Alcohol use: Yes     Comment: Occasionally    Drug use: Never     Review of Systems   Constitutional:  Negative for appetite change, diaphoresis and fatigue.   HENT: Negative.     Eyes: Negative.    Respiratory: Negative.  "    Cardiovascular: Negative.    Gastrointestinal: Negative.    Endocrine: Negative.    Genitourinary: Negative.    Musculoskeletal:  Positive for arthralgias, back pain and joint swelling.   Skin: Negative.    Allergic/Immunologic: Negative.    Neurological: Negative.    Hematological: Negative.    Psychiatric/Behavioral: Negative.     All other systems reviewed and are negative.      Physical Exam     Initial Vitals [01/09/24 1106]   BP Pulse Resp Temp SpO2   (!) 130/57 74 18 98.6 °F (37 °C) 100 %      MAP       --         Physical Exam    Vitals reviewed.  Constitutional: She appears well-developed and well-nourished. No distress.   HENT:   Head: Normocephalic and atraumatic.   Eyes: Conjunctivae, EOM and lids are normal. Pupils are equal, round, and reactive to light.   Neck: Neck supple.   Normal range of motion.  Cardiovascular:  Normal rate and regular rhythm.           Pulmonary/Chest: Effort normal and breath sounds normal.   Abdominal: Abdomen is soft and flat. Bowel sounds are normal.   Musculoskeletal:      Cervical back: Normal range of motion and neck supple.      Comments: CN grossly intact.PERRLA. 50% FROM head and neck in all directions. 5/5 muscle strength in bilateral deltoids, biceps, triceps, interossei, brachioradialis, iliopsoas, quadriceps, dorsiflexion,plantar flexion, inversion, eversion, and hamstring function. Intact dermatomes in upper and lower extremities. 1-2+ DTRs bilaterally. Unable to fully assess right wrist- as unable to remove DME due to patient pain and patient asking to terminate removal of brace. Skin intact from what I was able to assess under splint.       Neurological: She is alert and oriented to person, place, and time. She has normal strength. She is not disoriented. No cranial nerve deficit or sensory deficit. GCS eye subscore is 4. GCS verbal subscore is 5. GCS motor subscore is 6.   Skin: Skin is warm and intact.   Psychiatric: She has a normal mood and affect. Her  speech is normal and behavior is normal. Judgment and thought content normal. Cognition and memory are normal.         ED Course   Procedures  Labs Reviewed - No data to display       Imaging Results              X-Ray Elbow Complete Right (Final result)  Result time 01/09/24 14:45:24      Final result by Bety Ellison MD (01/09/24 14:45:24)                   Impression:      No acute osseous abnormality.      Electronically signed by: Bety Ellison  Date:    01/09/2024  Time:    14:45               Narrative:    EXAMINATION:  XR ELBOW COMPLETE 3 VIEW RIGHT    CLINICAL HISTORY:  . Injury, unspecified, initial encounter    TECHNIQUE:  AP, lateral, and oblique views of the right elbow were performed.    COMPARISON:  None.    FINDINGS:  No fracture. No dislocation.    Regional soft tissues are normal.                                       X-Ray Cervical Spine 2 or 3 Views (Final result)  Result time 01/09/24 14:23:24      Final result by Sam Hernández MD (01/09/24 14:23:24)                   Impression:      No acute fracture or malalignment identified.      Electronically signed by: Sam Hernández  Date:    01/09/2024  Time:    14:23               Narrative:    EXAMINATION:  XR CERVICAL SPINE 2 OR 3 VIEWS    CLINICAL HISTORY:  trauma;    TECHNIQUE:  Cervical two view radiography.    COMPARISON:  October 25, 2023.    FINDINGS:  There is grade 1 anterolisthesis of C4 on C5 without significant interval change.  Cervical vertebrae stature is preserved.  Intervertebral disc space degenerative changes and spondylosis at C5-C6 and C6-C7 is without significant interval difference.  Small anterior degenerative hypertrophic spurrings.  No acute fracture or malalignment identified.                                       X-Ray Foot Complete Right (Final result)  Result time 01/09/24 13:07:04      Final result by Bety Ellison MD (01/09/24 13:07:04)                   Impression:      No acute osseous  abnormality.      Electronically signed by: Bety Ellison  Date:    01/09/2024  Time:    13:07               Narrative:    EXAMINATION:  XR FOOT COMPLETE 3 VIEW RIGHT    CLINICAL HISTORY:  . Unspecified fall, initial encounter    TECHNIQUE:  AP, lateral, and oblique views of the right foot were performed.    COMPARISON:  None    FINDINGS:  No fracture. No dislocation.    Regional soft tissues are normal.                                    X-Rays:   Independently Interpreted Readings:   Other Readings:  No fractures    Medications - No data to display  Medical Decision Making  53yoWF presents to ER with neck pain, headaches, right wrist pain, right elbow pain and right foot pain. States she fell from sitting height off [email or web address] 24-28hours ago. Evaluate and treated in urgent care yesterday for RIGHT non-displaced pisiform fracture and diagnosed with a concussion. Patient here with above complaints. Patient has RIGHT wrist brace, DME in place at time of visit. Attempted to make a referral for a patient to her orthopedic of her choice. I attempted to explain that she needed to keep the wrist splinted as directed by prior facility. Follow up with PCP as needed. No other acute fractures found at this time. I attempted to discharge patient and review assessment and plan,however; she was on the phone the entire time.     Problems Addressed:  Fall: acute illness or injury  Trauma: acute illness or injury with systemic symptoms    Amount and/or Complexity of Data Reviewed  Radiology: ordered.    Risk  OTC drugs.  Prescription drug management.                                      Clinical Impression:  Final diagnoses:  [W19.XXXA] Fall  [T14.90XA] Trauma  [S62.164A] Closed nondisplaced fracture of pisiform of right wrist, initial encounter (Primary)          ED Disposition Condition    Discharge Stable          ED Prescriptions    None       Follow-up Information       Follow up With Specialties Details Why Contact Info     Raegan Swan MD Family Medicine Call  As needed, If symptoms worsen [address] [facility] [address]  [phone]      [facility] - Emergency Dept Emergency Medicine  As needed, If symptoms worsen [address]  [facility] [id number] [phone]             Merry Gonzales PA  01/09/24 1442       Merry Gonzales PA  01/09/24 1516       Merry Gonzales PA  01/09/24 1530

## 2024-01-09 NOTE — Clinical Note
"Remi"Santi Baptiste was seen and treated in our emergency department on 1/9/2024.  She may return to work on 01/15/2024.  She may return sooner if pain level allows.     If you have any questions or concerns, please don't hesitate to call.      Elisabet HEATH    "

## 2024-01-09 NOTE — ED TRIAGE NOTES
"  Pt complaint of fall from family barstool yesterday with evaluation at Urgent care. Pt  presents with brace to right wrist diagnosed with "nondisplaced pisiform fracture." Pt expresses frustation at follow up process as she  told by nurse in DR Marie office,  whom insisted that pt be seen in  ED in order to get a referral to orthopedist. (2) Pt complaint pain to lateral right foot noticed to     "

## 2024-01-12 ENCOUNTER — HOSPITAL ENCOUNTER (OUTPATIENT)
Dept: RADIOLOGY | Facility: HOSPITAL | Age: 54
Discharge: HOME | End: 2024-01-12
Attending: STUDENT IN AN ORGANIZED HEALTH CARE EDUCATION/TRAINING PROGRAM
Payer: MEDICAID

## 2024-01-12 ENCOUNTER — OFFICE VISIT (OUTPATIENT)
Dept: ORTHOPEDICS | Facility: CLINIC | Age: 54
End: 2024-01-12
Payer: MEDICAID

## 2024-01-12 VITALS
WEIGHT: 122 LBS | HEIGHT: 64 IN | BODY MASS INDEX: 20.83 KG/M2 | TEMPERATURE: 98 F | RESPIRATION RATE: 18 BRPM | OXYGEN SATURATION: 98 % | HEART RATE: 72 BPM | DIASTOLIC BLOOD PRESSURE: 50 MMHG | SYSTOLIC BLOOD PRESSURE: 93 MMHG

## 2024-01-12 DIAGNOSIS — S63.501A SPRAIN OF RIGHT WRIST, INITIAL ENCOUNTER: ICD-10-CM

## 2024-01-12 DIAGNOSIS — M25.531 RIGHT WRIST PAIN: ICD-10-CM

## 2024-01-12 DIAGNOSIS — S62.164A CLOSED NONDISPLACED FRACTURE OF PISIFORM OF RIGHT WRIST, INITIAL ENCOUNTER: Primary | ICD-10-CM

## 2024-01-12 PROCEDURE — 3008F BODY MASS INDEX DOCD: CPT | Mod: CPTII,,, | Performed by: SPECIALIST

## 2024-01-12 PROCEDURE — 99203 OFFICE O/P NEW LOW 30 MIN: CPT | Mod: S$PBB,,, | Performed by: SPECIALIST

## 2024-01-12 PROCEDURE — 3074F SYST BP LT 130 MM HG: CPT | Mod: CPTII,,, | Performed by: SPECIALIST

## 2024-01-12 PROCEDURE — 73110 X-RAY EXAM OF WRIST: CPT | Mod: TC,RT

## 2024-01-12 PROCEDURE — 1159F MED LIST DOCD IN RCRD: CPT | Mod: CPTII,,, | Performed by: SPECIALIST

## 2024-01-12 PROCEDURE — 3078F DIAST BP <80 MM HG: CPT | Mod: CPTII,,, | Performed by: SPECIALIST

## 2024-01-12 PROCEDURE — 99215 OFFICE O/P EST HI 40 MIN: CPT | Mod: PBBFAC

## 2024-01-12 NOTE — PROGRESS NOTES
Past Medical History:   Diagnosis Date    Anxiety     Depression     Diverticulitis     Endometrial cyst of ovary     Stroke        Past Surgical History:   Procedure Laterality Date    BLADDER SUSPENSION      OVARIAN CYST REMOVAL      TONSILLECTOMY         Current Outpatient Medications   Medication Sig    polyethylene glycol (GLYCOLAX) 17 gram/dose powder Take 17 g by mouth once daily.    vitamin E 100 UNIT capsule Take 100 Units by mouth once daily.    aspirin (ECOTRIN) 81 MG EC tablet Take 1 tablet (81 mg total) by mouth once daily. (Patient not taking: Reported on 1/12/2024)    doxycycline (VIBRAMYCIN) 100 MG Cap Take 1 capsule (100 mg total) by mouth 2 (two) times daily.    fish,bora,flax oils-om3,6,9no1 (OMEGA 3-6-9) 1,200 mg Cap Take 1 capsule by mouth Daily.    ondansetron (ZOFRAN) 4 MG tablet Take 1 tablet (4 mg total) by mouth every 6 (six) hours as needed for Nausea. (Patient not taking: Reported on 1/12/2024)    propranoloL (INDERAL) 10 MG tablet Take 1 tablet (10 mg total) by mouth once daily. (Patient not taking: Reported on 1/12/2024)    tumeric-ging-olive-oreg-capryl 100 mg-150 mg- 50 mg-150 mg Cap Take 1 capsule by mouth Daily.     No current facility-administered medications for this visit.       Review of patient's allergies indicates:   Allergen Reactions    Hydrocodone-guaifenesin Hives    Hydrocodone-acetaminophen     Penicillins Rash       Family History   Problem Relation Age of Onset    Hypertension Mother     Arthritis Mother     COPD Mother     Hypertension Father     Colon cancer Maternal Grandfather 70    Colon cancer Paternal Grandmother 70       Social History     Socioeconomic History    Marital status: Single    Number of children: 0   Occupational History    Occupation: [employer]    Occupation: Housekeeping   Tobacco Use    Smoking status: Never     Passive exposure: Never    Smokeless tobacco: Never   Substance and Sexual Activity    Alcohol use: Not Currently     Comment:  Occasionally    Drug use: Never    Sexual activity: Not Currently     Partners: Male     Birth control/protection: Post-menopausal     Social Determinants of Health     Financial Resource Strain: Low Risk  (6/22/2022)    Overall Financial Resource Strain (CARDIA)     Difficulty of Paying Living Expenses: Not hard at all   Food Insecurity: No Food Insecurity (6/22/2022)    Hunger Vital Sign     Worried About Running Out of Food in the Last Year: Never true     Ran Out of Food in the Last Year: Never true   Transportation Needs: No Transportation Needs (6/22/2022)    PRAPARE - Transportation     Lack of Transportation (Medical): No     Lack of Transportation (Non-Medical): No   Physical Activity: Insufficiently Active (6/22/2022)    Exercise Vital Sign     Days of Exercise per Week: 4 days     Minutes of Exercise per Session: 30 min   Stress: No Stress Concern Present (6/22/2022)    [employer] [place] [employer] - Occupational Stress Questionnaire     Feeling of Stress : Not at all   Social Connections: Unknown (6/22/2022)    Social Connection and Isolation Panel [NHANES]     Frequency of Communication with Friends and Family: More than three times a week     Frequency of Social Gatherings with Friends and Family: More than three times a week   Housing Stability: Low Risk  (6/22/2022)    Housing Stability Vital Sign     Unable to Pay for Housing in the Last Year: No     Number of Places Lived in the Last Year: 1     Unstable Housing in the Last Year: No       Chief Complaint:   Chief Complaint   Patient presents with    Right Wrist - Injury, Pain     Fell off bar stool after a dog jumped on her on 1/8/23 injured right wrist, pain 3/10 takes Tylenol as needed for pain       Consulting Physician: Merry Gonzales PA    History of present illness:    This is a 53 y.o. year old female who complains of pain in the right wrist after a fall off of her friend's kitchen bar stool Monday.  She fell because the  "dog of her friends jumped on her causing her to spin around and fall off of the bar stool onto her outstretched hand.  She also hit the back of her head.  She was having neck pain but now the neck does not hurt.  She had a concussion but is not having any headaches currently and no blurry vision or dizziness.  She has in a thumb spica brace.  No numbness or tingling.    Review of Systems:    Constitution:   Denies chills, fever, and sweats.  HENT:   Denies headaches or blurry vision.  Cardiovascular:  Denies chest pain or irregular heart beat.  Respiratory:   Denies cough or shortness of breath.  Gastrointestinal:  Denies abdominal pain, nausea, or vomiting.  Musculoskeletal:   Denies muscle cramps.  Neurological:   Denies dizziness or focal weakness.  Psychiatric/Behavior: Normal mental status.  Hematology/Lymph:  Denies bleeding problem or easy bruising/bleeding.  Skin:    Denies rash or suspicious lesions.    Examination:    Vital Signs:    Vitals:    01/12/24 0744   BP: (!) 93/50   Pulse: 72   Resp: 18   Temp: 98.3 °F (36.8 °C)   SpO2: 98%   Weight: 55.3 kg (122 lb)   Height: 5' 4" (1.626 m)   PainSc:   3       Body mass index is 20.94 kg/m².    Constitution:   Well-developed, well nourished patient in no acute distress.  Neurological:   Alert and oriented x 3 and cooperative to examination.     Psychiatric/Behavior: Normal mental status.  Respiratory:   No shortness of breath.non labored breathing.  Cardiovascular: Regular rate and rhythm  Eyes:    Extraoccular muscles intact  Skin:    No scars, rash or suspicious lesions.    Physical Exam:  Right upper extremity exam:   2+ radial pulse   Intact normal capillary refill in all 5 fingers   Intact flexion-extension weakly of all 5 fingers with pain in the forearm during active range of motion   No forearm swelling   Swelling and bruising over the dorsal and volar aspect of the wrist with tenderness over the triquetrum and pisiform bones as well as tenderness over " the distal radius and distal ulna.  No gross deformity   No elbow tenderness   Mild tenderness with pronation and supination but patient does have full active and passive pronation and supination as well as elbow range motion  No finger deformity    Imaging: X-rays reviewed and images interpreted today personally by me of three views of the right elbow and three views of the right wrist which show no gross evidence of definitive fracture or dislocation.  Questionable nondisplaced fracture of the distal radius, pisiform bone, and triquetrum.       Assessment: Closed nondisplaced fracture of pisiform of right wrist, initial encounter  -     Ambulatory referral/consult to Orthopedics  -     Ambulatory referral/consult to Physical/Occupational Therapy; Future; Expected date: 01/19/2024    Right wrist pain  -     X-Ray Wrist Complete Right; Future; Expected date: 01/12/2024    Sprain of right wrist, initial encounter        Plan:  Injury discussed with the patient and recommend continuing with the wrist brace.  Physical therapy/hand therapy ordered for range motion strengthening over fingers.  For pain she has been taking Tylenol and I have also recommended ibuprofen 400 mg p.o. 3 times daily with food.  She will get this over-the-counter to take.  Follow up with us for a re-evaluation in 2 weeks with repeat radiographs of the right wrist.  Additionally she can work light duty at [employer] with no lifting or cutting or chopping with the right hand until cleared by physicians.      DISCLAIMER: This note may have been dictated using voice recognition software and may contain grammatical errors.     NOTE: Consult report sent to referring provider via [employer].

## 2024-01-26 ENCOUNTER — HOSPITAL ENCOUNTER (OUTPATIENT)
Dept: RADIOLOGY | Facility: HOSPITAL | Age: 54
Discharge: HOME | End: 2024-01-26
Attending: STUDENT IN AN ORGANIZED HEALTH CARE EDUCATION/TRAINING PROGRAM
Payer: MEDICAID

## 2024-01-26 ENCOUNTER — OFFICE VISIT (OUTPATIENT)
Dept: ORTHOPEDICS | Facility: CLINIC | Age: 54
End: 2024-01-26
Payer: MEDICAID

## 2024-01-26 VITALS
WEIGHT: 123.38 LBS | SYSTOLIC BLOOD PRESSURE: 107 MMHG | TEMPERATURE: 98 F | HEIGHT: 64 IN | DIASTOLIC BLOOD PRESSURE: 50 MMHG | BODY MASS INDEX: 21.06 KG/M2 | RESPIRATION RATE: 20 BRPM | HEART RATE: 66 BPM | OXYGEN SATURATION: 97 %

## 2024-01-26 DIAGNOSIS — M25.531 RIGHT WRIST PAIN: ICD-10-CM

## 2024-01-26 DIAGNOSIS — M25.531 RIGHT WRIST PAIN: Primary | ICD-10-CM

## 2024-01-26 PROCEDURE — 99214 OFFICE O/P EST MOD 30 MIN: CPT | Mod: S$PBB,,, | Performed by: REHABILITATION UNIT

## 2024-01-26 PROCEDURE — 3074F SYST BP LT 130 MM HG: CPT | Mod: CPTII,,, | Performed by: REHABILITATION UNIT

## 2024-01-26 PROCEDURE — 3078F DIAST BP <80 MM HG: CPT | Mod: CPTII,,, | Performed by: REHABILITATION UNIT

## 2024-01-26 PROCEDURE — 3008F BODY MASS INDEX DOCD: CPT | Mod: CPTII,,, | Performed by: REHABILITATION UNIT

## 2024-01-26 PROCEDURE — 1159F MED LIST DOCD IN RCRD: CPT | Mod: CPTII,,, | Performed by: REHABILITATION UNIT

## 2024-01-26 PROCEDURE — 73110 X-RAY EXAM OF WRIST: CPT | Mod: TC,RT

## 2024-01-26 PROCEDURE — 99214 OFFICE O/P EST MOD 30 MIN: CPT | Mod: PBBFAC

## 2024-01-27 NOTE — PROGRESS NOTES
[facility]  Established Patient Office Visit  01/27/2024       Patient ID: Remi Baptiste  YOB: 1970  MRN: [id number]    Chief Complaint: Pain of the Right Wrist (F/u  right wrist pain, no c/o pain at present, wrist brace intact  )      Past Orthopaedic Surgeries: none    HPI:  Remi Baptiste is a 53 y.o. female with chief complaint of right wrist pain after a fall off of her friend's kitchen bar stool on 01/08/2024.  At that time, x-rays were concerning for questionable nondisplaced fracture of the distal radius, pisiform, triquetrum.  She was wearing a thumb spica removable brace.  She has been wearing this.  She has been going to occupational therapy.  They told her she would nerve damage.  She has been concerned about this.  She has been taking Tylenol and ibuprofen.  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    12 point ROS performed and negative except as above.     Past Medical History:    Past Medical History:   Diagnosis Date    Anxiety     Depression     Diverticulitis     Endometrial cyst of ovary     Stroke      Past Surgical History:   Procedure Laterality Date    BLADDER SUSPENSION      OVARIAN CYST REMOVAL      TONSILLECTOMY       Family History   Problem Relation Age of Onset    Hypertension Mother     Arthritis Mother     COPD Mother     Hypertension Father     Colon cancer Maternal Grandfather 70    Colon cancer Paternal Grandmother 70     Social History     Socioeconomic History    Marital status: Single    Number of children: 0   Occupational History    Occupation: [employer]    Occupation: Housekeeping   Tobacco Use    Smoking status: Never     Passive exposure: Never    Smokeless tobacco: Never   Substance and Sexual Activity    Alcohol use: Not Currently     Comment: Occasionally    Drug use: Never    Sexual activity: Not Currently     Partners: Male     Birth control/protection: Post-menopausal     Social Determinants of Health      Financial Resource Strain: Low Risk  (6/22/2022)    Overall Financial Resource Strain (CARDIA)     Difficulty of Paying Living Expenses: Not hard at all   Food Insecurity: No Food Insecurity (6/22/2022)    Hunger Vital Sign     Worried About Running Out of Food in the Last Year: Never true     Ran Out of Food in the Last Year: Never true   Transportation Needs: No Transportation Needs (6/22/2022)    PRAPARE - Transportation     Lack of Transportation (Medical): No     Lack of Transportation (Non-Medical): No   Physical Activity: Insufficiently Active (6/22/2022)    Exercise Vital Sign     Days of Exercise per Week: 4 days     Minutes of Exercise per Session: 30 min   Stress: No Stress Concern Present (6/22/2022)    [employer] [employer] - Occupational Stress Questionnaire     Feeling of Stress : Not at all   Social Connections: Unknown (6/22/2022)    Social Connection and Isolation Panel [NHANES]     Frequency of Communication with Friends and Family: More than three times a week     Frequency of Social Gatherings with Friends and Family: More than three times a week   Housing Stability: Low Risk  (6/22/2022)    Housing Stability Vital Sign     Unable to Pay for Housing in the Last Year: No     Number of Places Lived in the Last Year: 1     Unstable Housing in the Last Year: No     Medication List with Changes/Refills   Current Medications    ASPIRIN (ECOTRIN) 81 MG EC TABLET    Take 1 tablet (81 mg total) by mouth once daily.    DOXYCYCLINE (VIBRAMYCIN) 100 MG CAP    Take 1 capsule (100 mg total) by mouth 2 (two) times daily.    FISH,BORA,FLAX OILS-OM3,6,9NO1 (OMEGA 3-6-9) 1,200 MG CAP    Take 1 capsule by mouth Daily.    ONDANSETRON (ZOFRAN) 4 MG TABLET    Take 1 tablet (4 mg total) by mouth every 6 (six) hours as needed for Nausea.    POLYETHYLENE GLYCOL (GLYCOLAX) 17 GRAM/DOSE POWDER    Take 17 g by mouth once daily.    PROPRANOLOL (INDERAL) 10 MG TABLET    Take 1 tablet (10 mg total) by  mouth once daily.    TUMERIC-GING-OLIVE-OREG-CAPRYL 100 MG-150 MG- 50 MG-150 MG CAP    Take 1 capsule by mouth Daily.    VITAMIN E 100 UNIT CAPSULE    Take 100 Units by mouth once daily.     Review of patient's allergies indicates:   Allergen Reactions    Hydrocodone-guaifenesin Hives    Hydrocodone-acetaminophen     Penicillins Rash       Physical Exam:    Right hand  No gross deformity  Some thenar atrophy compared to the contralateral  Stiffness of this hands fingers compared to the contralateral  No tenderness in the anatomic snuffbox  No tenderness on the radial side of the wrist  Tender to palpation on the ulnar side of the wrist along the triquetrum and pisiform  AIN/PIN/ulnar intact  Tinel's positive at the carpal and cubital tunnels  Negative wartenberg  Sensation intact to light touch M/R/U  Hand warm and well perfused    Imaging independently interpreted:  The prior radiographs show a possible fracture along the triquetrum pisiform.  Radiographs from today show questionable fracture of the scaphoid.    Assessment and Plan:    Remi Baptiste is a 53 y.o. 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

## 2024-01-29 NOTE — PROGRESS NOTES
Faculty Attestation: Remi Baptiste  was seen at [facility] in the Orthopaedic Clinic. Discussed with the resident at the time of the visit. History of Present Illness, Physical Exam, and Assessment and Plan reviewed. Treatment plan is reasonable and appropriate. Compliance with treatment recommendations is important. No procedure was performed.     Drew Celestin MD  Orthopaedic Surgery

## 2024-02-02 ENCOUNTER — HOSPITAL ENCOUNTER (OUTPATIENT)
Dept: RADIOLOGY | Facility: HOSPITAL | Age: 54
Discharge: HOME | End: 2024-02-02
Attending: STUDENT IN AN ORGANIZED HEALTH CARE EDUCATION/TRAINING PROGRAM
Payer: MEDICAID

## 2024-02-02 DIAGNOSIS — M25.531 RIGHT WRIST PAIN: ICD-10-CM

## 2024-02-02 PROCEDURE — 73200 CT UPPER EXTREMITY W/O DYE: CPT | Mod: TC,RT

## 2024-02-14 ENCOUNTER — OFFICE VISIT (OUTPATIENT)
Dept: GYNECOLOGY | Facility: CLINIC | Age: 54
End: 2024-02-14
Payer: MEDICAID

## 2024-02-14 VITALS
HEIGHT: 64 IN | SYSTOLIC BLOOD PRESSURE: 103 MMHG | WEIGHT: 127.19 LBS | BODY MASS INDEX: 21.71 KG/M2 | DIASTOLIC BLOOD PRESSURE: 65 MMHG | OXYGEN SATURATION: 97 % | TEMPERATURE: 98 F | RESPIRATION RATE: 18 BRPM | HEART RATE: 77 BPM

## 2024-02-14 DIAGNOSIS — N95.2 VAGINAL ATROPHY: ICD-10-CM

## 2024-02-14 DIAGNOSIS — N94.819 VULVODYNIA: ICD-10-CM

## 2024-02-14 DIAGNOSIS — Z12.4 SCREENING FOR CERVICAL CANCER: Primary | ICD-10-CM

## 2024-02-14 PROCEDURE — 87624 HPV HI-RISK TYP POOLED RSLT: CPT

## 2024-02-14 PROCEDURE — 99459 PELVIC EXAMINATION: CPT | Mod: PBBFAC

## 2024-02-14 PROCEDURE — 88174 CYTOPATH C/V AUTO IN FLUID: CPT

## 2024-02-14 PROCEDURE — 99213 OFFICE O/P EST LOW 20 MIN: CPT | Mod: PBBFAC

## 2024-02-14 NOTE — PROGRESS NOTES
[facility] GYNECOLOGY CLINIC NOTE     Remi Baptiste is a 53 y.o.  presenting to GYN clinic for vaginal dryness.      She states that she is overall doing well. She reports some vaginal dryness. She reports that she is not sexually active at this time. She states that she has pain just at the opening of the vagina even to touch. Reports some itchiness and irritation externally.     OB Hx:       Gyn Hx:   Pap: Denies abnormal paps, review demonstrating ASCUS pap .   LMP: , has not has any bleeding since      Gynecology  OB History          0    Para   0    Term   0       0    AB   0    Living   0         SAB   0    IAB   0    Ectopic   0    Multiple   0    Live Births   0                Past Medical History:   Diagnosis Date    Anxiety     Depression     Diverticulitis     Endometrial cyst of ovary     Stroke       Past Surgical History:   Procedure Laterality Date    BLADDER SUSPENSION      OVARIAN CYST REMOVAL      TONSILLECTOMY        Current Outpatient Medications   Medication Instructions    aspirin (ECOTRIN) 81 mg, Oral, Daily    conjugated estrogens (PREMARIN) 1 g, Vaginal, Daily    doxycycline (VIBRAMYCIN) 100 mg, Oral, 2 times daily    fish,bora,flax oils-om3,6,9no1 (OMEGA 3-6-9) 1,200 mg Cap 1 capsule, Oral, Daily    ondansetron (ZOFRAN) 4 mg, Oral, Every 6 hours PRN    polyethylene glycol (GLYCOLAX) 17 g, Oral, Daily    propranoloL (INDERAL) 10 mg, Oral, Daily    tumeric-ging-olive-oreg-capryl 100 mg-150 mg- 50 mg-150 mg Cap 1 capsule, Oral, Daily    vitamin E 100 Units, Oral, Daily     Social History     Tobacco Use    Smoking status: Never     Passive exposure: Never    Smokeless tobacco: Never   Substance Use Topics    Alcohol use: Not Currently     Comment: Occasionally    Drug use: Never       Review of Systems  Pertinent items are noted in HPI.     Objective:     /65 (BP Location: Left arm, Patient Position: Sitting, BP Method: Large (Automatic))   Pulse 77    "Temp 97.9 °F (36.6 °C) (Oral)   Resp 18   Ht 5' 4" (1.626 m)   Wt 57.7 kg (127 lb 3.2 oz)   SpO2 97%   BMI 21.83 kg/m²     Physical Exam:  Gen: Well-nourished, well-developed female appearing stated age. Alert, cooperative, in no acute distress.  CV: regular rate  Chest: no increased work of breathing, no conversational dyspnea  Abdomen: Soft, non-tender, no masses.  Extrem: Extremities normal, atraumatic, non-tender calves.  External genitalia: Normal female genetalia without lesion, discharge or tenderness. Some loss of structure of the labia and clitoral gan.   Speculum Exam: Vaginal vault without discharge, nonodorous, no lesions/masses seen. Vaginal mucosa with loss of ruggae and pale pink. Cervix small and round. Cervical os visualized as closed. Small cervical polyp seen at os.   Bimanual Exam: No cervical motion tenderness. Uterus freely mobile.  Normal size uterus. No adnexal masses. Nontender exam.     Note: Female chaperone present for entirety of exam.      Relevant Imaging:  EXAMINATION:  US PELVIS COMP WITH TRANSVAG NON-OB (XPD)     CLINICAL HISTORY:  Unspecified abdominal pain     TECHNIQUE:  Multiple sonographic images of the pelvis were obtained by department sonographer.     COMPARISON:  None     FINDINGS:  Uterus is retroverted measuring 8.7 x 4.0 x 4.6 cm.  The myometrium demonstrates normal echogenicity.  The endometrial complex measures 3 mm without mass effect or effacement.  Microcalcifications are identified along the endometrial complex and could represent sequela of prior instrumentation versus infection.     The bilateral ovaries are not identified.  No free fluid in the pelvis no evidence for adenopathy.     Impression:     Nonvisualized ovaries without evidence for adnexal mass.  Somewhat atretic appearance of the uterus.         Assessment:       53 y.o.  here for vaginal dryness and atrophy.     Plan:       Problem List Items Addressed This Visit    None  Visit Diagnoses  "      Screening for cervical cancer    -  Primary    Relevant Orders    Liquid-Based Pap Smear, Screening Screening    Vulvodynia        Vaginal atrophy              Vulvodynia/vaginal atrophy:   - TVUS results reviewed, overall WNL  - Premarin cream ordered. Proper usage discussed with patient   - Lubrication discussed on days between and if patient decides to be sexually active.   - Declines STI screening at this time.     Screening for cervical cancer  - Pap results reviewed with ASCUS pap 2021, HPV -  - Pap collected today.   - Will follow up and treat as indicated    Return to clinic 1 year WWE with NP.    Discussed patient and plan with Dr. Hernandez.    Allison cJ, DO  [employer] [facility] PGY-2

## 2024-02-16 ENCOUNTER — OFFICE VISIT (OUTPATIENT)
Dept: ORTHOPEDICS | Facility: CLINIC | Age: 54
End: 2024-02-16
Payer: MEDICAID

## 2024-02-16 VITALS
WEIGHT: 126.19 LBS | TEMPERATURE: 98 F | DIASTOLIC BLOOD PRESSURE: 65 MMHG | BODY MASS INDEX: 21.54 KG/M2 | HEART RATE: 69 BPM | OXYGEN SATURATION: 95 % | HEIGHT: 64 IN | RESPIRATION RATE: 20 BRPM | SYSTOLIC BLOOD PRESSURE: 101 MMHG

## 2024-02-16 DIAGNOSIS — S62.164D CLOSED NONDISPLACED FRACTURE OF PISIFORM OF RIGHT WRIST WITH ROUTINE HEALING, SUBSEQUENT ENCOUNTER: Primary | ICD-10-CM

## 2024-02-16 DIAGNOSIS — S62.164D: ICD-10-CM

## 2024-02-16 LAB — PSYCHE PATHOLOGY RESULT: NORMAL

## 2024-02-16 PROCEDURE — 1159F MED LIST DOCD IN RCRD: CPT | Mod: CPTII,,, | Performed by: STUDENT IN AN ORGANIZED HEALTH CARE EDUCATION/TRAINING PROGRAM

## 2024-02-16 PROCEDURE — 3074F SYST BP LT 130 MM HG: CPT | Mod: CPTII,,, | Performed by: STUDENT IN AN ORGANIZED HEALTH CARE EDUCATION/TRAINING PROGRAM

## 2024-02-16 PROCEDURE — 3078F DIAST BP <80 MM HG: CPT | Mod: CPTII,,, | Performed by: STUDENT IN AN ORGANIZED HEALTH CARE EDUCATION/TRAINING PROGRAM

## 2024-02-16 PROCEDURE — 99213 OFFICE O/P EST LOW 20 MIN: CPT | Mod: PBBFAC

## 2024-02-16 PROCEDURE — 99213 OFFICE O/P EST LOW 20 MIN: CPT | Mod: S$PBB,,, | Performed by: STUDENT IN AN ORGANIZED HEALTH CARE EDUCATION/TRAINING PROGRAM

## 2024-02-16 PROCEDURE — 3008F BODY MASS INDEX DOCD: CPT | Mod: CPTII,,, | Performed by: STUDENT IN AN ORGANIZED HEALTH CARE EDUCATION/TRAINING PROGRAM

## 2024-02-16 NOTE — PROGRESS NOTES
[facility]  Established Patient Office Visit  01/27/2024       Patient ID: Remi Baptiste  YOB: 1970  MRN: [id number]    Chief Complaint: Pain of the Right Wrist (F/u  right wrist pain, no c/o pain at present, wrist brace intact  )      Past Orthopaedic Surgeries: none    HPI:  Remi Baptiste is a 53 y.o. female with chief complaint of right wrist pain after a fall off of her friend's kitchen bar stool on 01/08/2024.  Based on her x-rays there was concern for questionable nondisplaced fractures of her distal radius, fusiform, triquetrum, and scaphoid.  She was placed into a removable thumb spica brace.  She has been wearing this.  She has been going to occupational therapy.  Her pain and range of motion of significantly improved.  She would like to return back to work.  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  She has been taking Tylenol occasionally for pain.    12 point ROS performed and negative except as above.     Past Medical History:    Past Medical History:   Diagnosis Date    Anxiety     Depression     Diverticulitis     Endometrial cyst of ovary     Stroke      Past Surgical History:   Procedure Laterality Date    BLADDER SUSPENSION      OVARIAN CYST REMOVAL      TONSILLECTOMY       Family History   Problem Relation Age of Onset    Hypertension Mother     Arthritis Mother     COPD Mother     Hypertension Father     Colon cancer Maternal Grandfather 70    Colon cancer Paternal Grandmother 70     Social History     Socioeconomic History    Marital status: Single    Number of children: 0   Occupational History    Occupation: Tastemaker    Occupation: Housekeeping   Tobacco Use    Smoking status: Never     Passive exposure: Never    Smokeless tobacco: Never   Substance and Sexual Activity    Alcohol use: Not Currently     Comment: Occasionally    Drug use: Never    Sexual activity: Not Currently     Partners: Male     Birth control/protection:  Post-menopausal     Social Determinants of Health     Financial Resource Strain: Low Risk  (6/22/2022)    Overall Financial Resource Strain (CARDIA)     Difficulty of Paying Living Expenses: Not hard at all   Food Insecurity: No Food Insecurity (6/22/2022)    Hunger Vital Sign     Worried About Running Out of Food in the Last Year: Never true     Ran Out of Food in the Last Year: Never true   Transportation Needs: No Transportation Needs (6/22/2022)    PRAPARE - Transportation     Lack of Transportation (Medical): No     Lack of Transportation (Non-Medical): No   Physical Activity: Insufficiently Active (6/22/2022)    Exercise Vital Sign     Days of Exercise per Week: 4 days     Minutes of Exercise per Session: 30 min   Stress: No Stress Concern Present (6/22/2022)    [place] [employer] - Occupational Stress Questionnaire     Feeling of Stress : Not at all   Social Connections: Unknown (6/22/2022)    Social Connection and Isolation Panel [NHANES]     Frequency of Communication with Friends and Family: More than three times a week     Frequency of Social Gatherings with Friends and Family: More than three times a week   Housing Stability: Low Risk  (6/22/2022)    Housing Stability Vital Sign     Unable to Pay for Housing in the Last Year: No     Number of Places Lived in the Last Year: 1     Unstable Housing in the Last Year: No     Medication List with Changes/Refills   Current Medications    ASPIRIN (ECOTRIN) 81 MG EC TABLET    Take 1 tablet (81 mg total) by mouth once daily.    DOXYCYCLINE (VIBRAMYCIN) 100 MG CAP    Take 1 capsule (100 mg total) by mouth 2 (two) times daily.    FISH,BORA,FLAX OILS-OM3,6,9NO1 (OMEGA 3-6-9) 1,200 MG CAP    Take 1 capsule by mouth Daily.    ONDANSETRON (ZOFRAN) 4 MG TABLET    Take 1 tablet (4 mg total) by mouth every 6 (six) hours as needed for Nausea.    POLYETHYLENE GLYCOL (GLYCOLAX) 17 GRAM/DOSE POWDER    Take 17 g by mouth once daily.    PROPRANOLOL  (INDERAL) 10 MG TABLET    Take 1 tablet (10 mg total) by mouth once daily.    TUMERIC-GING-OLIVE-OREG-CAPRYL 100 MG-150 MG- 50 MG-150 MG CAP    Take 1 capsule by mouth Daily.    VITAMIN E 100 UNIT CAPSULE    Take 100 Units by mouth once daily.     Review of patient's allergies indicates:   Allergen Reactions    Hydrocodone-guaifenesin Hives    Hydrocodone-acetaminophen     Penicillins Rash       Physical Exam:    Right hand  No gross deformity  She has in no tenderness in the anatomic snuffbox or in the radial side of the wrist, she is tender to palpation of the ulnar side of the wrist along the pisiform  AIN/PIN/ulnar intact  Sensation intact to light touch M/R/U  Hand warm and well perfused    Imaging independently interpreted:  CT scan of the right hand independently interpreted shows a minimally displaced fracture of the pisiforn    Assessment and Plan:    Remi Baptiste is a 53 y.o. female with pisiform fracture 01/08/2024 now doing well    -continue occupational therapy, advanced to weight-bearing as tolerated  -okay to return to work, paperwork filled out today  -anti-inflammatories and over-the-counter pain medicine as needed  -follow up as needed    Sam Falcon MD  [employer] [facility] PGY-3          Orders Placed This Encounter    X-Ray Wrist Complete Right    CT Hand Without Contrast Right

## 2024-02-23 ENCOUNTER — TELEPHONE (OUTPATIENT)
Dept: ORTHOPEDICS | Facility: CLINIC | Age: 54
End: 2024-02-23
Payer: MEDICAID

## 2024-02-23 NOTE — TELEPHONE ENCOUNTER
Patient called with c/o pain to right wrist after returning to work on Monday 02/19/24,   Described pain as excruciating at times and does report swelling to her right wrist. She takes Tylenol for pain as needed with minimal relief. I did advise patient to go to the Urgent Care for an exam and XR. She stated that she will has a PT appt this afternoon and will monitor her symptoms and will go to Urgent Care if symptoms does not improve. I advised her to call the clinic on Monday if she wants to be seen for this issue.

## 2024-02-24 ENCOUNTER — HOSPITAL ENCOUNTER (EMERGENCY)
Facility: HOSPITAL | Age: 54
Discharge: HOME | End: 2024-02-24
Attending: EMERGENCY MEDICINE
Payer: MEDICAID

## 2024-02-24 VITALS
WEIGHT: 125 LBS | TEMPERATURE: 98 F | HEIGHT: 64 IN | HEART RATE: 70 BPM | DIASTOLIC BLOOD PRESSURE: 55 MMHG | RESPIRATION RATE: 18 BRPM | SYSTOLIC BLOOD PRESSURE: 106 MMHG | BODY MASS INDEX: 21.34 KG/M2 | OXYGEN SATURATION: 98 %

## 2024-02-24 DIAGNOSIS — M25.539 PAIN IN WRIST, UNSPECIFIED LATERALITY: Primary | ICD-10-CM

## 2024-02-24 DIAGNOSIS — R52 PAIN: ICD-10-CM

## 2024-02-24 PROCEDURE — 99284 EMERGENCY DEPT VISIT MOD MDM: CPT | Mod: 25

## 2024-02-24 RX ORDER — KETOROLAC TROMETHAMINE 30 MG/ML
15 INJECTION, SOLUTION INTRAMUSCULAR; INTRAVENOUS
Status: DISCONTINUED | OUTPATIENT
Start: 2024-02-24 | End: 2024-02-24 | Stop reason: HOSPADM

## 2024-02-24 RX ORDER — PREDNISONE 20 MG/1
20 TABLET ORAL DAILY
Qty: 5 TABLET | Refills: 0 | Status: SHIPPED | OUTPATIENT
Start: 2024-02-24 | End: 2024-02-29

## 2024-02-24 RX ORDER — TRAMADOL HYDROCHLORIDE 50 MG/1
50 TABLET ORAL EVERY 6 HOURS PRN
Qty: 12 TABLET | Refills: 0 | Status: SHIPPED | OUTPATIENT
Start: 2024-02-24 | End: 2024-03-13

## 2024-02-24 RX ORDER — DEXAMETHASONE SODIUM PHOSPHATE 4 MG/ML
8 INJECTION, SOLUTION INTRA-ARTICULAR; INTRALESIONAL; INTRAMUSCULAR; INTRAVENOUS; SOFT TISSUE
Status: DISCONTINUED | OUTPATIENT
Start: 2024-02-24 | End: 2024-02-24 | Stop reason: HOSPADM

## 2024-02-24 NOTE — ED PROVIDER NOTES
Encounter Date: 2/24/2024       History     Chief Complaint   Patient presents with    Wrist Pain     Right wrist pain; Wrist injury on 01/08/2024; referred to ER by orthopedic doc at [facility]     Patient is a 54 yo F presenting with right wrist pain. She is being followed at [facility] orthopedics for nondisplaced fractures of her distal radius, fusiform, triquetrum, and scaphoid. She has been in removable thumb spica and recently returned to work which does require a lot of repetitive movements with that hand. Since then she's been having severe pain that is not relieved by NSAIDs. No new trauma otherwise. She did think the volar aspect of the wrist was swollen but that has resolved.         Review of patient's allergies indicates:   Allergen Reactions    Hydrocodone-guaifenesin Hives    Hydrocodone-acetaminophen     Penicillins Rash     Past Medical History:   Diagnosis Date    Anxiety     Depression     Diverticulitis     Endometrial cyst of ovary     Right wrist injury     Stroke      Past Surgical History:   Procedure Laterality Date    BLADDER SUSPENSION      OVARIAN CYST REMOVAL      TONSILLECTOMY       Family History   Problem Relation Age of Onset    Hypertension Mother     Arthritis Mother     COPD Mother     Hypertension Father     Colon cancer Maternal Grandfather 70    Colon cancer Paternal Grandmother 70     Social History     Tobacco Use    Smoking status: Never     Passive exposure: Never    Smokeless tobacco: Never   Substance Use Topics    Alcohol use: Not Currently     Comment: Occasionally    Drug use: Never     Review of Systems   Constitutional:  Negative for fever.   HENT:  Negative for sore throat.    Respiratory:  Negative for shortness of breath.    Cardiovascular:  Negative for chest pain.   Gastrointestinal:  Negative for nausea.   Genitourinary:  Negative for dysuria.   Musculoskeletal:  Positive for joint swelling. Negative for back pain.   Skin:  Negative for rash.   Neurological:  Negative for  weakness.   Hematological:  Does not bruise/bleed easily.       Physical Exam     Initial Vitals [02/24/24 1656]   BP Pulse Resp Temp SpO2   (!) 106/55 70 18 98.1 °F (36.7 °C) 98 %      MAP       --         Physical Exam    Nursing note and vitals reviewed.  Constitutional: She appears well-developed and well-nourished. No distress.   HENT:   Head: Normocephalic and atraumatic.   Neck: Neck supple.   Cardiovascular:  Normal rate, regular rhythm and normal heart sounds.           No murmur heard.  Pulmonary/Chest: Breath sounds normal. No respiratory distress. She has no wheezes. She has no rhonchi.   Musculoskeletal:      Cervical back: Neck supple.      Comments: RUE: Neurovascularly intact. Decreased ROM of the R wrist. TTP diffusely. No erythema or swelling noted today.      Neurological: She is alert and oriented to person, place, and time.   Skin: Skin is warm and dry.   Psychiatric: She has a normal mood and affect. Thought content normal.         ED Course   Procedures  Labs Reviewed - No data to display       Imaging Results              X-Ray Wrist Complete Right (Final result)  Result time 02/24/24 18:12:16      Final result by Sam Hernández MD (02/24/24 18:12:16)                   Impression:      No acute osseous abnormality identified.      Electronically signed by: Sam Hernández  Date:    02/24/2024  Time:    18:12               Narrative:    EXAMINATION:  XR WRIST COMPLETE 3 VIEWS RIGHT    CLINICAL HISTORY:  Pain, unspecified    TECHNIQUE:  Right wrist three views    COMPARISON:  January 26, 2024.    FINDINGS:  Right wrist articular surfaces are unremarkable and there is no intrinsic osseous abnormality. There is no acute fracture, dislocation or arthritic change.  Position and alignment is satisfactory. There is unremarkable mineralization of the bones.  There is no soft tissue calcification.                                       Medications - No data to display  Medical Decision Making  Patient is  a 52 yo F presenting with R wrist pain with previous hx of fractures. No new findings on xray. Likely due to repetitive stress. Discussed with patient her allergies and she can take ultram without issue. Discussed follow up with ortho.     Problems Addressed:  Pain in wrist, unspecified laterality: acute illness or injury    Amount and/or Complexity of Data Reviewed  Radiology: ordered. Decision-making details documented in ED Course.    Risk  Prescription drug management.                                      Clinical Impression:  Final diagnoses:  [R52] Pain  [M25.539] Pain in wrist, unspecified laterality (Primary)          ED Disposition Condition    Discharge Stable          ED Prescriptions       Medication Sig Dispense Start Date End Date Auth. Provider    traMADoL (ULTRAM) 50 mg tablet Take 1 tablet (50 mg total) by mouth every 6 (six) hours as needed for Pain. 12 tablet 2/24/2024 -- Reba Archer MD    predniSONE (DELTASONE) 20 MG tablet Take 1 tablet (20 mg total) by mouth once daily. for 5 days 5 tablet 2/24/2024 2/29/2024 Reba Archer MD          Follow-up Information       Follow up With Specialties Details Why Contact Info    Call your orthopedic doctor on Monday morning for follow up.                 Reba Archer MD  02/25/24 5351

## 2024-02-24 NOTE — ED TRIAGE NOTES
Right wrist pain; Wrist injury on 01/08/2024; referred to ER by orthopedic doc at [facility]

## 2024-02-28 ENCOUNTER — OFFICE VISIT (OUTPATIENT)
Dept: FAMILY MEDICINE | Facility: CLINIC | Age: 54
End: 2024-02-28
Payer: MEDICAID

## 2024-02-28 VITALS
HEIGHT: 64 IN | BODY MASS INDEX: 21.68 KG/M2 | WEIGHT: 127 LBS | HEART RATE: 60 BPM | DIASTOLIC BLOOD PRESSURE: 52 MMHG | RESPIRATION RATE: 18 BRPM | SYSTOLIC BLOOD PRESSURE: 98 MMHG | OXYGEN SATURATION: 97 % | TEMPERATURE: 98 F

## 2024-02-28 DIAGNOSIS — L91.8 MULTIPLE ACQUIRED SKIN TAGS: ICD-10-CM

## 2024-02-28 DIAGNOSIS — R11.0 NAUSEA: ICD-10-CM

## 2024-02-28 DIAGNOSIS — R06.81 APNEIC EPISODE: ICD-10-CM

## 2024-02-28 DIAGNOSIS — D22.9 NUMEROUS SKIN MOLES: ICD-10-CM

## 2024-02-28 DIAGNOSIS — R06.83 SNORING: ICD-10-CM

## 2024-02-28 DIAGNOSIS — R09.81 NASAL CONGESTION: Primary | ICD-10-CM

## 2024-02-28 PROCEDURE — 3078F DIAST BP <80 MM HG: CPT | Mod: CPTII,,, | Performed by: FAMILY MEDICINE

## 2024-02-28 PROCEDURE — 1160F RVW MEDS BY RX/DR IN RCRD: CPT | Mod: CPTII,,, | Performed by: FAMILY MEDICINE

## 2024-02-28 PROCEDURE — 1159F MED LIST DOCD IN RCRD: CPT | Mod: CPTII,,, | Performed by: FAMILY MEDICINE

## 2024-02-28 PROCEDURE — 3074F SYST BP LT 130 MM HG: CPT | Mod: CPTII,,, | Performed by: FAMILY MEDICINE

## 2024-02-28 PROCEDURE — 3008F BODY MASS INDEX DOCD: CPT | Mod: CPTII,,, | Performed by: FAMILY MEDICINE

## 2024-02-28 PROCEDURE — 99215 OFFICE O/P EST HI 40 MIN: CPT | Mod: PBBFAC | Performed by: FAMILY MEDICINE

## 2024-02-28 PROCEDURE — 99214 OFFICE O/P EST MOD 30 MIN: CPT | Mod: S$PBB,,, | Performed by: FAMILY MEDICINE

## 2024-02-28 RX ORDER — PANTOPRAZOLE SODIUM 40 MG/1
40 TABLET, DELAYED RELEASE ORAL DAILY
Qty: 30 TABLET | Refills: 1 | Status: SHIPPED | OUTPATIENT
Start: 2024-02-28

## 2024-02-28 RX ORDER — ONDANSETRON 4 MG/1
4 TABLET, FILM COATED ORAL EVERY 6 HOURS PRN
Qty: 12 TABLET | Refills: 0 | Status: SHIPPED | OUTPATIENT
Start: 2024-02-28

## 2024-02-28 RX ORDER — CETIRIZINE HYDROCHLORIDE 10 MG/1
10 TABLET ORAL DAILY
Qty: 30 TABLET | Refills: 2 | Status: SHIPPED | OUTPATIENT
Start: 2024-02-28 | End: 2024-03-13

## 2024-02-28 RX ORDER — FLUTICASONE PROPIONATE 50 MCG
1 SPRAY, SUSPENSION (ML) NASAL DAILY
Qty: 16 G | Refills: 0 | Status: SHIPPED | OUTPATIENT
Start: 2024-02-28 | End: 2024-05-03 | Stop reason: SDUPTHER

## 2024-02-28 NOTE — PROGRESS NOTES
Patient Name: Remi Baptiste   : 1970  MRN: [id number]     SUBJECTIVE:  Remi Baptiste is a 53 y.o. female here for Nasal Congestion (The patient states that she has trouble breathing through her nose and it is worsening. She says that her nose was broken as a teenager, and doesn't know if that has anything to do with it. Also, when she broke her nose the issue was never fixed. )  .    HPI  Here for above issues  Patient states her nose feels congested from right side and has been having trouble breathing from that side, especially at night causing her to breathe through her mouth and sometimes wakes up gasping for air.  Patient states she had a fracture of her nose when she was 14 years old. did not have any surgery but unsure if she had any deformity.  Takes allegra for seasonal allergies and clears the left side but not the right side. She was also noticed to snore from her boyfriend. Does not feel refreshed in the morning either. Sometimes has caught herself chocking for air.    Zofran uses it as needed especially in the morning for years. Used to be on protonix, but not anymore. Sometimes will have black stools.  Overall denies any heartburn issues.  Follows with GI and had colonoscopy and per patient they were aware of the black stools too.  Advised patient to call her GI doctor and schedule appointment regarding nausea for possible upper GI scope.  Hypotensive in the office, but it is her normal blood pressure, asymptomatic and has been off of propranolol for a long time.  Was told from Cardiology to discontinue it.     Would like referral to Dermatology having multiple moles in her body to get routine skin check.  She also has some skin tags that would like to get them removed.      ALLERGIES:   Review of patient's allergies indicates:   Allergen Reactions    Hydrocodone-guaifenesin Hives    Hydrocodone-acetaminophen     Penicillins Rash         ROS:  Review of Systems   Constitutional:  Negative for  "chills, fever and weight loss.   HENT:  Positive for congestion.    Eyes:  Negative for blurred vision.   Respiratory:  Negative for cough and shortness of breath.    Cardiovascular:  Negative for chest pain, palpitations and leg swelling.   Gastrointestinal:  Positive for melena and nausea. Negative for abdominal pain, blood in stool, diarrhea and vomiting.   Genitourinary:  Negative for dysuria and hematuria.   Neurological:  Negative for dizziness and headaches.   Psychiatric/Behavioral:  Negative for depression. The patient is not nervous/anxious.          OBJECTIVE:  Vital signs  Vitals:    02/28/24 0749 02/28/24 0826   BP: (!) 90/57 (!) 98/52   Pulse: 60    Resp: 18    Temp: 97.7 °F (36.5 °C)    TempSrc: Oral    SpO2: 97%    Weight: 57.6 kg (127 lb)    Height: 5' 4" (1.626 m)       Body mass index is 21.8 kg/m².    PHYSICAL EXAM:   Physical Exam  Vitals reviewed.   Constitutional:       General: She is not in acute distress.     Appearance: Normal appearance. She is not ill-appearing.   HENT:      Head: Normocephalic and atraumatic.      Right Ear: External ear normal.      Left Ear: External ear normal.      Nose: Congestion present. No rhinorrhea.      Right Nostril: No epistaxis.      Left Nostril: No epistaxis.      Mouth/Throat:      Mouth: Mucous membranes are moist.   Eyes:      General: No scleral icterus.        Right eye: No discharge.         Left eye: No discharge.      Conjunctiva/sclera: Conjunctivae normal.      Pupils: Pupils are equal, round, and reactive to light.   Cardiovascular:      Rate and Rhythm: Normal rate and regular rhythm.   Pulmonary:      Effort: Pulmonary effort is normal. No respiratory distress.      Breath sounds: No wheezing, rhonchi or rales.   Abdominal:      General: Bowel sounds are normal. There is no distension.      Palpations: Abdomen is soft.      Tenderness: There is no abdominal tenderness.   Musculoskeletal:      Cervical back: Normal range of motion. No " rigidity.      Right lower leg: No edema.      Left lower leg: No edema.   Skin:     General: Skin is warm.      Coloration: Skin is not pale.      Comments: Multiple skin tags and multiple moles.   Neurological:      General: No focal deficit present.      Mental Status: She is alert and oriented to person, place, and time.   Psychiatric:         Mood and Affect: Mood normal.         Behavior: Behavior normal.          ASSESSMENT/PLAN:  1. Nasal congestion  Trouble breathing through her nose, can be due to congestion from allergies, but being unilateral with history of fracture, can be due to any underlying deformity/nasal septal deviation or polyp.  Refer to ENT for further evaluation.  In the meantime trial of Flonase and Zyrtec.  -     Ambulatory referral/consult to ENT; Future; Expected date: 03/06/2024  -     fluticasone propionate (FLONASE) 50 mcg/actuation nasal spray; 1 spray (50 mcg total) by Each Nostril route once daily.  Dispense: 16 g; Refill: 0  -     cetirizine (ZYRTEC) 10 MG tablet; Take 1 tablet (10 mg total) by mouth once daily.  Dispense: 30 tablet; Refill: 2    2. Apneic episode  Further evaluation with sleep study.  Neck circumference checked.  Dalton Sleepiness scale done.  -     Ambulatory referral/consult to Sleep Disorders; Future; Expected date: 03/06/2024    3. Snoring  -     Ambulatory referral/consult to Sleep Disorders; Future; Expected date: 03/06/2024    4. Nausea  Discussed with patient that she will need to follow with her GI doctor and possibly have EGD done having this nausea, rarely heartburn and sometimes black stools.  Trial of Protonix in the meantime until she has an appointment with them.  Discussed with patient that being on Zofran for long term is not recommended.  -     ondansetron (ZOFRAN) 4 MG tablet; Take 1 tablet (4 mg total) by mouth every 6 (six) hours as needed for Nausea.  Dispense: 12 tablet; Refill: 0  -     pantoprazole (PROTONIX) 40 MG tablet; Take 1 tablet  (40 mg total) by mouth once daily.  Dispense: 30 tablet; Refill: 1    5. Numerous skin moles  -     Ambulatory referral/consult to Dermatology; Future; Expected date: 03/06/2024    6. Multiple acquired skin tags  -     Ambulatory referral/consult to Dermatology; Future; Expected date: 03/06/2024             Previous medical history/lab work/radiology reviewed and considered during medical management decisions.   Medication list reviewed and medication reconciliation performed.  Patient was provided  and care about his/her current diagnosis (es) and medications including risk/benefit and side effects/adverse events, over the counter medication uses/doses, home self-care and contact precautions,  and red flags and indications for when to seek immediate medical attention.   Patient was advised to continue compliance with current medication list and medical recommendations.  Recommended/ Advised continued compliance with recommended eating habits/ diets for medical conditions and exercise 150 minutes/ week (if possible) for medical condition (s).        RESULTS:  Recent Results (from the past 1008 hour(s))   Liquid-Based Pap Smear, Screening Screening    Collection Time: 02/14/24  9:35 AM   Result Value Ref Range    Pathology Result           Follow Up:  Follow up in about 6 months (around 8/28/2024) for please cancel next appt.         This note was created with the assistance of a voice recognition software or phone dictation. There may be transcription errors as a result of using this technology however minimal. Effort has been made to assure accuracy of transcription but any obvious errors or omissions should be clarified with the author of the document

## 2024-03-12 ENCOUNTER — TELEPHONE (OUTPATIENT)
Dept: NEUROLOGY | Facility: CLINIC | Age: 54
End: 2024-03-12
Payer: MEDICAID

## 2024-03-12 NOTE — TELEPHONE ENCOUNTER
----- Message from Camelia Villa sent at 3/12/2024 10:29 AM CDT -----  Regarding: Please advise  Pt wants to know what was her f/u for. Pt stated on last visit she thought it was nothing that could be done for her.Pt can be reached at [phone]. Pt needs a better understanding about appt. Please advise,    Thank You

## 2024-03-13 ENCOUNTER — OFFICE VISIT (OUTPATIENT)
Dept: NEUROLOGY | Facility: CLINIC | Age: 54
End: 2024-03-13
Payer: MEDICAID

## 2024-03-13 VITALS
SYSTOLIC BLOOD PRESSURE: 109 MMHG | BODY MASS INDEX: 21 KG/M2 | HEART RATE: 78 BPM | HEIGHT: 64 IN | DIASTOLIC BLOOD PRESSURE: 72 MMHG | WEIGHT: 123 LBS | RESPIRATION RATE: 20 BRPM | OXYGEN SATURATION: 95 % | TEMPERATURE: 98 F

## 2024-03-13 DIAGNOSIS — M54.12 CERVICAL RADICULOPATHY: ICD-10-CM

## 2024-03-13 DIAGNOSIS — M54.16 LUMBAR RADICULOPATHY: ICD-10-CM

## 2024-03-13 DIAGNOSIS — M50.30 DEGENERATIVE DISC DISEASE, CERVICAL: Primary | ICD-10-CM

## 2024-03-13 DIAGNOSIS — I63.9 STROKE ABORTED BY ADMINISTRATION OF THROMBOLYTIC AGENT: ICD-10-CM

## 2024-03-13 DIAGNOSIS — G44.89 OTHER HEADACHE SYNDROME: ICD-10-CM

## 2024-03-13 DIAGNOSIS — M51.36 DEGENERATIVE DISC DISEASE, LUMBAR: ICD-10-CM

## 2024-03-13 PROCEDURE — 3078F DIAST BP <80 MM HG: CPT | Mod: CPTII,,, | Performed by: NURSE PRACTITIONER

## 2024-03-13 PROCEDURE — 3008F BODY MASS INDEX DOCD: CPT | Mod: CPTII,,, | Performed by: NURSE PRACTITIONER

## 2024-03-13 PROCEDURE — 99214 OFFICE O/P EST MOD 30 MIN: CPT | Mod: S$PBB,,, | Performed by: NURSE PRACTITIONER

## 2024-03-13 PROCEDURE — 99214 OFFICE O/P EST MOD 30 MIN: CPT | Mod: PBBFAC | Performed by: NURSE PRACTITIONER

## 2024-03-13 PROCEDURE — 1159F MED LIST DOCD IN RCRD: CPT | Mod: CPTII,,, | Performed by: NURSE PRACTITIONER

## 2024-03-13 PROCEDURE — 3074F SYST BP LT 130 MM HG: CPT | Mod: CPTII,,, | Performed by: NURSE PRACTITIONER

## 2024-03-13 PROCEDURE — 1160F RVW MEDS BY RX/DR IN RCRD: CPT | Mod: CPTII,,, | Performed by: NURSE PRACTITIONER

## 2024-03-13 PROCEDURE — G2211 COMPLEX E/M VISIT ADD ON: HCPCS | Mod: S$PBB,,, | Performed by: NURSE PRACTITIONER

## 2024-03-13 RX ORDER — SODIUM PICOSULFATE, MAGNESIUM OXIDE, AND ANHYDROUS CITRIC ACID 12; 3.5; 1 G/175ML; G/175ML; MG/175ML
LIQUID ORAL
COMMUNITY
Start: 2023-09-29 | End: 2024-03-13

## 2024-03-13 RX ORDER — DICYCLOMINE HYDROCHLORIDE 20 MG/1
20 TABLET ORAL 4 TIMES DAILY
COMMUNITY
Start: 2024-01-18

## 2024-03-13 RX ORDER — ASPIRIN 81 MG/1
TABLET ORAL
COMMUNITY

## 2024-03-13 RX ORDER — PROPRANOLOL HYDROCHLORIDE 10 MG/1
TABLET ORAL
COMMUNITY

## 2024-03-13 RX ORDER — POLYETHYLENE GLYCOL 3350 17 G/17G
POWDER, FOR SOLUTION ORAL
COMMUNITY
End: 2024-05-03

## 2024-03-13 RX ORDER — ONDANSETRON 4 MG/1
TABLET, FILM COATED ORAL
COMMUNITY
End: 2024-03-13

## 2024-03-13 RX ORDER — LANOLIN ALCOHOL/MO/W.PET/CERES
400 CREAM (GRAM) TOPICAL DAILY
Qty: 30 TABLET | Refills: 11 | Status: SHIPPED | OUTPATIENT
Start: 2024-03-13 | End: 2024-05-03

## 2024-03-13 RX ORDER — ONDANSETRON 4 MG/1
4 TABLET, ORALLY DISINTEGRATING ORAL EVERY 6 HOURS
COMMUNITY
Start: 2024-01-19 | End: 2024-03-13

## 2024-03-13 RX ORDER — TRAMADOL HYDROCHLORIDE 50 MG/1
TABLET ORAL
COMMUNITY
Start: 2023-12-18 | End: 2024-05-02

## 2024-03-13 RX ORDER — CIPROFLOXACIN HYDROCHLORIDE 500 MG/1
TABLET, FILM COATED ORAL
COMMUNITY
Start: 2024-01-24 | End: 2024-05-03

## 2024-03-13 RX ORDER — HYDROCODONE BITARTRATE AND ACETAMINOPHEN 7.5; 325 MG/1; MG/1
1 TABLET ORAL EVERY 6 HOURS
COMMUNITY
Start: 2024-01-08 | End: 2024-05-03

## 2024-03-13 RX ORDER — MELOXICAM 15 MG/1
TABLET ORAL
COMMUNITY
Start: 2023-09-29

## 2024-03-13 RX ORDER — METRONIDAZOLE 500 MG/1
TABLET ORAL
COMMUNITY
Start: 2024-01-24 | End: 2024-05-03

## 2024-03-13 NOTE — PROGRESS NOTES
[facility] Neurology Follow Up Office Visit Note    Initial Visit Date: 1/19/2023  Last Visit Date: 9/11/2023  Current Visit Date:  03/13/2024    Chief Complaint:     Chief Complaint   Patient presents with    Pt is here for neck pain f/u. Hx of DDD. Follows up with ca     History of Present Illness:      This is 53 y.o. female with history of PTSD, DJD, CVA 2023 no deficits, who was referred for right leg paresthesia of unknown chronicity. She states that she has been having bilateral hand paresthesia, upon awakening, along with neck pain. She also notes right leg numbness and paresthesia. Denied any UI/SI and saddle anesthesia. 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 She has not been doing home exercises. During last visit, Pt was referred to [place] [facility] for physical therapy    Today, Pt states she completed physical therapy and found it helpful for neck pain. Has again evaluated by neurosurgeon who states she did not require surgery. Continues w/neck pain that now radiates down bilat upper ext w/loss of sensation throughout the day. Pain scale today 3/10. On a bad day 8/10. Takes Tylenol PRN for minor relief. Has tried to have EMG studies performed twice and could not complete due to pain. Was seen in ED 1/9/2024 for fall and closed wrist fracture. C/O daily HA since hitting head in January. Denies LOC. Pain located to R temporal/parietal area. Described as throbbing, does not affect ADLs. Takes Advil 2 caps daily which helps, but suffers with GI issues. Tylenol 2 tabs PRN with minimal relief.     Other Hx:  ED on 4/28/2023 for L sided weakness including inability to open OS, difficulty ambulating, states she could not think clearly. States she was at home watching TV. Went to  and was unable to pull up her pants. Significant other transported to [facility] ED. Was told by stroke MD at [facility] she had a stroke. Dr. Mitchell approved TPA administration, MRI brain negative followed TPA administration. States symptoms  "lasted less than 24 hrs. Feels back to baseline today. Denies smoking, ETOH  intake, is a vegetarian. Is not currently taking statin as she read on internet it could cause a stroke. Is followed by [facility] in [facility] for "leaky valve".     Medications:     Current Outpatient Medications on File Prior to Visit   Medication Sig Dispense Refill    conjugated estrogens (PREMARIN) vaginal cream Place 1 g vaginally once daily. 1 applicator 11    fish,bora,flax oils-om3,6,9no1 (OMEGA 3-6-9) 1,200 mg Cap Take 1 capsule by mouth Daily.      fluticasone propionate (FLONASE) 50 mcg/actuation nasal spray 1 spray (50 mcg total) by Each Nostril route once daily. 16 g 0    meloxicam (MOBIC) 15 MG tablet 1 tablet Orally Once a day for 30 day(s)      ondansetron (ZOFRAN) 4 MG tablet Take 1 tablet (4 mg total) by mouth every 6 (six) hours as needed for Nausea. 12 tablet 0    pantoprazole (PROTONIX) 40 MG tablet Take 1 tablet (40 mg total) by mouth once daily. 30 tablet 1    tumeric-ging-olive-oreg-capryl 100 mg-150 mg- 50 mg-150 mg Cap Take 1 capsule by mouth Daily.      vitamin E 100 UNIT capsule Take 100 Units by mouth once daily.      aspirin (DYLAN LOW DOSE ASPIRIN) 81 MG EC tablet 1 tablet Orally Once a day      CIPRO 500 mg tablet 1 tablet Orally twice a day for 14 days      dicyclomine (BENTYL) 20 mg tablet Take 20 mg by mouth 4 (four) times daily.      HYDROcodone-acetaminophen (NORCO) 7.5-325 mg per tablet Take 1 tablet by mouth every 6 (six) hours.      metroNIDAZOLE (FLAGYL) 500 MG tablet 1 tablet Orally Three times a day for 14 days      polyethylene glycol (MIRALAX) 17 gram/dose powder 1 scoop mixed with 8 ounces of fluid Orally Once a day      propranoloL (INDERAL) 10 MG tablet 1 tablet Orally Once a day for 30 days      traMADoL (ULTRAM) 50 mg tablet 1 tablet as needed Orally Once a day      [DISCONTINUED] cetirizine (ZYRTEC) 10 MG tablet Take 1 tablet (10 mg total) by mouth once daily. (Patient not taking: Reported on " 3/13/2024) 30 tablet 2    [DISCONTINUED] CLENPIQ 10 mg-3.5 gram- 12 gram/175 mL Soln 175 mL the first dose the evening before and second dose the morning of colonoscopy Orally Once a day for 2 day(s)      [DISCONTINUED] ondansetron (ZOFRAN) 4 MG tablet 1 tablet Orally Once a day      [DISCONTINUED] ondansetron (ZOFRAN-ODT) 4 MG TbDL Take 4 mg by mouth every 6 (six) hours.      [DISCONTINUED] traMADoL (ULTRAM) 50 mg tablet Take 1 tablet (50 mg total) by mouth every 6 (six) hours as needed for Pain. (Patient not taking: Reported on 3/13/2024) 12 tablet 0     No current facility-administered medications on file prior to visit.     Labs:     Results for orders placed or performed in visit on 02/14/24   Liquid-Based Pap Smear, Screening Screening   Result Value Ref Range    Pathology Result         Studies:      [facility]: 7/9/2023 - no acute intracranial findings    MRI C and L-spine without contrast 2022: Multilevel DJD with severe left neuroforaminal narrowing at C4-C5, C5-C6, C6-C7.  Mild-to-moderate right L3-L4 neural foraminal narrowing.    MRI brain without without contrast 10/2022:  I have reviewed the study independently and with the patient.  Chronic microvascular changes.    Review of Systems:     Review of Systems   All other systems reviewed and are negative.  As per HPI    Physical Exams:     Vitals:    03/13/24 0923   BP: 109/72   Pulse: 78   Resp: 20   Temp: 97.9 °F (36.6 °C)     Physical Exam  Vitals and nursing note reviewed.   Constitutional:       Appearance: Normal appearance.   HENT:      Head: Normocephalic and atraumatic.      Nose: Nose normal.      Mouth/Throat:      Mouth: Mucous membranes are moist.      Pharynx: Oropharynx is clear.   Eyes:      Conjunctiva/sclera: Conjunctivae normal.   Cardiovascular:      Rate and Rhythm: Normal rate and regular rhythm.      Pulses: Normal pulses.   Pulmonary:      Effort: Pulmonary effort is normal.      Breath sounds: Normal breath sounds.   Abdominal:       "General: Abdomen is flat.   Musculoskeletal:         General: Normal range of motion.      Cervical back: Normal range of motion.   Skin:     General: Skin is warm.   Neurological:      Mental Status: She is alert.     Comprehensive Neurological Exam:  Mental Status: Alert Oriented to Self, Date, and Place. Naming, repetition, reading, and writing wnl. Comprehension wnl. No dysarthria.   CN II - XII: LUCI, No APD, VA grossly intact to finger counting at 6 ft, VFFC, No ptosis OU, EOMI without nystagmus, LT/Temp symmetric in CN V1-3 distribution, Hearing grossly intact, Face Symmetric, Tongue and Uvula midline, Trapezius symmetric bilateral.   Motor: tone and bulk wnl throughout, no abnormal involuntary or voluntary movements, 5/5 to confrontation, Fine finger movements wnl b/l, No pronator drift. Pain to L side of neck when turning head to R or w/R ear to shoulder tilt  Sensory: LT, Proprioception, Vibration, PP, Temp symmetric to bilat lower ext, decreased sensation to bilat upper ext, No sensory simultagnosia.   Reflexes: 2+ throughout, plantar reflexes downward bilateral.   Cerebellar: FNF wnl b/l, RAHM wnl b/l,   Romberg: Negative  Gait: normal, bilat arm swing intact    Assessment:     This is 53 y.o. female with history of  PTSD, DJD, CVA 2023 no deficits, who was referred for Cervical and Lumbar DJD. Recent admission for weakness. Was evaluated by neurosurgeon who did not recommend sx. CVA workup  negative. Is followed by [facility] in [facility] for "leaky valve". Denies smoking, ETOH intake, is a vegetarian. Is not currently taking statin. Has participated in PT in the past w/adequate pain relief. New c/o HA since hitting head in January, also had items fall on head. Denies LOC. HA more than likely result of concussion.    Problem List Items Addressed This Visit          Neuro    Degenerative disc disease, cervical - Primary    Cervical radiculopathy    Lumbar radiculopathy    Degenerative disc disease, lumbar    " Stroke aborted by administration of thrombolytic agent     Other Visit Diagnoses       Other headache syndrome        Relevant Medications    magnesium oxide (MAG-OX) 400 mg (241.3 mg magnesium) tablet          Plan:     [] c/w ASA 81mg daily  [] start Mag Ox 400 mg daily for headache  [] start Riboflavin 400 mg daily for headache  [] f/u w/[facility]    RTC 4 mth    I have explained the treatment plan, diagnosis, and prognosis to patient. All questions are answered to the best of my knowledge.     Face to face time 30 minutes, including documentation, chart review, counseling, education, review of test results, relevant medical records, and coordination of care.     I have explained the treatment plan, diagnosis, and prognosis to patient. All questions are answered to the best of my knowledge.     03/13/2024

## 2024-04-16 ENCOUNTER — OFFICE VISIT (OUTPATIENT)
Dept: FAMILY MEDICINE | Facility: CLINIC | Age: 54
End: 2024-04-16
Payer: MEDICAID

## 2024-04-16 VITALS
HEIGHT: 64 IN | SYSTOLIC BLOOD PRESSURE: 110 MMHG | WEIGHT: 127 LBS | OXYGEN SATURATION: 99 % | TEMPERATURE: 99 F | DIASTOLIC BLOOD PRESSURE: 67 MMHG | BODY MASS INDEX: 21.68 KG/M2 | RESPIRATION RATE: 18 BRPM | HEART RATE: 87 BPM

## 2024-04-16 DIAGNOSIS — L91.8 SKIN TAGS, MULTIPLE ACQUIRED: ICD-10-CM

## 2024-04-16 DIAGNOSIS — L82.1 SEBORRHEIC KERATOSIS: Primary | ICD-10-CM

## 2024-04-16 DIAGNOSIS — D22.9 NUMEROUS SKIN MOLES: ICD-10-CM

## 2024-04-16 DIAGNOSIS — L91.8 MULTIPLE ACQUIRED SKIN TAGS: ICD-10-CM

## 2024-04-16 PROCEDURE — 99214 OFFICE O/P EST MOD 30 MIN: CPT | Mod: PBBFAC,25

## 2024-04-16 PROCEDURE — 17110 DESTRUCTION B9 LES UP TO 14: CPT | Mod: PBBFAC | Performed by: FAMILY MEDICINE

## 2024-04-16 PROCEDURE — 11200 RMVL SKIN TAGS UP TO&INC 15: CPT | Mod: 59,PBBFAC | Performed by: FAMILY MEDICINE

## 2024-04-16 NOTE — PROGRESS NOTES
Subjective:       Patient ID: Remi Baptiste is a 53 y.o. female.    Chief Complaint: Skin Check    HPI  54 yo referred to minor surgery for a skin check. Pt reports numerous moles and a few skin tags that are bothersome to him. She denies a h/o skin cancer personally but does have a FH of unknown skin cancer.     Review of Systems  As per HPI         Objective:      Vitals:    04/16/24 1431   BP: 110/67   Pulse: 87   Resp: 18   Temp: 98.6 °F (37 °C)       Physical Exam    Gen: alert, no acute distress  Skin: scattered small hyperpigmented nevi and cherry hemangiomas across torso; hyperpigmented, pedunculated skin tags on neck and under left buttocks; hyperpigmented stuck on lesions with cracked surface consistent with appearance of SK on left upper forehead and right posterior leg  Psych: cooperative, appropriate mood and affect    Procedure Note:  Procedure: cryotherapy for SK x 2  Performed by: Haylie Duncan MD  Consent: signed consent obtained after discussion of risks, benefits, and alternative treatments. Time out completed  Description: Liquid nitrogen used for cryotherapy. Tip held 1 cm from lesion and sprayed in freeze-thaw cycle.   The patient tolerated the procedure well with no complications.       Procedure Note:  Procedure: skin tag removal  Performed by: Haylie Duncan MD  Consent: signed consent obtained after discussion of risks, benefits, and alternative treatments. Time out completed  Description: Cryo tweezers dipped in liquid nitrogen and used to to freeze each skin tag x 2.  The patient tolerated the procedure well with no complications.         Assessment/Plan:  Seborrheic keratosis  - cryo today  - Post care instructions and return precautions discussed.    Skin tags, multiple acquired  -     treated with cryo today  - Post care instructions and return precautions discussed.    Numerous skin moles  -   reassurance and observation           Follow up if symptoms worsen or fail to improve.

## 2024-05-02 ENCOUNTER — OFFICE VISIT (OUTPATIENT)
Dept: FAMILY MEDICINE | Facility: CLINIC | Age: 54
End: 2024-05-02
Payer: MEDICAID

## 2024-05-02 ENCOUNTER — TELEPHONE (OUTPATIENT)
Dept: FAMILY MEDICINE | Facility: CLINIC | Age: 54
End: 2024-05-02
Payer: MEDICAID

## 2024-05-02 VITALS
BODY MASS INDEX: 22.86 KG/M2 | TEMPERATURE: 98 F | WEIGHT: 129 LBS | DIASTOLIC BLOOD PRESSURE: 59 MMHG | HEART RATE: 69 BPM | OXYGEN SATURATION: 98 % | SYSTOLIC BLOOD PRESSURE: 107 MMHG | RESPIRATION RATE: 16 BRPM | HEIGHT: 63 IN

## 2024-05-02 DIAGNOSIS — M25.511 ACUTE PAIN OF RIGHT SHOULDER: ICD-10-CM

## 2024-05-02 DIAGNOSIS — N89.8 VAGINAL DISCHARGE: ICD-10-CM

## 2024-05-02 DIAGNOSIS — R07.9 CHEST PAIN, UNSPECIFIED TYPE: Primary | ICD-10-CM

## 2024-05-02 DIAGNOSIS — R06.02 SOB (SHORTNESS OF BREATH): ICD-10-CM

## 2024-05-02 LAB
CLUE CELLS VAG QL WET PREP: NORMAL
T VAGINALIS VAG QL WET PREP: NORMAL
WBC #/AREA VAG WET PREP: NORMAL
YEAST SPEC QL WET PREP: NORMAL

## 2024-05-02 PROCEDURE — 87210 SMEAR WET MOUNT SALINE/INK: CPT | Performed by: FAMILY MEDICINE

## 2024-05-02 PROCEDURE — 3008F BODY MASS INDEX DOCD: CPT | Mod: CPTII,,, | Performed by: FAMILY MEDICINE

## 2024-05-02 PROCEDURE — 3078F DIAST BP <80 MM HG: CPT | Mod: CPTII,,, | Performed by: FAMILY MEDICINE

## 2024-05-02 PROCEDURE — 99214 OFFICE O/P EST MOD 30 MIN: CPT | Mod: PBBFAC | Performed by: FAMILY MEDICINE

## 2024-05-02 PROCEDURE — 1160F RVW MEDS BY RX/DR IN RCRD: CPT | Mod: CPTII,,, | Performed by: FAMILY MEDICINE

## 2024-05-02 PROCEDURE — 3074F SYST BP LT 130 MM HG: CPT | Mod: CPTII,,, | Performed by: FAMILY MEDICINE

## 2024-05-02 PROCEDURE — 1159F MED LIST DOCD IN RCRD: CPT | Mod: CPTII,,, | Performed by: FAMILY MEDICINE

## 2024-05-02 PROCEDURE — 99214 OFFICE O/P EST MOD 30 MIN: CPT | Mod: S$PBB,,, | Performed by: FAMILY MEDICINE

## 2024-05-02 RX ORDER — CYCLOBENZAPRINE HCL 5 MG
5 TABLET ORAL 3 TIMES DAILY PRN
Qty: 60 TABLET | Refills: 0 | Status: SHIPPED | OUTPATIENT
Start: 2024-05-02 | End: 2024-05-03

## 2024-05-02 NOTE — PROGRESS NOTES
"Patient Name: Remi Baptiste   : 1970  MRN: [id number]     SUBJECTIVE:  Remi Baptiste is a 53 y.o. female here for Flank Pain and Shortness of Breath (The patient states that she has left flank/lower back pain, and been taking tylenol for the issue. SOB and chest pain for the past week that is not constant but lifts on her job which leads to right shoulder pain.)  .    HPI  Here for above issues.  Chest pain and shortness of breath intermittently for the past week.  Chest pain is central, nonradiating, intermittent, pressure feeling, lasting for 5-10 minutes.  Last episode just this morning.  Feeling short of breath, very easily even with just sitting or just walking around the house.  Randomly started this past week.  Of note, patient has history of stroke 2023, had thrombolytic administration.  MRI of the brain was negative follow up tPA administration.  Follows with Neurology, but has refused to take statin.  Denies any new numbness or weakness, no speech issues, no blurry vision.  Patient does have a cardiologist, following them for "leaky valve and having hyperlipidemia".  She states she will call them and let them know    Also lately, patient has had some mild dysuria with no hematuria.  Some vaginal discharge.  She would like to get checked again for yeast infection, despite discussing with patient that the priority currently is chest pain shortness for breath and needs to go to the emergency room as soon as possible, but patient insists to be checked for yeast infection and for the right shoulder pain.  She is wondering if she is having urinary issues going to the back affecting the kidneys or is the back pain her chronic issue because of degenerative changes.  Her back has been bothering her lately.  Taking Tylenol as needed.  Follows with neurology/neurosurgery for degenerative disc disease.    Right shoulder pain for 3 weeks now. Taking tylenol, with no relief.  This is an acute issue, states " "that she never had issues with the shoulder in the past.  Admits doing a lot of lifting at her work.  Also has degenerative cervical disc disease and was following with Neurosurgery, supposed to do physical therapy and she would follow up back with them.  Patient states that she does not have neck pain radiating to the shoulder, but this is separate issue with acute right shoulder pain.      ALLERGIES:   Review of patient's allergies indicates:   Allergen Reactions    Hydrocodone-guaifenesin Hives    Hydrocodone-acetaminophen     Penicillins Rash         ROS:  Review of Systems   Constitutional:  Negative for chills, fever and weight loss.   HENT:  Negative for congestion.    Eyes:  Negative for blurred vision.   Respiratory:  Positive for shortness of breath. Negative for cough.    Cardiovascular:  Positive for chest pain. Negative for palpitations and leg swelling.   Gastrointestinal:  Negative for abdominal pain, blood in stool, diarrhea, nausea and vomiting.   Genitourinary:  Positive for dysuria. Negative for hematuria.   Neurological:  Negative for dizziness and headaches.   Psychiatric/Behavioral:  Negative for depression. The patient is not nervous/anxious.          OBJECTIVE:  Vital signs  Vitals:    05/02/24 1030   BP: (!) 107/59   Pulse: 69   Resp: 16   Temp: 97.5 °F (36.4 °C)   TempSrc: Oral   SpO2: 98%   Weight: 58.5 kg (129 lb)   Height: 5' 3" (1.6 m)      Body mass index is 22.85 kg/m².    PHYSICAL EXAM:   Physical Exam  Vitals reviewed.   Constitutional:       General: She is not in acute distress.     Appearance: Normal appearance. She is not ill-appearing.   HENT:      Head: Normocephalic and atraumatic.      Right Ear: External ear normal.      Left Ear: External ear normal.      Nose: Nose normal.      Mouth/Throat:      Mouth: Mucous membranes are moist.   Eyes:      General: No scleral icterus.        Right eye: No discharge.         Left eye: No discharge.      Conjunctiva/sclera: Conjunctivae " normal.      Pupils: Pupils are equal, round, and reactive to light.   Cardiovascular:      Rate and Rhythm: Normal rate and regular rhythm.   Pulmonary:      Effort: Pulmonary effort is normal. No respiratory distress.      Breath sounds: No wheezing, rhonchi or rales.   Chest:      Chest wall: No tenderness.   Abdominal:      General: Bowel sounds are normal. There is no distension.      Palpations: Abdomen is soft.      Tenderness: There is no abdominal tenderness.   Musculoskeletal:         General: Tenderness (right anterior shoulder. able to lift arm post 90 degrees. unable to touch hand over back because of pain.) present. Normal range of motion.      Cervical back: Normal range of motion and neck supple. No rigidity or tenderness.      Right lower leg: No edema.      Left lower leg: No edema.   Skin:     General: Skin is warm.      Findings: No rash.   Neurological:      General: No focal deficit present.      Mental Status: She is alert and oriented to person, place, and time.   Psychiatric:         Mood and Affect: Mood normal.         Behavior: Behavior normal.          ASSESSMENT/PLAN:  1. Chest pain, unspecified type  The clinical picture of chest pain associated with shortness of breath, given also age and history of stroke that she needed thrombolytics, these put her at high risk to have cardiovascular etiology of the chest pain.  Discussed with patient the importance to go immediately to the emergency room to get evaluated, as differential diagnosis would be MI, pulmonary embolism etc. patient did not seem that bothered, despite discussing the importance to get evaluated and the probable diagnosis.  She would rather get checked in the office today for her shoulder and urinary issues/vaginal discharge, but did agree to go to the emergency room afterwards.    Spoke to ER.  Aware patient is coming in    2. SOB (shortness of breath)  As above.  Discussed differential diagnosis of cardiac etiology or even  pulmonary embolism given history of stroke last year, and the need to be evaluated immediately to the emergency room.  Patient voiced understanding and is going there.  ER aware.  She is also going to let her Cardiologist know about this      3. Acute pain of right shoulder  X-ray and trial of Flexeril along with Tylenol/ibuprofen as needed.  Has an appointment 2 weeks with us and will consider further imaging/referrals if persistent pain.  -     X-ray Shoulder 2 or More Views Right; Future; Expected date: 05/02/2024  -     cyclobenzaprine (FLEXERIL) 5 MG tablet; Take 1 tablet (5 mg total) by mouth 3 (three) times daily as needed for Muscle spasms.  Dispense: 60 tablet; Refill: 0    4. Vaginal discharge  Wet prep collected as requested per patient and will have UA collected most likely in ER.  -     Wet Prep, Genital             Previous medical history/lab work/radiology reviewed and considered during medical management decisions.   Medication list reviewed and medication reconciliation performed.  Patient was provided  and care about his/her current diagnosis (es) and medications including risk/benefit and side effects/adverse events, over the counter medication uses/doses, home self-care and contact precautions,  and red flags and indications for when to seek immediate medical attention.   Patient was advised to continue compliance with current medication list and medical recommendations.  Recommended/ Advised continued compliance with recommended eating habits/ diets for medical conditions and exercise 150 minutes/ week (if possible) for medical condition (s).        RESULTS:  Recent Results (from the past 1008 hour(s))   Wet Prep, Genital    Collection Time: 05/02/24 11:01 AM    Specimen: Cervicovaginal; Genital   Result Value Ref Range    WBC, Wet Prep None Seen None Seen    Clue Cells, Wet Prep None Seen None Seen    Trichomonas, Wet Prep None Seen None Seen    Yeast, Wet Prep None Seen None Seen          Follow Up:  Follow up for as scheduled for shoulder.       This note was created with the assistance of a voice recognition software or phone dictation. There may be transcription errors as a result of using this technology however minimal. Effort has been made to assure accuracy of transcription but any obvious errors or omissions should be clarified with the author of the document

## 2024-05-02 NOTE — TELEPHONE ENCOUNTER
Attempted to call patient no answer left voicemail.      ----- Message from Raegan Swan MD sent at 5/2/2024  1:03 PM CDT -----  Normal wet prep swab

## 2024-05-03 ENCOUNTER — TELEPHONE (OUTPATIENT)
Dept: FAMILY MEDICINE | Facility: CLINIC | Age: 54
End: 2024-05-03
Payer: MEDICAID

## 2024-05-03 ENCOUNTER — OFFICE VISIT (OUTPATIENT)
Dept: OTOLARYNGOLOGY | Facility: CLINIC | Age: 54
End: 2024-05-03
Payer: MEDICAID

## 2024-05-03 VITALS
WEIGHT: 126 LBS | HEART RATE: 74 BPM | HEIGHT: 64 IN | BODY MASS INDEX: 21.51 KG/M2 | DIASTOLIC BLOOD PRESSURE: 62 MMHG | SYSTOLIC BLOOD PRESSURE: 94 MMHG | TEMPERATURE: 98 F

## 2024-05-03 DIAGNOSIS — R09.81 NASAL CONGESTION: Primary | ICD-10-CM

## 2024-05-03 DIAGNOSIS — J34.3 NASAL TURBINATE HYPERTROPHY: ICD-10-CM

## 2024-05-03 DIAGNOSIS — J34.89 NASAL OBSTRUCTION: ICD-10-CM

## 2024-05-03 PROCEDURE — 3008F BODY MASS INDEX DOCD: CPT | Mod: CPTII,,, | Performed by: NURSE PRACTITIONER

## 2024-05-03 PROCEDURE — 3074F SYST BP LT 130 MM HG: CPT | Mod: CPTII,,, | Performed by: NURSE PRACTITIONER

## 2024-05-03 PROCEDURE — 1159F MED LIST DOCD IN RCRD: CPT | Mod: CPTII,,, | Performed by: NURSE PRACTITIONER

## 2024-05-03 PROCEDURE — 99204 OFFICE O/P NEW MOD 45 MIN: CPT | Mod: S$PBB,,, | Performed by: NURSE PRACTITIONER

## 2024-05-03 PROCEDURE — 3078F DIAST BP <80 MM HG: CPT | Mod: CPTII,,, | Performed by: NURSE PRACTITIONER

## 2024-05-03 PROCEDURE — 99214 OFFICE O/P EST MOD 30 MIN: CPT | Mod: PBBFAC | Performed by: NURSE PRACTITIONER

## 2024-05-03 RX ORDER — FLUTICASONE PROPIONATE 50 MCG
1 SPRAY, SUSPENSION (ML) NASAL 2 TIMES DAILY
Qty: 16 G | Refills: 11 | Status: SHIPPED | OUTPATIENT
Start: 2024-05-03

## 2024-05-03 RX ORDER — KETOROLAC TROMETHAMINE 10 MG/1
10 TABLET, FILM COATED ORAL EVERY 8 HOURS PRN
COMMUNITY
Start: 2024-03-30 | End: 2024-05-03

## 2024-05-03 RX ORDER — CETIRIZINE HYDROCHLORIDE 10 MG/1
1 TABLET ORAL EVERY MORNING
COMMUNITY

## 2024-05-03 RX ORDER — AZELASTINE 1 MG/ML
1 SPRAY, METERED NASAL 2 TIMES DAILY
Qty: 30 ML | Refills: 5 | Status: SHIPPED | OUTPATIENT
Start: 2024-05-03 | End: 2025-05-03

## 2024-05-03 NOTE — PROGRESS NOTES
[facility]  Otolaryngology Clinic Note    Remi Baptiste  YOB: 1970    Chief Complaint:   Chief Complaint   Patient presents with    referral: Nasal Congestion        HPI: 05/03/2024: 53 y.o. female presents with c/o nasal congestion/obstruction which is most pronounced at night. Occasional rhinitis and PND. Denies recurrent sinus infections or significant allergy symptoms. States she has sustained a few episodes of nasal trauma and is concerned she has a deviated septum. She has been using flonase and zyrtec daily over the past month without much benefit. States she mouth breathes at night and sometimes awakens coughing/choking. PCP ordered a sleep study but she has not yet been contacted.    ROS:   10-point review of systems negative except per HPI      Review of patient's allergies indicates:   Allergen Reactions    Hydrocodone-guaifenesin Hives    Hydrocodone-acetaminophen     Penicillins Rash       Past Medical History:   Diagnosis Date    Anxiety     Depression     Diverticulitis     Endometrial cyst of ovary     Right wrist injury     Stroke        Past Surgical History:   Procedure Laterality Date    BLADDER SUSPENSION      OVARIAN CYST REMOVAL      TONSILLECTOMY         Social History     Socioeconomic History    Marital status: Single    Number of children: 0   Occupational History    Occupation: [employer]    Occupation: Housekeeping   Tobacco Use    Smoking status: Never     Passive exposure: Never    Smokeless tobacco: Never   Substance and Sexual Activity    Alcohol use: Not Currently     Comment: Occasionally    Drug use: Never    Sexual activity: Not Currently     Partners: Male     Birth control/protection: Post-menopausal     Social Determinants of Health     Financial Resource Strain: Low Risk  (6/22/2022)    Overall Financial Resource Strain (CARDIA)     Difficulty of Paying Living Expenses: Not hard at all   Food Insecurity: No Food Insecurity (6/22/2022)     Hunger Vital Sign     Worried About Running Out of Food in the Last Year: Never true     Ran Out of Food in the Last Year: Never true   Transportation Needs: No Transportation Needs (6/22/2022)    PRAPARE - Transportation     Lack of Transportation (Medical): No     Lack of Transportation (Non-Medical): No   Physical Activity: Insufficiently Active (6/22/2022)    Exercise Vital Sign     Days of Exercise per Week: 4 days     Minutes of Exercise per Session: 30 min   Stress: No Stress Concern Present (6/22/2022)    [employer] [place] [employer] - Occupational Stress Questionnaire     Feeling of Stress : Not at all   Housing Stability: Low Risk  (6/22/2022)    Housing Stability Vital Sign     Unable to Pay for Housing in the Last Year: No     Number of Places Lived in the Last Year: 1     Unstable Housing in the Last Year: No       Family History   Problem Relation Name Age of Onset    Hypertension Mother Karen Mcgee     Arthritis Mother Karen Mcgee     COPD Mother Karen Mcgee     Hypertension Father Husam jenkins     Colon cancer Maternal Grandfather  70    Colon cancer Paternal Grandmother Brice jenkins 70       Outpatient Encounter Medications as of 5/3/2024   Medication Sig Dispense Refill    aspirin (DYLAN LOW DOSE ASPIRIN) 81 MG EC tablet 1 tablet Orally Once a day      cetirizine (ZYRTEC) 10 MG tablet Take 1 tablet by mouth every morning.      conjugated estrogens (PREMARIN) vaginal cream Place 1 g vaginally once daily. 1 applicator 11    fish,bora,flax oils-om3,6,9no1 (OMEGA 3-6-9) 1,200 mg Cap Take 1 capsule by mouth Daily.      fluticasone propionate (FLONASE) 50 mcg/actuation nasal spray 1 spray (50 mcg total) by Each Nostril route once daily. 16 g 0    ondansetron (ZOFRAN) 4 MG tablet Take 1 tablet (4 mg total) by mouth every 6 (six) hours as needed for Nausea. 12 tablet 0    vitamin E 100 UNIT capsule Take 100 Units by mouth once daily.      dicyclomine (BENTYL) 20 mg tablet Take 20 mg  by mouth 4 (four) times daily. (Patient not taking: Reported on 5/3/2024)      meloxicam (MOBIC) 15 MG tablet 1 tablet Orally Once a day for 30 day(s) (Patient not taking: Reported on 5/3/2024)      pantoprazole (PROTONIX) 40 MG tablet Take 1 tablet (40 mg total) by mouth once daily. (Patient not taking: Reported on 5/3/2024) 30 tablet 1    propranoloL (INDERAL) 10 MG tablet 1 tablet Orally Once a day for 30 days (Patient not taking: Reported on 5/3/2024)      [DISCONTINUED] CIPRO 500 mg tablet 1 tablet Orally twice a day for 14 days (Patient not taking: Reported on 5/3/2024)      [DISCONTINUED] cyclobenzaprine (FLEXERIL) 5 MG tablet Take 1 tablet (5 mg total) by mouth 3 (three) times daily as needed for Muscle spasms. (Patient not taking: Reported on 5/3/2024) 60 tablet 0    [DISCONTINUED] HYDROcodone-acetaminophen (NORCO) 7.5-325 mg per tablet Take 1 tablet by mouth every 6 (six) hours. (Patient not taking: Reported on 5/3/2024)      [DISCONTINUED] ketorolac (TORADOL) 10 mg tablet Take 10 mg by mouth every 8 (eight) hours as needed. (Patient not taking: Reported on 5/3/2024)      [DISCONTINUED] magnesium oxide (MAG-OX) 400 mg (241.3 mg magnesium) tablet Take 1 tablet (400 mg total) by mouth once daily. (Patient not taking: Reported on 5/3/2024) 30 tablet 11    [DISCONTINUED] metroNIDAZOLE (FLAGYL) 500 MG tablet 1 tablet Orally Three times a day for 14 days (Patient not taking: Reported on 5/3/2024)      [DISCONTINUED] polyethylene glycol (MIRALAX) 17 gram/dose powder 1 scoop mixed with 8 ounces of fluid Orally Once a day (Patient not taking: Reported on 5/3/2024)      [DISCONTINUED] traMADoL (ULTRAM) 50 mg tablet 1 tablet as needed Orally Once a day      [DISCONTINUED] tumeric-ging-olive-oreg-capryl 100 mg-150 mg- 50 mg-150 mg Cap Take 1 capsule by mouth Daily. (Patient not taking: Reported on 5/3/2024)       Facility-Administered Encounter Medications as of 5/3/2024   Medication Dose Route Frequency Provider Last  "Rate Last Admin    [DISCONTINUED] nitroGLYCERIN SL tablet  0.4 mg Sublingual  Provider, Generic External Data           Physical Exam:  Vitals:    05/03/24 1139   BP: 94/62   BP Location: Right arm   Patient Position: Sitting   BP Method: Medium (Automatic)   Pulse: 74   Temp: 97.9 °F (36.6 °C)   TempSrc: Oral   Weight: 57.2 kg (126 lb)   Height: 5' 4" (1.626 m)       Physical Exam   General: NAD, voice normal  Neuro: AAO, CN II - XII grossly intact  Head/ Face: NCAT, symmetric, sensations intact bilaterally  Eyes: EOMI, PERRL  Ears: externally normal with grossly normal hearing  AD: EAC patent, TM intact, no middle ear effusion, no retractions  AS: EAC patent, TM intact, no middle ear effusion, no retractions  Nose: bilateral nares patent, left septal deflection, no rhinorrhea, no external deformity, +ITH  OC/OP: MMM, no intraoral lesions,  no trismus, dentition is good, no uvular deviation, bilaterally symmetric soft palate elevation, palatoglossus and palatopharyngeal fold wnl; tonsils are symmetric/absent  Indirect laryngoscopy: deferred due to patient intolerance  Neck: soft, supple, no LAD, normal ROM, no thyromegaly  Respiratory: nonlabored, no wheezing, bilateral chest rise  Cardiovascular: RRR  Gastrointestinal: S NT ND  Skin: warm, no lesions  Musculoskeletal: 5/5 strength  Psych: Appropriate affect/mood     Pertinent Data:  ? LABS:  ? AUDIO:           ? PATH:      Imaging:   I personally reviewed the following images:        Assessment/Plan:  53 y.o. female with nasal congestion/obstruction. Not medically optimized. Reviewed CT head and CTA head/neck from last year- there is a mild left septal deviation and significant turbinate hypertrophy- reviewed.   - Flonase + astelin BID (reviewed technique)  - RTC 6wks    Jimena Butler NP        "

## 2024-05-03 NOTE — TELEPHONE ENCOUNTER
No answer.    ----- Message from Raegan Swan MD sent at 5/2/2024  1:03 PM CDT -----  Normal wet prep swab

## 2024-05-06 ENCOUNTER — TELEPHONE (OUTPATIENT)
Dept: FAMILY MEDICINE | Facility: CLINIC | Age: 54
End: 2024-05-06
Payer: MEDICAID

## 2024-05-06 NOTE — TELEPHONE ENCOUNTER
Patient informed Normal wet prep swab. Patient voiced understanding.    ----- Message from Raegan Swan MD sent at 5/2/2024  1:03 PM CDT -----  Normal wet prep swab

## 2024-05-14 ENCOUNTER — HOSPITAL ENCOUNTER (OUTPATIENT)
Dept: RADIOLOGY | Facility: HOSPITAL | Age: 54
Discharge: HOME | End: 2024-05-14
Attending: FAMILY MEDICINE
Payer: MEDICAID

## 2024-05-14 DIAGNOSIS — M25.511 ACUTE PAIN OF RIGHT SHOULDER: ICD-10-CM

## 2024-05-14 PROCEDURE — 73030 X-RAY EXAM OF SHOULDER: CPT | Mod: TC,RT

## 2024-05-15 ENCOUNTER — OFFICE VISIT (OUTPATIENT)
Dept: FAMILY MEDICINE | Facility: CLINIC | Age: 54
End: 2024-05-15
Payer: MEDICAID

## 2024-05-15 VITALS
TEMPERATURE: 98 F | RESPIRATION RATE: 18 BRPM | OXYGEN SATURATION: 97 % | DIASTOLIC BLOOD PRESSURE: 64 MMHG | HEART RATE: 73 BPM | HEIGHT: 64 IN | BODY MASS INDEX: 21.85 KG/M2 | WEIGHT: 128 LBS | SYSTOLIC BLOOD PRESSURE: 102 MMHG

## 2024-05-15 DIAGNOSIS — M75.101 ROTATOR CUFF SYNDROME OF RIGHT SHOULDER: Primary | ICD-10-CM

## 2024-05-15 PROCEDURE — 1160F RVW MEDS BY RX/DR IN RCRD: CPT | Mod: CPTII,,, | Performed by: FAMILY MEDICINE

## 2024-05-15 PROCEDURE — 99214 OFFICE O/P EST MOD 30 MIN: CPT | Mod: S$PBB,,, | Performed by: FAMILY MEDICINE

## 2024-05-15 PROCEDURE — 1159F MED LIST DOCD IN RCRD: CPT | Mod: CPTII,,, | Performed by: FAMILY MEDICINE

## 2024-05-15 PROCEDURE — 3008F BODY MASS INDEX DOCD: CPT | Mod: CPTII,,, | Performed by: FAMILY MEDICINE

## 2024-05-15 PROCEDURE — 99215 OFFICE O/P EST HI 40 MIN: CPT | Mod: PBBFAC | Performed by: FAMILY MEDICINE

## 2024-05-15 PROCEDURE — 3074F SYST BP LT 130 MM HG: CPT | Mod: CPTII,,, | Performed by: FAMILY MEDICINE

## 2024-05-15 PROCEDURE — 3078F DIAST BP <80 MM HG: CPT | Mod: CPTII,,, | Performed by: FAMILY MEDICINE

## 2024-05-15 RX ORDER — METHOCARBAMOL 500 MG/1
500 TABLET, FILM COATED ORAL 3 TIMES DAILY PRN
Qty: 30 TABLET | Refills: 0 | Status: SHIPPED | OUTPATIENT
Start: 2024-05-15 | End: 2024-05-25

## 2024-05-15 NOTE — PROGRESS NOTES
Patient Name: Remi Baptiste   : 1970  MRN: [id number]     SUBJECTIVE:  Remi Baptiste is a 54 y.o. female here for Shoulder Pain (The patient still C/O shoulder pain from previous visit on 24. Also, she has a small brown dot on her face she would like the doctor to look at.)  .    HPI  Here for right shoulder follow up.  Right shoulder pain for 4 weeks now.  Mostly anterior and worse when trying to reach to grab something or to reach her hand on her back.  Taking tylenol, with no relief.  Also tries Advil here and there.  This is an acute issue, states that she never had issues with the shoulder in the past.  Admits doing a lot of lifting at her work.  Also has degenerative cervical disc disease and was following with Neurosurgery, supposed to do physical therapy and she would follow up back with them.  Patient states that she does not have neck pain radiating to the shoulder, but this is separate issue with acute right shoulder pain.  XR shoulder done yesterday, no acute abnormalities.  Did not try flexeril yet. Takes advil and tylenol as needed only.    Of note, having cardiac stress test next week.       Of note follows with family medicine resident for seborrheic keratosis on her face.  Has had cryotherapy just last month.  Not interested for removal, just wanted to get them checked.      ALLERGIES:   Review of patient's allergies indicates:   Allergen Reactions    Hydrocodone-guaifenesin Hives    Hydrocodone-acetaminophen     Penicillins Rash         ROS:  Review of Systems   Constitutional:  Negative for chills and fever.   HENT:  Negative for congestion.    Respiratory:  Negative for cough and shortness of breath.    Gastrointestinal:  Negative for nausea and vomiting.   Genitourinary:  Negative for dysuria and hematuria.   Musculoskeletal:  Positive for joint pain.   Neurological:  Negative for dizziness and headaches.   Psychiatric/Behavioral:  Negative for depression. The patient is not  "nervous/anxious.          OBJECTIVE:  Vital signs  Vitals:    05/15/24 1024   BP: 102/64   Pulse: 73   Resp: 18   Temp: 97.9 °F (36.6 °C)   TempSrc: Oral   SpO2: 97%   Weight: 58.1 kg (128 lb)   Height: 5' 4" (1.626 m)      Body mass index is 21.97 kg/m².    PHYSICAL EXAM:   Physical Exam  Vitals reviewed.   Constitutional:       General: She is not in acute distress.     Appearance: Normal appearance. She is not ill-appearing.   HENT:      Head: Normocephalic and atraumatic.      Right Ear: External ear normal.      Left Ear: External ear normal.      Nose: Nose normal.      Mouth/Throat:      Mouth: Mucous membranes are moist.   Eyes:      General: No scleral icterus.        Right eye: No discharge.         Left eye: No discharge.      Conjunctiva/sclera: Conjunctivae normal.      Pupils: Pupils are equal, round, and reactive to light.   Cardiovascular:      Rate and Rhythm: Normal rate and regular rhythm.   Pulmonary:      Effort: Pulmonary effort is normal. No respiratory distress.      Breath sounds: No wheezing, rhonchi or rales.   Abdominal:      General: Bowel sounds are normal. There is no distension.      Palpations: Abdomen is soft.      Tenderness: There is no abdominal tenderness.   Musculoskeletal:         General: Tenderness (right shoulder: aneriorly more tender.  Difficulties with overhead activity post 90°.  Empty can test positive.  Barely reaching on her back with the right hand.) present.      Cervical back: Normal range of motion and neck supple. No rigidity or tenderness.      Right lower leg: No edema.      Left lower leg: No edema.   Skin:     General: Skin is warm.      Findings: No rash.   Neurological:      General: No focal deficit present.      Mental Status: She is alert and oriented to person, place, and time.   Psychiatric:         Mood and Affect: Mood normal.         Behavior: Behavior normal.          ASSESSMENT/PLAN:  1. Rotator cuff syndrome of right shoulder  This seems to be a " separate issue, suspicious for rotator cuff syndrome.  She does have issues with her neck, but currently there is no neck pain or cervical radiculopathy.  Agreeable to have physical therapy done.  Methocarbamol to help in the meantime and patient would like to see Orthopedics.  -     methocarbamoL (ROBAXIN) 500 MG Tab; Take 1 tablet (500 mg total) by mouth 3 (three) times daily as needed (spasm).  Dispense: 30 tablet; Refill: 0  -     Ambulatory referral/consult to Physical/Occupational Therapy; Future; Expected date: 05/22/2024  -     Ambulatory referral/consult to Orthopedics; Future; Expected date: 05/22/2024             Previous medical history/lab work/radiology reviewed and considered during medical management decisions.   Medication list reviewed and medication reconciliation performed.  Patient was provided  and care about his/her current diagnosis (es) and medications including risk/benefit and side effects/adverse events, over the counter medication uses/doses, home self-care and contact precautions,  and red flags and indications for when to seek immediate medical attention.   Patient was advised to continue compliance with current medication list and medical recommendations.  Recommended/ Advised continued compliance with recommended eating habits/ diets for medical conditions and exercise 150 minutes/ week (if possible) for medical condition (s).        RESULTS:  No results found for this or any previous visit (from the past 1008 hour(s)).        Follow Up:  Follow up in about 6 months (around 11/15/2024) for please cancel next appt..         This note was created with the assistance of a voice recognition software or phone dictation. There may be transcription errors as a result of using this technology however minimal. Effort has been made to assure accuracy of transcription but any obvious errors or omissions should be clarified with the author of the document

## 2024-06-19 ENCOUNTER — OFFICE VISIT (OUTPATIENT)
Dept: OTOLARYNGOLOGY | Facility: CLINIC | Age: 54
End: 2024-06-19
Payer: MEDICAID

## 2024-06-19 DIAGNOSIS — J34.2 NASAL SEPTAL DEVIATION: ICD-10-CM

## 2024-06-19 DIAGNOSIS — J34.3 NASAL TURBINATE HYPERTROPHY: ICD-10-CM

## 2024-06-19 DIAGNOSIS — R09.81 NASAL CONGESTION: Primary | ICD-10-CM

## 2024-06-19 DIAGNOSIS — J34.89 NASAL OBSTRUCTION: ICD-10-CM

## 2024-06-19 PROCEDURE — 99213 OFFICE O/P EST LOW 20 MIN: CPT | Mod: 95,,, | Performed by: NURSE PRACTITIONER

## 2024-06-19 PROCEDURE — 1159F MED LIST DOCD IN RCRD: CPT | Mod: CPTII,95,, | Performed by: NURSE PRACTITIONER

## 2024-06-19 NOTE — PROGRESS NOTES
Audio Only Telehealth Visit     The patient location is: [facility]  The chief complaint leading to consultation is: f/u  Visit type: Virtual visit with audio only (telephone)  Total time spent on visit: 20 min     The reason for the audio only service rather than synchronous audio and video virtual visit was related to technical difficulties or patient preference/necessity.     Each patient to whom I provide medical services by telemedicine is:  (1) informed of the relationship between the physician and patient and the respective role of any other health care provider with respect to management of the patient; and (2) notified that they may decline to receive medical services by telemedicine and may withdraw from such care at any time. Patient verbally consented to receive this service via voice-only telephone call.       HPI: 05/03/2024: 53 y.o. female presents with c/o nasal congestion/obstruction which is most pronounced at night. Occasional rhinitis and PND. Denies recurrent sinus infections or significant allergy symptoms. States she has sustained a few episodes of nasal trauma and is concerned she has a deviated septum. She has been using flonase and zyrtec daily over the past month without much benefit. States she mouth breathes at night and sometimes awakens coughing/choking. PCP ordered a sleep study but she has not yet been contacted.    06/19/2024: Endorses compliance with nasal sprays but continues to have significant nasal congestion/obstruction, stating that she finds herself mouth breathing at times during the day and especially at night.     Imaging:        Assessment and plan:  54 y.o. female with nasal congestion/obstruction which failed medical therapy. Reviewed CT head and CTA head/neck from last year- there is left septal deviation and significant turbinate hypertrophy- would benefit from septoturbs- D/w Dr. Dwyer.   - Flonase + astelin BID (reviewed technique)  - RTC 6wks to discuss  surgery    Jimena Butler NP      This service was not originating from a related E/M service provided within the previous 7 days nor will  to an E/M service or procedure within the next 24 hours or my soonest available appointment.  Prevailing standard of care was able to be met in this audio-only visit.

## 2024-07-02 ENCOUNTER — OFFICE VISIT (OUTPATIENT)
Dept: ORTHOPEDICS | Facility: CLINIC | Age: 54
End: 2024-07-02
Payer: MEDICAID

## 2024-07-02 ENCOUNTER — HOSPITAL ENCOUNTER (OUTPATIENT)
Dept: RADIOLOGY | Facility: HOSPITAL | Age: 54
Discharge: HOME | End: 2024-07-02
Attending: FAMILY MEDICINE
Payer: MEDICAID

## 2024-07-02 VITALS
SYSTOLIC BLOOD PRESSURE: 121 MMHG | DIASTOLIC BLOOD PRESSURE: 80 MMHG | BODY MASS INDEX: 21.22 KG/M2 | TEMPERATURE: 98 F | HEART RATE: 76 BPM | HEIGHT: 64 IN | WEIGHT: 124.31 LBS

## 2024-07-02 DIAGNOSIS — M75.101 ROTATOR CUFF SYNDROME OF RIGHT SHOULDER: ICD-10-CM

## 2024-07-02 DIAGNOSIS — M19.011 OSTEOARTHRITIS OF RIGHT AC (ACROMIOCLAVICULAR) JOINT: ICD-10-CM

## 2024-07-02 DIAGNOSIS — M75.21 BICEPS TENDINITIS OF RIGHT SHOULDER: ICD-10-CM

## 2024-07-02 DIAGNOSIS — M75.101 ROTATOR CUFF SYNDROME OF RIGHT SHOULDER: Primary | ICD-10-CM

## 2024-07-02 PROCEDURE — 73030 X-RAY EXAM OF SHOULDER: CPT | Mod: TC,RT

## 2024-07-02 PROCEDURE — 99214 OFFICE O/P EST MOD 30 MIN: CPT | Mod: PBBFAC,25

## 2024-07-02 NOTE — PROGRESS NOTES
"Subjective:    Patient ID: Remi Baptiste is a right handed 54 y.o. female  who presented to [facility] for new visit.      Chief Complaint: Pain of the Right Shoulder      History of Present Illness:    Remi Baptiste pisiform fracture of the right wrist 6 months ago presented today for right shoulder pain for the past 2 months.  Pain is located at the trapezius acromioclavicular area, 0/10 at rest and 8/10 when aggravated.  Pain is described as sharp, worse with lifting and working overhead, denies any specific inciting events or injuries.  She has been worked up by her PCP, and has had plain films.  She has tried anti-inflammatory medications with no relief.  Has not done PT, or CSI. Works in housekeeping.  She has a high demand shoulder.    Shoulder Review of Systems:  Swelling?  no  Instability?  no  Clicking?  no  Limited ROM? yes  Fever/Chills? no  Subluxation? no  Dislocation? no       Objective:      Physical Exam:    /80   Pulse 76   Temp 97.6 °F (36.4 °C)   Ht 5' 4" (1.626 m)   Wt 56.4 kg (124 lb 5.4 oz)   BMI 21.34 kg/m²     Ortho/SPM Exam    Appearance:  Soft tissue swelling: Left: no Right: no  Effusion: Left:  Negative Right: Negative  Erythema: Left no Right: no  Ecchymosis: Left: no Right: no  Atrophy: Left: no Right: no  Scapular winging: Left: no Right: no    Palpation:  Shoulder Tenderness: Left: none  Right: AC joint, biceps tendon, lateral acromion    Range of motion:  Flexion (0-90): Left:  90 Right: 90  Abduction (0-180): Left:  180 Right: 180  External rotation (0-55): Left: 55 Right: 55  Internal rotation (0-45): Left: 45 Right: 45    Strength:  Abduction: Left: 5/5 Pain: no Right: 4/5 Pain: yes  External rotation: Left: 5/5 Pain: no Right: 5/5 Pain: no  Internal rotation: Left: 5/5 Pain: no Right: 4/5 Pain: no  Elbow flexion: Left: 5/5 Pain: no Right: 5/5 Pain: no  Elbow extension: Left: 5/5 Pain: no Right: 5/5 Pain: " no    Special Tests:  Subacromial Impingement  Neer: Left: Negative Right: Negative  Araujo: Left: Negative Right: Negative    AC Joint Arthritis:  Cross-body abduction: Left: Negative Right: Negative  Resisted cross-body adduction positive on the right.    Rotator Cuff Tear   Drop arm test: Left: Negative Right: Negative  Hornblower: Left: Left: Not performed Right: Not performed   Belly press test: Left: Negative Right: Negative  Kael test (Empty can): Left: Negative Right: Positive  Lift-off test positive on the right.    Biceps tendon/Labral tear  Speed's: Left: Negative Right: Positive  Yergason's: Left: Negative Right: Negative  O'Fransisco's: Left: Negative Right: Negative  Cranck test: Left: Negative Right: Negative    Stability   Sulcus sign: Left: Not performed Right: Not performed   Apprehension test: Left: Not performed Right: Not performed   Relocation test: Left: Not performed Right: Not performed     Cervical   Spurling: Left: Negative Right: Negative    AIN/PIN/Ulnar nerve: Intact and symmetric    General appearance: NAD  Peripheral pulses: normal bilaterally   Reflexes: Left: Not performed Right: Not performed   Sensation: normal    Labs:  Last A1c: The patient doesn't have any registry metric data available     Imaging:   Previous images reviewed.  X-rays ordered and performed today: yes  # of views: 4 Laterality: right  My Interpretation:  AC degenerative changes changes, otherwise no acute fractures or dislocations.      Assessment:        Encounter Diagnoses   Code Name Primary?    M75.101 Rotator cuff syndrome of right shoulder Yes    M75.21 Biceps tendinitis of right shoulder     M19.011 Osteoarthritis of right AC (acromioclavicular) joint         Plan:        MDM: Prior external referring provider notes reviewed. Prior external referring provider studies reviewed.   Dx:  Right biceps tendinitis.  Right AC osteoarthritis.  Right rotator cuff syndrome.  Chronic in moderate exacerbation.  Treatment  Plan: Discussed with patient diagnosis and treatment recommendations. Recommend conservative treatment to include: avoidance of aggravating activity, significant modification of daily activities, hot/cold therapies, topical and oral medications, braces, HEP/PT/OT, and injections.   Recommend right biceps tendon CSI however, given her recent use of Paxlovid we will have her follow up in 2 weeks for this procedure.  Follow up in 4 weeks after procedure visit, consider right AC versus SA/SD CSI at that time.  Imaging: radiological studies ordered and independently reviewed; discussed with patient; pending radiologist interpretation.   Procedure: Discussed CSI as treatment options; follow up in 2 weeks for right biceps tendon sheath CSI.  Therapy: Physical Therapy  Medication: CONTINUE previously prescribed medication see list . Please see your primary care physician for further refills.  RTC:  4 weeks.         Brijesh Williamson MD.  Note dictated using MModal.

## 2024-07-22 ENCOUNTER — OFFICE VISIT (OUTPATIENT)
Dept: ORTHOPEDICS | Facility: CLINIC | Age: 54
End: 2024-07-22
Payer: MEDICAID

## 2024-07-22 VITALS
BODY MASS INDEX: 22.74 KG/M2 | SYSTOLIC BLOOD PRESSURE: 112 MMHG | DIASTOLIC BLOOD PRESSURE: 75 MMHG | HEART RATE: 60 BPM | WEIGHT: 133.19 LBS | HEIGHT: 64 IN

## 2024-07-22 DIAGNOSIS — M75.101 ROTATOR CUFF SYNDROME OF RIGHT SHOULDER: Primary | ICD-10-CM

## 2024-07-22 PROCEDURE — 20610 DRAIN/INJ JOINT/BURSA W/O US: CPT | Mod: PBBFAC | Performed by: STUDENT IN AN ORGANIZED HEALTH CARE EDUCATION/TRAINING PROGRAM

## 2024-07-22 PROCEDURE — 99213 OFFICE O/P EST LOW 20 MIN: CPT | Mod: PBBFAC

## 2024-07-22 RX ORDER — TRIAMCINOLONE ACETONIDE 40 MG/ML
40 INJECTION, SUSPENSION INTRA-ARTICULAR; INTRAMUSCULAR ONCE
Status: COMPLETED | OUTPATIENT
Start: 2024-07-22 | End: 2024-07-22

## 2024-07-22 RX ORDER — LIDOCAINE HYDROCHLORIDE 10 MG/ML
5 INJECTION, SOLUTION EPIDURAL; INFILTRATION; INTRACAUDAL; PERINEURAL
Status: COMPLETED | OUTPATIENT
Start: 2024-07-22 | End: 2024-07-22

## 2024-07-22 RX ADMIN — LIDOCAINE HYDROCHLORIDE 50 MG: 10 INJECTION, SOLUTION EPIDURAL; INFILTRATION; INTRACAUDAL; PERINEURAL at 08:07

## 2024-07-22 RX ADMIN — TRIAMCINOLONE ACETONIDE 40 MG: 40 INJECTION, SUSPENSION INTRA-ARTICULAR; INTRAMUSCULAR at 08:07

## 2024-07-22 NOTE — PROGRESS NOTES
Large Joint Aspiration/Injection: R subacromial bursa    Date/Time: 7/22/2024 7:30 AM    Performed by: Brijesh Williamson MD  Authorized by: Brijesh Williamson MD    Consent Done?:  Yes (Written)  Indications:  Arthritis  Site marked: the procedure site was marked    Timeout: prior to procedure the correct patient, procedure, and site was verified      Local anesthesia used?: Yes    Local anesthetic:  Topical anesthetic    Details:  Needle Size:  21 G  Approach:  Anterolateral  Location:  Shoulder  Site:  R subacromial bursa  Patient tolerance:  Patient tolerated the procedure well with no immediate complications     Staff: Comfort Swan MD    Risks:  Possible complications with the injection include bleeding, infection (.01%), tendon rupture, steroid flare, fat pad or soft tissue atrophy, skin depigmentation, allergic reaction to medications and vasovagal response. (steroid flare treatment is rest, ice, NSAIDs and resolves in 24-36 hours.)    Consent:  No absolute contraindications (cellulitis overlying joint, infection, lack of informed consent, allergy to injection medication, AVN protein or egg allergy for sodium hyaluronate, or history of steroid flare) or relative contraindications (uncontrolled DM2 A1c>10, coagulopathy, INR > 3.5, previous joint replacement or history of AVN).        Description:  The patient was prepped in normal sterile fashion use of chlorhexidine scrub and the appropriate and anatomic landmarks were identified with ultrasound.  Contents of syringe included: 5cc of 1% of lidocaine with 40mg of Kenalog     Post Procedure: Patient alert, and moving all extremities. ROM improved, pain decreased.  Good peripheral pulses, no signs of vascular compromise and range of motion intact.  Aftercare instructions were given to patient at time of discharge.  Relative rest for 3 days-avoiding excess activity.  Place ice on the area for 15 minutes every 4-6 hours. Patient may take Tylenol a 1000 mg b.i.d. or  ibuprofen 600 mg t.i.d. for the next 3-4 days if not on medication already and safe to take pending co-morbidities.  Protect the area for the next 1-8 hours if anesthetic was used.  Avoid excessive activity for the next 3-4 weeks.  ER precautions given for fever, severe joint pain or allergic reaction or other new symptoms related to the joint injection.     Patient had immediate improvement in symptoms following injection.    Brijesh Williamson MD.  Note dictated using MModal.

## 2024-07-26 NOTE — PROGRESS NOTES
Faculty Attestation: Remi Baptiste  was seen in Sports Medicine Clinic. Discussed with Dr. Williamson at the time of the visit. History of Present Illness, Physical Exam, and Assessment and Plan reviewed. Treatment plan is reasonable and appropriate. Compliance with treatment recommendations is important.   Procedure note reviewed. Patient tolerated procedure well.      Comfort Swan MD  Sports Medicine

## 2024-07-31 ENCOUNTER — LAB VISIT (OUTPATIENT)
Dept: LAB | Facility: HOSPITAL | Age: 54
End: 2024-07-31
Attending: STUDENT IN AN ORGANIZED HEALTH CARE EDUCATION/TRAINING PROGRAM
Payer: MEDICAID

## 2024-07-31 ENCOUNTER — OFFICE VISIT (OUTPATIENT)
Dept: OTOLARYNGOLOGY | Facility: CLINIC | Age: 54
End: 2024-07-31
Payer: MEDICAID

## 2024-07-31 VITALS
OXYGEN SATURATION: 99 % | HEIGHT: 64 IN | TEMPERATURE: 97 F | HEART RATE: 67 BPM | DIASTOLIC BLOOD PRESSURE: 67 MMHG | SYSTOLIC BLOOD PRESSURE: 112 MMHG | RESPIRATION RATE: 20 BRPM | BODY MASS INDEX: 22.74 KG/M2 | WEIGHT: 133.19 LBS

## 2024-07-31 DIAGNOSIS — J34.2 NASAL SEPTAL DEVIATION: ICD-10-CM

## 2024-07-31 DIAGNOSIS — J34.3 NASAL TURBINATE HYPERTROPHY: ICD-10-CM

## 2024-07-31 DIAGNOSIS — R09.81 NASAL CONGESTION: ICD-10-CM

## 2024-07-31 DIAGNOSIS — J34.89 NASAL OBSTRUCTION: ICD-10-CM

## 2024-07-31 DIAGNOSIS — R09.81 NASAL CONGESTION: Primary | ICD-10-CM

## 2024-07-31 PROCEDURE — 99214 OFFICE O/P EST MOD 30 MIN: CPT | Mod: PBBFAC | Performed by: STUDENT IN AN ORGANIZED HEALTH CARE EDUCATION/TRAINING PROGRAM

## 2024-07-31 PROCEDURE — 36415 COLL VENOUS BLD VENIPUNCTURE: CPT

## 2024-07-31 PROCEDURE — 82785 ASSAY OF IGE: CPT

## 2024-07-31 RX ORDER — FAMOTIDINE 40 MG/1
40 TABLET, FILM COATED ORAL DAILY
Qty: 30 TABLET | Refills: 11 | Status: SHIPPED | OUTPATIENT
Start: 2024-07-31 | End: 2025-07-31

## 2024-07-31 NOTE — PROGRESS NOTES
[facility]  Otolaryngology Clinic Note    Remi Baptiste  YOB: 1970    Chief Complaint:   No chief complaint on file.       HPI: 07/31/2024: 54 y.o. female presents with c/o nasal congestion/obstruction which is most pronounced at night. Occasional rhinitis and PND. Denies recurrent sinus infections or significant allergy symptoms. States she has sustained a few episodes of nasal trauma and is concerned she has a deviated septum. She has been using flonase and zyrtec daily over the past month without much benefit. States she mouth breathes at night and sometimes awakens coughing/choking. PCP ordered a sleep study but she has not yet been contacted.    7/31/24:  Here for follow-up.  Patient reports that she got COVID about 6 weeks ago and since then she has been having increased globus sensation.  Patient does have reflux and takes omeprazole.  She still feels very congested however she is not having much nasal drainage.  We discussed using nasal sprays.  She is interested in surgery.  We were discussing septoplasty and turbinate reduction however she does not want nasal splints in her nose as that may suffocate her as she has panic attacks    ROS:   10-point review of systems negative except per HPI      Review of patient's allergies indicates:   Allergen Reactions    Hydrocodone-guaifenesin Hives    Hydrocodone-acetaminophen     Penicillins Rash       Past Medical History:   Diagnosis Date    Anxiety     Depression     Diverticulitis     Endometrial cyst of ovary     Right wrist injury     Stroke        Past Surgical History:   Procedure Laterality Date    BLADDER SUSPENSION      OVARIAN CYST REMOVAL      TONSILLECTOMY         Social History     Socioeconomic History    Marital status: Single    Number of children: 0   Occupational History    Occupation: [employer]    Occupation: Housekeeping   Tobacco Use    Smoking status: Never     Passive exposure: Never    Smokeless  tobacco: Never   Substance and Sexual Activity    Alcohol use: Not Currently     Comment: Occasionally    Drug use: Never    Sexual activity: Not Currently     Partners: Male     Birth control/protection: Post-menopausal     Social Determinants of Health     Financial Resource Strain: Low Risk  (6/22/2022)    Overall Financial Resource Strain (CARDIA)     Difficulty of Paying Living Expenses: Not hard at all   Food Insecurity: No Food Insecurity (6/22/2022)    Hunger Vital Sign     Worried About Running Out of Food in the Last Year: Never true     Ran Out of Food in the Last Year: Never true   Transportation Needs: No Transportation Needs (6/22/2022)    PRAPARE - Transportation     Lack of Transportation (Medical): No     Lack of Transportation (Non-Medical): No   Physical Activity: Insufficiently Active (6/22/2022)    Exercise Vital Sign     Days of Exercise per Week: 4 days     Minutes of Exercise per Session: 30 min   Stress: No Stress Concern Present (6/22/2022)    [employer] [place] [employer] - Occupational Stress Questionnaire     Feeling of Stress : Not at all   Housing Stability: Low Risk  (6/22/2022)    Housing Stability Vital Sign     Unable to Pay for Housing in the Last Year: No     Number of Places Lived in the Last Year: 1     Unstable Housing in the Last Year: No       Family History   Problem Relation Name Age of Onset    Hypertension Mother Karen Mcgee     Arthritis Mother Karen Mcgee     COPD Mother Karen Mcgee     Hypertension Father Husam jenkins     Colon cancer Maternal Grandfather  70    Colon cancer Paternal Grandmother Brice jenkins 70       Outpatient Encounter Medications as of 7/31/2024   Medication Sig Dispense Refill    aspirin (DYLAN LOW DOSE ASPIRIN) 81 MG EC tablet 1 tablet Orally Once a day (Patient not taking: Reported on 7/2/2024)      azelastine (ASTELIN) 137 mcg (0.1 %) nasal spray 1 spray (137 mcg total) by Nasal route 2 (two) times daily. 30 mL 5     cetirizine (ZYRTEC) 10 MG tablet Take 1 tablet by mouth every morning.      conjugated estrogens (PREMARIN) vaginal cream Place 1 g vaginally once daily. (Patient not taking: Reported on 7/2/2024) 1 applicator 11    dicyclomine (BENTYL) 20 mg tablet Take 20 mg by mouth 4 (four) times daily. (Patient not taking: Reported on 7/2/2024)      fish,bora,flax oils-om3,6,9no1 (OMEGA 3-6-9) 1,200 mg Cap Take 1 capsule by mouth Daily.      fluticasone propionate (FLONASE) 50 mcg/actuation nasal spray 1 spray (50 mcg total) by Each Nostril route 2 (two) times a day. 16 g 11    meloxicam (MOBIC) 15 MG tablet 1 tablet Orally Once a day for 30 day(s) (Patient not taking: Reported on 7/2/2024)      ondansetron (ZOFRAN) 4 MG tablet Take 1 tablet (4 mg total) by mouth every 6 (six) hours as needed for Nausea. 12 tablet 0    pantoprazole (PROTONIX) 40 MG tablet Take 1 tablet (40 mg total) by mouth once daily. (Patient not taking: Reported on 7/2/2024) 30 tablet 1    propranoloL (INDERAL) 10 MG tablet 1 tablet Orally Once a day for 30 days (Patient not taking: Reported on 7/2/2024)      vitamin E 100 UNIT capsule Take 100 Units by mouth once daily.       No facility-administered encounter medications on file as of 7/31/2024.       Physical Exam:  There were no vitals filed for this visit.      Physical Exam   General: NAD, voice normal  Neuro: AAO, CN II - XII grossly intact  Head/ Face: NCAT, symmetric, sensations intact bilaterally  Eyes: EOMI, PERRL  Ears: externally normal with grossly normal hearing  AD: EAC patent, TM intact, no middle ear effusion, no retractions  AS: EAC patent, TM intact, no middle ear effusion, no retractions  Nose: bilateral nares patent, left septal deflection, no rhinorrhea, no external deformity, +ITH  OC/OP: MMM, no intraoral lesions,  no trismus, dentition is good, no uvular deviation, bilaterally symmetric soft palate elevation, palatoglossus and palatopharyngeal fold wnl; tonsils are  symmetric/absent  Indirect laryngoscopy: deferred due to patient intolerance  Neck: soft, supple, no LAD, normal ROM, no thyromegaly  Respiratory: nonlabored, no wheezing, bilateral chest rise  Cardiovascular: RRR  Gastrointestinal: S NT ND  Skin: warm, no lesions  Musculoskeletal: 5/5 strength  Psych: Appropriate affect/mood       Flexible Fiberoptic Laryngoscopy/Nasopharyngoscopy via b/l nare  Anesthesia: Topical 2% lidocaine  Adverse Events: None  Resident Surgeon: ORALIA Dave MD  Blood loss: none  Condition: good    Procedure in Detail: Informed consent was obtained from the patient after explanation of procedure, indications, risks and benefits. Flexible endoscopy was performed on Remi Baptiste through the b/l nasal passages. The nasal cavity, nasopharynx, oropharynx, hypopharynx and larynx were adequately visualized. The true vocal cords and arytenoids were examined during phonation and repose.    Findings:  No mucus or fluid emanating from middle meatus bilaterally.  Mild leftward septal deviation  NP/OP: no masses/lesions of NC, eustachian tube, fossa of Rosenmuller, no adenoid hypertrophy  BOT/vallecula: no lingual hypertrophy, no masses/lesions, no secretions obscuring visualization  Piriform sinuses/post-cricoid: no masses/lesions, no pooling of secretions  Epiglottis: lingual and laryngeal surfaces within normal limits  Arytenoids/FVFs: no masses/lesions, mild edema, bilateral mobility  TVCs: bilateral cord mobility, no masses or lesions      Pertinent Data:  ? LABS:  ? AUDIO:           ? PATH:      Imaging:   I personally reviewed the following images:        Assessment/Plan:  54 y.o. female with nasal congestion/obstruction. Not medically optimized. Reviewed CT head and CTA head/neck from last year- there is a mild left septal deviation and significant turbinate hypertrophy- reviewed.   We discussed inferior turbinate reduction and septoplasty however patient very paranoid about having nasal  splints in place.  She wants to delay surgery currently to see if she feels better with her globus sensation prior to undergoing surgery.  On flexible scope exam she has mild edema of the arytenoids and some sulcus vocalis concerning for reflux.  Patient already on Protonix    - we discussed methods to decrease reflux  - start on Pepcid and continue Protonix for 6 weeks  - rast testing  - Flonase + astelin BID (reviewed technique)  - RTC 6wks    I. Miroslava Dave MD  [facility] Department of Otolaryngology  [facility]

## 2024-07-31 NOTE — PROGRESS NOTES
The scope used for the exam was:  Flexible scope ENF-P4  Serial Number:  1)    [id number]    []   2)    [id number]    []   3)    [id number]    [x]   4)    [id number]    []   5)    [id number]    []   6)    [id number]    []       The scope used for the exam was:  Rigid scope   Serial Number:  1)   [id number]    []   2)   [id number]    []   3)   [id number]    []   4)    3384   []   5)    0824   []   6)    5554   []     7)   7425    []   8)   2240    []   9)   1109    []

## 2024-08-05 LAB
ALLERGEN ALTERNARIA ALTERNATA IGE (OLG): <0.1 KUA/L
ALLERGEN ASPERGILLUS FUMIGATUS IGE (OLG): <0.1 KUA/L
ALLERGEN BERMUDA GRASS IGE (OLG): <0.1 KUA/L
ALLERGEN BOXELDER MAPLE TREE IGE (OLG): <0.1 KUA/L
ALLERGEN CAT DANDER IGE (OLG): <0.1 KUA/L
ALLERGEN CLADOSPORIUM HERBARUM IGE (OLG): <0.1 KUA/L
ALLERGEN COCKROACH GERMAN IGE (OLG): <0.1 KUA/L
ALLERGEN COMMON RAGWEED IGE (OLG): <0.1 KUA/L
ALLERGEN DOG DANDER IGE (OLG): <0.1 KUA/L
ALLERGEN DUST MITE (D. PTERONYSSINUS) IGE (OLG): 0.77 KUA/L
ALLERGEN DUST MITE (D.FARINAE) IGE (OLG): 0.46 KUA/L
ALLERGEN ELM TREE IGE (OLG): <0.1 KUA/L
ALLERGEN HORSE DANDER IGE (OLG): <0.1 KUA/L
ALLERGEN MOUNTAIN JUNIPER TREE IGE (OLG): <0.1 KUA/L
ALLERGEN MOUSE URINE PROTEINS IGE (OLG): <0.1 KUA/L
ALLERGEN MULBERRY TREE IGE (OLG): <0.1 KUA/L
ALLERGEN OAK TREE IGE (OLG): <0.1 KUA/L
ALLERGEN PECAN HICKORY TREE IGE (OLG): <0.1 KUA/L
ALLERGEN PENICILLIUM CHRYSOGENUM IGE (OLG): <0.1 KUA/L
ALLERGEN PIGWEED IGE (OLG): <0.1 KUA/L
ALLERGEN ROUGH MARSH ELDER IGE (OLG): <0.1 KUA/L
ALLERGEN SILVER BIRCH TREE IGE (OLG): <0.1 KUA/L
ALLERGEN TIMOTHY GRASS IGE (OLG): <0.1 KUA/L
ALLERGEN WALNUT TREE IGE (OLG): <0.1 KUA/L
PHADIOTOP IGE QN: 114 KU/L

## 2024-08-06 ENCOUNTER — TELEPHONE (OUTPATIENT)
Dept: FAMILY MEDICINE | Facility: CLINIC | Age: 54
End: 2024-08-06
Payer: MEDICAID

## 2024-08-06 ENCOUNTER — OFFICE VISIT (OUTPATIENT)
Dept: FAMILY MEDICINE | Facility: CLINIC | Age: 54
End: 2024-08-06
Payer: MEDICAID

## 2024-08-06 VITALS
HEIGHT: 64 IN | SYSTOLIC BLOOD PRESSURE: 114 MMHG | WEIGHT: 129 LBS | RESPIRATION RATE: 18 BRPM | HEART RATE: 62 BPM | OXYGEN SATURATION: 99 % | DIASTOLIC BLOOD PRESSURE: 73 MMHG | TEMPERATURE: 99 F | BODY MASS INDEX: 22.02 KG/M2

## 2024-08-06 DIAGNOSIS — L82.1 SEBORRHEIC KERATOSIS: Primary | ICD-10-CM

## 2024-08-06 PROCEDURE — 17110 DESTRUCTION B9 LES UP TO 14: CPT | Mod: PBBFAC

## 2024-08-06 PROCEDURE — 99213 OFFICE O/P EST LOW 20 MIN: CPT | Mod: PBBFAC

## 2024-08-07 ENCOUNTER — TELEPHONE (OUTPATIENT)
Dept: FAMILY MEDICINE | Facility: CLINIC | Age: 54
End: 2024-08-07
Payer: MEDICAID

## 2024-08-07 DIAGNOSIS — J34.3 NASAL TURBINATE HYPERTROPHY: ICD-10-CM

## 2024-08-07 DIAGNOSIS — J34.2 NASAL SEPTAL DEVIATION: Primary | ICD-10-CM

## 2024-08-07 DIAGNOSIS — R09.81 NASAL CONGESTION: ICD-10-CM

## 2024-08-28 ENCOUNTER — OFFICE VISIT (OUTPATIENT)
Dept: NEUROLOGY | Facility: CLINIC | Age: 54
End: 2024-08-28
Payer: MEDICAID

## 2024-08-28 VITALS
HEART RATE: 68 BPM | DIASTOLIC BLOOD PRESSURE: 77 MMHG | HEIGHT: 63 IN | OXYGEN SATURATION: 98 % | SYSTOLIC BLOOD PRESSURE: 120 MMHG | BODY MASS INDEX: 22.57 KG/M2 | WEIGHT: 127.38 LBS

## 2024-08-28 DIAGNOSIS — I63.9 STROKE ABORTED BY ADMINISTRATION OF THROMBOLYTIC AGENT: Primary | ICD-10-CM

## 2024-08-28 DIAGNOSIS — M54.12 CERVICAL RADICULOPATHY: ICD-10-CM

## 2024-08-28 PROCEDURE — 99213 OFFICE O/P EST LOW 20 MIN: CPT | Mod: PBBFAC | Performed by: NURSE PRACTITIONER

## 2024-08-28 RX ORDER — OMEPRAZOLE 40 MG/1
40 CAPSULE, DELAYED RELEASE ORAL EVERY MORNING
COMMUNITY
Start: 2024-07-10

## 2024-08-28 NOTE — PROGRESS NOTES
"[facility] Neurology Follow Up Office Visit Note    Initial Visit Date: 1/19/2023  Last Visit Date: 3/13/2024  Current Visit Date:  08/28/2024    Chief Complaint:     Chief Complaint   Patient presents with    Headache     Pt reports improvement since last visit     History of Present Illness:      This is 54 y.o. female with history of PTSD, DJD, CVA 2023 no deficits, who was referred for right leg paresthesia of unknown chronicity. She states that she has been having bilateral hand paresthesia, upon awakening, along with neck pain. She also notes right leg numbness and paresthesia. Denied any UI/SI and saddle anesthesia. 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 She has not been doing home exercises. During last visit, MagOx 400mg daily and Riboflavin 400mg daily were initiated.    Today, HA have improved w/MagOx. However, have returned some since mara COVID. Pain located to R temporal/parietal area. Described as throbbing, does not affect ADLs. Tylenol 2 tabs PRN with minimal relief.     Other Hx:  ED on 4/28/2023 for L sided weakness including inability to open OS, difficulty ambulating, states she could not think clearly. States she was at home watching TV. Went to  and was unable to pull up her pants. Significant other transported to [facility] ED. Was told by stroke MD at [facility] she had a stroke. Dr. Mitchell approved TPA administration, MRI brain negative followed TPA administration. States symptoms lasted less than 24 hrs. Feels back to baseline today. Denies smoking, ETOH  intake, is a vegetarian. Is not currently taking statin as she read on internet it could cause a stroke. Is followed by [facility] in [facility] for "leaky valve".     Medications:     Current Outpatient Medications on File Prior to Visit   Medication Sig Dispense Refill    azelastine (ASTELIN) 137 mcg (0.1 %) nasal spray 1 spray (137 mcg total) by Nasal route 2 (two) times daily. (Patient taking differently: 1 spray by Nasal route 2 " (two) times daily. prn) 30 mL 5    cetirizine (ZYRTEC) 10 MG tablet Take 1 tablet by mouth every morning.      conjugated estrogens (PREMARIN) vaginal cream Place 1 g vaginally once daily. 1 applicator 11    famotidine (PEPCID) 40 MG tablet Take 1 tablet (40 mg total) by mouth once daily. (Patient taking differently: Take 40 mg by mouth once daily. prn) 30 tablet 11    fish,bora,flax oils-om3,6,9no1 (OMEGA 3-6-9) 1,200 mg Cap Take 1 capsule by mouth Daily.      fluticasone propionate (FLONASE) 50 mcg/actuation nasal spray 1 spray (50 mcg total) by Each Nostril route 2 (two) times a day. (Patient taking differently: 1 spray by Each Nostril route 2 (two) times a day. prn) 16 g 11    omeprazole (PRILOSEC) 40 MG capsule Take 40 mg by mouth every morning. prn      ondansetron (ZOFRAN) 4 MG tablet Take 1 tablet (4 mg total) by mouth every 6 (six) hours as needed for Nausea. 12 tablet 0    vitamin E 100 UNIT capsule Take 100 Units by mouth once daily.      [DISCONTINUED] aspirin (DYLAN LOW DOSE ASPIRIN) 81 MG EC tablet       [DISCONTINUED] dicyclomine (BENTYL) 20 mg tablet Take 20 mg by mouth 4 (four) times daily. (Patient not taking: Reported on 7/2/2024)      [DISCONTINUED] meloxicam (MOBIC) 15 MG tablet 1 tablet Orally Once a day for 30 day(s) (Patient not taking: Reported on 7/2/2024)      [DISCONTINUED] pantoprazole (PROTONIX) 40 MG tablet Take 1 tablet (40 mg total) by mouth once daily. (Patient not taking: Reported on 7/2/2024) 30 tablet 1    [DISCONTINUED] propranoloL (INDERAL) 10 MG tablet 1 tablet Orally Once a day for 30 days (Patient not taking: Reported on 7/2/2024)       No current facility-administered medications on file prior to visit.     Labs:     Results for orders placed or performed in visit on 07/31/24   Allergen Respiratory Panel IgE - Region 6   Result Value Ref Range    D. pteronyssinus Dust Mite IgE 0.77 (H) <=0.10 kUA/L    D. farinae Dust Mite IgE 0.46 (H) <=0.10 kUA/L    Cat Dander IgE <0.10  <=0.10 kUA/L    Dog Dander IgE <0.10 <=0.10 kUA/L    Horse Dander IgE <0.10 <=0.10 kUA/L    Mouse Urine Proteins IgE <0.10 <=0.10 kUA/L    [place] Grass IgE <0.10 <=0.10 kUA/L    [place] Cockroach IgE <0.10 <=0.10 kUA/L    P. chrysogenum IgE <0.10 <=0.10 kUA/L    Cladosporium IgE <0.10 <=0.10 kUA/L    A. fumigatus IgE <0.10 <=0.10 kUA/L    A. alternata IgE <0.10 <=0.10 kUA/L    [place] Maple Tree IgE <0.10 <=0.10 kUA/L    Silver Birch Tree IgE <0.10 <=0.10 kUA/L    Mountain Juniper Tree IgE <0.10 <=0.10 kUA/L    [place] Tree IgE <0.10 <=0.10 kUA/L    Elm Tree IgE <0.10 <=0.10 kUA/L    [place] Tree IgE <0.10 <=0.10 kUA/L    Pecan [place] Tree IgE <0.10 <=0.10 kUA/L    [place] Tree IgE <0.10 <=0.10 kUA/L    Common Ragweed IgE <0.10 <=0.10 kUA/L    Pigweed IgE <0.10 <=0.10 kUA/L    Rough [place] Elder IgE <0.10 <=0.10 kUA/L    [place] Grass IgE <0.10 <=0.10 kUA/L    Total IgE 114.00 <=144.00 kU/L       Studies:      - NCT: 7/9/2023 - no acute intracranial findings    - MRI C and L-spine without contrast 2022: Multilevel DJD with severe left neuroforaminal narrowing at C4-C5, C5-C6, C6-C7.  Mild-to-moderate right L3-L4 neural foraminal narrowing.    - MRI brain without without contrast 10/2022:  I have reviewed the study independently and with the patient.  Chronic microvascular changes.    Review of Systems:     Review of Systems   All other systems reviewed and are negative.  As per HPI    Physical Exams:     Vitals:    08/28/24 1510   BP: 120/77   Pulse: 68       Physical Exam  Vitals and nursing note reviewed.   Constitutional:       Appearance: Normal appearance.   HENT:      Head: Normocephalic and atraumatic.      Nose: Nose normal.      Mouth/Throat:      Mouth: Mucous membranes are moist.      Pharynx: Oropharynx is clear.   Eyes:      Conjunctiva/sclera: Conjunctivae normal.   Cardiovascular:      Rate and Rhythm: Normal rate and regular rhythm.      Pulses: Normal pulses.   Pulmonary:      Effort: Pulmonary  "effort is normal.      Breath sounds: Normal breath sounds.   Abdominal:      General: Abdomen is flat.   Musculoskeletal:         General: Normal range of motion.      Cervical back: Normal range of motion.   Skin:     General: Skin is warm.   Neurological:      Mental Status: She is alert.     Comprehensive Neurological Exam:  Mental Status: Alert Oriented to Self, Date, and Place. Naming, repetition, reading, and writing wnl. Comprehension wnl. No dysarthria.   CN II - XII: LUCI, No APD, VA grossly intact to finger counting at 6 ft, VFFC, No ptosis OU, EOMI without nystagmus, LT/Temp symmetric in CN V1-3 distribution, Hearing grossly intact, Face Symmetric, Tongue and Uvula midline, Trapezius symmetric bilateral.   Motor: tone and bulk wnl throughout, no abnormal involuntary or voluntary movements, 5/5 to confrontation, Fine finger movements wnl b/l, No pronator drift. Pain to L side of neck when turning head to R or w/R ear to shoulder tilt  Sensory: LT, Proprioception, Vibration, PP, Temp symmetric to bilat lower ext, decreased sensation to bilat upper ext, No sensory simultagnosia.   Reflexes: 2+ throughout, plantar reflexes downward bilateral.   Cerebellar: FNF wnl b/l  Romberg: Negative  Gait: normal, bilat arm swing intact    Assessment:     This is 54 y.o. female with history of  PTSD, DJD, CVA 2023 no deficits, who was referred for Cervical and Lumbar DJD. Recent admission for weakness. Was evaluated by neurosurgeon who did not recommend sx. CVA workup  negative. Is followed by [facility] in [facility] for "leaky valve". Denies smoking, ETOH intake, is a vegetarian. Is not currently taking statin. Has participated in PT in the past w/adequate pain relief. New c/o HA since hitting head in January, also had items fall on head. Denies LOC. HA more than likely result of concussion. New c/o depression, believes it is due to menopause. Denies homicidal or suicidal ideation. Has f/u with PCP in November.    Problem " List Items Addressed This Visit          Neuro    Cervical radiculopathy    Stroke aborted by administration of thrombolytic agent - Primary       Plan:     [] c/w ASA 81mg daily  [] c/w Mag Ox 400 mg daily for headache  [] f/u w/[facility]    RTC 6 mth    I have explained the treatment plan, diagnosis, and prognosis to patient. All questions are answered to the best of my knowledge.     Face to face time 30 minutes, including documentation, chart review, counseling, education, review of test results, relevant medical records, and coordination of care.     I have explained the treatment plan, diagnosis, and prognosis to patient. All questions are answered to the best of my knowledge.     Pamela Hughes, NP-C  General Neurology    08/28/2024

## 2024-09-05 ENCOUNTER — TELEPHONE (OUTPATIENT)
Dept: ORTHOPEDICS | Facility: CLINIC | Age: 54
End: 2024-09-05
Payer: MEDICAID

## 2024-09-19 ENCOUNTER — PATIENT MESSAGE (OUTPATIENT)
Dept: FAMILY MEDICINE | Facility: CLINIC | Age: 54
End: 2024-09-19
Payer: MEDICAID

## 2024-09-24 ENCOUNTER — OFFICE VISIT (OUTPATIENT)
Dept: FAMILY MEDICINE | Facility: CLINIC | Age: 54
End: 2024-09-24
Payer: MEDICAID

## 2024-09-24 VITALS
HEART RATE: 79 BPM | HEIGHT: 63 IN | SYSTOLIC BLOOD PRESSURE: 124 MMHG | BODY MASS INDEX: 22.86 KG/M2 | WEIGHT: 129 LBS | RESPIRATION RATE: 18 BRPM | DIASTOLIC BLOOD PRESSURE: 76 MMHG | TEMPERATURE: 98 F | OXYGEN SATURATION: 98 %

## 2024-09-24 DIAGNOSIS — Z00.00 ENCOUNTER FOR WELLNESS EXAMINATION: ICD-10-CM

## 2024-09-24 DIAGNOSIS — K21.9 GASTROESOPHAGEAL REFLUX DISEASE, UNSPECIFIED WHETHER ESOPHAGITIS PRESENT: ICD-10-CM

## 2024-09-24 DIAGNOSIS — R11.0 NAUSEA: ICD-10-CM

## 2024-09-24 DIAGNOSIS — M79.604 LEG PAIN, POSTERIOR, RIGHT: ICD-10-CM

## 2024-09-24 DIAGNOSIS — N30.10 INTERSTITIAL CYSTITIS: Primary | ICD-10-CM

## 2024-09-24 PROBLEM — R07.9 CHEST PAIN: Status: RESOLVED | Noted: 2023-04-29 | Resolved: 2024-09-24

## 2024-09-24 LAB
25(OH)D3+25(OH)D2 SERPL-MCNC: 34 NG/ML (ref 30–80)
ALBUMIN SERPL-MCNC: 4.6 G/DL (ref 3.5–5)
ALBUMIN/GLOB SERPL: 1.5 RATIO (ref 1.1–2)
ALP SERPL-CCNC: 58 UNIT/L (ref 40–150)
ALT SERPL-CCNC: 26 UNIT/L (ref 0–55)
ANION GAP SERPL CALC-SCNC: 7 MEQ/L
AST SERPL-CCNC: 21 UNIT/L (ref 5–34)
BACTERIA #/AREA URNS AUTO: ABNORMAL /HPF
BASOPHILS # BLD AUTO: 0.02 X10(3)/MCL
BASOPHILS NFR BLD AUTO: 0.5 %
BILIRUB SERPL-MCNC: 0.6 MG/DL
BILIRUB UR QL STRIP.AUTO: NEGATIVE
BUN SERPL-MCNC: 17.7 MG/DL (ref 9.8–20.1)
CALCIUM SERPL-MCNC: 10.6 MG/DL (ref 8.4–10.2)
CHLORIDE SERPL-SCNC: 106 MMOL/L (ref 98–107)
CHOLEST SERPL-MCNC: 266 MG/DL
CHOLEST/HDLC SERPL: 3 {RATIO} (ref 0–5)
CLARITY UR: CLEAR
CO2 SERPL-SCNC: 27 MMOL/L (ref 22–29)
COLOR UR AUTO: COLORLESS
CREAT SERPL-MCNC: 0.77 MG/DL (ref 0.55–1.02)
CREAT/UREA NIT SERPL: 23
CRP SERPL-MCNC: <1 MG/L
EOSINOPHIL # BLD AUTO: 0.16 X10(3)/MCL (ref 0–0.9)
EOSINOPHIL NFR BLD AUTO: 3.7 %
ERYTHROCYTE [DISTWIDTH] IN BLOOD BY AUTOMATED COUNT: 12 % (ref 11.5–17)
ERYTHROCYTE [SEDIMENTATION RATE] IN BLOOD: 2 MM/HR (ref 0–20)
EST. AVERAGE GLUCOSE BLD GHB EST-MCNC: 99.7 MG/DL
FERRITIN SERPL-MCNC: 14.8 NG/ML (ref 4.63–204)
GFR SERPLBLD CREATININE-BSD FMLA CKD-EPI: >60 ML/MIN/1.73/M2
GLOBULIN SER-MCNC: 3.1 GM/DL (ref 2.4–3.5)
GLUCOSE SERPL-MCNC: 84 MG/DL (ref 74–100)
GLUCOSE UR QL STRIP: NORMAL
HAV IGM SERPL QL IA: NONREACTIVE
HBA1C MFR BLD: 5.1 %
HBV CORE IGM SERPL QL IA: NONREACTIVE
HBV SURFACE AG SERPL QL IA: NONREACTIVE
HCT VFR BLD AUTO: 41.9 % (ref 37–47)
HCV AB SERPL QL IA: NONREACTIVE
HDLC SERPL-MCNC: 102 MG/DL (ref 35–60)
HGB BLD-MCNC: 14.2 G/DL (ref 12–16)
HGB UR QL STRIP: NEGATIVE
HIV 1+2 AB+HIV1 P24 AG SERPL QL IA: NONREACTIVE
HYALINE CASTS #/AREA URNS LPF: ABNORMAL /LPF
IMM GRANULOCYTES # BLD AUTO: 0 X10(3)/MCL (ref 0–0.04)
IMM GRANULOCYTES NFR BLD AUTO: 0 %
IRON SATN MFR SERPL: 29 % (ref 20–50)
IRON SERPL-MCNC: 99 UG/DL (ref 50–170)
KETONES UR QL STRIP: NEGATIVE
LDLC SERPL CALC-MCNC: 152 MG/DL (ref 50–140)
LEUKOCYTE ESTERASE UR QL STRIP: NEGATIVE
LYMPHOCYTES # BLD AUTO: 1.63 X10(3)/MCL (ref 0.6–4.6)
LYMPHOCYTES NFR BLD AUTO: 37.6 %
MCH RBC QN AUTO: 32.5 PG (ref 27–31)
MCHC RBC AUTO-ENTMCNC: 33.9 G/DL (ref 33–36)
MCV RBC AUTO: 95.9 FL (ref 80–94)
MONOCYTES # BLD AUTO: 0.31 X10(3)/MCL (ref 0.1–1.3)
MONOCYTES NFR BLD AUTO: 7.2 %
MUCOUS THREADS URNS QL MICRO: ABNORMAL /LPF
NEUTROPHILS # BLD AUTO: 2.21 X10(3)/MCL (ref 2.1–9.2)
NEUTROPHILS NFR BLD AUTO: 51 %
NITRITE UR QL STRIP: NEGATIVE
NRBC BLD AUTO-RTO: 0 %
PH UR STRIP: 6 [PH]
PLATELET # BLD AUTO: 266 X10(3)/MCL (ref 130–400)
PMV BLD AUTO: 9.4 FL (ref 7.4–10.4)
POTASSIUM SERPL-SCNC: 4 MMOL/L (ref 3.5–5.1)
PROT SERPL-MCNC: 7.7 GM/DL (ref 6.4–8.3)
PROT UR QL STRIP: NEGATIVE
RBC # BLD AUTO: 4.37 X10(6)/MCL (ref 4.2–5.4)
RBC #/AREA URNS AUTO: ABNORMAL /HPF
RET# (OHS): 0.07 X10E6/UL (ref 0.02–0.08)
RETICULOCYTE COUNT AUTOMATED (OLG): 1.51 % (ref 1.1–2.1)
SODIUM SERPL-SCNC: 140 MMOL/L (ref 136–145)
SP GR UR STRIP.AUTO: 1.01 (ref 1–1.03)
SQUAMOUS #/AREA URNS LPF: ABNORMAL /HPF
T PALLIDUM AB SER QL: NONREACTIVE
T4 FREE SERPL-MCNC: 0.93 NG/DL (ref 0.7–1.48)
TIBC SERPL-MCNC: 241 UG/DL (ref 70–310)
TIBC SERPL-MCNC: 340 UG/DL (ref 250–450)
TRANSFERRIN SERPL-MCNC: 318 MG/DL (ref 180–382)
TRIGL SERPL-MCNC: 61 MG/DL (ref 37–140)
TSH SERPL-ACNC: 2.18 UIU/ML (ref 0.35–4.94)
UROBILINOGEN UR STRIP-ACNC: NORMAL
VIT B12 SERPL-MCNC: 691 PG/ML (ref 213–816)
VLDLC SERPL CALC-MCNC: 12 MG/DL
WBC # BLD AUTO: 4.33 X10(3)/MCL (ref 4.5–11.5)
WBC #/AREA URNS AUTO: ABNORMAL /HPF

## 2024-09-24 PROCEDURE — 3008F BODY MASS INDEX DOCD: CPT | Mod: CPTII,,, | Performed by: NURSE PRACTITIONER

## 2024-09-24 PROCEDURE — 86140 C-REACTIVE PROTEIN: CPT | Performed by: NURSE PRACTITIONER

## 2024-09-24 PROCEDURE — 81001 URINALYSIS AUTO W/SCOPE: CPT | Performed by: NURSE PRACTITIONER

## 2024-09-24 PROCEDURE — 86780 TREPONEMA PALLIDUM: CPT | Performed by: NURSE PRACTITIONER

## 2024-09-24 PROCEDURE — 83540 ASSAY OF IRON: CPT | Performed by: NURSE PRACTITIONER

## 2024-09-24 PROCEDURE — 84443 ASSAY THYROID STIM HORMONE: CPT | Performed by: NURSE PRACTITIONER

## 2024-09-24 PROCEDURE — 86200 CCP ANTIBODY: CPT | Performed by: NURSE PRACTITIONER

## 2024-09-24 PROCEDURE — 86039 ANTINUCLEAR ANTIBODIES (ANA): CPT | Performed by: NURSE PRACTITIONER

## 2024-09-24 PROCEDURE — 86162 COMPLEMENT TOTAL (CH50): CPT | Performed by: NURSE PRACTITIONER

## 2024-09-24 PROCEDURE — 3078F DIAST BP <80 MM HG: CPT | Mod: CPTII,,, | Performed by: NURSE PRACTITIONER

## 2024-09-24 PROCEDURE — 3074F SYST BP LT 130 MM HG: CPT | Mod: CPTII,,, | Performed by: NURSE PRACTITIONER

## 2024-09-24 PROCEDURE — 84439 ASSAY OF FREE THYROXINE: CPT | Performed by: NURSE PRACTITIONER

## 2024-09-24 PROCEDURE — 99215 OFFICE O/P EST HI 40 MIN: CPT | Mod: PBBFAC,PN | Performed by: NURSE PRACTITIONER

## 2024-09-24 PROCEDURE — 80053 COMPREHEN METABOLIC PANEL: CPT | Performed by: NURSE PRACTITIONER

## 2024-09-24 PROCEDURE — 86235 NUCLEAR ANTIGEN ANTIBODY: CPT | Performed by: NURSE PRACTITIONER

## 2024-09-24 PROCEDURE — 82607 VITAMIN B-12: CPT | Performed by: NURSE PRACTITIONER

## 2024-09-24 PROCEDURE — 83516 IMMUNOASSAY NONANTIBODY: CPT | Performed by: NURSE PRACTITIONER

## 2024-09-24 PROCEDURE — 86376 MICROSOMAL ANTIBODY EACH: CPT | Performed by: NURSE PRACTITIONER

## 2024-09-24 PROCEDURE — 80074 ACUTE HEPATITIS PANEL: CPT | Performed by: NURSE PRACTITIONER

## 2024-09-24 PROCEDURE — 36415 COLL VENOUS BLD VENIPUNCTURE: CPT | Performed by: NURSE PRACTITIONER

## 2024-09-24 PROCEDURE — 99396 PREV VISIT EST AGE 40-64: CPT | Mod: S$PBB,,, | Performed by: NURSE PRACTITIONER

## 2024-09-24 PROCEDURE — 1159F MED LIST DOCD IN RCRD: CPT | Mod: CPTII,,, | Performed by: NURSE PRACTITIONER

## 2024-09-24 PROCEDURE — 85045 AUTOMATED RETICULOCYTE COUNT: CPT | Performed by: NURSE PRACTITIONER

## 2024-09-24 PROCEDURE — 85652 RBC SED RATE AUTOMATED: CPT | Performed by: NURSE PRACTITIONER

## 2024-09-24 PROCEDURE — 87389 HIV-1 AG W/HIV-1&-2 AB AG IA: CPT | Performed by: NURSE PRACTITIONER

## 2024-09-24 PROCEDURE — 1160F RVW MEDS BY RX/DR IN RCRD: CPT | Mod: CPTII,,, | Performed by: NURSE PRACTITIONER

## 2024-09-24 PROCEDURE — 82306 VITAMIN D 25 HYDROXY: CPT | Performed by: NURSE PRACTITIONER

## 2024-09-24 PROCEDURE — 82728 ASSAY OF FERRITIN: CPT | Performed by: NURSE PRACTITIONER

## 2024-09-24 PROCEDURE — 99214 OFFICE O/P EST MOD 30 MIN: CPT | Mod: 25,S$PBB,, | Performed by: NURSE PRACTITIONER

## 2024-09-24 PROCEDURE — 83550 IRON BINDING TEST: CPT | Performed by: NURSE PRACTITIONER

## 2024-09-24 PROCEDURE — 80061 LIPID PANEL: CPT | Performed by: NURSE PRACTITIONER

## 2024-09-24 PROCEDURE — 85025 COMPLETE CBC W/AUTO DIFF WBC: CPT | Performed by: NURSE PRACTITIONER

## 2024-09-24 PROCEDURE — 83036 HEMOGLOBIN GLYCOSYLATED A1C: CPT | Performed by: NURSE PRACTITIONER

## 2024-09-24 RX ORDER — ONDANSETRON 4 MG/1
4 TABLET, FILM COATED ORAL EVERY 6 HOURS PRN
Qty: 12 TABLET | Refills: 0 | Status: SHIPPED | OUTPATIENT
Start: 2024-09-24

## 2024-09-24 NOTE — ASSESSMENT & PLAN NOTE
Pt requested referral to Urology   Calcipotriene Counseling:  I discussed with the patient the risks of calcipotriene including but not limited to erythema, scaling, itching, and irritation.

## 2024-09-24 NOTE — PROGRESS NOTES
"Patient Name: Remi Baptiste     : 1970    MRN: [id number]     Subjective:     Patient ID: Remi Baptiste is a 54 y.o. female.    Chief Complaint:   Chief Complaint   Patient presents with    Establish Care     Pt is here to establish care with PCP. Pt request MS testing, c/o frequent urination, anxiety, and vertigo         HPI: 24:  Establish care with PCP.  Patient with multiple complaints today to be addressed.  She is established with Neurology, ENT, Ortho, and GYN clinic currently. Previously seen by Dr. Swan.  States that her last testing for MS was in . Tested twice in her life. She has the following symptoms that she thinks may be related to a dx of MS. C/o left eye twitching, rapid movements. She has been seen by Jaspreet and examined but nothing diagnosed.  Has balance issues for a while. Feels lightheaded at times-not now.  C/o nausea.  That is chronic.  Needs refill of Zofran.  She states she has an upcoming appointment with ENT and will discuss this with them.   Hx interstitial cystitis. Has had bladder distended.  Past couple of months is urinating 3 times a hour which is a full amount of urine.  Disrupting her life.  Not able to work another job. Is asking for a referral to Urology for evaluation of such.   Hormones are "out of whack".  Hot flashes.  No period in 3 years.  Worse lately.  Mood swings, anxiety issues, sleep issues.  She sees GYN clinic and has upcoming appointment. She was prescribed Premarin cream in the past for vaginal atrophy. Has not used it yet.   RLE pain behind knee. Parents with hx blood clots, this is her concern. Vein is protruding from back of knee, however it is not engorged or painful. She does c/o pain behind the knee that seems to be reproducible on palpitation and more muscle related. Denies any injury to back of leg.  Feels internally increased heat to right thigh.   Due for MMG. Told that she does not need to be on statin medication.    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  Stroke with TPA. Also with anxiety, PTSD, grief reaction.   Has appointment with ortho clinic coming up for her wrist.  Has broken her foot in past and had foot drop from that incident.         ROS:      Review of Systems   Constitutional:         Hot flashes, brain fog, insomnia   HENT: Negative.     Eyes:         Left eye twitching   Respiratory: Negative.     Cardiovascular: Negative.         Posterior knee pain   Gastrointestinal: Negative.    Genitourinary:  Positive for frequency.   Musculoskeletal:  Positive for back pain, joint pain and neck pain.   Skin:  Negative for itching and rash.   Neurological:  Positive for dizziness, focal weakness and headaches. Negative for loss of consciousness.   Endo/Heme/Allergies: Negative.  Negative for environmental allergies and polydipsia. Does not bruise/bleed easily.   Psychiatric/Behavioral:  Positive for suicidal ideas. Negative for hallucinations. The patient is nervous/anxious and has insomnia.    All other systems reviewed and are negative.           History:     Past Medical History:   Diagnosis Date    Anxiety     Chest pain 04/29/2023    Depression     Diverticulitis     Endometrial cyst of ovary     Osteoporosis     Right wrist injury     Stroke         Past Surgical History:   Procedure Laterality Date    BLADDER SUSPENSION      OVARIAN CYST REMOVAL      TONSILLECTOMY         Family History   Problem Relation Name Age of Onset    Hypertension Mother Karen Mcgee     Arthritis Mother Karen Mcgee     COPD Mother Karen Mcgee     Hypertension Father Husam jenkins     Colon cancer Maternal Grandfather  70    Colon cancer Paternal Grandmother Brice jenkins 70        Social History     Tobacco Use    Smoking status: Never     Passive exposure: Never    Smokeless tobacco: Never   Substance and Sexual Activity    Alcohol use: Not Currently     Comment: Occasionally    Drug use: Never    Sexual activity: Not Currently     Partners: Male      "Birth control/protection: Post-menopausal       Current Outpatient Medications   Medication Instructions    azelastine (ASTELIN) 137 mcg, Nasal, 2 times daily    cetirizine (ZYRTEC) 10 MG tablet 1 tablet, Oral, Every morning    conjugated estrogens (PREMARIN) 1 g, Vaginal, Daily    famotidine (PEPCID) 40 mg, Oral, Daily    fish,bora,flax oils-om3,6,9no1 (OMEGA 3-6-9) 1,200 mg Cap 1 capsule, Oral, Daily    fluticasone propionate (FLONASE) 50 mcg, Each Nostril, 2 times daily    omeprazole (PRILOSEC) 40 mg, Oral, Every morning, prn    ondansetron (ZOFRAN) 4 mg, Oral, Every 6 hours PRN    vitamin E 100 Units, Oral, Daily        Review of patient's allergies indicates:   Allergen Reactions    Hydrocodone-guaifenesin Hives    Hydrocodone-acetaminophen     Penicillins Rash       Objective:     Visit Vitals  /76 (BP Location: Left arm, Patient Position: Sitting, BP Method: Medium (Automatic))   Pulse 79   Temp 98.1 °F (36.7 °C) (Oral)   Resp 18   Ht 5' 3" (1.6 m)   Wt 58.5 kg (129 lb)   SpO2 98%   BMI 22.85 kg/m²       Physical Examination:     Physical Exam  Vitals and nursing note reviewed.   Constitutional:       General: She is awake.      Appearance: Normal appearance. She is well-developed and well-groomed.   HENT:      Head: Normocephalic and atraumatic.      Right Ear: Hearing, tympanic membrane, ear canal and external ear normal.      Left Ear: Hearing, tympanic membrane, ear canal and external ear normal.      Nose: Nose normal.      Mouth/Throat:      Lips: Pink.      Mouth: Mucous membranes are moist.      Tongue: No lesions.      Pharynx: Oropharynx is clear. No posterior oropharyngeal erythema.   Eyes:      General: Lids are normal. Vision grossly intact.      Extraocular Movements: Extraocular movements intact.      Conjunctiva/sclera: Conjunctivae normal.      Pupils: Pupils are equal, round, and reactive to light.   Neck:      Thyroid: No thyroid mass.      Vascular: No carotid bruit.      Trachea: " Trachea and phonation normal.   Cardiovascular:      Rate and Rhythm: Normal rate and regular rhythm.      Pulses: Normal pulses.      Heart sounds: Normal heart sounds, S1 normal and S2 normal.   Pulmonary:      Effort: Pulmonary effort is normal.      Breath sounds: Normal breath sounds. No decreased breath sounds, wheezing, rhonchi or rales.   Abdominal:      General: Abdomen is flat. Bowel sounds are normal.      Palpations: Abdomen is soft.      Tenderness: There is no abdominal tenderness.   Musculoskeletal:         General: Normal range of motion.      Cervical back: Full passive range of motion without pain and normal range of motion.      Right lower leg: No edema.      Left lower leg: No edema.        Legs:       Comments: Bilateral lower extremity strength 5/5  Bilateral upper extremity strength 5/5   Lymphadenopathy:      Cervical: No cervical adenopathy.   Skin:     General: Skin is warm and dry.      Capillary Refill: Capillary refill takes less than 2 seconds.   Neurological:      General: No focal deficit present.      Mental Status: She is alert and oriented to person, place, and time.      GCS: GCS eye subscore is 4. GCS verbal subscore is 5. GCS motor subscore is 6.      Cranial Nerves: Cranial nerves 2-12 are intact. No cranial nerve deficit.      Sensory: Sensation is intact.      Motor: Motor function is intact. No weakness, tremor, atrophy, abnormal muscle tone, seizure activity or pronator drift.      Coordination: Coordination is intact. Romberg sign negative. Coordination normal. Finger-Nose-Finger Test and Heel to Shin Test normal. Rapid alternating movements normal.      Gait: Gait is intact. Gait and tandem walk normal.      Deep Tendon Reflexes: Reflexes are normal and symmetric.   Psychiatric:         Attention and Perception: Attention and perception normal.         Mood and Affect: Mood normal.         Speech: Speech normal.         Behavior: Behavior normal. Behavior is cooperative.          Thought Content: Thought content normal.         Cognition and Memory: Cognition and memory normal.         Lab Results:     Chemistry:  Lab Results   Component Value Date     09/22/2023    K 4.0 09/22/2023    BUN 13.2 09/22/2023    CREATININE 0.65 09/22/2023    EGFRNORACEVR >60 09/22/2023    GLUCOSE 99 09/22/2023    CALCIUM 9.9 09/22/2023    ALKPHOS 53 09/22/2023    LABPROT 7.2 09/22/2023    ALBUMIN 4.3 09/22/2023    BILIDIR 0.3 07/02/2021    IBILI 0.40 07/02/2021    AST 20 09/22/2023    ALT 23 09/22/2023    MG 2.20 11/30/2022    OAOOWNPS24NB 34.6 12/08/2021    TSH 1.355 09/22/2023        Lab Results   Component Value Date    HGBA1C 5.1 09/24/2024        Hematology:  Lab Results   Component Value Date    WBC 4.33 (L) 09/24/2024    HGB 14.2 09/24/2024    HCT 41.9 09/24/2024     09/24/2024       Lipid Panel:  Lab Results   Component Value Date    CHOL 231 (H) 09/22/2023    HDL 96 (H) 09/22/2023    .00 09/22/2023    TRIG 55 09/22/2023    TOTALCHOLEST 2 09/22/2023        Urine:  Lab Results   Component Value Date    APPEARANCEUA Clear 09/24/2024    SGUA 1.011 09/24/2024    PROTEINUA Negative 09/24/2024    KETONESUA Negative 09/24/2024    LEUKOCYTESUR Negative 09/24/2024    RBCUA 0-5 09/24/2024    WBCUA None Seen 09/24/2024    BACTERIA None Seen 09/24/2024    SQEPUA None Seen 09/24/2024    HYALINECASTS None Seen 09/24/2024        Assessment:          ICD-10-CM ICD-9-CM   1. Interstitial cystitis  N30.10 595.1   2. Encounter for wellness examination  Z00.00 V70.0   3. Leg pain, posterior, right  M79.604 729.5   4. Nausea  R11.0 787.02   5. Gastroesophageal reflux disease, unspecified whether esophagitis present  K21.9 530.81        Plan:     1. Interstitial cystitis  Assessment & Plan:  Pt requested referral to Urology    Orders:  -     Ambulatory referral/consult to Urology; Future; Expected date: 10/01/2024    2. Encounter for wellness examination  -     CBC Auto Differential  -      Comprehensive Metabolic Panel  -     Mammo Digital Screening Bilat; Future; Expected date: 09/24/2024  -     Urinalysis  -     Hemoglobin A1C  -     TSH  -     T4, Free  -     Vitamin D  -     Vitamin B12  -     Hepatitis Panel, Acute  -     HIV 1/2 Ag/Ab (4th Gen)  -     SYPHILIS ANTIBODY (WITH REFLEX RPR)  -     Lipid Panel  -     Ferritin  -     Reticulocytes  -     Iron and TIBC  -     Complement, Total  -     SANDOVAL IgG by IFA  -     Sedimentation rate  -     C-Reactive Protein  -     Thyroid Stimulating Immunoglobulin  -     Thyroid Peroxidase Antibody  -     Sjogrens syndrome-B extractable nuclear antibody  -     Anti-Smith Antibody  -     PM-Scl Antibody by Immunodiffusion  -     Anti-Alexandra 1 Antibody, IgG  -     Anti-Centromere Antibody  -     Cyclic Citrullinated Peptide Antibody, IgG    3. Leg pain, posterior, right  Assessment & Plan:  US arterial and venous right lower extremity    Orders:  -     CV Ultrasound doppler arterial leg right; Future  -     CV Ultrasound doppler venous DVT leg right; Future    4. Nausea  -     ondansetron (ZOFRAN) 4 MG tablet; Take 1 tablet (4 mg total) by mouth every 6 (six) hours as needed for Nausea.  Dispense: 12 tablet; Refill: 0    5. Gastroesophageal reflux disease, unspecified whether esophagitis present  Assessment & Plan:  Pepcid, omeprazole to continue, helping  Chronic, stable issue    Avoid spicy, acidic, fried foods and alcohol.  Eat 2-3 hours before going to bed.  Avoid tight clothing, chew food thoroughly.  Reduce caffeine intake, avoid soda.  Stressed importance of Smoking/Tobacco Cessation.  Increased risk of Osteoporosis with PPI use over 1 year. Increase Calcium intake 1200-1800mg. It also increases risk for stomach polyp, dementia, and malnutrition             Follow up in about 2 weeks (around 10/8/2024) for Review of labs.    Future Appointments   Date Time Provider Department Center   10/9/2024  8:30 AM Channing Rea DO [facility]   10/9/2024   9:40 AM Shasha Cardoza, JOAQUINA [facility]   10/22/2024  1:00 PM SCHEDULE,  MINOR SURGERY [facility] FM RES [place]    10/31/2024 11:30 AM RESIDENT 1, [facility] OTORHINOLARYNGOLOGY [facility] ENT [place]   11/15/2024 11:00 AM [facility]   2/19/2025  8:10 AM Emily Mohr FNP [facility] GYN [place]   2/26/2025  3:00 PM Pamela Hughes, ANP [facility] NEURO [place]        JOAQUINA [place]

## 2024-09-24 NOTE — ASSESSMENT & PLAN NOTE
Pepcid, omeprazole to continue, helping  Chronic, stable issue    Avoid spicy, acidic, fried foods and alcohol.  Eat 2-3 hours before going to bed.  Avoid tight clothing, chew food thoroughly.  Reduce caffeine intake, avoid soda.  Stressed importance of Smoking/Tobacco Cessation.  Increased risk of Osteoporosis with PPI use over 1 year. Increase Calcium intake 1200-1800mg. It also increases risk for stomach polyp, dementia, and malnutrition

## 2024-09-26 ENCOUNTER — TELEPHONE (OUTPATIENT)
Dept: NEUROLOGY | Facility: CLINIC | Age: 54
End: 2024-09-26
Payer: MEDICAID

## 2024-09-26 LAB
ANA SER QL HEP2 SUBST: NORMAL
CCP IGG SERPL-ACNC: 3 UNITS
CENTROMERE IGG SER-ACNC: <0.2 U
CH50 SERPL-ACNC: 60 U/ML (ref 30–75)
ENA JO1 IGG SER-ACNC: <0.2 U
ENA SCL70 IGG SER IA-ACNC: <0.2 U
ENA SM IGG SER-ACNC: <0.2 U
ENA SS-A IGG SER-ACNC: <0.2 U
ENA SS-B IGG SER-ACNC: <0.2 U
THYROPEROXIDASE AB SERPL-ACNC: 0.4 IU/ML

## 2024-09-26 NOTE — TELEPHONE ENCOUNTER
----- Message from Aylin Eason sent at 9/26/2024 11:59 AM CDT -----  Pt called stating she is having new symptoms and would like to be seen sooner to discuss, pt said virtual is ok, pt can be reached @ [phone]

## 2024-09-27 NOTE — TELEPHONE ENCOUNTER
Patient states she is having some balance issues and light headedness. The patient is having rapid eye movement. She states that she has been having a burning sensation in her right knee down to her lower leg. Patient states she has an ultrasound scheduled next week for the knee pain. She states she has recent labs ordered by her PCP.

## 2024-09-27 NOTE — TELEPHONE ENCOUNTER
Her PCP has ordered a massive lab work up which may explain some of her symptoms. I would recommend waiting until those  labs are drawn and resulted. We can put her in to see me in 2 mths if she would like. If she feels her symptoms are severe enough, she can go to ED.

## 2024-09-30 ENCOUNTER — TELEPHONE (OUTPATIENT)
Dept: FAMILY MEDICINE | Facility: CLINIC | Age: 54
End: 2024-09-30
Payer: MEDICAID

## 2024-09-30 NOTE — TELEPHONE ENCOUNTER
----- Message from [employer] sent at 9/30/2024  9:24 AM CDT -----  .Who Called: Remi Baptiste    What order is the patient requesting: Dipak Hyperthyroidism blood test    When does the expect the orders to be performed? Prior to her upcoming appointment         Preferred Method of Contact: Phone Call  Patient's Preferred Phone Number on File: [phone]   Best Call Back Number, if different:  Additional Information: Pt stated that she would like to know if she can receive this specific blood test. She has an upcoming appointment and forgot to mention at the previous one that her mother and her aunt also have the same diagnosis and she wants to rule out if this is what's going on with heart. Please call to advise

## 2024-10-04 ENCOUNTER — HOSPITAL ENCOUNTER (OUTPATIENT)
Dept: CARDIOLOGY | Facility: HOSPITAL | Age: 54
Discharge: HOME | End: 2024-10-04
Attending: NURSE PRACTITIONER
Payer: MEDICAID

## 2024-10-04 DIAGNOSIS — M79.604 LEG PAIN, POSTERIOR, RIGHT: ICD-10-CM

## 2024-10-04 DIAGNOSIS — R52 PAIN: ICD-10-CM

## 2024-10-04 LAB
LEFT ABI: 1.17
LEFT DORSALIS PEDIS: 119 MMHG
LEFT POSTERIOR TIBIAL: 112 MMHG
RIGHT ABI: 1.12
RIGHT ARM BP: 102 MMHG
RIGHT CFA PSV: 138 CM/S
RIGHT DORSALIS PEDIS PSV: 52 CM/S
RIGHT DORSALIS PEDIS: 113 MMHG
RIGHT POPLITEAL PSV: 65 CM/S
RIGHT POST TIBIAL SYS PSV: 51 CM/S
RIGHT POSTERIOR TIBIAL: 114 MMHG
RIGHT PROFUNDA SYS PSV: 93 CM/S
RIGHT SUPER FEMORAL DIST SYS PSV: 121 CM/S
RIGHT SUPER FEMORAL MID SYS PSV: 118 CM/S
RIGHT SUPER FEMORAL PROX SYS PSV: 119 CM/S

## 2024-10-04 PROCEDURE — 93922 UPR/L XTREMITY ART 2 LEVELS: CPT

## 2024-10-04 PROCEDURE — 93926 LOWER EXTREMITY STUDY: CPT | Mod: RT

## 2024-10-04 PROCEDURE — 93926 LOWER EXTREMITY STUDY: CPT | Mod: 26,RT,, | Performed by: SPECIALIST

## 2024-10-04 PROCEDURE — 93971 EXTREMITY STUDY: CPT | Mod: RT

## 2024-10-04 PROCEDURE — 93922 UPR/L XTREMITY ART 2 LEVELS: CPT | Mod: 26,,, | Performed by: SPECIALIST

## 2024-10-04 PROCEDURE — 93971 EXTREMITY STUDY: CPT | Mod: 26,RT,, | Performed by: SPECIALIST

## 2024-10-07 ENCOUNTER — OFFICE VISIT (OUTPATIENT)
Dept: UROLOGY | Facility: CLINIC | Age: 54
End: 2024-10-07
Payer: MEDICAID

## 2024-10-07 VITALS — WEIGHT: 130 LBS | HEIGHT: 64 IN | BODY MASS INDEX: 22.2 KG/M2

## 2024-10-07 DIAGNOSIS — N32.81 OVERACTIVE BLADDER: Primary | ICD-10-CM

## 2024-10-07 LAB
BILIRUBIN, UA POC OHS: NEGATIVE
BLOOD, UA POC OHS: NEGATIVE
CLARITY, UA POC OHS: CLEAR
COLOR, UA POC OHS: YELLOW
GLUCOSE, UA POC OHS: NEGATIVE
KETONES, UA POC OHS: NEGATIVE
LEUKOCYTES, UA POC OHS: NEGATIVE
NITRITE, UA POC OHS: NEGATIVE
PH, UA POC OHS: 6
PROTEIN, UA POC OHS: NEGATIVE
SPECIFIC GRAVITY, UA POC OHS: 1.01
UROBILINOGEN, UA POC OHS: 0.2

## 2024-10-07 PROCEDURE — 3008F BODY MASS INDEX DOCD: CPT | Mod: CPTII,S$GLB,,

## 2024-10-07 PROCEDURE — 99204 OFFICE O/P NEW MOD 45 MIN: CPT | Mod: S$GLB,,,

## 2024-10-07 PROCEDURE — 1159F MED LIST DOCD IN RCRD: CPT | Mod: CPTII,S$GLB,,

## 2024-10-07 PROCEDURE — 81003 URINALYSIS AUTO W/O SCOPE: CPT | Mod: QW,S$GLB,,

## 2024-10-07 PROCEDURE — 3044F HG A1C LEVEL LT 7.0%: CPT | Mod: CPTII,S$GLB,,

## 2024-10-07 PROCEDURE — 1160F RVW MEDS BY RX/DR IN RCRD: CPT | Mod: CPTII,S$GLB,,

## 2024-10-07 NOTE — PROGRESS NOTES
Subjective:       Patient ID: Remi Baptiste is a 54 y.o. female.    Chief Complaint: No chief complaint on file.      HPI: 54-year-old female new patient presents today for frequent urination.  Patient reports that she has a history of interstitial cystitis that required hydrodistention in the past.  She reports that she has not had any flares in several years and she has learned to live with the residual symptoms of frequency.  She reports however that in the last 6 months she has noticed an increase in her urinary frequency.  She reports that she will go 3 times per hour with a normal volume of urine output at times and other times a small volume of urine.  She denies dysuria, abdominal pressure, and lower back pain.  She does complain of some urgency however she denies leakage.  She does complain of nocturia reporting that she gets up 5-6 times per night.  She reports her urine stream is strong without spraying or splitting.  Her PVR today is 0 mL       Past Medical History:   Past Medical History:   Diagnosis Date    Anxiety     Chest pain 04/29/2023    Depression     Diverticulitis     Endometrial cyst of ovary     Osteoporosis     Right wrist injury     Stroke        Past Surgical Historical:   Past Surgical History:   Procedure Laterality Date    BLADDER SUSPENSION      x2    OVARIAN CYST REMOVAL      endometreosis    TONSILLECTOMY          Medications:   Medication List with Changes/Refills   Current Medications    AZELASTINE (ASTELIN) 137 MCG (0.1 %) NASAL SPRAY    1 spray (137 mcg total) by Nasal route 2 (two) times daily.    CETIRIZINE (ZYRTEC) 10 MG TABLET    Take 1 tablet by mouth every morning.    CONJUGATED ESTROGENS (PREMARIN) VAGINAL CREAM    Place 1 g vaginally once daily.    FAMOTIDINE (PEPCID) 40 MG TABLET    Take 1 tablet (40 mg total) by mouth once daily.    FISH,BORA,FLAX OILS-OM3,6,9NO1 (OMEGA 3-6-9) 1,200 MG CAP    Take 1 capsule by mouth Daily.    FLUTICASONE PROPIONATE (FLONASE) 50  MCG/ACTUATION NASAL SPRAY    1 spray (50 mcg total) by Each Nostril route 2 (two) times a day.    OMEPRAZOLE (PRILOSEC) 40 MG CAPSULE    Take 40 mg by mouth every morning. prn    ONDANSETRON (ZOFRAN) 4 MG TABLET    Take 1 tablet (4 mg total) by mouth every 6 (six) hours as needed for Nausea.    VITAMIN E 100 UNIT CAPSULE    Take 100 Units by mouth once daily.        Past Social History:   Social History     Socioeconomic History    Marital status: Single    Number of children: 0   Occupational History    Occupation: eCareer    Occupation: [employer]   Tobacco Use    Smoking status: Never     Passive exposure: Never    Smokeless tobacco: Never   Substance and Sexual Activity    Alcohol use: Not Currently     Comment: Occasionally    Drug use: Never    Sexual activity: Not Currently     Partners: Male     Birth control/protection: Post-menopausal     Social Drivers of Health     Financial Resource Strain: High Risk (8/5/2024)    Overall Financial Resource Strain (CARDIA)     Difficulty of Paying Living Expenses: Very hard   Food Insecurity: Food Insecurity Present (8/5/2024)    Hunger Vital Sign     Worried About Running Out of Food in the Last Year: Sometimes true     Ran Out of Food in the Last Year: Never true   Transportation Needs: No Transportation Needs (6/22/2022)    PRAPARE - Transportation     Lack of Transportation (Medical): No     Lack of Transportation (Non-Medical): No   Physical Activity: Insufficiently Active (8/5/2024)    Exercise Vital Sign     Days of Exercise per Week: 3 days     Minutes of Exercise per Session: 30 min   Stress: Stress Concern Present (8/5/2024)    [place] citizen [place] [employer] - Occupational Stress Questionnaire     Feeling of Stress : Very much   Housing Stability: High Risk (8/5/2024)    Housing Stability Vital Sign     Unable to Pay for Housing in the Last Year: Yes       Allergies:   Review of patient's allergies indicates:   Allergen Reactions     Hydrocodone-guaifenesin Hives    Hydrocodone-acetaminophen     Penicillins Rash        Family History:   Family History   Problem Relation Name Age of Onset    Hypertension Mother Karen Mcgee     Arthritis Mother Karen Mcgee     COPD Mother Karen Mcgee     Hypertension Father Husam jenkins     Colon cancer Maternal Grandfather  70    Colon cancer Paternal Grandmother Brice jenkins 70        Review of Systems:  Review of Systems   Constitutional:  Negative for activity change, appetite change, chills, diaphoresis, fatigue, fever and unexpected weight change.   HENT:  Negative for congestion, dental problem, drooling, ear discharge, ear pain, facial swelling, hearing loss, mouth sores, nosebleeds, postnasal drip, rhinorrhea, sinus pressure, sinus pain, sneezing, sore throat, tinnitus, trouble swallowing and voice change.    Eyes:  Negative for photophobia, pain, discharge, redness, itching and visual disturbance.   Respiratory:  Negative for apnea, cough, choking, chest tightness, shortness of breath, wheezing and stridor.    Cardiovascular:  Negative for chest pain and leg swelling.   Gastrointestinal:  Negative for abdominal distention, abdominal pain, anal bleeding, blood in stool, constipation, diarrhea, nausea, rectal pain and vomiting.   Endocrine: Negative for cold intolerance, heat intolerance, polydipsia, polyphagia and polyuria.   Genitourinary:  Positive for frequency and urgency. Negative for decreased urine volume, difficulty urinating, dyspareunia, dysuria, enuresis, flank pain, genital sores, hematuria, menstrual problem, pelvic pain, vaginal bleeding, vaginal discharge and vaginal pain.   Musculoskeletal:  Negative for arthralgias, back pain, gait problem, joint swelling, myalgias, neck pain and neck stiffness.   Skin:  Negative for color change, pallor, rash and wound.   Allergic/Immunologic: Negative for environmental allergies, food allergies and immunocompromised state.   Neurological:   Negative for dizziness, tremors, seizures, syncope, facial asymmetry, speech difficulty, weakness, light-headedness, numbness and headaches.   Hematological:  Negative for adenopathy. Does not bruise/bleed easily.   Psychiatric/Behavioral:  Negative for agitation, behavioral problems, confusion, decreased concentration, dysphoric mood, hallucinations, self-injury, sleep disturbance and suicidal ideas. The patient is not nervous/anxious and is not hyperactive.        Physical Exam:  Physical Exam  Cardiovascular:      Rate and Rhythm: Normal rate.   Pulmonary:      Effort: Pulmonary effort is normal.   Abdominal:      General: Abdomen is flat. Bowel sounds are normal.      Palpations: Abdomen is soft.   Neurological:      Mental Status: She is alert and oriented to person, place, and time.   Urinalysis: Negative     PVR 0 mL  Assessment/Plan:     Overactive bladder:  We will start patient on Gemtessa by giving her 1 month samples.  Discussed effects of caffeine, tea, sodas, and alcohol on OAB symptoms. Instructed patient to decrease consumption of these fluids and to stop oral fluids approx 4 hours prior to bedtime to decrease night time symptoms. Education provided on bladder training, bladder control strategies, and PFT. Patient verbalized understanding.    Follow up in 1 month or sooner if needed   Problem List Items Addressed This Visit          Renal/    Interstitial cystitis - Primary

## 2024-10-09 ENCOUNTER — OFFICE VISIT (OUTPATIENT)
Dept: ORTHOPEDICS | Facility: CLINIC | Age: 54
End: 2024-10-09
Payer: MEDICAID

## 2024-10-09 ENCOUNTER — OFFICE VISIT (OUTPATIENT)
Dept: FAMILY MEDICINE | Facility: CLINIC | Age: 54
End: 2024-10-09
Payer: MEDICAID

## 2024-10-09 VITALS
DIASTOLIC BLOOD PRESSURE: 81 MMHG | HEART RATE: 82 BPM | SYSTOLIC BLOOD PRESSURE: 131 MMHG | BODY MASS INDEX: 22.02 KG/M2 | RESPIRATION RATE: 18 BRPM | WEIGHT: 129 LBS | HEIGHT: 64 IN | TEMPERATURE: 98 F | OXYGEN SATURATION: 98 %

## 2024-10-09 VITALS
WEIGHT: 134.5 LBS | HEART RATE: 72 BPM | HEIGHT: 64 IN | BODY MASS INDEX: 22.96 KG/M2 | SYSTOLIC BLOOD PRESSURE: 113 MMHG | DIASTOLIC BLOOD PRESSURE: 66 MMHG

## 2024-10-09 DIAGNOSIS — K57.90 DIVERTICULOSIS: ICD-10-CM

## 2024-10-09 DIAGNOSIS — M75.101 ROTATOR CUFF SYNDROME OF RIGHT SHOULDER: Primary | ICD-10-CM

## 2024-10-09 DIAGNOSIS — E83.52 SERUM CALCIUM ELEVATED: Primary | ICD-10-CM

## 2024-10-09 DIAGNOSIS — M75.41 SHOULDER IMPINGEMENT SYNDROME, RIGHT: ICD-10-CM

## 2024-10-09 PROCEDURE — 3075F SYST BP GE 130 - 139MM HG: CPT | Mod: CPTII,,, | Performed by: NURSE PRACTITIONER

## 2024-10-09 PROCEDURE — 3079F DIAST BP 80-89 MM HG: CPT | Mod: CPTII,,, | Performed by: NURSE PRACTITIONER

## 2024-10-09 PROCEDURE — 3044F HG A1C LEVEL LT 7.0%: CPT | Mod: CPTII,,, | Performed by: NURSE PRACTITIONER

## 2024-10-09 PROCEDURE — 3008F BODY MASS INDEX DOCD: CPT | Mod: CPTII,,, | Performed by: NURSE PRACTITIONER

## 2024-10-09 PROCEDURE — 99213 OFFICE O/P EST LOW 20 MIN: CPT | Mod: S$PBB,,, | Performed by: NURSE PRACTITIONER

## 2024-10-09 PROCEDURE — 1160F RVW MEDS BY RX/DR IN RCRD: CPT | Mod: CPTII,,, | Performed by: NURSE PRACTITIONER

## 2024-10-09 PROCEDURE — 1159F MED LIST DOCD IN RCRD: CPT | Mod: CPTII,,, | Performed by: NURSE PRACTITIONER

## 2024-10-09 PROCEDURE — 99215 OFFICE O/P EST HI 40 MIN: CPT | Mod: PBBFAC,PN | Performed by: NURSE PRACTITIONER

## 2024-10-09 PROCEDURE — 99213 OFFICE O/P EST LOW 20 MIN: CPT | Mod: PBBFAC,27 | Performed by: STUDENT IN AN ORGANIZED HEALTH CARE EDUCATION/TRAINING PROGRAM

## 2024-10-09 NOTE — PROGRESS NOTES
Patient Name: Remi Baptiste     : 1970    MRN: [id number]     Subjective:     Patient ID: Remi Baptiste is a 54 y.o. female.    Chief Complaint:   Chief Complaint   Patient presents with    Follow-up     Pt is here for follow up. Pt is requesting Dipak hyperthyroidism testing and GI referral d/t h/o diverticulitis        HPI: 10/9/24:  FU review of labs 2 weeks.  Pt requesting parathyroid hormone testing SORIANO.  Reports fly hx of this.  Recent labs showed elevated cholesterol-pt sees Cardiology and will discuss this with them.  Noted slight elevation in Calcium level at 10.6.  has not been elevated in the past.  She would like to pursue further SORIANO of this. Still has c/o losing her hair. She states that she knows her body and something is really really wrong with her and she just needs help.  She states her mother has Myasthenia Gravis.  Her recent autoimmune SORIANO done was negative. Iron studies WNL. Vitamin D is notably WNL. White count was very mildly decreased at 4.4; however RBC, hgb, Hct were WNL. MCV mildly elevated at 95.  Has not tried Premarin cream prescribed by GYN discussed at last visit.   Pt is also requesting a GI referral because Dr. Wilberto Hernandez is not able to see her any longer.        24:  Establish care with PCP.  Patient with multiple complaints today to be addressed.  She is established with Neurology, ENT, Ortho, and GYN clinic currently. Previously seen by Dr. Swan.  States that her last testing for MS was in . Tested twice in her life. She has the following symptoms that she thinks may be related to a dx of MS. C/o left eye twitching, rapid movements. She has been seen by Jaspreet and examined but nothing diagnosed.  Has balance issues for a while. Feels lightheaded at times-not now.  C/o nausea.  That is chronic.  Needs refill of Zofran.  She states she has an upcoming appointment with ENT and will discuss this with them.   Hx interstitial cystitis. Has had bladder distended.  Past  "couple of months is urinating 3 times a hour which is a full amount of urine.  Disrupting her life.  Not able to work another job. Is asking for a referral to Urology for evaluation of such.   Hormones are "out of whack".  Hot flashes.  No period in 3 years.  Worse lately.  Mood swings, anxiety issues, sleep issues.  She sees GYN clinic and has upcoming appointment. She was prescribed Premarin cream in the past for vaginal atrophy. Has not used it yet.   RLE pain behind knee. Parents with hx blood clots, this is her concern. Vein is protruding from back of knee, however it is not engorged or painful. She does c/o pain behind the knee that seems to be reproducible on palpitation and more muscle related. Denies any injury to back of leg.  Feels internally increased heat to right thigh.   Due for MMG. Told that she does not need to be on statin medication.    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  Stroke with TPA. Also with anxiety, PTSD, grief reaction.   Has appointment with ortho clinic coming up for her wrist.  Has broken her foot in past and had foot drop from that incident.               ROS:      Review of Systems   Constitutional:  Negative for chills, diaphoresis, fever, malaise/fatigue and weight loss.        Hair loss, hot flashes   Eyes:         Left eye twitching   Gastrointestinal:  Positive for nausea.   Genitourinary:  Positive for frequency and urgency.   Psychiatric/Behavioral:  The patient is nervous/anxious and has insomnia.    All other systems reviewed and are negative.           History:     Past Medical History:   Diagnosis Date    Anxiety     Chest pain 04/29/2023    Depression     Diverticulitis     Endometrial cyst of ovary     Osteoporosis     Right wrist injury     Stroke         Past Surgical History:   Procedure Laterality Date    BLADDER SUSPENSION      x2    OVARIAN CYST REMOVAL      endometreosis    TONSILLECTOMY         Family History   Problem Relation Name Age of Onset    Hypertension Mother " "Karen Mcgee     Arthritis Mother Karen Mcgee     COPD Mother Karen Mcgee     Hypertension Father Husam jenkins     Colon cancer Maternal Grandfather  70    Colon cancer Paternal Grandmother Brice jenkins 70        Social History     Tobacco Use    Smoking status: Never     Passive exposure: Never    Smokeless tobacco: Never   Substance and Sexual Activity    Alcohol use: Not Currently     Comment: Occasionally    Drug use: Never    Sexual activity: Not Currently     Partners: Male     Birth control/protection: Post-menopausal       Current Outpatient Medications   Medication Instructions    azelastine (ASTELIN) 137 mcg, Nasal, 2 times daily    cetirizine (ZYRTEC) 10 MG tablet 1 tablet, Every morning    conjugated estrogens (PREMARIN) 1 g, Vaginal, Daily    famotidine (PEPCID) 40 mg, Oral, Daily    fish,bora,flax oils-om3,6,9no1 (OMEGA 3-6-9) 1,200 mg Cap 1 capsule, Daily    fluticasone propionate (FLONASE) 50 mcg, Each Nostril, 2 times daily    omeprazole (PRILOSEC) 40 mg, Every morning    ondansetron (ZOFRAN) 4 mg, Oral, Every 6 hours PRN    vitamin E 100 Units, Daily        Review of patient's allergies indicates:   Allergen Reactions    Hydrocodone-guaifenesin Hives    Hydrocodone-acetaminophen     Penicillins Rash       Objective:     Visit Vitals  /81 (BP Location: Left arm, Patient Position: Sitting)   Pulse 82   Temp 97.9 °F (36.6 °C) (Oral)   Resp 18   Ht 5' 4" (1.626 m)   Wt 58.5 kg (129 lb)   SpO2 98%   BMI 22.14 kg/m²       Physical Examination:     Physical Exam  Vitals reviewed.   Constitutional:       Appearance: Normal appearance. She is normal weight.   HENT:      Head: Normocephalic.   Cardiovascular:      Rate and Rhythm: Normal rate and regular rhythm.      Pulses: Normal pulses.      Heart sounds: Normal heart sounds.   Pulmonary:      Effort: Pulmonary effort is normal.      Breath sounds: Normal breath sounds.   Abdominal:      General: Abdomen is flat.      Palpations: Abdomen " is soft.   Musculoskeletal:         General: Normal range of motion.      Cervical back: Normal range of motion.   Skin:     General: Skin is warm and dry.   Neurological:      Mental Status: She is alert.   Psychiatric:         Mood and Affect: Mood normal.         Lab Results:     Chemistry:  Lab Results   Component Value Date     09/24/2024    K 4.0 09/24/2024    BUN 17.7 09/24/2024    CREATININE 0.77 09/24/2024    EGFRNORACEVR >60 09/24/2024    GLUCOSE 84 09/24/2024    CALCIUM 10.6 (H) 09/24/2024    ALKPHOS 58 09/24/2024    LABPROT 7.7 09/24/2024    ALBUMIN 4.6 09/24/2024    BILIDIR 0.3 07/02/2021    IBILI 0.40 07/02/2021    AST 21 09/24/2024    ALT 26 09/24/2024    MG 2.20 11/30/2022    JCRCNSOI89JB 34 09/24/2024    TSH 2.181 09/24/2024    FABUMP7SUHH 0.93 09/24/2024        Lab Results   Component Value Date    HGBA1C 5.1 09/24/2024        Hematology:  Lab Results   Component Value Date    WBC 4.33 (L) 09/24/2024    HGB 14.2 09/24/2024    HCT 41.9 09/24/2024     09/24/2024       Lipid Panel:  Lab Results   Component Value Date    CHOL 266 (H) 09/24/2024     (H) 09/24/2024    .00 (H) 09/24/2024    TRIG 61 09/24/2024    TOTALCHOLEST 3 09/24/2024        Urine:  Lab Results   Component Value Date    APPEARANCEUA Clear 09/24/2024    SGUA 1.011 09/24/2024    PROTEINUA Negative 09/24/2024    KETONESUA Negative 09/24/2024    BLOODUA Negative 10/07/2024    LEUKOCYTESUR Negative 09/24/2024    RBCUA 0-5 09/24/2024    WBCUA None Seen 09/24/2024    BACTERIA None Seen 09/24/2024    SQEPUA None Seen 09/24/2024    HYALINECASTS None Seen 09/24/2024        Assessment:          ICD-10-CM ICD-9-CM   1. Serum calcium elevated  E83.52 275.42   2. Diverticulosis  K57.90 562.10        Plan:     1. Serum calcium elevated  Assessment & Plan:  10.6 on last labs. Ionized calcium, PTH intact and repeat CMP ordered to be done next week at hospital lab.   FU 1 month to discuss labs.    Orders:  -     Comprehensive  Metabolic Panel; Future; Expected date: 10/09/2024  -     PTH, Intact; Future; Expected date: 10/09/2024  -     Calcium, Ionized; Future; Expected date: 10/09/2024    2. Diverticulosis  Assessment & Plan:  Referral to GI at pt request.    Orders:  -     Ambulatory referral/consult to Gastroenterology; Future; Expected date: 10/16/2024         Follow up in about 1 month (around 11/9/2024) for Lab review for elevated calclium level.    Future Appointments   Date Time Provider Department Center   10/22/2024  1:00 PM SCHEDULE,  MINOR SURGERY [facility] FM RES [place]    10/31/2024 11:30 AM RESIDENT 1, [facility] OTORHINOLARYNGOLOGY [facility] ENT [place]    11/7/2024  9:20 AM Ying Mars, NP [facility] UROLOGY  [place]   11/11/2024  9:40 AM Shasha Cardoza FNP [facility]   11/15/2024 11:00 AM Raegan Swan MD [facility] FAMMED [place]    11/22/2024  8:30 AM Channing Rea DO [facility] ORTHO [place]    2/19/2025  8:10 AM Emily Mohr FNP [facility] GYN [place]    2/26/2025  3:00 PM Pamela Hughes, HARDIK [facility] NEURO [place]         JOAQUINA Chamberlain

## 2024-10-09 NOTE — PROGRESS NOTES
"Subjective:    Patient ID: Remi Baptiste is a right handed 54 y.o. female  who presented to [facility] for follow up..      Chief Complaint: Pain of the Right Shoulder      History of Present Illness:  Remi Baptiste is a 54-year-old female who presents to clinic today for follow-up on her right shoulder pain and decreased range of motion.  She reports that the shoulder pain has been going on for about 3 months now.  She was last seen about 2 months ago and had a right subacromial injection which provided a where with a 100% relief.  Patient did not go to physical therapy or do home exercises as prescribed and reports worsening of her right shoulder pain today.  She reports pain as a 8/10 and describes the pain as sharp.  She reports having increased difficulty doing things above her head.  She works as a  and is unable to reach above her head to clean.  She is not taking any oral medications at this time or are using any creams.  She is  requesting a new corticosteroid injection at this time.    Shoulder Review of Systems:  Swelling?  no  Instability?  no  Clicking?  no  Limited ROM? yes  Fever/Chills? no  Subluxation? no  Dislocation? no    Comorbid Conditions         Objective:      Physical Exam:    /66   Pulse 72   Ht 5' 4" (1.626 m)   Wt 61 kg (134 lb 7.7 oz)   BMI 23.08 kg/m²     Ortho/SPM Exam    Appearance:  Soft tissue swelling: Left: no Right: no  Effusion: Left:  Negative Right: Negative  Erythema: Left no Right: no  Ecchymosis: Left: no Right: no  Atrophy: Left: no Right: no  Scapular winging: Left: no Right: no    Palpation:  Shoulder Tenderness: Left: none  Right: AC joint, deltoid muscle    Range of motion:  Flexion (0-90): Left:  90 Right: 90  Abduction (0-180): Left:  180 Right: 90  External rotation (0-55): Left: 55 Right: 45  Internal rotation (0-45): Left: 45 Right: 15    Strength:  Abduction: Left: 5/5 Pain: no Right: 5/5 " Pain: yes  External rotation: Left: 5/5 Pain: no Right: 5/5 Pain: no  Internal rotation: Left: 5/5 Pain: no Right: 5/5 Pain: yes  Elbow flexion: Left: 5/5 Pain: no Right: 5/5 Pain: no  Elbow extension: Left: 5/5 Pain: no Right: 5/5 Pain: no    Special Tests:  Subacromial Impingement  Neer: Left: Negative Right: Positive  Araujo: Left: Negative Right: Positive    AC Joint Arthritis:  Cross-body abduction: Left: Negative Right: Positive    Rotator Cuff Tear   Drop arm test: Left: Negative Right: Negative  Hornblower: Left: Left: Not performed Right: Not performed   Belly press test: Left: Negative Right: Negative  Kael test (Empty can): Left: Negative Right: Positive    Biceps tendon/Labral tear  Speed's: Left: Negative Right: Positive  Yergason's: Left: Negative Right: Negative  O'Fransisco's: Left: Negative Right: Negative  Cranck test: Left: Negative Right: Negative    Stability   Sulcus sign: Left: Not performed Right: Not performed   Apprehension test: Left: Not performed Right: Not performed   Relocation test: Left: Not performed Right: Not performed     Cervical   Spurling: Left: Negative Right: Negative    AIN/PIN/Ulnar nerve: Intact and symmetric    General appearance: NAD  Peripheral pulses: normal bilaterally   Reflexes: Left: Not performed Right: Not performed   Sensation: normal    Labs:  Last A1c: The patient doesn't have any registry metric data available     Imaging:   Previous images reviewed.  X-rays ordered and performed today: no  # of views: 4 Laterality: right  My Interpretation:  No fractures or dislocations noted.  Mild AC joint arthritis.  No glenohumeral arthritis    Assessment:        Encounter Diagnoses   Code Name Primary?    M75.101 Rotator cuff syndrome of right shoulder Yes    M75.41 Shoulder impingement syndrome, right         Plan:     Dx: right. rotator cuff tendinitis right shoulder impingement syndrome, moderate exacerbation  Treatment Plan: Discussed with patient diagnosis and  treatment recommendations. Natural history and expected course discussed. Questions answered.  Reduction in offending activity.  Gentle ROM exercises.  Rest, ice, compression, and elevation (RICE) therapy.  Plain film x-rays.  Physical therapy referral.  Follow up in 6 week.  Home physical therapy exercise handouts provided to patient.   formal PT script provided to patient. You may take this script to whichever physical therapist you would like to go to.   Over the counter NSAID and/or tylenol provided you do not have contraindications such as but not limited to liver or kidney disease or uncontrolled blood pressure. If you're doctors have told you to to not take them based on your health, do not take them.   Voltaren gel  Imaging: prior radiological studies independently reviewed; discussed with patient; agree with radiologist interpretation.   Procedure: Discussed CSI/VSI as treatment options; patient not a candidate for injections at this time.  Therapy: Physical Therapy counseled the patient on the importance of doing physical therapy formally and home exercises to increase mobility pain relief and range of motion.  Reinforced the patient needs to do formal physical therapy to improve her symptoms and the injection is just for symptomatic relief.  Medication: START over-the-counter acetaminophen (Tylenol 1000 mg three times per day as needed)  START Voltaren Gel 1% as prescribed to affected area  CONTINUE over-the-counter NSAIDs (ibuprofen 200mg three tablets three times a day as needed). Please see your primary care physician for further refills.  RTC:  6 weeks.         Procedures    Channing Rea D.O.  Sports Medicine Fellow

## 2024-10-09 NOTE — ASSESSMENT & PLAN NOTE
10.6 on last labs. Ionized calcium, PTH intact and repeat CMP ordered to be done next week at hospital lab.   FU 1 month to discuss labs.

## 2024-10-10 NOTE — PROGRESS NOTES
Faculty Attestation: Remi Baptiste  was seen in Sports Medicine Clinic Discussed with Dr. Rea at the time of the visit. History of Present Illness, Physical Exam, and Assessment and Plan reviewed.     Treatment plan is reasonable and appropriate. Compliance with treatment recommendations is important.      Radiology images independently reviewed and agree with fellow interpretation.     No procedure was performed.    Kirk Aguilar MD  Sports Medicine

## 2024-10-22 ENCOUNTER — OFFICE VISIT (OUTPATIENT)
Dept: FAMILY MEDICINE | Facility: CLINIC | Age: 54
End: 2024-10-22
Payer: MEDICAID

## 2024-10-22 ENCOUNTER — TELEPHONE (OUTPATIENT)
Dept: FAMILY MEDICINE | Facility: CLINIC | Age: 54
End: 2024-10-22
Payer: MEDICAID

## 2024-10-22 VITALS
HEART RATE: 65 BPM | HEIGHT: 64 IN | OXYGEN SATURATION: 98 % | BODY MASS INDEX: 21.83 KG/M2 | WEIGHT: 127.88 LBS | SYSTOLIC BLOOD PRESSURE: 116 MMHG | DIASTOLIC BLOOD PRESSURE: 71 MMHG | RESPIRATION RATE: 18 BRPM | TEMPERATURE: 99 F

## 2024-10-22 DIAGNOSIS — L82.1 SEBORRHEIC KERATOSIS: Primary | ICD-10-CM

## 2024-10-22 PROCEDURE — 17110 DESTRUCTION B9 LES UP TO 14: CPT | Mod: PBBFAC | Performed by: FAMILY MEDICINE

## 2024-10-22 PROCEDURE — 99213 OFFICE O/P EST LOW 20 MIN: CPT | Mod: PBBFAC,25

## 2024-10-22 NOTE — PROGRESS NOTES
"  Family Medicine Clinic Note     Subjective     Patient ID: Remi Baptiste is a 54 y.o. female.    Chief Complaint: Seborrheic Keratosis    Pt is a 53 yo F who presents for concerns of a few skin lesions. Pt had lesions frozen on her R temple last apt; has healed well. New lesions are present on her R arm and R shoulder. Lesions have not grown in size, are non-tender and denied any skin color changes.       Review of Systems   Constitutional:  Negative for chills, fever and malaise/fatigue.   Skin:         Two multiple hyperpigmented stuck on raised lesions present on Chest and R shoulder.       Objective     Vitals:    10/22/24 1308   BP: 116/71   Patient Position: Sitting   Pulse: 65   Resp: 18   Temp: 98.6 °F (37 °C)   SpO2: 98%   Weight: 58 kg (127 lb 13.9 oz)   Height: 5' 3.78" (1.62 m)       Current Outpatient Medications   Medication Instructions    azelastine (ASTELIN) 137 mcg, Nasal, 2 times daily    cetirizine (ZYRTEC) 10 MG tablet 1 tablet, Every morning    conjugated estrogens (PREMARIN) 1 g, Vaginal, Daily    famotidine (PEPCID) 40 mg, Oral, Daily    fish,bora,flax oils-om3,6,9no1 (OMEGA 3-6-9) 1,200 mg Cap 1 capsule, Daily    fluticasone propionate (FLONASE) 50 mcg, Each Nostril, 2 times daily    omeprazole (PRILOSEC) 40 mg, Every morning    ondansetron (ZOFRAN) 4 mg, Oral, Every 6 hours PRN    vitamin E 100 Units, Daily       Physical Exam  Vitals reviewed.   Constitutional:       General: She is not in acute distress.  Skin:     Comments: Pt has 2 flesh colored stuck on appearing lesions present on R shoulder and RUE.   Neurological:      Mental Status: She is alert.       Procedure Note:  Procedure: Cryotherapy  Performed by: Des Wilkes MD  Supervised by: Haylie wheeler MD  Consent: signed consent obtained after discussion of risks, benefits, and alternative treatments  Description: Liquid nitrogen used for cryotherapy. Tip held 1 cm from lesion and sprayed in freeze-thaw cycle. The patient " tolerated the procedure well with no complications.      Assessment and Plan      1. Seborrheic keratosis      -2 SKs received cryotherapy today   -Post-procedure care dicussed with pt and she expressed understanding.   -Pt also had concerns for hair loss; will send referral to Derm for her for further evaluation.        Follow up if symptoms worsen or fail to improve.      Des Wilkes MD  [facility]

## 2024-10-22 NOTE — TELEPHONE ENCOUNTER
----- Message from Sveta sent at 10/22/2024  2:02 PM CDT -----  Regarding: referral  .Who Called: Remi Baptiste    Does the patient already have the specialty appointment scheduled?:no  If yes, what is the date of that appointment?:  Referral to What Specialty:urologist   Reason for Referral:problems urinating   Does the patient want the referral with a specific physician?:[facility]  If yes, which provider?:   [facility]    Preferred Method of Contact: Phone Call  Patient's Preferred Phone Number on File: [phone]   Best Call Back Number, if different:  Additional Information:  pt requesting a referral to a urologist at [facility]

## 2024-10-28 ENCOUNTER — LAB VISIT (OUTPATIENT)
Dept: LAB | Facility: HOSPITAL | Age: 54
End: 2024-10-28
Attending: NURSE PRACTITIONER
Payer: MEDICAID

## 2024-10-28 DIAGNOSIS — E83.52 SERUM CALCIUM ELEVATED: ICD-10-CM

## 2024-10-28 LAB
ALBUMIN SERPL-MCNC: 4.1 G/DL (ref 3.5–5)
ALBUMIN/GLOB SERPL: 1.5 RATIO (ref 1.1–2)
ALP SERPL-CCNC: 48 UNIT/L (ref 40–150)
ALT SERPL-CCNC: 24 UNIT/L (ref 0–55)
ANION GAP SERPL CALC-SCNC: 7 MEQ/L
AST SERPL-CCNC: 24 UNIT/L (ref 5–34)
BILIRUB SERPL-MCNC: 0.5 MG/DL
BUN SERPL-MCNC: 13.7 MG/DL (ref 9.8–20.1)
CALCIUM SERPL-MCNC: 9.8 MG/DL (ref 8.4–10.2)
CHLORIDE SERPL-SCNC: 106 MMOL/L (ref 98–107)
CO2 SERPL-SCNC: 29 MMOL/L (ref 22–29)
CREAT SERPL-MCNC: 0.69 MG/DL (ref 0.55–1.02)
CREAT/UREA NIT SERPL: 20
GFR SERPLBLD CREATININE-BSD FMLA CKD-EPI: >60 ML/MIN/1.73/M2
GLOBULIN SER-MCNC: 2.7 GM/DL (ref 2.4–3.5)
GLUCOSE SERPL-MCNC: 97 MG/DL (ref 74–100)
POTASSIUM SERPL-SCNC: 4.9 MMOL/L (ref 3.5–5.1)
PROT SERPL-MCNC: 6.8 GM/DL (ref 6.4–8.3)
PTH-INTACT SERPL-MCNC: 65.4 PG/ML (ref 8.7–77)
SODIUM SERPL-SCNC: 142 MMOL/L (ref 136–145)

## 2024-10-28 PROCEDURE — 80053 COMPREHEN METABOLIC PANEL: CPT

## 2024-10-28 PROCEDURE — 83970 ASSAY OF PARATHORMONE: CPT

## 2024-10-28 PROCEDURE — 82330 ASSAY OF CALCIUM: CPT

## 2024-10-28 PROCEDURE — 36415 COLL VENOUS BLD VENIPUNCTURE: CPT

## 2024-10-29 LAB — CA-I ADJ PH7.4 SERPL ISE-MCNC: 5.36 MG/DL (ref 4.57–5.43)

## 2024-10-30 ENCOUNTER — OFFICE VISIT (OUTPATIENT)
Dept: UROLOGY | Facility: CLINIC | Age: 54
End: 2024-10-30
Payer: MEDICAID

## 2024-10-30 VITALS
SYSTOLIC BLOOD PRESSURE: 116 MMHG | WEIGHT: 132 LBS | RESPIRATION RATE: 20 BRPM | BODY MASS INDEX: 22.53 KG/M2 | OXYGEN SATURATION: 98 % | HEIGHT: 64 IN | DIASTOLIC BLOOD PRESSURE: 71 MMHG | HEART RATE: 65 BPM | TEMPERATURE: 99 F

## 2024-10-30 DIAGNOSIS — N30.10 INTERSTITIAL CYSTITIS: Primary | ICD-10-CM

## 2024-10-30 LAB
BILIRUB SERPL-MCNC: NORMAL MG/DL
BLOOD URINE, POC: NORMAL
COLOR, POC UA: YELLOW
GLUCOSE UR QL STRIP: NORMAL
KETONES UR QL STRIP: NORMAL
LEUKOCYTE ESTERASE URINE, POC: NORMAL
NITRITE, POC UA: NORMAL
PH, POC UA: 5.5
PROTEIN, POC: NORMAL
SPECIFIC GRAVITY, POC UA: >=1.03
UROBILINOGEN, POC UA: 0.2

## 2024-10-30 PROCEDURE — 81001 URINALYSIS AUTO W/SCOPE: CPT | Mod: PBBFAC | Performed by: NURSE PRACTITIONER

## 2024-10-30 PROCEDURE — 99214 OFFICE O/P EST MOD 30 MIN: CPT | Mod: PBBFAC | Performed by: NURSE PRACTITIONER

## 2024-10-31 ENCOUNTER — OFFICE VISIT (OUTPATIENT)
Dept: OTOLARYNGOLOGY | Facility: CLINIC | Age: 54
End: 2024-10-31
Payer: MEDICAID

## 2024-10-31 VITALS
SYSTOLIC BLOOD PRESSURE: 110 MMHG | RESPIRATION RATE: 20 BRPM | HEART RATE: 71 BPM | TEMPERATURE: 97 F | OXYGEN SATURATION: 99 % | BODY MASS INDEX: 22.53 KG/M2 | DIASTOLIC BLOOD PRESSURE: 60 MMHG | HEIGHT: 64 IN | WEIGHT: 132 LBS

## 2024-10-31 DIAGNOSIS — R09.81 NASAL CONGESTION: ICD-10-CM

## 2024-10-31 DIAGNOSIS — J34.3 NASAL TURBINATE HYPERTROPHY: ICD-10-CM

## 2024-10-31 DIAGNOSIS — J34.89 NASAL OBSTRUCTION: Primary | ICD-10-CM

## 2024-10-31 DIAGNOSIS — J34.2 NASAL SEPTAL DEVIATION: ICD-10-CM

## 2024-10-31 PROCEDURE — 99213 OFFICE O/P EST LOW 20 MIN: CPT | Mod: PBBFAC

## 2024-10-31 RX ORDER — FLUTICASONE PROPIONATE 50 MCG
1 SPRAY, SUSPENSION (ML) NASAL 2 TIMES DAILY
Qty: 16 G | Refills: 11 | Status: SHIPPED | OUTPATIENT
Start: 2024-10-31

## 2024-10-31 RX ORDER — AZELASTINE 1 MG/ML
1 SPRAY, METERED NASAL 2 TIMES DAILY
Qty: 30 ML | Refills: 5 | Status: SHIPPED | OUTPATIENT
Start: 2024-10-31 | End: 2025-10-31

## 2024-11-06 ENCOUNTER — CLINICAL SUPPORT (OUTPATIENT)
Dept: UROLOGY | Facility: CLINIC | Age: 54
End: 2024-11-06
Payer: MEDICAID

## 2024-11-06 DIAGNOSIS — N30.10 INTERSTITIAL CYSTITIS: Primary | ICD-10-CM

## 2024-11-06 LAB
BILIRUB SERPL-MCNC: NORMAL MG/DL
BLOOD URINE, POC: NORMAL
COLOR, POC UA: YELLOW
GLUCOSE UR QL STRIP: NORMAL
KETONES UR QL STRIP: NORMAL
LEUKOCYTE ESTERASE URINE, POC: NORMAL
NITRITE, POC UA: NORMAL
PH, POC UA: 5.5
PROTEIN, POC: NORMAL
SPECIFIC GRAVITY, POC UA: 1.02
UROBILINOGEN, POC UA: 0.2

## 2024-11-06 PROCEDURE — 51700 IRRIGATION OF BLADDER: CPT | Mod: PBBFAC | Performed by: NURSE PRACTITIONER

## 2024-11-06 PROCEDURE — 81001 URINALYSIS AUTO W/SCOPE: CPT | Mod: PBBFAC

## 2024-11-06 PROCEDURE — 96372 THER/PROPH/DIAG INJ SC/IM: CPT | Mod: PBBFAC

## 2024-11-06 PROCEDURE — 99212 OFFICE O/P EST SF 10 MIN: CPT | Mod: PBBFAC

## 2024-11-06 PROCEDURE — 51700 IRRIGATION OF BLADDER: CPT | Mod: S$PBB,,, | Performed by: NURSE PRACTITIONER

## 2024-11-06 RX ORDER — DEXAMETHASONE SODIUM PHOSPHATE 10 MG/ML
100 INJECTION INTRAMUSCULAR; INTRAVENOUS
Status: COMPLETED | OUTPATIENT
Start: 2024-11-06 | End: 2024-11-06

## 2024-11-06 RX ORDER — LIDOCAINE HYDROCHLORIDE 20 MG/ML
5 INJECTION, SOLUTION INFILTRATION; PERINEURAL
Status: COMPLETED | OUTPATIENT
Start: 2024-11-06 | End: 2024-11-06

## 2024-11-06 RX ORDER — LIDOCAINE HYDROCHLORIDE 20 MG/ML
JELLY TOPICAL
Status: COMPLETED | OUTPATIENT
Start: 2024-11-06 | End: 2024-11-06

## 2024-11-06 RX ORDER — SODIUM BICARBONATE 42 MG/ML
5 INJECTION, SOLUTION INTRAVENOUS
Status: COMPLETED | OUTPATIENT
Start: 2024-11-06 | End: 2024-11-06

## 2024-11-06 RX ORDER — HEPARIN SODIUM 5000 [USP'U]/ML
40000 INJECTION, SOLUTION INTRAVENOUS; SUBCUTANEOUS
Status: COMPLETED | OUTPATIENT
Start: 2024-11-06 | End: 2024-11-06

## 2024-11-06 RX ADMIN — HEPARIN SODIUM 40000 UNITS: 5000 INJECTION, SOLUTION INTRAVENOUS; SUBCUTANEOUS at 10:11

## 2024-11-06 RX ADMIN — LIDOCAINE HYDROCHLORIDE: 20 JELLY TOPICAL at 10:11

## 2024-11-06 RX ADMIN — DEXAMETHASONE SODIUM PHOSPHATE 100 MG: 10 INJECTION, SOLUTION INTRAMUSCULAR; INTRAVENOUS at 10:11

## 2024-11-06 RX ADMIN — SODIUM BICARBONATE 10 ML: 42 INJECTION, SOLUTION INTRAVENOUS at 10:11

## 2024-11-06 RX ADMIN — LIDOCAINE HYDROCHLORIDE 5 ML: 20 INJECTION, SOLUTION INFILTRATION; PERINEURAL at 10:11

## 2024-11-06 NOTE — PROGRESS NOTES
Patient requested 12 in fr blake and also lidocaine jelly Bladder instillation #1 administered with sterile technique via 15 Fr. Blake , small amount of clear odorless urine drained from bladder. Verbalized patient to hold bladder a minimum of two hrs Procedure tolerated well RTC for weekly instillation   Component 10:50   Color, UA Yellow   Spec Grav UA 1.020   pH, UA 5.5   WBC, UA trace   Nitrite, UA neg   Protein, POC neg   Glucose, UA neg   Ketones, UA neg   Urobilinogen, UA 0.2   Bilirubin, POC neg   Blood, UA neg             Specimen Collected: 11/06/24 10:50 CST Last Resulted: 11/06/24 10:50 CST   Microscopic urinalysis revealed WBCs 0-1 per HPF, negative RBCs, negative bacteria, negative epithelial cells, negative nitrites.

## 2024-11-11 ENCOUNTER — OFFICE VISIT (OUTPATIENT)
Dept: FAMILY MEDICINE | Facility: CLINIC | Age: 54
End: 2024-11-11
Payer: MEDICAID

## 2024-11-11 VITALS
TEMPERATURE: 98 F | DIASTOLIC BLOOD PRESSURE: 68 MMHG | HEIGHT: 64 IN | BODY MASS INDEX: 22.36 KG/M2 | HEART RATE: 73 BPM | RESPIRATION RATE: 18 BRPM | OXYGEN SATURATION: 98 % | WEIGHT: 131 LBS | SYSTOLIC BLOOD PRESSURE: 108 MMHG

## 2024-11-11 DIAGNOSIS — L98.9 SKIN LESIONS, GENERALIZED: ICD-10-CM

## 2024-11-11 DIAGNOSIS — L65.9 HAIR LOSS: ICD-10-CM

## 2024-11-11 DIAGNOSIS — E83.52 SERUM CALCIUM ELEVATED: Primary | ICD-10-CM

## 2024-11-11 PROCEDURE — 3074F SYST BP LT 130 MM HG: CPT | Mod: CPTII,,, | Performed by: NURSE PRACTITIONER

## 2024-11-11 PROCEDURE — 3078F DIAST BP <80 MM HG: CPT | Mod: CPTII,,, | Performed by: NURSE PRACTITIONER

## 2024-11-11 PROCEDURE — 3044F HG A1C LEVEL LT 7.0%: CPT | Mod: CPTII,,, | Performed by: NURSE PRACTITIONER

## 2024-11-11 PROCEDURE — 99215 OFFICE O/P EST HI 40 MIN: CPT | Mod: PBBFAC,PN | Performed by: NURSE PRACTITIONER

## 2024-11-11 PROCEDURE — 99213 OFFICE O/P EST LOW 20 MIN: CPT | Mod: S$PBB,,, | Performed by: NURSE PRACTITIONER

## 2024-11-11 PROCEDURE — 1159F MED LIST DOCD IN RCRD: CPT | Mod: CPTII,,, | Performed by: NURSE PRACTITIONER

## 2024-11-11 PROCEDURE — 3008F BODY MASS INDEX DOCD: CPT | Mod: CPTII,,, | Performed by: NURSE PRACTITIONER

## 2024-11-11 PROCEDURE — 1160F RVW MEDS BY RX/DR IN RCRD: CPT | Mod: CPTII,,, | Performed by: NURSE PRACTITIONER

## 2024-11-11 RX ORDER — METRONIDAZOLE 500 MG/1
500 TABLET ORAL 3 TIMES DAILY
COMMUNITY
Start: 2024-11-04

## 2024-11-11 RX ORDER — LACTULOSE 10 G/15ML
30 SOLUTION ORAL; RECTAL
COMMUNITY
Start: 2024-10-24

## 2024-11-11 RX ORDER — CIPROFLOXACIN 500 MG/1
500 TABLET ORAL 2 TIMES DAILY
COMMUNITY
Start: 2024-11-04

## 2024-11-11 NOTE — PROGRESS NOTES
"Patient Name: Remi Baptiste     : 1970    MRN: [id number]     Subjective:     Patient ID: Remi Baptiste is a 54 y.o. female.    Chief Complaint:   Chief Complaint   Patient presents with    Follow-up     Pt is here for follow up. Pt will r/s MMG once she feels better        HPI: 24: Pt is here for lab review. We worked up her elevated calcium level with concerns about parathyroid issues.  Repeat Calcium was normal. PTH was normal.      Hair loss-  States that she is still having hair loss issues and has some pigmented skin lesions she would like evaluated on her face and anterior thighs. Asking for referral to dermatology.       Neurologic complaints-pt established with Neurology  States that her last testing for MS was in . Tested twice in her life. She has the following symptoms that she thinks may be related to a dx of MS. C/o left eye twitching, rapid movements. She has been seen by Jaspreet and examined but nothing diagnosed.  Has balance issues for a while. Feels lightheaded at times-not now.  C/o nausea.  That is chronic. he states her mother has Myasthenia Gravis. She has had numerous MRI and CT performed that did not show any evidence of demyelinating disease over the past 3 years.      Diverticulitis-current flare on abx for from GI  She states that she is currently taking antibiotics for diverticulitis flare. She has seen GI.      Interstitial Cystitis-  Has also seen Urology and is getting bladder instillations.     Hormones are "out of whack".  Hot flashes.  No period in 3 years.  Worse lately.  Mood swings, anxiety issues, sleep issues.  She sees GYN clinic and has upcoming appointment. She was prescribed Premarin cream in the past for vaginal atrophy. Has not used it yet.           10/9/24:  FU review of labs 2 weeks.  Pt requesting parathyroid hormone testing SORIANO.  Reports fly hx of this.  Recent labs showed elevated cholesterol-pt sees Cardiology and will discuss this with them.  Noted " slight elevation in Calcium level at 10.6.  has not been elevated in the past.  She would like to pursue further SORIANO of this. Still has c/o losing her hair. She states that she knows her body and something is really really wrong with her and she just needs help.  She states her mother has Myasthenia Gravis.  Her recent autoimmune SORIANO done was negative. Iron studies WNL. Vitamin D is notably WNL. White count was very mildly decreased at 4.4; however RBC, hgb, Hct were WNL. MCV mildly elevated at 95.  Has not tried Premarin cream prescribed by GYN discussed at last visit.   Pt is also requesting a GI referral because Dr. Ladd Mary is not able to see her any longer.              ROS:      Review of Systems   Constitutional: Negative.    HENT: Negative.     Eyes: Negative.    Respiratory: Negative.     Cardiovascular: Negative.    Gastrointestinal:  Positive for abdominal pain, diarrhea and nausea.   Genitourinary: Negative.    Musculoskeletal: Negative.  Negative for falls.   Skin: Negative.         Lesions pigmented to thighs and face   Neurological:  Positive for sensory change.   Endo/Heme/Allergies: Negative.    Psychiatric/Behavioral: Negative.     All other systems reviewed and are negative.           History:     Past Medical History:   Diagnosis Date    Anxiety     Chest pain 04/29/2023    Depression     Diverticulitis     Endometrial cyst of ovary     Osteoporosis     Right wrist injury     Stroke         Past Surgical History:   Procedure Laterality Date    BLADDER SUSPENSION      x2    OVARIAN CYST REMOVAL      endometreosis    TONSILLECTOMY         Family History   Problem Relation Name Age of Onset    Hypertension Mother Karen Mcgee     Arthritis Mother Karen Mcgee     COPD Mother Karen Mcgee     Hypertension Father Husam jenkins     Colon cancer Maternal Grandfather  70    Colon cancer Paternal Grandmother Brice jenkins 70        Social History     Tobacco Use    Smoking status: Never     Passive  "exposure: Never    Smokeless tobacco: Never   Substance and Sexual Activity    Alcohol use: Not Currently     Comment: occasionally    Drug use: Never    Sexual activity: Not Currently     Partners: Male     Birth control/protection: Post-menopausal       Current Outpatient Medications   Medication Instructions    azelastine (ASTELIN) 137 mcg, Nasal, 2 times daily    cetirizine (ZYRTEC) 10 MG tablet 1 tablet, Every morning    ciprofloxacin HCl (CIPRO) 500 mg, 2 times daily    conjugated estrogens (PREMARIN) 1 g, Vaginal, Daily    famotidine (PEPCID) 40 mg, Oral, Daily    fish,bora,flax oils-om3,6,9no1 (OMEGA 3-6-9) 1,200 mg Cap 1 capsule, Daily    fluticasone propionate (FLONASE) 50 mcg, Each Nostril, 2 times daily    lactulose (CHRONULAC) 10 gram/15 mL solution 30 mLs    metroNIDAZOLE (FLAGYL) 500 mg, 3 times daily    omeprazole (PRILOSEC) 40 mg, Every morning    ondansetron (ZOFRAN) 4 mg, Oral, Every 6 hours PRN    vibegron 75 mg, Oral, Daily    vitamin E 100 Units, Daily        Review of patient's allergies indicates:   Allergen Reactions    Hydrocodone-guaifenesin Hives    Hydrocodone-acetaminophen     Penicillins Rash       Objective:     Visit Vitals  /68 (BP Location: Left arm, Patient Position: Sitting)   Pulse 73   Temp 97.9 °F (36.6 °C) (Oral)   Resp 18   Ht 5' 4" (1.626 m)   Wt 59.4 kg (131 lb)   SpO2 98%   BMI 22.49 kg/m²       Physical Examination:     Physical Exam  Vitals reviewed.   Constitutional:       Appearance: Normal appearance. She is normal weight.   HENT:      Head: Normocephalic.   Cardiovascular:      Rate and Rhythm: Normal rate and regular rhythm.      Pulses: Normal pulses.      Heart sounds: Normal heart sounds.   Pulmonary:      Effort: Pulmonary effort is normal.      Breath sounds: Normal breath sounds.   Abdominal:      General: Abdomen is flat.      Palpations: Abdomen is soft.   Musculoskeletal:         General: Normal range of motion.      Cervical back: Normal range of " motion.   Skin:     General: Skin is warm and dry.   Neurological:      Mental Status: She is alert.   Psychiatric:         Mood and Affect: Mood normal.         Lab Results:     Chemistry:  Lab Results   Component Value Date     10/28/2024    K 4.9 10/28/2024    BUN 13.7 10/28/2024    CREATININE 0.69 10/28/2024    EGFRNORACEVR >60 10/28/2024    GLUCOSE 97 10/28/2024    CALCIUM 9.8 10/28/2024    ALKPHOS 48 10/28/2024    LABPROT 6.8 10/28/2024    ALBUMIN 4.1 10/28/2024    BILIDIR 0.3 07/02/2021    IBILI 0.40 07/02/2021    AST 24 10/28/2024    ALT 24 10/28/2024    MG 2.20 11/30/2022    PKIWGNEJ75BN 34 09/24/2024    TSH 2.181 09/24/2024    OILDBS3FXMV 0.93 09/24/2024        Lab Results   Component Value Date    HGBA1C 5.1 09/24/2024        Hematology:  Lab Results   Component Value Date    WBC 4.33 (L) 09/24/2024    HGB 14.2 09/24/2024    HCT 41.9 09/24/2024     09/24/2024       Lipid Panel:  Lab Results   Component Value Date    CHOL 266 (H) 09/24/2024     (H) 09/24/2024    .00 (H) 09/24/2024    TRIG 61 09/24/2024    TOTALCHOLEST 3 09/24/2024        Urine:  Lab Results   Component Value Date    APPEARANCEUA Clear 09/24/2024    SGUA 1.011 09/24/2024    PROTEINUA Negative 09/24/2024    KETONESUA Negative 09/24/2024    BLOODUA Negative 10/07/2024    LEUKOCYTESUR Negative 09/24/2024    RBCUA 0-5 09/24/2024    WBCUA None Seen 09/24/2024    BACTERIA None Seen 09/24/2024    SQEPUA None Seen 09/24/2024    HYALINECASTS None Seen 09/24/2024        Assessment:          ICD-10-CM ICD-9-CM   1. Serum calcium elevated  E83.52 275.42   2. Hair loss  L65.9 704.00   3. Skin lesions, generalized  L98.9 709.9        Plan:     1. Serum calcium elevated  Assessment & Plan:  Resolved        2. Hair loss  -     Ambulatory referral/consult to Family Practice; Future; Expected date: 11/18/2024    3. Skin lesions, generalized  -     Ambulatory referral/consult to Family Practice; Future; Expected date: 11/18/2024          Follow up in about 6 months (around 5/11/2025) for Routine FU hair loss, skin lesions.    Future Appointments   Date Time Provider Department Center   11/13/2024 10:00 AM NURSE, [facility] UROLOGY [facility] UROLO [place]   11/22/2024  8:30 AM Channing Rea DO [facility] ORTHO [place] [facility]   12/19/2024  8:30 AM RESIDENT 2, [facility] OTORHINOLARYNGOLOGY [facility] ENT [facility]   2/19/2025  8:10 AM Emily Mohr FNP [facility] GYN [place] [facility]   2/26/2025  3:00 PM Pamela Hughes, HARDIK [facility] NEURO [place]    5/12/2025  9:40 AM Shasha Cardoza FNP [facility]        JOAQUINA Chamberlain

## 2024-11-13 ENCOUNTER — CLINICAL SUPPORT (OUTPATIENT)
Dept: UROLOGY | Facility: CLINIC | Age: 54
End: 2024-11-13
Payer: MEDICAID

## 2024-11-13 DIAGNOSIS — R30.0 DYSURIA: ICD-10-CM

## 2024-11-13 DIAGNOSIS — N30.10 INTERSTITIAL CYSTITIS: Primary | ICD-10-CM

## 2024-11-13 LAB
BCS RECOMMENDATION EXT: NORMAL
BILIRUB SERPL-MCNC: NEGATIVE MG/DL
BLOOD URINE, POC: NEGATIVE
COLOR, POC UA: YELLOW
GLUCOSE UR QL STRIP: NEGATIVE
KETONES UR QL STRIP: NEGATIVE
LEUKOCYTE ESTERASE URINE, POC: NEGATIVE
NITRITE, POC UA: NEGATIVE
PH, POC UA: 5.5
PROTEIN, POC: NEGATIVE
SPECIFIC GRAVITY, POC UA: 1.02
UROBILINOGEN, POC UA: 0.2

## 2024-11-13 PROCEDURE — 87086 URINE CULTURE/COLONY COUNT: CPT

## 2024-11-13 RX ORDER — LIDOCAINE HYDROCHLORIDE 20 MG/ML
JELLY TOPICAL
Status: COMPLETED | OUTPATIENT
Start: 2024-11-13 | End: 2024-11-13

## 2024-11-13 RX ORDER — HEPARIN SODIUM 5000 [USP'U]/ML
40000 INJECTION, SOLUTION INTRAVENOUS; SUBCUTANEOUS
Status: COMPLETED | OUTPATIENT
Start: 2024-11-13 | End: 2024-11-13

## 2024-11-13 RX ORDER — DEXAMETHASONE SODIUM PHOSPHATE 10 MG/ML
100 INJECTION INTRAMUSCULAR; INTRAVENOUS
Status: COMPLETED | OUTPATIENT
Start: 2024-11-13 | End: 2024-11-13

## 2024-11-13 RX ORDER — SODIUM BICARBONATE 42 MG/ML
5 INJECTION, SOLUTION INTRAVENOUS
Status: COMPLETED | OUTPATIENT
Start: 2024-11-13 | End: 2024-11-13

## 2024-11-13 RX ORDER — LIDOCAINE HYDROCHLORIDE 20 MG/ML
5 INJECTION, SOLUTION INFILTRATION; PERINEURAL
Status: COMPLETED | OUTPATIENT
Start: 2024-11-13 | End: 2024-11-13

## 2024-11-13 RX ADMIN — LIDOCAINE HYDROCHLORIDE: 20 JELLY TOPICAL at 10:11

## 2024-11-13 RX ADMIN — LIDOCAINE HYDROCHLORIDE 5 ML: 20 INJECTION, SOLUTION INFILTRATION; PERINEURAL at 09:11

## 2024-11-13 RX ADMIN — DEXAMETHASONE SODIUM PHOSPHATE 100 MG: 10 INJECTION INTRAMUSCULAR; INTRAVENOUS at 09:11

## 2024-11-13 RX ADMIN — SODIUM BICARBONATE 10 ML: 42 INJECTION, SOLUTION INTRAVENOUS at 09:11

## 2024-11-13 RX ADMIN — HEPARIN SODIUM 40000 UNITS: 5000 INJECTION, SOLUTION INTRAVENOUS; SUBCUTANEOUS at 09:11

## 2024-11-13 NOTE — PROGRESS NOTES
"Bladder instillation #1 held for two hrs and "symptoms seem to be decreasing "stated by patient . Bladder instillation #2 administered with sterile technique Lidocaine jelly 2% applies with  via 12 Fr. Hill , small amount of clear odorless urine drained from bladder. Procedure tolerated well RTC for weekly instillation   Component 09:14   Color, UA Yellow   Spec Grav UA 1.025   pH, UA 5.5   WBC, UA Negative   Nitrite, UA Negative   Protein, POC Negative   Glucose, UA Negative   Ketones, UA Negative   Urobilinogen, UA 0.2   Bilirubin, POC Negative   Blood, UA Negative             Specimen Collected: 11/13/24 09:14 CST Last Resulted: 11/13/24 09:14 CST   Microscopic urinalysis negative for WBCs, negative nitrites, negative for RBCs.    "

## 2024-11-14 ENCOUNTER — DOCUMENTATION ONLY (OUTPATIENT)
Dept: FAMILY MEDICINE | Facility: CLINIC | Age: 54
End: 2024-11-14
Payer: MEDICAID

## 2024-11-15 LAB — BACTERIA UR CULT: NO GROWTH

## 2024-11-21 NOTE — PROGRESS NOTES
"Subjective:    Patient ID: Remi Baptiste is a right handed 54 y.o. female  who presented to [facility] for follow up..      Chief Complaint: Pain of the Right Shoulder      History of Present Illness:  Remi Baptiste is a 54-year-old female with known history of right shoulder impingement and cuff syndrome who presents to clinic today for follow up on her right shoulder pain.  Patient reports that this has been going on for more than 6 months.  Today, she reports pain as a 5 or 6/10.  She reports the pain as dull and achy and is worse whenever she tries to reach over head.  She has had a previous corticosteroid injection but has not followed up with any home physical therapy or formal physical therapy.  She reports that she has gone to the initial physical therapy appointment but has not started any exercises or home exercise program at this time.  She is requesting a new corticosteroid injection at this time.    Shoulder Review of Systems:  Swelling?  no  Instability?  no  Clicking?  no  Limited ROM? no  Fever/Chills? no  Subluxation? no  Dislocation? no    Comorbid Conditions         Objective:      Physical Exam:    /62 (Patient Position: Sitting)   Pulse 76   Temp 97.6 °F (36.4 °C)   Ht 5' 4" (1.626 m)   Wt 60.4 kg (133 lb 2.5 oz)   BMI 22.86 kg/m²     Ortho/SPM Exam    Appearance:  Soft tissue swelling: Left: no Right: no  Effusion: Left:  Negative Right: Negative  Erythema: Left no Right: no  Ecchymosis: Left: no Right: no  Atrophy: Left: no Right: no  Scapular winging: Left: no Right: no    Palpation:  Shoulder Tenderness: Left: none  Right: biceps tendon, deltoid muscle    Range of motion:  Flexion (0-90): Left:  180 Right: 160  Abduction (0-180): Left:  180 Right: 150  External rotation (0-55): Left: 55 Right: 55  Internal rotation (0-45): Left: 45 Right: 45    Strength:  Abduction: Left: 5/5 Pain: no Right: 5/5 Pain: yes  External rotation: " Left: 5/5 Pain: no Right: 5/5 Pain: no  Internal rotation: Left: 5/5 Pain: no Right: 5/5 Pain: yes  Elbow flexion: Left: 5/5 Pain: no Right: 5/5 Pain: no  Elbow extension: Left: 5/5 Pain: no Right: 5/5 Pain: no    Special Tests:  Subacromial Impingement  Neer: Left: Negative Right: Positive  Araujo: Left: Negative Right: Positive    AC Joint Arthritis:  Cross-body abduction: Left: Negative Right: Negative    Rotator Cuff Tear   Drop arm test: Left: Negative Right: Negative  Hornblower: Left: Left: Not performed Right: Not performed   Belly press test: Left: Negative Right: Negative  Kael test (Empty can): Left: Negative Right: Positive    AIN/PIN/Ulnar nerve: Intact and symmetric    General appearance: NAD  Peripheral pulses: normal bilaterally   Reflexes: Left: Not performed Right: Not performed   Sensation: normal    Labs:  Last A1c: The patient doesn't have any registry metric data available     Imaging:   Previous images reviewed.  X-rays ordered and performed today: no      Assessment:        Encounter Diagnoses   Code Name Primary?    M75.41 Shoulder impingement syndrome, right Yes    M75.101 Rotator cuff syndrome of right shoulder         Plan:   Dx:  Right shoulder impingement, moderate exacerbation, right rotator cuff syndrome, moderate exacerbation.  Treatment Plan: Discussed with patient diagnosis and treatment recommendations. Handout given. Recommend conservative treatment to include: avoidance of aggravating activity, significant modification of daily activities, hot/cold therapies, topical and oral medications, braces, HEP/PT/OT, and injections.   Gentle ROM exercises.  Shoulder injection. See procedure note.  Physical therapy referral.  Home physical therapy exercise handouts provided to patient.   formal PT script provided to patient. You may take this script to whichever physical therapist you would like to go to.   Over the counter NSAID and/or tylenol provided you do not have contraindications  such as but not limited to liver or kidney disease or uncontrolled blood pressure. If you're doctors have told you to to not take them based on your health, do not take them.   Imaging: radiological studies ordered and independently reviewed; discussed with patient; pending radiologist interpretation.   Procedure: Discussed CSI/VSI as treatment options; discussed injections as treatment options; since conservative measures did not improve symptoms patient consented for CSI today.  Therapy: Physical Therapy  Medication: START over-the-counter acetaminophen (Tylenol 1000 mg three times per day as needed). Please see your primary care physician for further refills.  RTC: PRN; call if any issues.         Large Joint Aspiration/Injection: R subacromial bursa    Date/Time: 11/22/2024 8:30 AM    Performed by: Channing Rea DO  Authorized by: Channing Rea, DO    Consent Done?:  Yes (Written)  Indications:  Arthritis and pain    Local anesthesia used?: Yes    Local anesthetic:  Topical anesthetic    Details:  Needle Size:  21 G  Ultrasonic Guidance for needle placement?: Yes    Images are saved and documented.  Approach:  Posterior  Location:  Shoulder  Site:  R subacromial bursa  Patient tolerance:  Patient tolerated the procedure well with no immediate complications     Staff: Indra Aguilar MD    Risks:  Possible complications with the injection include bleeding, infection (.01%), tendon rupture, steroid flare, fat pad or soft tissue atrophy, skin depigmentation, allergic reaction to medications and vasovagal response. (steroid flare treatment is rest, ice, NSAIDs and resolves in 24-36 hours.)    Consent:  No absolute contraindications (cellulitis overlying joint, infection, lack of informed consent, allergy to injection medication, AVN protein or egg allergy for sodium hyaluronate, or history of steroid flare) or relative contraindications (uncontrolled DM2 A1c>10, coagulopathy, INR > 3.5, previous joint replacement or history  of AVN).        Description:  The patient was prepped in normal sterile fashion use of chlorhexidine scrub and the appropriate and anatomic landmarks were identified with ultrasound.  Contents of syringe included: 5cc of 1% lidocaine with 40mg of Kenalog    Post Procedure: Patient alert, and moving all extremities. ROM improved, pain decreased.  Good peripheral pulses, no signs of vascular compromise and range of motion intact.  Aftercare instructions were given to patient at time of discharge.  Relative rest for 3 days-avoiding excess activity.  Place ice on the area for 15 minutes every 4-6 hours. Patient may take Tylenol a 1000 mg b.i.d. or ibuprofen 600 mg t.i.d. for the next 3-4 days if not on medication already and safe to take pending co-morbidities.  Protect the area for the next 1-8 hours if anesthetic was used.  Avoid excessive activity for the next 3-4 weeks.  ER precautions given for fever, severe joint pain or allergic reaction or other new symptoms related to the joint injection.            Channing Rea D.O.  Sports Medicine Fellow

## 2024-11-22 ENCOUNTER — OFFICE VISIT (OUTPATIENT)
Dept: ORTHOPEDICS | Facility: CLINIC | Age: 54
End: 2024-11-22
Payer: MEDICAID

## 2024-11-22 ENCOUNTER — CLINICAL SUPPORT (OUTPATIENT)
Dept: UROLOGY | Facility: CLINIC | Age: 54
End: 2024-11-22
Payer: MEDICAID

## 2024-11-22 VITALS
SYSTOLIC BLOOD PRESSURE: 102 MMHG | DIASTOLIC BLOOD PRESSURE: 62 MMHG | HEART RATE: 76 BPM | WEIGHT: 133.19 LBS | TEMPERATURE: 98 F | HEIGHT: 64 IN | BODY MASS INDEX: 22.74 KG/M2

## 2024-11-22 DIAGNOSIS — M75.41 SHOULDER IMPINGEMENT SYNDROME, RIGHT: Primary | ICD-10-CM

## 2024-11-22 DIAGNOSIS — M75.101 ROTATOR CUFF SYNDROME OF RIGHT SHOULDER: ICD-10-CM

## 2024-11-22 DIAGNOSIS — N30.10 INTERSTITIAL CYSTITIS: Primary | ICD-10-CM

## 2024-11-22 LAB
BILIRUB SERPL-MCNC: NEGATIVE MG/DL
BLOOD URINE, POC: NEGATIVE
COLOR, POC UA: YELLOW
GLUCOSE UR QL STRIP: NEGATIVE
KETONES UR QL STRIP: NEGATIVE
LEUKOCYTE ESTERASE URINE, POC: NORMAL
NITRITE, POC UA: NEGATIVE
PH, POC UA: 5.5
PROTEIN, POC: NEGATIVE
SPECIFIC GRAVITY, POC UA: 1.03
UROBILINOGEN, POC UA: 0.2

## 2024-11-22 PROCEDURE — 20611 DRAIN/INJ JOINT/BURSA W/US: CPT | Mod: PBBFAC | Performed by: STUDENT IN AN ORGANIZED HEALTH CARE EDUCATION/TRAINING PROGRAM

## 2024-11-22 PROCEDURE — 99211 OFF/OP EST MAY X REQ PHY/QHP: CPT | Mod: PBBFAC

## 2024-11-22 PROCEDURE — 99213 OFFICE O/P EST LOW 20 MIN: CPT | Mod: PBBFAC,25,27 | Performed by: STUDENT IN AN ORGANIZED HEALTH CARE EDUCATION/TRAINING PROGRAM

## 2024-11-22 RX ORDER — LIDOCAINE HYDROCHLORIDE 10 MG/ML
5 INJECTION, SOLUTION EPIDURAL; INFILTRATION; INTRACAUDAL; PERINEURAL
Status: COMPLETED | OUTPATIENT
Start: 2024-11-22 | End: 2024-11-22

## 2024-11-22 RX ORDER — SODIUM BICARBONATE 42 MG/ML
5 INJECTION, SOLUTION INTRAVENOUS
Status: COMPLETED | OUTPATIENT
Start: 2024-11-22 | End: 2024-11-22

## 2024-11-22 RX ORDER — LIDOCAINE HYDROCHLORIDE 20 MG/ML
5 INJECTION, SOLUTION INFILTRATION; PERINEURAL
Status: COMPLETED | OUTPATIENT
Start: 2024-11-22 | End: 2024-11-22

## 2024-11-22 RX ORDER — DEXAMETHASONE SODIUM PHOSPHATE 10 MG/ML
100 INJECTION INTRAMUSCULAR; INTRAVENOUS
Status: COMPLETED | OUTPATIENT
Start: 2024-11-22 | End: 2024-11-22

## 2024-11-22 RX ORDER — TRIAMCINOLONE ACETONIDE 40 MG/ML
40 INJECTION, SUSPENSION INTRA-ARTICULAR; INTRAMUSCULAR ONCE
Status: COMPLETED | OUTPATIENT
Start: 2024-11-22 | End: 2024-11-22

## 2024-11-22 RX ORDER — HEPARIN SODIUM 5000 [USP'U]/ML
40000 INJECTION, SOLUTION INTRAVENOUS; SUBCUTANEOUS
Status: COMPLETED | OUTPATIENT
Start: 2024-11-22 | End: 2024-11-22

## 2024-11-22 RX ADMIN — SODIUM BICARBONATE 10 ML: 42 INJECTION, SOLUTION INTRAVENOUS at 09:11

## 2024-11-22 RX ADMIN — HEPARIN SODIUM 40000 UNITS: 5000 INJECTION, SOLUTION INTRAVENOUS; SUBCUTANEOUS at 09:11

## 2024-11-22 RX ADMIN — LIDOCAINE HYDROCHLORIDE 5 ML: 20 INJECTION, SOLUTION INFILTRATION; PERINEURAL at 09:11

## 2024-11-22 RX ADMIN — DEXAMETHASONE SODIUM PHOSPHATE 100 MG: 10 INJECTION INTRAMUSCULAR; INTRAVENOUS at 09:11

## 2024-11-22 RX ADMIN — TRIAMCINOLONE ACETONIDE 40 MG: 40 INJECTION, SUSPENSION INTRA-ARTICULAR; INTRAMUSCULAR at 09:11

## 2024-11-22 RX ADMIN — LIDOCAINE HYDROCHLORIDE 50 MG: 10 INJECTION, SOLUTION EPIDURAL; INFILTRATION; INTRACAUDAL; PERINEURAL at 09:11

## 2024-11-22 NOTE — PROGRESS NOTES
Presented to clinic without complaints.  States bladder symptoms are improving.  Urethra prepped with betadine.  14FR blake catheter inserted using sterile technique with 20cc clear yellow urine noted.  Bladder instillation instilled without difficulty.  RTC 1 week for instillation.  Component 09:35   Color, UA Yellow   Spec Grav UA 1.030   pH, UA 5.5   WBC, UA Trace   Nitrite, UA Negative   Protein, POC Negative   Glucose, UA Negative   Ketones, UA Negative   Urobilinogen, UA 0.2   Bilirubin, POC Negative   Blood, UA Negative             Specimen Collected: 11/22/24 09:35 CST Last Resulted: 11/22/24 09:35 CST   Microscopic urinalysis negative nitrites, trace WBCs 1-2 per HPF, epithelial cells 2-3 per HPF, negative RBCs.

## 2024-11-25 NOTE — PROGRESS NOTES
Faculty Attestation: Remi Baptiste  was seen in [facility] Discussed with Dr. Rea at the time of the visit. History of Present Illness, Physical Exam, and Assessment and Plan reviewed.     Treatment plan is reasonable and appropriate. Compliance with treatment recommendations is important.      No imaging studies were done today.     Procedure note reviewed. Present for entire procedure with the fellow. Patient tolerated procedure well.     Kirk Aguilar MD  Sports Medicine

## 2024-11-27 ENCOUNTER — CLINICAL SUPPORT (OUTPATIENT)
Dept: UROLOGY | Facility: CLINIC | Age: 54
End: 2024-11-27
Payer: MEDICAID

## 2024-11-27 DIAGNOSIS — K59.00 CONSTIPATION: Primary | ICD-10-CM

## 2024-11-27 DIAGNOSIS — R10.32 ABDOMINAL PAIN, LEFT LOWER QUADRANT: ICD-10-CM

## 2024-11-27 DIAGNOSIS — Z87.19 PERSONAL HISTORY OF UNSPECIFIED DIGESTIVE DISEASE: ICD-10-CM

## 2024-11-27 DIAGNOSIS — N30.10 INTERSTITIAL CYSTITIS: Primary | ICD-10-CM

## 2024-11-27 PROCEDURE — 99212 OFFICE O/P EST SF 10 MIN: CPT | Mod: PBBFAC

## 2024-11-27 PROCEDURE — 51700 IRRIGATION OF BLADDER: CPT | Mod: PBBFAC | Performed by: NURSE PRACTITIONER

## 2024-11-27 PROCEDURE — 51700 IRRIGATION OF BLADDER: CPT | Mod: S$PBB,,, | Performed by: NURSE PRACTITIONER

## 2024-11-27 PROCEDURE — 96372 THER/PROPH/DIAG INJ SC/IM: CPT | Mod: PBBFAC

## 2024-11-27 RX ORDER — DEXAMETHASONE SODIUM PHOSPHATE 10 MG/ML
100 INJECTION INTRAMUSCULAR; INTRAVENOUS
Status: COMPLETED | OUTPATIENT
Start: 2024-11-27 | End: 2024-11-27

## 2024-11-27 RX ORDER — SODIUM BICARBONATE 42 MG/ML
5 INJECTION, SOLUTION INTRAVENOUS
Status: COMPLETED | OUTPATIENT
Start: 2024-11-27 | End: 2024-11-27

## 2024-11-27 RX ORDER — HEPARIN SODIUM 5000 [USP'U]/ML
40000 INJECTION, SOLUTION INTRAVENOUS; SUBCUTANEOUS
Status: COMPLETED | OUTPATIENT
Start: 2024-11-27 | End: 2024-11-27

## 2024-11-27 RX ORDER — LIDOCAINE HYDROCHLORIDE 20 MG/ML
5 INJECTION, SOLUTION INFILTRATION; PERINEURAL
Status: COMPLETED | OUTPATIENT
Start: 2024-11-27 | End: 2024-11-27

## 2024-11-27 RX ADMIN — DEXAMETHASONE SODIUM PHOSPHATE 100 MG: 10 INJECTION, SOLUTION INTRAMUSCULAR; INTRAVENOUS at 01:11

## 2024-11-27 RX ADMIN — LIDOCAINE HYDROCHLORIDE 5 ML: 20 INJECTION, SOLUTION INFILTRATION; PERINEURAL at 01:11

## 2024-11-27 RX ADMIN — SODIUM BICARBONATE 10 ML: 42 INJECTION, SOLUTION INTRAVENOUS at 01:11

## 2024-11-27 RX ADMIN — HEPARIN SODIUM 40000 UNITS: 5000 INJECTION INTRAVENOUS; SUBCUTANEOUS at 01:11

## 2024-11-27 NOTE — PROGRESS NOTES
Patient is here for Bladder instillation #4.  Using sterile technique area was cleansed, 12 Fr. Catheter was inserted, bladder drained of 30 ml clear yellow urine and medication was instilled without difficulty.  Instructed to hold for 2 hours and she verbalized  understanding.  She stated that symptoms are improving and she will RTC in 1 week for #5.

## 2024-11-27 NOTE — PATIENT INSTRUCTIONS
Bladder Instillation   Why is this procedure done?   Bladder instillation therapy is when a liquid drug is used to coat the inside of your bladder. Your doctor may order this procedure to:  Treat bladder pain or interstitial cystitis  Treat recurrent bladder infections  Prevent bladder stones  What happens before the procedure?   Your doctor will ask about your health history. Talk to the doctor about:  All the drugs you are taking. Be sure to include all prescription, over the counter, vitamins, and herbal supplements. Bring a list of drugs you take with you. Tell the doctor if you have any drug allergy.  If you take a diuretic, ask the doctor if you should take this drug the morning of your treatment.  If you think you may have a bladder infection, if you have had any fevers, or have had any recent blood in your urine  Any surgeries you have had on your bladder, kidneys, urethra, or ureters  If you need to limit fluids or avoid caffeine before the procedure  The doctor may order tests and check your urine for blood or infection.  What happens during the procedure?   The staff will place a thin, flexible tube through your urethra into your bladder. This is a catheter. The catheter will drain any urine from your bladder.  The doctor will place a mixture of fluid and drugs through the catheter into your bladder. The doctor may heat the fluid before giving it to you.  You will hold the fluid and drugs inside your bladder for a time. Talk with your doctor to find out how long you need to hold the drugs in your bladder to help them work. This could be anywhere from a few minutes to a few hours. Ask your doctor if you need to change positions while the fluid is in your bladder.  What happens after the procedure?   You may have to stay in the doctor's office or hospital for a time while the drugs are in your bladder.  The doctor may take the catheter out right away or it may be left in place to help drain out the fluid  and then be taken out afterwards.  If the doctor takes the catheter out, you will sit on the toilet and pass urine so the urine does not splash when you are allowed to get rid of the fluids and drugs from your bladder.  What care is needed at home?   Ask your doctor what you need to do when you go home. Make sure you ask questions if you do not understand everything the doctor says.  Your doctor may ask you to drink extra fluids after your procedure.  What follow-up care is needed?   Your doctor may ask you to come back to the office to check on your progress. Be sure to keep these visits.  You may need to have more bladder instillations. Talk to your doctor about how many treatments you may need.  What problems could happen?   Bladder or belly pain  Passing urine often  Urinary tract or prostate infection  Severe infection throughout the body  Blood in the urine  Burning feeling in bladder  Feeling tired  Fever  Where can I learn more?   American Cancer Society  https://www.cancer.org/cancer/bladder-cancer/treating/intravesical-therapy.html   [place] [employer]  https://www.baus.org.uk/_userfiles/pages/files/Patients/Leaflets/Painful%20bladder.pdf   Last Reviewed Date   2021-03-18  Consumer Information Use and Disclaimer   This information is not specific medical advice and does not replace information you receive from your health care provider. This is only a brief summary of general information. It does NOT include all information about conditions, illnesses, injuries, tests, procedures, treatments, therapies, discharge instructions or life-style choices that may apply to you. You must talk with your health care provider for complete information about your health and treatment options. This information should not be used to decide whether or not to accept your health care providers advice, instructions or recommendations. Only your health care provider has the knowledge and training to  provide advice that is right for you.  Copyright   Copyright © 2021 [employer] and its affiliates and/or licensors. All rights reserved.

## 2024-12-04 ENCOUNTER — CLINICAL SUPPORT (OUTPATIENT)
Dept: UROLOGY | Facility: CLINIC | Age: 54
End: 2024-12-04
Payer: MEDICAID

## 2024-12-04 DIAGNOSIS — N30.10 INTERSTITIAL CYSTITIS: Primary | ICD-10-CM

## 2024-12-04 LAB
BILIRUB SERPL-MCNC: NORMAL MG/DL
BLOOD URINE, POC: NORMAL
COLOR, POC UA: YELLOW
GLUCOSE UR QL STRIP: NORMAL
KETONES UR QL STRIP: NORMAL
LEUKOCYTE ESTERASE URINE, POC: NORMAL
NITRITE, POC UA: NORMAL
PH, POC UA: 6
PROTEIN, POC: NORMAL
SPECIFIC GRAVITY, POC UA: 1.02
UROBILINOGEN, POC UA: 0.2

## 2024-12-04 PROCEDURE — 96372 THER/PROPH/DIAG INJ SC/IM: CPT | Mod: PBBFAC

## 2024-12-04 PROCEDURE — 51700 IRRIGATION OF BLADDER: CPT | Mod: PBBFAC | Performed by: NURSE PRACTITIONER

## 2024-12-04 PROCEDURE — 51700 IRRIGATION OF BLADDER: CPT | Mod: S$PBB,,, | Performed by: NURSE PRACTITIONER

## 2024-12-04 PROCEDURE — 81001 URINALYSIS AUTO W/SCOPE: CPT | Mod: PBBFAC

## 2024-12-04 RX ORDER — LIDOCAINE HYDROCHLORIDE 20 MG/ML
5 INJECTION, SOLUTION INFILTRATION; PERINEURAL
Status: COMPLETED | OUTPATIENT
Start: 2024-12-04 | End: 2024-12-04

## 2024-12-04 RX ORDER — SODIUM BICARBONATE 42 MG/ML
5 INJECTION, SOLUTION INTRAVENOUS
Status: COMPLETED | OUTPATIENT
Start: 2024-12-04 | End: 2024-12-04

## 2024-12-04 RX ORDER — HEPARIN SODIUM 5000 [USP'U]/ML
40000 INJECTION, SOLUTION INTRAVENOUS; SUBCUTANEOUS
Status: COMPLETED | OUTPATIENT
Start: 2024-12-04 | End: 2024-12-04

## 2024-12-04 RX ORDER — DEXAMETHASONE SODIUM PHOSPHATE 10 MG/ML
100 INJECTION INTRAMUSCULAR; INTRAVENOUS
Status: COMPLETED | OUTPATIENT
Start: 2024-12-04 | End: 2024-12-04

## 2024-12-04 RX ADMIN — SODIUM BICARBONATE 10 ML: 42 INJECTION, SOLUTION INTRAVENOUS at 09:12

## 2024-12-04 RX ADMIN — LIDOCAINE HYDROCHLORIDE 5 ML: 20 INJECTION, SOLUTION INFILTRATION; PERINEURAL at 09:12

## 2024-12-04 RX ADMIN — DEXAMETHASONE SODIUM PHOSPHATE 100 MG: 10 INJECTION, SOLUTION INTRAMUSCULAR; INTRAVENOUS at 09:12

## 2024-12-04 RX ADMIN — HEPARIN SODIUM 40000 UNITS: 5000 INJECTION INTRAVENOUS; SUBCUTANEOUS at 09:12

## 2024-12-04 NOTE — PROGRESS NOTES
Component 09:31   Color, UA Yellow   Spec Grav UA 1.025   pH, UA 6.0   WBC, UA neg   Nitrite, UA neg   Protein, POC neg   Glucose, UA neg   Ketones, UA neg   Urobilinogen, UA 0.2   Bilirubin, POC neg   Blood, UA neg      Microscopic urinalysis negative WBCs, negative nitrites, negative RBCs.       Specimen Collected: 12/04/24 09:31 CST Last Resulted: 12/04/24 09:31 CST

## 2024-12-04 NOTE — PROGRESS NOTES
"Bladder instillation #4 held for two hrs and "symptoms seem to be decreasing "stated by patient . Bladder instillation #5 administered with sterile technique via 12 Fr. Hill , small amount of clear odorless urine drained from bladder. Procedure tolerated well RTC for weekly instillation     "

## 2024-12-05 ENCOUNTER — LAB VISIT (OUTPATIENT)
Dept: LAB | Facility: HOSPITAL | Age: 54
End: 2024-12-05
Attending: INTERNAL MEDICINE
Payer: MEDICAID

## 2024-12-05 DIAGNOSIS — E78.5 HYPERLIPIDEMIA, UNSPECIFIED HYPERLIPIDEMIA TYPE: ICD-10-CM

## 2024-12-05 DIAGNOSIS — R53.1 WEAKNESS: Primary | ICD-10-CM

## 2024-12-05 LAB
BASOPHILS # BLD AUTO: 0.02 X10(3)/MCL
BASOPHILS NFR BLD AUTO: 0.4 %
EOSINOPHIL # BLD AUTO: 0.18 X10(3)/MCL (ref 0–0.9)
EOSINOPHIL NFR BLD AUTO: 3.4 %
ERYTHROCYTE [DISTWIDTH] IN BLOOD BY AUTOMATED COUNT: 11.9 % (ref 11.5–17)
HCT VFR BLD AUTO: 41.2 % (ref 37–47)
HGB BLD-MCNC: 14.1 G/DL (ref 12–16)
IMM GRANULOCYTES # BLD AUTO: 0.02 X10(3)/MCL (ref 0–0.04)
IMM GRANULOCYTES NFR BLD AUTO: 0.4 %
LYMPHOCYTES # BLD AUTO: 2.08 X10(3)/MCL (ref 0.6–4.6)
LYMPHOCYTES NFR BLD AUTO: 39.2 %
MCH RBC QN AUTO: 32.4 PG (ref 27–31)
MCHC RBC AUTO-ENTMCNC: 34.2 G/DL (ref 33–36)
MCV RBC AUTO: 94.7 FL (ref 80–94)
MONOCYTES # BLD AUTO: 0.48 X10(3)/MCL (ref 0.1–1.3)
MONOCYTES NFR BLD AUTO: 9.1 %
NEUTROPHILS # BLD AUTO: 2.52 X10(3)/MCL (ref 2.1–9.2)
NEUTROPHILS NFR BLD AUTO: 47.5 %
NRBC BLD AUTO-RTO: 0 %
PLATELET # BLD AUTO: 272 X10(3)/MCL (ref 130–400)
PMV BLD AUTO: 8.7 FL (ref 7.4–10.4)
RBC # BLD AUTO: 4.35 X10(6)/MCL (ref 4.2–5.4)
WBC # BLD AUTO: 5.3 X10(3)/MCL (ref 4.5–11.5)

## 2024-12-05 PROCEDURE — 36415 COLL VENOUS BLD VENIPUNCTURE: CPT

## 2024-12-05 PROCEDURE — 85025 COMPLETE CBC W/AUTO DIFF WBC: CPT

## 2024-12-13 ENCOUNTER — HOSPITAL ENCOUNTER (OUTPATIENT)
Dept: RADIOLOGY | Facility: HOSPITAL | Age: 54
Discharge: HOME | End: 2024-12-13
Attending: NURSE PRACTITIONER
Payer: MEDICAID

## 2024-12-13 ENCOUNTER — OFFICE VISIT (OUTPATIENT)
Dept: FAMILY MEDICINE | Facility: CLINIC | Age: 54
End: 2024-12-13
Payer: MEDICAID

## 2024-12-13 ENCOUNTER — TELEPHONE (OUTPATIENT)
Dept: FAMILY MEDICINE | Facility: CLINIC | Age: 54
End: 2024-12-13

## 2024-12-13 ENCOUNTER — CLINICAL SUPPORT (OUTPATIENT)
Dept: UROLOGY | Facility: CLINIC | Age: 54
End: 2024-12-13
Payer: MEDICAID

## 2024-12-13 VITALS
TEMPERATURE: 98 F | WEIGHT: 125 LBS | OXYGEN SATURATION: 99 % | DIASTOLIC BLOOD PRESSURE: 70 MMHG | HEART RATE: 71 BPM | SYSTOLIC BLOOD PRESSURE: 113 MMHG | BODY MASS INDEX: 21.34 KG/M2 | HEIGHT: 64 IN | RESPIRATION RATE: 18 BRPM

## 2024-12-13 DIAGNOSIS — M51.362 DEGENERATION OF INTERVERTEBRAL DISC OF LUMBAR REGION WITH DISCOGENIC BACK PAIN AND LOWER EXTREMITY PAIN: ICD-10-CM

## 2024-12-13 DIAGNOSIS — N20.0 KIDNEY STONE: ICD-10-CM

## 2024-12-13 DIAGNOSIS — Z78.0 POST-MENOPAUSAL: Primary | ICD-10-CM

## 2024-12-13 DIAGNOSIS — N20.0 STONE IN KIDNEY: ICD-10-CM

## 2024-12-13 DIAGNOSIS — N20.0 KIDNEY STONES: ICD-10-CM

## 2024-12-13 DIAGNOSIS — N30.10 INTERSTITIAL CYSTITIS: Primary | ICD-10-CM

## 2024-12-13 DIAGNOSIS — K76.89 HEPATIC CYST: ICD-10-CM

## 2024-12-13 PROBLEM — E83.52 SERUM CALCIUM ELEVATED: Status: RESOLVED | Noted: 2024-10-09 | Resolved: 2024-12-13

## 2024-12-13 PROBLEM — M79.604 LEG PAIN, POSTERIOR, RIGHT: Status: RESOLVED | Noted: 2024-09-24 | Resolved: 2024-12-13

## 2024-12-13 PROCEDURE — 74018 RADEX ABDOMEN 1 VIEW: CPT | Mod: TC

## 2024-12-13 PROCEDURE — 99215 OFFICE O/P EST HI 40 MIN: CPT | Mod: PBBFAC,PN | Performed by: NURSE PRACTITIONER

## 2024-12-13 PROCEDURE — 99212 OFFICE O/P EST SF 10 MIN: CPT | Mod: PBBFAC,27,25

## 2024-12-13 RX ORDER — DICLOFENAC SODIUM 75 MG/1
75 TABLET, DELAYED RELEASE ORAL 2 TIMES DAILY
Qty: 60 TABLET | Refills: 0 | Status: SHIPPED | OUTPATIENT
Start: 2024-12-13

## 2024-12-13 RX ORDER — SODIUM BICARBONATE 42 MG/ML
5 INJECTION, SOLUTION INTRAVENOUS
Status: COMPLETED | OUTPATIENT
Start: 2024-12-13 | End: 2024-12-13

## 2024-12-13 RX ORDER — LIDOCAINE HYDROCHLORIDE 20 MG/ML
5 INJECTION, SOLUTION INFILTRATION; PERINEURAL
Status: COMPLETED | OUTPATIENT
Start: 2024-12-13 | End: 2024-12-13

## 2024-12-13 RX ORDER — DEXAMETHASONE SODIUM PHOSPHATE 10 MG/ML
100 INJECTION INTRAMUSCULAR; INTRAVENOUS
Status: COMPLETED | OUTPATIENT
Start: 2024-12-13 | End: 2024-12-13

## 2024-12-13 RX ORDER — HEPARIN SODIUM 5000 [USP'U]/ML
40000 INJECTION, SOLUTION INTRAVENOUS; SUBCUTANEOUS
Status: COMPLETED | OUTPATIENT
Start: 2024-12-13 | End: 2024-12-13

## 2024-12-13 RX ORDER — TAMSULOSIN HYDROCHLORIDE 0.4 MG/1
0.4 CAPSULE ORAL DAILY
Qty: 30 CAPSULE | Refills: 3 | Status: SHIPPED | OUTPATIENT
Start: 2024-12-13

## 2024-12-13 RX ADMIN — SODIUM BICARBONATE 10 ML: 42 INJECTION, SOLUTION INTRAVENOUS at 09:12

## 2024-12-13 RX ADMIN — HEPARIN SODIUM 40000 UNITS: 5000 INJECTION, SOLUTION INTRAVENOUS; SUBCUTANEOUS at 09:12

## 2024-12-13 RX ADMIN — LIDOCAINE HYDROCHLORIDE 5 ML: 20 INJECTION, SOLUTION INFILTRATION; PERINEURAL at 09:12

## 2024-12-13 RX ADMIN — DEXAMETHASONE SODIUM PHOSPHATE 100 MG: 10 INJECTION INTRAMUSCULAR; INTRAVENOUS at 09:12

## 2024-12-13 NOTE — TELEPHONE ENCOUNTER
----- Message from [employer] sent at 12/13/2024 10:54 AM CST -----  Regarding: med advice  Who Called: Remi Baptiste    Caller is requesting assistance/information from provider's office.    Symptoms (please be specific):    How long has patient had these symptoms:   List of preferred pharmacies on file (remove unneeded): [unfilled]  If different, enter pharmacy into here including location and phone number:       Preferred Method of Contact: Phone Call  Patient's Preferred Phone Number on File: [phone]   Best Call Back Number, if different:  Additional Information: pt is requesting a call back, states she received some disturbing news from Urologist and would like to discuss with Shasha or Echo. If unavailable at preferred number pls call [phone]

## 2024-12-13 NOTE — PATIENT INSTRUCTIONS
Bladder Instillation   Why is this procedure done?   Bladder instillation therapy is when a liquid drug is used to coat the inside of your bladder. Your doctor may order this procedure to:  Treat bladder pain or interstitial cystitis  Treat recurrent bladder infections  Prevent bladder stones  What happens before the procedure?   Your doctor will ask about your health history. Talk to the doctor about:  All the drugs you are taking. Be sure to include all prescription, over the counter, vitamins, and herbal supplements. Bring a list of drugs you take with you. Tell the doctor if you have any drug allergy.  If you take a diuretic, ask the doctor if you should take this drug the morning of your treatment.  If you think you may have a bladder infection, if you have had any fevers, or have had any recent blood in your urine  Any surgeries you have had on your bladder, kidneys, urethra, or ureters  If you need to limit fluids or avoid caffeine before the procedure  The doctor may order tests and check your urine for blood or infection.  What happens during the procedure?   The staff will place a thin, flexible tube through your urethra into your bladder. This is a catheter. The catheter will drain any urine from your bladder.  The doctor will place a mixture of fluid and drugs through the catheter into your bladder. The doctor may heat the fluid before giving it to you.  You will hold the fluid and drugs inside your bladder for a time. Talk with your doctor to find out how long you need to hold the drugs in your bladder to help them work. This could be anywhere from a few minutes to a few hours. Ask your doctor if you need to change positions while the fluid is in your bladder.  What happens after the procedure?   You may have to stay in the doctor's office or hospital for a time while the drugs are in your bladder.  The doctor may take the catheter out right away or it may be left in place to help drain out the fluid  and then be taken out afterwards.  If the doctor takes the catheter out, you will sit on the toilet and pass urine so the urine does not splash when you are allowed to get rid of the fluids and drugs from your bladder.  What care is needed at home?   Ask your doctor what you need to do when you go home. Make sure you ask questions if you do not understand everything the doctor says.  Your doctor may ask you to drink extra fluids after your procedure.  What follow-up care is needed?   Your doctor may ask you to come back to the office to check on your progress. Be sure to keep these visits.  You may need to have more bladder instillations. Talk to your doctor about how many treatments you may need.  What problems could happen?   Bladder or belly pain  Passing urine often  Urinary tract or prostate infection  Severe infection throughout the body  Blood in the urine  Burning feeling in bladder  Feeling tired  Fever  Where can I learn more?   American Cancer Society  https://www.cancer.org/cancer/bladder-cancer/treating/intravesical-therapy.html   [place] [employer]  https://www.baus.org.uk/_userfiles/pages/files/Patients/Leaflets/Painful%20bladder.pdf   Last Reviewed Date   2021-03-18  Consumer Information Use and Disclaimer   This information is not specific medical advice and does not replace information you receive from your health care provider. This is only a brief summary of general information. It does NOT include all information about conditions, illnesses, injuries, tests, procedures, treatments, therapies, discharge instructions or life-style choices that may apply to you. You must talk with your health care provider for complete information about your health and treatment options. This information should not be used to decide whether or not to accept your health care providers advice, instructions or recommendations. Only your health care provider has the knowledge and training to  provide advice that is right for you.  Copyright   Copyright © 2021 [employer] and its affiliates and/or licensors. All rights reserved.

## 2024-12-13 NOTE — ASSESSMENT & PLAN NOTE
Flomax 0.4mg po daily   FU with Urology  Increase fluid intake  Norco 7.5mg po q 8 hours, disp 15 for severe breakthrough pain  Diclofenac 75mg po BID to trial .

## 2024-12-13 NOTE — ASSESSMENT & PLAN NOTE
Will consider referral to interventional pain management doctor when he is available for referral.

## 2024-12-13 NOTE — PROGRESS NOTES
Component 10:11   Color, UA Yellow   Spec Grav UA 1.025   pH, UA 5.5   WBC, UA neg   Nitrite, UA neg   Protein, POC neg   Glucose, UA neg   Ketones, UA neg   Urobilinogen, UA 0.2   Bilirubin, POC neg   Blood, UA trace-intact             Specimen Collected: 12/13/24 10:11 CST Last Resulted: 12/13/24 10:11 CST   Microscopic urinalysis negative for WBCs, negative nitrites, trace intact RBCs.

## 2024-12-13 NOTE — PROGRESS NOTES
Presented to clinic without complaints. States bladder symptoms are improving. Urethra prepped with betadine. 12FR blake catheter inserted using sterile technique with 100cc clear yellow urine noted. Bladder instillation #6 instilled without difficulty. RTC 1 week as scheduled.

## 2024-12-13 NOTE — PROGRESS NOTES
Patient Name: Remi Baptiste     : 1970    MRN: [id number]     Subjective:     Patient ID: Remi Baptiste is a 54 y.o. female.    Chief Complaint:   Chief Complaint   Patient presents with    Follow-up     Pt here with c/o side and back pain limiting mobility. Pt seen at [facility] ER. Pt also reports rash from neck down. Pt requesting bone scan        HPI: 24: ER FU.  Pt presents to clinic today with c/o side and back pain limiting mobility. Pt seen at [facility] ER. Pt also reports rash from neck down.  Patient told to follow up with GI after ER visit or return to ER if pain persisted.     ER Note:   Patient states that she she has pain lower mid and left lower quadrant that is constantly aching with intermittent stabbing sensations radiating into her lower back.     Medical Decision Making  On arrival to the emergency department patient's vital signs were all normal. Nothing to suggest SIRS criteria. Patient's white count resulted normal. Urine is clear. CT of the abdomen and pelvis with IV contrast does not demonstrate any diverticulitis. Patient does have moderate constipation which could be the cause of the patient's symptoms. Patient has been advised to take laxatives to help with the constipation and to follow-up with her GI doctor in 1 to 2 days. Patient also strongly encouraged to return to ER symptoms should worsen. Patient will be sent home with a Medrol Dosepak.    CT Abdomen Pelvis with IV Contrast Only (2024 4:17 PM CST)  Imaging Results - CT Abdomen Pelvis with IV Contrast Only (2024 4:17 PM CST)  Anatomical Region Laterality Modality  Abdomen, Pelvis   Computed Tomography  Imaging Results - CT Abdomen Pelvis with IV Contrast Only (2024 4:17 PM CST)  Specimen (Source) Anatomical Location / Laterality Collection Method / Volume Collection Time Received Time  Imaging Results - CT Abdomen Pelvis with IV Contrast Only (2024 4:17 PM CST)  Impressions  2024 5:00 PM CST   1.  Moderate retained colonic stool burden, suggesting constipation.  2. No acute inflammatory process in the abdomen or pelvis. No evidence of diverticulitis.         12/09/2024 5:00 PM CST   CT ABDOMEN AND PELVIS  Clinical Indication:  Female, 54 years old. Worsening lower abdominal pain with radiation to the back. Vomiting. Onset 2 days ago. History of diverticulitis.    Technique:  Multiple contiguous axial images were obtained through the abdomen and pelvis following the administration of IV contrast material.  Post processing coronal and sagittal reconstruction images were made from the axial images.    All CT scans at this facility use dose modulation, iterative reconstruction, and/or weight base dose when appropriate to reduce radiation dose to as low as reasonably achievable.  IV contrast: 100mL Omnipaque 300  Bowel contrast:  None  Comparison: None  FINDINGS:  Lower Thorax: 5 mm fissural-based nodule of the right middle lobe. No basilar airspace disease or pleural effusion.  Liver and Biliary system: Liver is normal in size and contour. Scattered cystic changes throughout the left lobe of the liver, reflecting hepatic cyst. Gallbladder is unremarkable. No calcified gallstones. No perihepatic ascites.  Spleen: Normal in size and contour.  Adrenal Glands and Kidneys: Both adrenal glands are unremarkable. The kidneys are normal in size and contour. Kidneys enhance symmetrically and empty into nondilated collecting systems. Nonobstructive stones in the pole of the left kidney.  Pancreas and Retroperitoneum: Pancreas appears unremarkable. No peripancreatic stranding or fluid. No suspicious retroperitoneal adenopathy.  Aorta and Major Vessels: Unremarkable. .  Bowel, Mesentery and Peritoneal space: Appendix is unremarkable in the right lower quadrant. No periappendiceal stranding or fluid collection.  There is mild scattered sigmoid diverticulosis without evidence of diverticulitis. Moderate retained stool  throughout the colon. No free fluid. Stomach is fluid distended without wall abnormality. Duodenum is unremarkable.  Pelvis: Urinary bladder is well distended. The uterus is unremarkable. No free fluid..  Abdominal wall and Osseous Structures: Unremarkable.      Patient told to follow up with GI after ER visit or return to ER if pain persisted.     Patient states that she she has pain lower mid and left lower quadrant that is constantly aching with intermittent stabbing sensations radiating into her lower back.     Medical Decision Making  On arrival to the emergency department patient's vital signs were all normal. Nothing to suggest SIRS criteria. Patient's white count resulted normal. Urine is clear. CT of the abdomen and pelvis with IV contrast does not demonstrate any diverticulitis. Patient does have moderate constipation which could be the cause of the patient's symptoms. Patient has been advised to take laxatives to help with the constipation and to follow-up with her GI doctor in 1 to 2 days. Patient also strongly encouraged to return to ER symptoms should worsen. Patient will be sent home with a Medrol Dosepak.    CT Abdomen Pelvis with IV Contrast Only (12/09/2024 4:17 PM CST)  Imaging Results - CT Abdomen Pelvis with IV Contrast Only (12/09/2024 4:17 PM CST)  Anatomical Region Laterality Modality   Abdomen, Pelvis   Computed Tomography     Imaging Results - CT Abdomen Pelvis with IV Contrast Only (12/09/2024 4:17 PM CST)  Specimen (Source) Anatomical Location / Laterality Collection Method / Volume Collection Time Received Time                 Imaging Results - CT Abdomen Pelvis with IV Contrast Only (12/09/2024 4:17 PM CST)  Impressions   12/09/2024 5:00 PM CST     1. Moderate retained colonic stool burden, suggesting constipation.  2. No acute inflammatory process in the abdomen or pelvis. No evidence of diverticulitis.        12/09/2024 5:00 PM CST   CT ABDOMEN AND PELVIS  Clinical Indication:   Female, 54 years old. Worsening lower abdominal pain with radiation to the back. Vomiting. Onset 2 days ago. History of diverticulitis.    Technique:  Multiple contiguous axial images were obtained through the abdomen and pelvis following the administration of IV contrast material.  Post processing coronal and sagittal reconstruction images were made from the axial images.    All CT scans at this facility use dose modulation, iterative reconstruction, and/or weight base dose when appropriate to reduce radiation dose to as low as reasonably achievable.  IV contrast: 100mL Omnipaque 300  Bowel contrast:  None  Comparison: None  FINDINGS:  Lower Thorax: 5 mm fissural-based nodule of the right middle lobe. No basilar airspace disease or pleural effusion.  Liver and Biliary system: Liver is normal in size and contour. Scattered cystic changes throughout the left lobe of the liver, reflecting hepatic cyst. Gallbladder is unremarkable. No calcified gallstones. No perihepatic ascites.  Spleen: Normal in size and contour.  Adrenal Glands and Kidneys: Both adrenal glands are unremarkable. The kidneys are normal in size and contour. Kidneys enhance symmetrically and empty into nondilated collecting systems. Nonobstructive stones in the pole of the left kidney.  Pancreas and Retroperitoneum: Pancreas appears unremarkable. No peripancreatic stranding or fluid. No suspicious retroperitoneal adenopathy.  Aorta and Major Vessels: Unremarkable. .  Bowel, Mesentery and Peritoneal space: Appendix is unremarkable in the right lower quadrant. No periappendiceal stranding or fluid collection.  There is mild scattered sigmoid diverticulosis without evidence of diverticulitis. Moderate retained stool throughout the colon. No free fluid. Stomach is fluid distended without wall abnormality. Duodenum is unremarkable.  Pelvis: Urinary bladder is well distended. The uterus is unremarkable. No free fluid..  Abdominal wall and Osseous Structures:  Unremarkable.      ROS:      Review of Systems   Genitourinary:  Positive for flank pain.   Skin:         Hives rash not present today but pt has pictures on phone and she had this in ER. Not sure if secondary to bladder instillations or to hormone meds she started taking over the counter.    All other systems reviewed and are negative.         History:     Past Medical History:   Diagnosis Date    Anxiety     Chest pain 04/29/2023    Depression     Diverticulitis     Endometrial cyst of ovary     Leg pain, posterior, right 09/24/2024    Osteoporosis     Right wrist injury     Serum calcium elevated 10/09/2024    Stroke         Past Surgical History:   Procedure Laterality Date    BLADDER SUSPENSION      x2    OVARIAN CYST REMOVAL      endometreosis    TONSILLECTOMY         Family History   Problem Relation Name Age of Onset    Hypertension Mother Karen Mcgee     Arthritis Mother Karen Mcgee     COPD Mother Karen Mcgee     Hypertension Father Husam jenkins     Colon cancer Maternal Grandfather  70    Colon cancer Paternal Grandmother Brice jenkins 70        Social History     Tobacco Use    Smoking status: Never     Passive exposure: Never    Smokeless tobacco: Never   Substance and Sexual Activity    Alcohol use: Not Currently     Comment: occasionally    Drug use: Never    Sexual activity: Not Currently     Partners: Male     Birth control/protection: Post-menopausal       Current Outpatient Medications   Medication Instructions    azelastine (ASTELIN) 137 mcg, Nasal, 2 times daily    cetirizine (ZYRTEC) 10 MG tablet 1 tablet, Every morning    conjugated estrogens (PREMARIN) 1 g, Vaginal, Daily    diclofenac (VOLTAREN) 75 mg, Oral, 2 times daily    famotidine (PEPCID) 40 mg, Oral, Daily    fish,bora,flax oils-om3,6,9no1 (OMEGA 3-6-9) 1,200 mg Cap 1 capsule, Daily    fluticasone propionate (FLONASE) 50 mcg, Each Nostril, 2 times daily    lactulose (CHRONULAC) 10 gram/15 mL solution 30 mLs    ondansetron  "(ZOFRAN) 4 mg, Oral, Every 6 hours PRN    tamsulosin (FLOMAX) 0.4 mg, Oral, Daily    vitamin E 100 Units, Daily        Review of patient's allergies indicates:   Allergen Reactions    Hydrocodone-guaifenesin Hives    Hydrocodone-acetaminophen     Penicillins Rash       Objective:     Visit Vitals  /70 (BP Location: Left arm, Patient Position: Sitting)   Pulse 71   Temp 98 °F (36.7 °C) (Oral)   Resp 18   Ht 5' 4" (1.626 m)   Wt 56.7 kg (125 lb)   SpO2 99%   BMI 21.46 kg/m²       Physical Examination:     Physical Exam  Vitals reviewed.   Constitutional:       Appearance: Normal appearance. She is normal weight.   HENT:      Head: Normocephalic.   Cardiovascular:      Rate and Rhythm: Normal rate and regular rhythm.      Pulses: Normal pulses.      Heart sounds: Normal heart sounds.   Pulmonary:      Effort: Pulmonary effort is normal.      Breath sounds: Normal breath sounds.   Abdominal:      General: Abdomen is flat. Bowel sounds are normal.      Palpations: Abdomen is soft.      Tenderness: There is abdominal tenderness in the periumbilical area, left upper quadrant and left lower quadrant. There is left CVA tenderness. There is no rebound.   Musculoskeletal:         General: Normal range of motion.      Cervical back: Normal range of motion.   Skin:     General: Skin is warm and dry.   Neurological:      Mental Status: She is alert.   Psychiatric:         Mood and Affect: Mood normal.         Lab Results:     Chemistry:  Lab Results   Component Value Date     10/28/2024    K 4.9 10/28/2024    BUN 13.7 10/28/2024    CREATININE 0.69 10/28/2024    EGFRNORACEVR >60 10/28/2024    GLUCOSE 97 10/28/2024    CALCIUM 9.8 10/28/2024    ALKPHOS 48 10/28/2024    LABPROT 6.8 10/28/2024    ALBUMIN 4.1 10/28/2024    BILIDIR 0.3 07/02/2021    IBILI 0.40 07/02/2021    AST 24 10/28/2024    ALT 24 10/28/2024    MG 2.20 11/30/2022    WASTKNZL48KN 34 09/24/2024    TSH 2.181 09/24/2024    JSSRKV3DPBE 0.93 09/24/2024    "     Lab Results   Component Value Date    HGBA1C 5.1 09/24/2024        Hematology:  Lab Results   Component Value Date    WBC 5.30 12/05/2024    HGB 14.1 12/05/2024    HCT 41.2 12/05/2024     12/05/2024       Lipid Panel:  Lab Results   Component Value Date    CHOL 266 (H) 09/24/2024     (H) 09/24/2024    .00 (H) 09/24/2024    TRIG 61 09/24/2024    TOTALCHOLEST 3 09/24/2024        Urine:  Lab Results   Component Value Date    APPEARANCEUA Clear 09/24/2024    SGUA 1.011 09/24/2024    PROTEINUA Negative 09/24/2024    KETONESUA Negative 09/24/2024    BLOODUA Negative 10/07/2024    LEUKOCYTESUR Negative 09/24/2024    RBCUA 0-5 09/24/2024    WBCUA None Seen 09/24/2024    BACTERIA None Seen 09/24/2024    SQEPUA None Seen 09/24/2024    HYALINECASTS None Seen 09/24/2024        Assessment:          ICD-10-CM ICD-9-CM   1. Post-menopausal  Z78.0 V49.81   2. Kidney stones  N20.0 592.0   3. Stone in kidney  N20.0 592.0   4. Degeneration of intervertebral disc of lumbar region with discogenic back pain and lower extremity pain  M51.362 722.52   5. Hepatic cyst  K76.89 573.8        Plan:     1. Post-menopausal  -     DXA Bone Density Axial Skeleton 1 or more sites; Future; Expected date: 12/13/2024  -     Ambulatory referral/consult to Hepatology; Future; Expected date: 12/20/2024    2. Kidney stones  -     tamsulosin (FLOMAX) 0.4 mg Cap; Take 1 capsule (0.4 mg total) by mouth once daily.  Dispense: 30 capsule; Refill: 3  -     diclofenac (VOLTAREN) 75 MG EC tablet; Take 1 tablet (75 mg total) by mouth 2 (two) times daily.  Dispense: 60 tablet; Refill: 0    3. Stone in kidney  Assessment & Plan:  Flomax 0.4mg po daily   FU with Urology  Increase fluid intake  Norco 7.5mg po q 8 hours, disp 15 for severe breakthrough pain  Diclofenac 75mg po BID to trial .      4. Degeneration of intervertebral disc of lumbar region with discogenic back pain and lower extremity pain  Assessment & Plan:  Will consider referral  to interventional pain management doctor when he is available for referral.       5. Hepatic cyst  Assessment & Plan:  Referral to Hepatology at pt request.            Follow up in about 1 month (around 1/13/2025) for Left flank pain, kidney stones, DXA results.    Future Appointments   Date Time Provider Department Center   12/13/2024 10:20 AM NURSE, [facility] UROLOGY [facility] UROLO [facility]   12/18/2024  8:30 AM [facility] CT2 500 LB LIMIT [facility] CTSCN [facility]   12/19/2024  8:30 AM RESIDENT 2, [facility] OTORHINOLARYNGOLOGY [facility] ENT [facility]   1/13/2025  7:40 AM [facility]   2/19/2025  8:10 AM Emily Mohr FNP [facility] GYN [facility]   2/26/2025  3:00 PM Pamela Hughes, HARDIK [facility] NEURO [facility]   5/12/2025  9:40 AM Shasha Cardoza FNP [facility]        JOAQUINA Chamberlain

## 2024-12-16 ENCOUNTER — TELEPHONE (OUTPATIENT)
Dept: UROLOGY | Facility: CLINIC | Age: 54
End: 2024-12-16
Payer: MEDICAID

## 2024-12-16 NOTE — TELEPHONE ENCOUNTER
Spoke to patient's alternate contact Chay Dacosta regarding constipation unable to see stone.  Patient's altered incontinence states patient is having left flank pain I instructed patient contact to send her to emergency room to be re-evaluate treated with a CT to evaluate stone placement.

## 2024-12-17 ENCOUNTER — TELEPHONE (OUTPATIENT)
Dept: FAMILY MEDICINE | Facility: CLINIC | Age: 54
End: 2024-12-17
Payer: MEDICAID

## 2024-12-17 NOTE — TELEPHONE ENCOUNTER
----- Message from [employer] sent at 12/17/2024  8:12 AM CST -----  Regarding: med advice  Who Called: Remi Baptiste    Caller is requesting assistance/information from provider's office.    Symptoms (please be specific):    How long has patient had these symptoms:    List of preferred pharmacies on file (remove unneeded): [unfilled]  If different, enter pharmacy into here including location and phone number:       Preferred Method of Contact: Phone Call  Patient's Preferred Phone Number on File: [phone]   Best Call Back Number, if different:  Additional Information: pt is requesting a call back regarding MRI she has scheduled for tomorrow. Pt states she received a letter in the mail and would like further discuss w/ nurse

## 2024-12-18 ENCOUNTER — CLINICAL SUPPORT (OUTPATIENT)
Dept: UROLOGY | Facility: CLINIC | Age: 54
End: 2024-12-18
Payer: MEDICAID

## 2024-12-18 DIAGNOSIS — N30.10 INTERSTITIAL CYSTITIS: Primary | ICD-10-CM

## 2024-12-18 LAB
BILIRUB SERPL-MCNC: NORMAL MG/DL
BLOOD URINE, POC: NORMAL
COLOR, POC UA: YELLOW
GLUCOSE UR QL STRIP: NORMAL
KETONES UR QL STRIP: NORMAL
LEUKOCYTE ESTERASE URINE, POC: NORMAL
NITRITE, POC UA: NORMAL
PH, POC UA: 5.5
PROTEIN, POC: NORMAL
SPECIFIC GRAVITY, POC UA: 1.01
UROBILINOGEN, POC UA: 0.2

## 2024-12-18 PROCEDURE — 81001 URINALYSIS AUTO W/SCOPE: CPT | Mod: PBBFAC

## 2024-12-18 PROCEDURE — 96372 THER/PROPH/DIAG INJ SC/IM: CPT | Mod: PBBFAC

## 2024-12-18 PROCEDURE — 99211 OFF/OP EST MAY X REQ PHY/QHP: CPT | Mod: PBBFAC

## 2024-12-18 PROCEDURE — 51700 IRRIGATION OF BLADDER: CPT | Mod: PBBFAC | Performed by: NURSE PRACTITIONER

## 2024-12-18 PROCEDURE — 51700 IRRIGATION OF BLADDER: CPT | Mod: S$PBB,,, | Performed by: NURSE PRACTITIONER

## 2024-12-18 RX ORDER — DEXAMETHASONE SODIUM PHOSPHATE 10 MG/ML
100 INJECTION INTRAMUSCULAR; INTRAVENOUS
Status: COMPLETED | OUTPATIENT
Start: 2024-12-18 | End: 2024-12-18

## 2024-12-18 RX ORDER — HEPARIN SODIUM 5000 [USP'U]/ML
40000 INJECTION, SOLUTION INTRAVENOUS; SUBCUTANEOUS
Status: COMPLETED | OUTPATIENT
Start: 2024-12-18 | End: 2024-12-18

## 2024-12-18 RX ORDER — SODIUM BICARBONATE 42 MG/ML
5 INJECTION, SOLUTION INTRAVENOUS
Status: COMPLETED | OUTPATIENT
Start: 2024-12-18 | End: 2024-12-18

## 2024-12-18 RX ORDER — LIDOCAINE HYDROCHLORIDE 20 MG/ML
5 INJECTION, SOLUTION INFILTRATION; PERINEURAL
Status: COMPLETED | OUTPATIENT
Start: 2024-12-18 | End: 2024-12-18

## 2024-12-18 RX ADMIN — LIDOCAINE HYDROCHLORIDE 5 ML: 20 INJECTION, SOLUTION INFILTRATION; PERINEURAL at 11:12

## 2024-12-18 RX ADMIN — HEPARIN SODIUM 40000 UNITS: 5000 INJECTION, SOLUTION INTRAVENOUS; SUBCUTANEOUS at 11:12

## 2024-12-18 RX ADMIN — SODIUM BICARBONATE 10 ML: 42 INJECTION, SOLUTION INTRAVENOUS at 11:12

## 2024-12-18 RX ADMIN — DEXAMETHASONE SODIUM PHOSPHATE 100 MG: 10 INJECTION, SOLUTION INTRAMUSCULAR; INTRAVENOUS at 11:12

## 2024-12-18 NOTE — PROGRESS NOTES
Presented to clinic without complaints. States bladder symptoms are improving. Urethra prepped with betadine. 12FR blake catheter inserted using sterile technique with 100cc clear yellow urine noted. Bladder instillation #7 instilled without difficulty. RTC 1 week as scheduled.        Component 11:13   Color, UA Yellow   Spec Grav UA 1.015   pH, UA 5.5   WBC, UA neg   Nitrite, UA neg   Protein, POC neg   Glucose, UA neg   Ketones, UA neg   Urobilinogen, UA 0.2   Bilirubin, POC neg   Blood, UA neg             Specimen Collected: 12/18/24 11:13 CST Last Resulted: 12/18/24 11:13 CST   Microscopic urinalysis revealed negative nitrites, negative RBCs, negative WBCs.

## 2024-12-19 ENCOUNTER — OFFICE VISIT (OUTPATIENT)
Dept: OTOLARYNGOLOGY | Facility: CLINIC | Age: 54
End: 2024-12-19
Payer: MEDICAID

## 2024-12-19 VITALS
WEIGHT: 125 LBS | RESPIRATION RATE: 20 BRPM | TEMPERATURE: 99 F | DIASTOLIC BLOOD PRESSURE: 77 MMHG | SYSTOLIC BLOOD PRESSURE: 119 MMHG | HEIGHT: 64 IN | BODY MASS INDEX: 21.34 KG/M2 | OXYGEN SATURATION: 99 % | HEART RATE: 67 BPM

## 2024-12-19 DIAGNOSIS — J34.3 NASAL TURBINATE HYPERTROPHY: ICD-10-CM

## 2024-12-19 DIAGNOSIS — J34.89 NASAL OBSTRUCTION: Primary | ICD-10-CM

## 2024-12-19 PROCEDURE — 99214 OFFICE O/P EST MOD 30 MIN: CPT | Mod: PBBFAC

## 2024-12-19 NOTE — PROGRESS NOTES
[facility]  Otolaryngology Clinic Note    Remi Baptiste  YOB: 1970    Chief Complaint:   Chief Complaint   Patient presents with    Nasal Lacrimal Obstruction        HPI: 12/19/2024: 54 y.o. female presents with c/o nasal congestion/obstruction which is most pronounced at night. Occasional rhinitis and PND. Denies recurrent sinus infections or significant allergy symptoms. States she has sustained a few episodes of nasal trauma and is concerned she has a deviated septum. She has been using flonase and zyrtec daily over the past month without much benefit. States she mouth breathes at night and sometimes awakens coughing/choking. PCP ordered a sleep study but she has not yet been contacted.    7/31/24:  Here for follow-up.  Patient reports that she got COVID about 6 weeks ago and since then she has been having increased globus sensation.  Patient does have reflux and takes omeprazole.  She still feels very congested however she is not having much nasal drainage.  We discussed using nasal sprays.  She is interested in surgery.  We were discussing septoplasty and turbinate reduction however she does not want nasal splints in her nose as that may suffocate her as she has panic attacks.    10/31/24:  Patient presents today for follow-up.  She reports that she has been doing extensive research into inferior turbinate reduction and septoplasty.  She states that she has severe anxiety regarding postoperative swelling/crusting/splints.  She also states that she has anxiety regarding any sort of sharp instrumentation in her nose.  She states she has been using her Flonase and Astelin as needed and does not notice much of a difference.  She would be interested in something less invasive.    12/19/24: No changes. Eager for Vivair/Rhinair    ROS:   10-point review of systems negative except per HPI      Review of patient's allergies indicates:   Allergen Reactions    Hydrocodone-guaifenesin  Hives    Hydrocodone-acetaminophen     Penicillins Rash       Past Medical History:   Diagnosis Date    Anxiety     Chest pain 04/29/2023    Depression     Diverticulitis     Endometrial cyst of ovary     Leg pain, posterior, right 09/24/2024    Osteoporosis     Right wrist injury     Serum calcium elevated 10/09/2024    Stroke        Past Surgical History:   Procedure Laterality Date    BLADDER SUSPENSION      x2    OVARIAN CYST REMOVAL      endometreosis    TONSILLECTOMY         Social History     Socioeconomic History    Marital status: Single    Number of children: 0   Occupational History    Occupation: [employer]    Occupation: Housekeeping   Tobacco Use    Smoking status: Never     Passive exposure: Never    Smokeless tobacco: Never   Substance and Sexual Activity    Alcohol use: Not Currently     Comment: occasionally    Drug use: Never    Sexual activity: Not Currently     Partners: Male     Birth control/protection: Post-menopausal     Social Drivers of Health     Financial Resource Strain: High Risk (8/5/2024)    Overall Financial Resource Strain (CARDIA)     Difficulty of Paying Living Expenses: Very hard   Food Insecurity: Food Insecurity Present (8/5/2024)    Hunger Vital Sign     Worried About Running Out of Food in the Last Year: Sometimes true     Ran Out of Food in the Last Year: Never true   Transportation Needs: No Transportation Needs (6/22/2022)    PRAPARE - Transportation     Lack of Transportation (Medical): No     Lack of Transportation (Non-Medical): No   Physical Activity: Insufficiently Active (8/5/2024)    Exercise Vital Sign     Days of Exercise per Week: 3 days     Minutes of Exercise per Session: 30 min   Stress: Stress Concern Present (8/5/2024)    [employer] [place] [employer] - Occupational Stress Questionnaire     Feeling of Stress : Very much   Housing Stability: High Risk (8/5/2024)    Housing Stability Vital Sign     Unable to Pay for Housing in the Last Year: Yes        Family History   Problem Relation Name Age of Onset    Hypertension Mother Karen Mcgee     Arthritis Mother Karen Mcgee     COPD Mother Karen Mcgee     Hypertension Father Husam jenkins     Colon cancer Maternal Grandfather  70    Colon cancer Paternal Grandmother Brice jenkins 70       Outpatient Encounter Medications as of 12/19/2024   Medication Sig Dispense Refill    azelastine (ASTELIN) 137 mcg (0.1 %) nasal spray 1 spray (137 mcg total) by Nasal route 2 (two) times daily. 30 mL 5    cetirizine (ZYRTEC) 10 MG tablet Take 1 tablet by mouth every morning.      conjugated estrogens (PREMARIN) vaginal cream Place 1 g vaginally once daily. 1 applicator 11    diclofenac (VOLTAREN) 75 MG EC tablet Take 1 tablet (75 mg total) by mouth 2 (two) times daily. 60 tablet 0    famotidine (PEPCID) 40 MG tablet Take 1 tablet (40 mg total) by mouth once daily. 30 tablet 11    fish,bora,flax oils-om3,6,9no1 (OMEGA 3-6-9) 1,200 mg Cap Take 1 capsule by mouth Daily.      fluticasone propionate (FLONASE) 50 mcg/actuation nasal spray 1 spray (50 mcg total) by Each Nostril route 2 (two) times a day. 16 g 11    lactulose (CHRONULAC) 10 gram/15 mL solution Take 30 mLs by mouth.      ondansetron (ZOFRAN) 4 MG tablet Take 1 tablet (4 mg total) by mouth every 6 (six) hours as needed for Nausea. 12 tablet 0    tamsulosin (FLOMAX) 0.4 mg Cap Take 1 capsule (0.4 mg total) by mouth once daily. 30 capsule 3    vitamin E 100 UNIT capsule Take 100 Units by mouth once daily.      [DISCONTINUED] ciprofloxacin HCl (CIPRO) 500 MG tablet Take 500 mg by mouth 2 (two) times daily. (Patient not taking: Reported on 12/13/2024)      [DISCONTINUED] metroNIDAZOLE (FLAGYL) 500 MG tablet Take 500 mg by mouth 3 (three) times daily. (Patient not taking: Reported on 12/13/2024)      [DISCONTINUED] omeprazole (PRILOSEC) 40 MG capsule Take 40 mg by mouth every morning. prn (Patient not taking: Reported on 12/13/2024)      [DISCONTINUED] vibegron 75  "mg Tab Take 1 tablet (75 mg total) by mouth Daily. 90 tablet 3     Facility-Administered Encounter Medications as of 12/19/2024   Medication Dose Route Frequency Provider Last Rate Last Admin    [COMPLETED] dexAMETHasone injection 100 mg  100 mg Other 1 time in Clinic/HOD    100 mg at 12/18/24 1124    [COMPLETED] heparin (porcine) injection 40,000 Units  40,000 Units Intra-Catheter 1 time in Clinic/HOD    40,000 Units at 12/18/24 1126    [COMPLETED] LIDOcaine HCL 20 mg/ml (2%) injection 5 mL  5 mL Other 1 time in Clinic/HOD    5 mL at 12/18/24 1125    [COMPLETED] sodium bicarbonate 4.2% 10 mL  5 mEq Bladder Instillation 1 time in Clinic/HOD    10 mL at 12/18/24 1125       Physical Exam:  Vitals:    12/19/24 0848   BP: 119/77   BP Location: Left arm   Patient Position: Sitting   Pulse: 67   Resp: 20   Temp: 98.5 °F (36.9 °C)   TempSrc: Oral   SpO2: 99%   Weight: 56.7 kg (125 lb)   Height: 5' 4" (1.626 m)       General: NAD, voice normal  Neuro: AAO, CN II - XII grossly intact  Head/ Face: NCAT, symmetric, sensations intact bilaterally  Eyes: EOMI, PERRL  Ears: externally normal with grossly normal hearing  AD: EAC patent, TM intact, no middle ear effusion, no retractions  AS: EAC patent, TM intact, no middle ear effusion, no retractions  Nose: bilateral nares patent, left septal deflection, no rhinorrhea, no external deformity, +ITH  OC/OP: MMM, no intraoral lesions,  no trismus, dentition is good, no uvular deviation, bilaterally symmetric soft palate elevation, palatoglossus and palatopharyngeal fold wnl; tonsils are symmetric/absent  Neck: soft, supple, no LAD, normal ROM, no thyromegaly  Respiratory: nonlabored, no wheezing, bilateral chest rise    Pertinent Data:  ? LABS:  ? AUDIO:           ? PATH:      Imaging:   I personally reviewed the following images:        Assessment/Plan:  54 y.o. female with nasal congestion/obstruction. Not medically optimized. Reviewed CT head and CTA head/neck from last year- there " is a mild left septal deviation and significant turbinate hypertrophy- reviewed.      Patient was previously offered inferior turbinate reduction and septoplasty however patient very anxious regarding any postoperative swelling/bleeding/crusting/splints.  Patient is very concerned regarding any sharp instrumentation in her nose and would be more interested in a noninvasive options such as RhinAer/VivAer.     - Flonase + astelin BID (reviewed technique), discussed the importance of taking these medications regularly rather than as needed.  Additional script sent to pharmacy.  - RTC 2 months to discuss patient's further thoughts on possible surgery and to see progress on obtaining RhinAer/VivAer technology.     Haim Benito MD  [facility] Department of Otolaryngology  [facility]

## 2024-12-27 ENCOUNTER — CLINICAL SUPPORT (OUTPATIENT)
Dept: UROLOGY | Facility: CLINIC | Age: 54
End: 2024-12-27
Payer: MEDICAID

## 2024-12-27 DIAGNOSIS — N30.10 INTERSTITIAL CYSTITIS: Primary | ICD-10-CM

## 2024-12-27 RX ORDER — DEXAMETHASONE SODIUM PHOSPHATE 10 MG/ML
100 INJECTION INTRAMUSCULAR; INTRAVENOUS
Status: COMPLETED | OUTPATIENT
Start: 2024-12-27 | End: 2024-12-27

## 2024-12-27 RX ORDER — SODIUM BICARBONATE 42 MG/ML
5 INJECTION, SOLUTION INTRAVENOUS
Status: COMPLETED | OUTPATIENT
Start: 2024-12-27 | End: 2024-12-27

## 2024-12-27 RX ORDER — LIDOCAINE HYDROCHLORIDE 20 MG/ML
5 INJECTION, SOLUTION INFILTRATION; PERINEURAL
Status: COMPLETED | OUTPATIENT
Start: 2024-12-27 | End: 2024-12-27

## 2024-12-27 RX ORDER — HEPARIN SODIUM 5000 [USP'U]/ML
40000 INJECTION, SOLUTION INTRAVENOUS; SUBCUTANEOUS
Status: COMPLETED | OUTPATIENT
Start: 2024-12-27 | End: 2024-12-27

## 2024-12-27 RX ADMIN — HEPARIN SODIUM 40000 UNITS: 5000 INJECTION, SOLUTION INTRAVENOUS; SUBCUTANEOUS at 11:12

## 2024-12-27 RX ADMIN — DEXAMETHASONE SODIUM PHOSPHATE 100 MG: 10 INJECTION INTRAMUSCULAR; INTRAVENOUS at 11:12

## 2024-12-27 RX ADMIN — SODIUM BICARBONATE 10 ML: 42 INJECTION, SOLUTION INTRAVENOUS at 11:12

## 2024-12-27 RX ADMIN — LIDOCAINE HYDROCHLORIDE 5 ML: 20 INJECTION, SOLUTION INFILTRATION; PERINEURAL at 11:12

## 2024-12-27 NOTE — PROGRESS NOTES
"Bladder instillation #7 held for two hrs and "symptoms seem to be decreasing "stated by patient . Bladder instillation #8 administered with sterile technique via [address] , small amount of clear odorless urine drained from bladder. Procedure tolerated well RTC for weekly instillation     "

## 2024-12-31 ENCOUNTER — LAB VISIT (OUTPATIENT)
Dept: LAB | Facility: HOSPITAL | Age: 54
End: 2024-12-31
Payer: MEDICAID

## 2024-12-31 DIAGNOSIS — I63.9 STROKE ABORTED BY ADMINISTRATION OF THROMBOLYTIC AGENT: ICD-10-CM

## 2024-12-31 DIAGNOSIS — N30.10 INTERSTITIAL CYSTITIS: ICD-10-CM

## 2024-12-31 DIAGNOSIS — E83.52 SERUM CALCIUM ELEVATED: ICD-10-CM

## 2024-12-31 DIAGNOSIS — M51.362 DEGENERATION OF INTERVERTEBRAL DISC OF LUMBAR REGION WITH DISCOGENIC BACK PAIN AND LOWER EXTREMITY PAIN: ICD-10-CM

## 2024-12-31 DIAGNOSIS — E78.5 HYPERLIPIDEMIA, UNSPECIFIED HYPERLIPIDEMIA TYPE: Primary | ICD-10-CM

## 2024-12-31 LAB
IGA SERPL-MCNC: 150 MG/DL (ref 65–421)
T3FREE SERPL-MCNC: 2.74 PG/ML (ref 1.58–3.91)
T4 SERPL-MCNC: 8.77 UG/DL (ref 4.87–11.72)
TSH SERPL-ACNC: 1.57 UIU/ML (ref 0.35–4.94)

## 2024-12-31 PROCEDURE — 82784 ASSAY IGA/IGD/IGG/IGM EACH: CPT

## 2024-12-31 PROCEDURE — 86258 DGP ANTIBODY EACH IG CLASS: CPT

## 2024-12-31 PROCEDURE — 84436 ASSAY OF TOTAL THYROXINE: CPT

## 2024-12-31 PROCEDURE — 84443 ASSAY THYROID STIM HORMONE: CPT

## 2024-12-31 PROCEDURE — 84481 FREE ASSAY (FT-3): CPT

## 2024-12-31 PROCEDURE — 86231 EMA EACH IG CLASS: CPT

## 2024-12-31 PROCEDURE — 36415 COLL VENOUS BLD VENIPUNCTURE: CPT

## 2024-12-31 PROCEDURE — 86364 TISS TRNSGLTMNASE EA IG CLAS: CPT | Mod: 91

## 2024-12-31 PROCEDURE — 86364 TISS TRNSGLTMNASE EA IG CLAS: CPT

## 2025-01-03 ENCOUNTER — HOSPITAL ENCOUNTER (OUTPATIENT)
Dept: RADIOLOGY | Facility: HOSPITAL | Age: 55
Discharge: HOME | End: 2025-01-03
Attending: NURSE PRACTITIONER
Payer: MEDICAID

## 2025-01-03 DIAGNOSIS — Z78.0 POST-MENOPAUSAL: ICD-10-CM

## 2025-01-03 LAB
ELIA GLIADINDP IGA QUANTITATIVE: 0.7 U/ML
ELIA GLIADINDP IGG QUANTITATIVE: <0.6 U/ML
TTG IGA SER IA-ACNC: <1.2 U/ML
TTG IGA SER IA-ACNC: <1.2 U/ML

## 2025-01-03 PROCEDURE — 77080 DXA BONE DENSITY AXIAL: CPT | Mod: TC

## 2025-01-06 LAB
ENDOMYSIUM IGA SER QL IF: NEGATIVE
IGA SERPL-MCNC: 143 MG/DL (ref 61–356)
IMMUNOLOGIST REVIEW: NORMAL

## 2025-01-07 DIAGNOSIS — R10.30 PAIN IN THE GROIN: Primary | ICD-10-CM

## 2025-01-07 DIAGNOSIS — K59.00 COLONIC CONSTIPATION: ICD-10-CM

## 2025-01-08 ENCOUNTER — CLINICAL SUPPORT (OUTPATIENT)
Dept: UROLOGY | Facility: CLINIC | Age: 55
End: 2025-01-08
Payer: MEDICAID

## 2025-01-08 DIAGNOSIS — N30.10 INTERSTITIAL CYSTITIS: Primary | ICD-10-CM

## 2025-01-08 PROCEDURE — 51700 IRRIGATION OF BLADDER: CPT | Mod: S$PBB,,, | Performed by: NURSE PRACTITIONER

## 2025-01-08 PROCEDURE — 96372 THER/PROPH/DIAG INJ SC/IM: CPT | Mod: PBBFAC

## 2025-01-08 PROCEDURE — 51700 IRRIGATION OF BLADDER: CPT | Mod: PBBFAC | Performed by: NURSE PRACTITIONER

## 2025-01-08 PROCEDURE — 81001 URINALYSIS AUTO W/SCOPE: CPT | Mod: PBBFAC

## 2025-01-08 RX ORDER — LIDOCAINE HYDROCHLORIDE 20 MG/ML
5 INJECTION, SOLUTION INFILTRATION; PERINEURAL
Status: COMPLETED | OUTPATIENT
Start: 2025-01-08 | End: 2025-01-08

## 2025-01-08 RX ORDER — HEPARIN SODIUM 5000 [USP'U]/ML
40000 INJECTION, SOLUTION INTRAVENOUS; SUBCUTANEOUS
Status: COMPLETED | OUTPATIENT
Start: 2025-01-08 | End: 2025-01-08

## 2025-01-08 RX ORDER — SODIUM BICARBONATE 42 MG/ML
5 INJECTION, SOLUTION INTRAVENOUS
Status: COMPLETED | OUTPATIENT
Start: 2025-01-08 | End: 2025-01-08

## 2025-01-08 RX ORDER — DEXAMETHASONE SODIUM PHOSPHATE 10 MG/ML
100 INJECTION INTRAMUSCULAR; INTRAVENOUS
Status: COMPLETED | OUTPATIENT
Start: 2025-01-08 | End: 2025-01-08

## 2025-01-08 RX ADMIN — DEXAMETHASONE SODIUM PHOSPHATE 100 MG: 10 INJECTION, SOLUTION INTRAMUSCULAR; INTRAVENOUS at 09:01

## 2025-01-08 RX ADMIN — LIDOCAINE HYDROCHLORIDE 5 ML: 20 INJECTION, SOLUTION INFILTRATION; PERINEURAL at 09:01

## 2025-01-08 RX ADMIN — HEPARIN SODIUM 40000 UNITS: 5000 INJECTION, SOLUTION INTRAVENOUS; SUBCUTANEOUS at 09:01

## 2025-01-08 RX ADMIN — SODIUM BICARBONATE 10 ML: 42 INJECTION, SOLUTION INTRAVENOUS at 09:01

## 2025-01-08 NOTE — PROGRESS NOTES
"Bladder instillation #8 held for two hrs and " frequent urination "stated by patient . Bladder instillation #9 administered with sterile technique via [address] , small amount of clear odorless urine drained from bladder. Procedure tolerated well RTC for doctor visit on 01/16/25      Component 09:45   Color, UA Yellow   Spec Grav UA 1.020   pH, UA 6.0   WBC, UA neg   Nitrite, UA neg   Protein, POC neg   Glucose, UA neg   Ketones, UA neg   Urobilinogen, UA 0.2   Bilirubin, POC neg   Blood, UA neg             Specimen Collected: 01/08/25 09:45 CST Last Resulted: 01/08/25 09:45 CS   Microscopic urinalysis RBCs negative, WBCs negative, nitrites negative.  "

## 2025-01-10 ENCOUNTER — HOSPITAL ENCOUNTER (OUTPATIENT)
Dept: RADIOLOGY | Facility: HOSPITAL | Age: 55
Discharge: HOME | End: 2025-01-10
Payer: MEDICAID

## 2025-01-10 DIAGNOSIS — R10.30 PAIN IN THE GROIN: ICD-10-CM

## 2025-01-10 DIAGNOSIS — K59.00 COLONIC CONSTIPATION: ICD-10-CM

## 2025-01-10 PROCEDURE — A9537 TC99M MEBROFENIN: HCPCS

## 2025-01-10 PROCEDURE — 63600175 PHARM REV CODE 636 W HCPCS

## 2025-01-10 PROCEDURE — 78226 HEPATOBILIARY SYSTEM IMAGING: CPT | Mod: TC

## 2025-01-10 RX ORDER — KIT FOR THE PREPARATION OF TECHNETIUM TC 99M MEBROFENIN 45 MG/10ML
8 INJECTION, POWDER, LYOPHILIZED, FOR SOLUTION INTRAVENOUS
Status: COMPLETED | OUTPATIENT
Start: 2025-01-10 | End: 2025-01-10

## 2025-01-10 RX ORDER — SINCALIDE 5 UG/5ML
0.02 INJECTION, POWDER, LYOPHILIZED, FOR SOLUTION INTRAVENOUS ONCE
Status: COMPLETED | OUTPATIENT
Start: 2025-01-10 | End: 2025-01-10

## 2025-01-10 RX ADMIN — SINCALIDE 1.1 MCG: 5 INJECTION, POWDER, LYOPHILIZED, FOR SOLUTION INTRAVENOUS at 11:01

## 2025-01-10 RX ADMIN — MEBROFENIN 7.5 MILLICURIE: 45 INJECTION, POWDER, LYOPHILIZED, FOR SOLUTION INTRAVENOUS at 10:01

## 2025-01-13 ENCOUNTER — OFFICE VISIT (OUTPATIENT)
Dept: FAMILY MEDICINE | Facility: CLINIC | Age: 55
End: 2025-01-13
Payer: MEDICAID

## 2025-01-13 ENCOUNTER — TELEPHONE (OUTPATIENT)
Dept: FAMILY MEDICINE | Facility: CLINIC | Age: 55
End: 2025-01-13

## 2025-01-13 VITALS
RESPIRATION RATE: 18 BRPM | DIASTOLIC BLOOD PRESSURE: 81 MMHG | BODY MASS INDEX: 21.85 KG/M2 | SYSTOLIC BLOOD PRESSURE: 138 MMHG | HEART RATE: 68 BPM | TEMPERATURE: 98 F | HEIGHT: 64 IN | WEIGHT: 128 LBS

## 2025-01-13 DIAGNOSIS — M81.6 LOCALIZED OSTEOPOROSIS WITHOUT CURRENT PATHOLOGICAL FRACTURE: ICD-10-CM

## 2025-01-13 DIAGNOSIS — K76.9 LESION OF LIVER LESS THAN 1 CM IN DIAMETER: Primary | ICD-10-CM

## 2025-01-13 DIAGNOSIS — R10.30 LOWER ABDOMINAL PAIN: Primary | ICD-10-CM

## 2025-01-13 PROCEDURE — 99213 OFFICE O/P EST LOW 20 MIN: CPT | Mod: S$PBB,,, | Performed by: NURSE PRACTITIONER

## 2025-01-13 PROCEDURE — 3008F BODY MASS INDEX DOCD: CPT | Mod: CPTII,,, | Performed by: NURSE PRACTITIONER

## 2025-01-13 PROCEDURE — 1159F MED LIST DOCD IN RCRD: CPT | Mod: CPTII,,, | Performed by: NURSE PRACTITIONER

## 2025-01-13 PROCEDURE — 1160F RVW MEDS BY RX/DR IN RCRD: CPT | Mod: CPTII,,, | Performed by: NURSE PRACTITIONER

## 2025-01-13 PROCEDURE — 3079F DIAST BP 80-89 MM HG: CPT | Mod: CPTII,,, | Performed by: NURSE PRACTITIONER

## 2025-01-13 PROCEDURE — 99215 OFFICE O/P EST HI 40 MIN: CPT | Mod: PBBFAC,PN | Performed by: NURSE PRACTITIONER

## 2025-01-13 PROCEDURE — 3075F SYST BP GE 130 - 139MM HG: CPT | Mod: CPTII,,, | Performed by: NURSE PRACTITIONER

## 2025-01-13 NOTE — ASSESSMENT & PLAN NOTE
Refer to Endocrinology for treatment. Pt refused Fosamax treatment today.  Frax score 0.7% and 1.7%.    DEXA scan, most current DEXA scan T-score is minus 2.9 in spine and -1.7 in femoral neck.

## 2025-01-13 NOTE — TELEPHONE ENCOUNTER
----- Message from Merary sent at 1/13/2025  9:38 AM CST -----  Regarding: MRI Abdomen With Contrast  Good morning, Please input new orders for the MRI as MRI Abdomen W/WO Contrast so that we can schedule with patient.        Thank You,Merary Brothers  Centralized Scheduling Dept

## 2025-01-13 NOTE — PROGRESS NOTES
Patient Name: Remi Baptiste     : 1970    MRN: [id number]     Subjective:     Patient ID: Remi Baptiste is a 54 y.o. female.    Chief Complaint:   Chief Complaint   Patient presents with    Follow-up     Pt is here for follow up. Pt reports going to [facility] hepatology dept and was told no Dr 's name is noted on the referral. Pt crying and doesn't want to answer questions        HPI: 25: FU 1 month liver lesion, DXA results. Pt is here for follow up. Pt reports going to [facility] hepatology dept and was told no Dr 's name is not on the referral. Pt crying and doesn't want to answer questions. Actively seeing GI and having workup of her abdominal pain. Has upcoming EGD and Colonoscopy scheduled for February.        ROS:      Review of Systems   Constitutional: Negative.    HENT: Negative.     Eyes: Negative.    Respiratory: Negative.     Cardiovascular: Negative.    Gastrointestinal: Negative.    Genitourinary: Negative.    Musculoskeletal: Negative.    Skin: Negative.    Neurological: Negative.    Endo/Heme/Allergies: Negative.    Psychiatric/Behavioral: Negative.     All other systems reviewed and are negative.           History:     Past Medical History:   Diagnosis Date    Anxiety     Chest pain 2023    Depression     Diverticulitis     Endometrial cyst of ovary     Leg pain, posterior, right 2024    Osteoporosis     Right wrist injury     Serum calcium elevated 10/09/2024    Stroke         Past Surgical History:   Procedure Laterality Date    BLADDER SUSPENSION      x2    OVARIAN CYST REMOVAL      endometreosis    TONSILLECTOMY         Family History   Problem Relation Name Age of Onset    Hypertension Mother Karen Mcgee     Arthritis Mother Karen Mcgee     COPD Mother Karen Mcgee     Hypertension Father Husam baptiste     Colon cancer Maternal Grandfather  70    Colon cancer Paternal Grandmother Brice baptiste 70        Social History     Tobacco Use    Smoking  "status: Never     Passive exposure: Never    Smokeless tobacco: Never   Substance and Sexual Activity    Alcohol use: Not Currently     Comment: occasionally    Drug use: Never    Sexual activity: Not Currently     Partners: Male     Birth control/protection: Post-menopausal       Current Outpatient Medications   Medication Instructions    azelastine (ASTELIN) 137 mcg, Nasal, 2 times daily    cetirizine (ZYRTEC) 10 MG tablet 1 tablet, Every morning    conjugated estrogens (PREMARIN) 1 g, Vaginal, Daily    diclofenac (VOLTAREN) 75 mg, Oral, 2 times daily    famotidine (PEPCID) 40 mg, Oral, Daily    fish,bora,flax oils-om3,6,9no1 (OMEGA 3-6-9) 1,200 mg Cap 1 capsule, Daily    fluticasone propionate (FLONASE) 50 mcg, Each Nostril, 2 times daily    lactulose (CHRONULAC) 10 gram/15 mL solution 30 mLs    ondansetron (ZOFRAN) 4 mg, Oral, Every 6 hours PRN    tamsulosin (FLOMAX) 0.4 mg, Oral, Daily    vitamin E 100 Units, Daily        Review of patient's allergies indicates:   Allergen Reactions    Hydrocodone-guaifenesin Hives    Hydrocodone-acetaminophen     Penicillins Rash       Objective:     Visit Vitals  /81 (BP Location: Left arm, Patient Position: Sitting)   Pulse 68   Temp 98 °F (36.7 °C) (Oral)   Resp 18   Ht 5' 4" (1.626 m)   Wt 58.1 kg (128 lb)   BMI 21.97 kg/m²       Physical Examination:     Physical Exam  Vitals reviewed.   Constitutional:       Appearance: Normal appearance. She is normal weight.   HENT:      Head: Normocephalic.   Cardiovascular:      Rate and Rhythm: Normal rate and regular rhythm.      Pulses: Normal pulses.      Heart sounds: Normal heart sounds.   Pulmonary:      Effort: Pulmonary effort is normal.      Breath sounds: Normal breath sounds.   Abdominal:      General: Abdomen is flat.      Palpations: Abdomen is soft.   Musculoskeletal:         General: Normal range of motion.      Cervical back: Normal range of motion.   Skin:     General: Skin is warm and dry. "   Neurological:      Mental Status: She is alert.   Psychiatric:         Mood and Affect: Mood normal.       Lab Results:     Chemistry:  Lab Results   Component Value Date     10/28/2024    K 4.9 10/28/2024    BUN 13.7 10/28/2024    CREATININE 0.69 10/28/2024    EGFRNORACEVR >60 10/28/2024    GLUCOSE 97 10/28/2024    CALCIUM 9.8 10/28/2024    ALKPHOS 48 10/28/2024    LABPROT 6.8 10/28/2024    ALBUMIN 4.1 10/28/2024    BILIDIR 0.3 07/02/2021    IBILI 0.40 07/02/2021    AST 24 10/28/2024    ALT 24 10/28/2024    MG 2.20 11/30/2022    DQVXMLWV56QU 34 09/24/2024    TSH 1.566 12/31/2024    OLYHSY4TRSG 0.93 09/24/2024        Lab Results   Component Value Date    HGBA1C 5.1 09/24/2024        Hematology:  Lab Results   Component Value Date    WBC 5.30 12/05/2024    HGB 14.1 12/05/2024    HCT 41.2 12/05/2024     12/05/2024       Lipid Panel:  Lab Results   Component Value Date    CHOL 266 (H) 09/24/2024     (H) 09/24/2024    .00 (H) 09/24/2024    TRIG 61 09/24/2024    TOTALCHOLEST 3 09/24/2024        Urine:  Lab Results   Component Value Date    APPEARANCEUA Clear 09/24/2024    SGUA 1.011 09/24/2024    PROTEINUA Negative 09/24/2024    KETONESUA Negative 09/24/2024    BLOODUA Negative 10/07/2024    LEUKOCYTESUR Negative 09/24/2024    RBCUA 0-5 09/24/2024    WBCUA None Seen 09/24/2024    BACTERIA None Seen 09/24/2024    SQEPUA None Seen 09/24/2024    HYALINECASTS None Seen 09/24/2024        Assessment:          ICD-10-CM ICD-9-CM   1. Lesion of liver less than 1 cm in diameter  K76.9 573.8   2. Localized osteoporosis without current pathological fracture  M81.6 733.09        Plan:     1. Lesion of liver less than 1 cm in diameter  -     MRI Abdomen With Contrast; Future; Expected date: 01/13/2025  -     MRI Abdomen W WO Contrast; Future; Expected date: 01/13/2025    2. Localized osteoporosis without current pathological fracture  Overview:  The L1 to L4 vertebral bone mineral density is equal to  0.841 g/cm squared with a T score of -2.9.     The left femoral neck bone mineral density is equal to 0.800 g/cm squared with a T score of -1.7.  The right femoral neck bone mineral density is equal to 0.770 g/cm squared with a T score of -1.9.       Assessment & Plan:  Refer to Endocrinology for treatment. Pt refused Fosamax treatment today.  Frax score 0.7% and 1.7%.    DEXA scan, most current DEXA scan T-score is minus 2.9 in spine and -1.7 in femoral neck.         Orders:  -     Ambulatory referral/consult to Endocrinology; Future; Expected date: 01/20/2025         Follow up in about 5 months (around 6/13/2025)..  In addition to their scheduled follow up, the patient has also been instructed to follow up on as needed basis.         Future Appointments   Date Time Provider Department Center   1/16/2025  8:00 AM Marco Hopkins, ERA [facility] UROLO [place]    2/19/2025  8:10 AM Emily Mohr FNP [facility] GYN [place]    2/26/2025  3:00 PM Pamela Hughes ANP [facility] NEURO [place]    2/27/2025  1:00 PM RESIDENT 2, [facility] OTORHINOLARYNGOLOGY [facility] ENT [place]   6/13/2025  7:40 AM Shasha Cardoza FNP [facility]        JOAQUINA [place]lain

## 2025-01-16 ENCOUNTER — OFFICE VISIT (OUTPATIENT)
Dept: UROLOGY | Facility: CLINIC | Age: 55
End: 2025-01-16
Payer: MEDICAID

## 2025-01-16 VITALS
BODY MASS INDEX: 21.17 KG/M2 | WEIGHT: 124 LBS | RESPIRATION RATE: 18 BRPM | HEART RATE: 69 BPM | DIASTOLIC BLOOD PRESSURE: 71 MMHG | SYSTOLIC BLOOD PRESSURE: 115 MMHG | HEIGHT: 64 IN | OXYGEN SATURATION: 100 %

## 2025-01-16 DIAGNOSIS — N32.81 OVERACTIVE BLADDER: Primary | ICD-10-CM

## 2025-01-16 DIAGNOSIS — N20.0 KIDNEY STONE: ICD-10-CM

## 2025-01-16 LAB
BILIRUB SERPL-MCNC: NEGATIVE MG/DL
BLOOD URINE, POC: NORMAL
COLOR, POC UA: YELLOW
GLUCOSE UR QL STRIP: NEGATIVE
KETONES UR QL STRIP: NEGATIVE
LEUKOCYTE ESTERASE URINE, POC: NEGATIVE
NITRITE, POC UA: NEGATIVE
PH, POC UA: 5.5
PROTEIN, POC: NEGATIVE
SPECIFIC GRAVITY, POC UA: 1.03
UROBILINOGEN, POC UA: 0.2

## 2025-01-16 PROCEDURE — 99213 OFFICE O/P EST LOW 20 MIN: CPT | Mod: S$PBB,,, | Performed by: NURSE PRACTITIONER

## 2025-01-16 PROCEDURE — 1160F RVW MEDS BY RX/DR IN RCRD: CPT | Mod: CPTII,,, | Performed by: NURSE PRACTITIONER

## 2025-01-16 PROCEDURE — 99214 OFFICE O/P EST MOD 30 MIN: CPT | Mod: PBBFAC | Performed by: NURSE PRACTITIONER

## 2025-01-16 PROCEDURE — 3008F BODY MASS INDEX DOCD: CPT | Mod: CPTII,,, | Performed by: NURSE PRACTITIONER

## 2025-01-16 PROCEDURE — 3078F DIAST BP <80 MM HG: CPT | Mod: CPTII,,, | Performed by: NURSE PRACTITIONER

## 2025-01-16 PROCEDURE — 3074F SYST BP LT 130 MM HG: CPT | Mod: CPTII,,, | Performed by: NURSE PRACTITIONER

## 2025-01-16 PROCEDURE — 81001 URINALYSIS AUTO W/SCOPE: CPT | Mod: PBBFAC | Performed by: NURSE PRACTITIONER

## 2025-01-16 PROCEDURE — 1159F MED LIST DOCD IN RCRD: CPT | Mod: CPTII,,, | Performed by: NURSE PRACTITIONER

## 2025-01-16 RX ORDER — VIBEGRON 75 MG/1
75 TABLET, FILM COATED ORAL DAILY
Qty: 30 TABLET | Refills: 11 | Status: SHIPPED | OUTPATIENT
Start: 2025-01-16

## 2025-01-16 NOTE — PROGRESS NOTES
Patient seen by Sandeep GIRARD. Pt will RTC for instillations starting tomorrow. Pt will return to clinic 1 month for in person visit. Pt will be tappered off instillations. Pt eduction given both written and verbal.

## 2025-01-16 NOTE — PATIENT INSTRUCTIONS
Bladder Instillation   Why is this procedure done?   Bladder instillation therapy is when a liquid drug is used to coat the inside of your bladder. Your doctor may order this procedure to:  Treat bladder pain or interstitial cystitis  Treat recurrent bladder infections  Prevent bladder stones  What happens before the procedure?   Your doctor will ask about your health history. Talk to the doctor about:  All the drugs you are taking. Be sure to include all prescription, over the counter, vitamins, and herbal supplements. Bring a list of drugs you take with you. Tell the doctor if you have any drug allergy.  If you take a diuretic, ask the doctor if you should take this drug the morning of your treatment.  If you think you may have a bladder infection, if you have had any fevers, or have had any recent blood in your urine  Any surgeries you have had on your bladder, kidneys, urethra, or ureters  If you need to limit fluids or avoid caffeine before the procedure  The doctor may order tests and check your urine for blood or infection.  What happens during the procedure?   The staff will place a thin, flexible tube through your urethra into your bladder. This is a catheter. The catheter will drain any urine from your bladder.  The doctor will place a mixture of fluid and drugs through the catheter into your bladder. The doctor may heat the fluid before giving it to you.  You will hold the fluid and drugs inside your bladder for a time. Talk with your doctor to find out how long you need to hold the drugs in your bladder to help them work. This could be anywhere from a few minutes to a few hours. Ask your doctor if you need to change positions while the fluid is in your bladder.  What happens after the procedure?   You may have to stay in the doctor's office or hospital for a time while the drugs are in your bladder.  The doctor may take the catheter out right away or it may be left in place to help drain out the fluid  and then be taken out afterwards.  If the doctor takes the catheter out, you will sit on the toilet and pass urine so the urine does not splash when you are allowed to get rid of the fluids and drugs from your bladder.  What care is needed at home?   Ask your doctor what you need to do when you go home. Make sure you ask questions if you do not understand everything the doctor says.  Your doctor may ask you to drink extra fluids after your procedure.  What follow-up care is needed?   Your doctor may ask you to come back to the office to check on your progress. Be sure to keep these visits.  You may need to have more bladder instillations. Talk to your doctor about how many treatments you may need.  What problems could happen?   Bladder or belly pain  Passing urine often  Urinary tract or prostate infection  Severe infection throughout the body  Blood in the urine  Burning feeling in bladder  Feeling tired  Fever  Where can I learn more?   American Cancer Society  https://www.cancer.org/cancer/bladder-cancer/treating/intravesical-therapy.html   [place] [employer]  https://www.baus.org.uk/_userfiles/pages/files/Patients/Leaflets/Painful%20bladder.pdf   Last Reviewed Date   2021-03-18  Consumer Information Use and Disclaimer   This information is not specific medical advice and does not replace information you receive from your health care provider. This is only a brief summary of general information. It does NOT include all information about conditions, illnesses, injuries, tests, procedures, treatments, therapies, discharge instructions or life-style choices that may apply to you. You must talk with your health care provider for complete information about your health and treatment options. This information should not be used to decide whether or not to accept your health care providers advice, instructions or recommendations. Only your health care provider has the knowledge and training to  provide advice that is right for you.  Copyright   Copyright © 2021 [employer] and its affiliates and/or licensors. All rights reserved.

## 2025-01-16 NOTE — PROGRESS NOTES
Chief Complaint:   Chief Complaint   Patient presents with    Follow-up       HPI:   Patient is a 54-year-old female referred to Urology for interstitial cystitis.  Patient seen by PCP on 09/24/2024 with a persistent history of bladder distension. Past couple of months is urinating 3 times a hour which is a full amount of urine. Disrupting her life.   Patient previously finished 8 week course of bladder instillations for interstitial cystitis.  Patient states having recurrent symptoms at 2 weeks therefore would like to taper dose her bladder instillations.  Patient states having urinary urgency and frequency with drinking water therefore would like to restart Gemtesa.  Patient's history of kidney stones as listed below.      Allergies:  Review of patient's allergies indicates:   Allergen Reactions    Hydrocodone-guaifenesin Hives    Hydrocodone-acetaminophen     Penicillins Rash       Medications:  Current Outpatient Medications   Medication Sig Dispense Refill    azelastine (ASTELIN) 137 mcg (0.1 %) nasal spray 1 spray (137 mcg total) by Nasal route 2 (two) times daily. 30 mL 5    cetirizine (ZYRTEC) 10 MG tablet Take 1 tablet by mouth every morning.      conjugated estrogens (PREMARIN) vaginal cream Place 1 g vaginally once daily. 1 applicator 11    diclofenac (VOLTAREN) 75 MG EC tablet Take 1 tablet (75 mg total) by mouth 2 (two) times daily. 60 tablet 0    famotidine (PEPCID) 40 MG tablet Take 1 tablet (40 mg total) by mouth once daily. 30 tablet 11    fish,bora,flax oils-om3,6,9no1 (OMEGA 3-6-9) 1,200 mg Cap Take 1 capsule by mouth Daily.      fluticasone propionate (FLONASE) 50 mcg/actuation nasal spray 1 spray (50 mcg total) by Each Nostril route 2 (two) times a day. 16 g 11    lactulose (CHRONULAC) 10 gram/15 mL solution Take 30 mLs by mouth.      ondansetron (ZOFRAN) 4 MG tablet Take 1 tablet (4 mg total) by mouth every 6 (six) hours as needed for Nausea. 12 tablet 0    tamsulosin (FLOMAX) 0.4 mg Cap Take 1  capsule (0.4 mg total) by mouth once daily. 30 capsule 3    vitamin E 100 UNIT capsule Take 100 Units by mouth once daily.       No current facility-administered medications for this visit.       Review of Systems:  General: No fever, chills, fatigability, or weight loss.  Skin: No rashes, itching, or changes in color or texture of skin.  Chest: Denies SCOTT, cyanosis, wheezing, cough, and sputum production.  Abdomen: Appetite fine. No weight loss. Denies diarrhea, abdominal pain, hematemesis, or blood in stool.  Musculoskeletal: No joint stiffness or swelling. Denies back pain.  : As above.  All other review of systems negative.    PMH:  Past Medical History:   Diagnosis Date    Anxiety     Chest pain 04/29/2023    Depression     Diverticulitis     Endometrial cyst of ovary     Leg pain, posterior, right 09/24/2024    Osteoporosis     Right wrist injury     Serum calcium elevated 10/09/2024    Stroke        PSH:  Past Surgical History:   Procedure Laterality Date    BLADDER SUSPENSION      x2    OVARIAN CYST REMOVAL      endometreosis    TONSILLECTOMY         FamHx:  Family History   Problem Relation Name Age of Onset    Hypertension Mother Karen Mcgee     Arthritis Mother Karen Mcgee     COPD Mother Karen Mcgee     Hypertension Father Husam jenkins     Colon cancer Maternal Grandfather  70    Colon cancer Paternal Grandmother Brice jenkins 70       SocHx:  Social History     Socioeconomic History    Marital status: Single    Number of children: 0   Occupational History    Occupation: [employer]    Occupation: Housekeeping   Tobacco Use    Smoking status: Never     Passive exposure: Never    Smokeless tobacco: Never   Substance and Sexual Activity    Alcohol use: Not Currently     Comment: occasionally    Drug use: Never    Sexual activity: Not Currently     Partners: Male     Birth control/protection: Post-menopausal     Social Drivers of Health     Financial Resource Strain: High Risk (8/5/2024)     Overall Financial Resource Strain (CARDIA)     Difficulty of Paying Living Expenses: Very hard   Food Insecurity: Food Insecurity Present (8/5/2024)    Hunger Vital Sign     Worried About Running Out of Food in the Last Year: Sometimes true     Ran Out of Food in the Last Year: Never true   Transportation Needs: No Transportation Needs (6/22/2022)    PRAPARE - Transportation     Lack of Transportation (Medical): No     Lack of Transportation (Non-Medical): No   Physical Activity: Insufficiently Active (8/5/2024)    Exercise Vital Sign     Days of Exercise per Week: 3 days     Minutes of Exercise per Session: 30 min   Stress: Stress Concern Present (8/5/2024)    [employer] - Occupational Stress Questionnaire     Feeling of Stress : Very much   Housing Stability: High Risk (8/5/2024)    Housing Stability Vital Sign     Unable to Pay for Housing in the Last Year: Yes       Physical Exam:  Vitals:    01/16/25 0811   BP: 115/71   Pulse: 69   Resp: 18     General: A&Ox3, no apparent distress, no deformities  Neck: No masses, normal thyroid  Lungs: CTA lesly, no use of accessory muscles  Heart: RRR, no arrhythmias  Abdomen: Soft, NT, ND, no masses, no hernias, no hepatosplenomegaly  Lymphatic: Neck and groin nodes negative  Skin: The skin is warm and dry. No jaundice.  Ext: No c/c/e.          Urinalysis:      Component 08:20   Color, UA Yellow   Spec Grav UA 1.030   pH, UA 5.5   WBC, UA Negative   Nitrite, UA Negative   Protein, POC Negative   Glucose, UA Negative   Ketones, UA Negative   Urobilinogen, UA 0.2   Bilirubin, POC Negative   Blood, UA Trace-intact             Specimen Collected: 01/16/25 08:20 CST Last Resulted: 01/16/25 08:20 CST   Microscopic urinalysis RBCs trace intact, WBCs negative, nitrites negative.        Imaging:  CT of the abdomen pelvis stone protocol on 12/29/2024 from [facility]: Several punctate left-sided renal calculi measuring up to 2 mm.  No hydronephrosis.  No ureteral  or bladder calculi.       Impression:  1. Overactive bladder  - POCT URINE DIPSTICK WITH MICROSCOPE, AUTOMATED  2. Interstitial cystitis  3. Kidney stone    Plan:  Instructed patient to continue bladder instillations every 2 weeks x2 then every 4 weeks x2 then every 6 weeks x2 then every 8 weeks x2.  Instructed patient to restart Gemtesa 75 mg p.o. daily.  Instructed patient on timed voiding every 2-3 hrs, double voiding, avoidance of bladder irritants such as alcohol, citrus foods, chocolate, caffeinated drinks, energy drinks, spicy foods, sugar, caffeine products, sodas. Instructed patient to avoid drinking fluids 1-2 hours prior to bedtime & void immediately before bedtime.   RTC one-month for re-evaluation of Gemtesa and six-month RTC with renal ultrasound to monitor kidney stones.  Instructed patient if develops any abnormal urologic symptoms notify clinic to be re-evaluate treated or during after hours go to emergency room versus urgent here.                           [facility]

## 2025-01-17 ENCOUNTER — CLINICAL SUPPORT (OUTPATIENT)
Dept: UROLOGY | Facility: CLINIC | Age: 55
End: 2025-01-17
Payer: MEDICAID

## 2025-01-17 DIAGNOSIS — N30.10 INTERSTITIAL CYSTITIS: Primary | ICD-10-CM

## 2025-01-17 PROCEDURE — 99211 OFF/OP EST MAY X REQ PHY/QHP: CPT | Mod: PBBFAC

## 2025-01-17 PROCEDURE — 96372 THER/PROPH/DIAG INJ SC/IM: CPT | Mod: PBBFAC

## 2025-01-17 PROCEDURE — 51700 IRRIGATION OF BLADDER: CPT | Mod: S$PBB,,, | Performed by: NURSE PRACTITIONER

## 2025-01-17 PROCEDURE — 51700 IRRIGATION OF BLADDER: CPT | Mod: PBBFAC | Performed by: NURSE PRACTITIONER

## 2025-01-17 RX ORDER — HEPARIN SODIUM 5000 [USP'U]/ML
40000 INJECTION, SOLUTION INTRAVENOUS; SUBCUTANEOUS
Status: COMPLETED | OUTPATIENT
Start: 2025-01-17 | End: 2025-01-17

## 2025-01-17 RX ORDER — SODIUM BICARBONATE 42 MG/ML
5 INJECTION, SOLUTION INTRAVENOUS
Status: COMPLETED | OUTPATIENT
Start: 2025-01-17 | End: 2025-01-17

## 2025-01-17 RX ORDER — DEXAMETHASONE SODIUM PHOSPHATE 10 MG/ML
100 INJECTION INTRAMUSCULAR; INTRAVENOUS
Status: COMPLETED | OUTPATIENT
Start: 2025-01-17 | End: 2025-01-17

## 2025-01-17 RX ORDER — LIDOCAINE HYDROCHLORIDE 20 MG/ML
5 INJECTION, SOLUTION INFILTRATION; PERINEURAL
Status: COMPLETED | OUTPATIENT
Start: 2025-01-17 | End: 2025-01-17

## 2025-01-17 RX ADMIN — LIDOCAINE HYDROCHLORIDE 5 ML: 20 INJECTION, SOLUTION INFILTRATION; PERINEURAL at 09:01

## 2025-01-17 RX ADMIN — SODIUM BICARBONATE 10 ML: 42 INJECTION, SOLUTION INTRAVENOUS at 09:01

## 2025-01-17 RX ADMIN — HEPARIN SODIUM 40000 UNITS: 5000 INJECTION, SOLUTION INTRAVENOUS; SUBCUTANEOUS at 09:01

## 2025-01-17 RX ADMIN — DEXAMETHASONE SODIUM PHOSPHATE 100 MG: 10 INJECTION, SOLUTION INTRAMUSCULAR; INTRAVENOUS at 09:01

## 2025-01-17 NOTE — PROGRESS NOTES
Presented to clinic without complaints. States bladder symptoms are improving. Urethra prepped with betadine. 12FR blake catheter inserted using sterile technique with 100cc clear yellow urine noted. Bladder instillation  instilled without difficulty. RTC 2 weeks as scheduled.

## 2025-02-07 ENCOUNTER — CLINICAL SUPPORT (OUTPATIENT)
Dept: UROLOGY | Facility: CLINIC | Age: 55
End: 2025-02-07
Payer: MEDICAID

## 2025-02-07 DIAGNOSIS — N30.10 INTERSTITIAL CYSTITIS: Primary | ICD-10-CM

## 2025-02-07 PROCEDURE — 51700 IRRIGATION OF BLADDER: CPT | Mod: S$PBB,,, | Performed by: NURSE PRACTITIONER

## 2025-02-07 PROCEDURE — 81001 URINALYSIS AUTO W/SCOPE: CPT | Mod: PBBFAC

## 2025-02-07 PROCEDURE — 51700 IRRIGATION OF BLADDER: CPT | Mod: PBBFAC | Performed by: NURSE PRACTITIONER

## 2025-02-07 RX ORDER — DEXAMETHASONE SODIUM PHOSPHATE 10 MG/ML
100 INJECTION INTRAMUSCULAR; INTRAVENOUS
Status: COMPLETED | OUTPATIENT
Start: 2025-02-07 | End: 2025-02-07

## 2025-02-07 RX ORDER — HEPARIN SODIUM 5000 [USP'U]/ML
40000 INJECTION, SOLUTION INTRAVENOUS; SUBCUTANEOUS
Status: COMPLETED | OUTPATIENT
Start: 2025-02-07 | End: 2025-02-07

## 2025-02-07 RX ORDER — LIDOCAINE HYDROCHLORIDE 20 MG/ML
5 INJECTION, SOLUTION INFILTRATION; PERINEURAL
Status: COMPLETED | OUTPATIENT
Start: 2025-02-07 | End: 2025-02-07

## 2025-02-07 RX ORDER — SODIUM BICARBONATE 42 MG/ML
5 INJECTION, SOLUTION INTRAVENOUS
Status: COMPLETED | OUTPATIENT
Start: 2025-02-07 | End: 2025-02-07

## 2025-02-07 RX ADMIN — LIDOCAINE HYDROCHLORIDE 5 ML: 20 INJECTION, SOLUTION INFILTRATION; PERINEURAL at 09:02

## 2025-02-07 RX ADMIN — DEXAMETHASONE SODIUM PHOSPHATE 100 MG: 10 INJECTION, SOLUTION INTRAMUSCULAR; INTRAVENOUS at 09:02

## 2025-02-07 RX ADMIN — SODIUM BICARBONATE 10 ML: 42 INJECTION, SOLUTION INTRAVENOUS at 09:02

## 2025-02-07 RX ADMIN — HEPARIN SODIUM 40000 UNITS: 5000 INJECTION, SOLUTION INTRAVENOUS; SUBCUTANEOUS at 09:02

## 2025-02-07 NOTE — PROGRESS NOTES
"Bladder instillation #1 held for two hrs and "urinary frequency "stated by patient . Bladder instillation #2 administered with sterile technique via [address] , small amount of clear odorless urine drained from bladder. Procedure tolerated well RTC for weekly instillation   Component 09:46   Color, UA Yellow   Spec Grav UA 1.025   pH, UA 6.0   WBC, UA neg   Nitrite, UA neg   Protein, POC neg   Glucose, UA neg   Ketones, UA neg   Urobilinogen, UA 0.2   Bilirubin, POC neg   Blood, UA neg             Specimen Collected: 02/07/25 09:46 CST Last Resulted: 02/07/25 09:46 CST   Microscopic urinalysis negative WBCs, negative nitrites, negative RBCs.    "

## 2025-02-12 ENCOUNTER — HOSPITAL ENCOUNTER (OUTPATIENT)
Dept: RADIOLOGY | Facility: HOSPITAL | Age: 55
Discharge: HOME | End: 2025-02-12
Attending: NURSE PRACTITIONER
Payer: MEDICAID

## 2025-02-12 DIAGNOSIS — K76.9 LESION OF LIVER LESS THAN 1 CM IN DIAMETER: ICD-10-CM

## 2025-02-12 PROCEDURE — 74183 MRI ABD W/O CNTR FLWD CNTR: CPT | Mod: TC

## 2025-02-12 PROCEDURE — A9577 INJ MULTIHANCE: HCPCS | Performed by: NURSE PRACTITIONER

## 2025-02-12 PROCEDURE — 25500020 PHARM REV CODE 255: Performed by: NURSE PRACTITIONER

## 2025-02-12 RX ADMIN — GADOBENATE DIMEGLUMINE 15 ML: 529 INJECTION, SOLUTION INTRAVENOUS at 09:02

## 2025-02-16 ENCOUNTER — PATIENT MESSAGE (OUTPATIENT)
Dept: FAMILY MEDICINE | Facility: CLINIC | Age: 55
End: 2025-02-16
Payer: MEDICAID

## 2025-02-19 ENCOUNTER — OFFICE VISIT (OUTPATIENT)
Dept: GYNECOLOGY | Facility: CLINIC | Age: 55
End: 2025-02-19
Payer: MEDICAID

## 2025-02-19 VITALS
WEIGHT: 127.38 LBS | BODY MASS INDEX: 21.75 KG/M2 | HEIGHT: 64 IN | SYSTOLIC BLOOD PRESSURE: 104 MMHG | HEART RATE: 79 BPM | TEMPERATURE: 98 F | RESPIRATION RATE: 16 BRPM | OXYGEN SATURATION: 99 % | DIASTOLIC BLOOD PRESSURE: 68 MMHG

## 2025-02-19 DIAGNOSIS — N95.2 ATROPHIC VAGINITIS: ICD-10-CM

## 2025-02-19 DIAGNOSIS — Z12.31 SCREENING MAMMOGRAM FOR BREAST CANCER: ICD-10-CM

## 2025-02-19 DIAGNOSIS — Z01.419 WOMEN'S ANNUAL ROUTINE GYNECOLOGICAL EXAMINATION: Primary | ICD-10-CM

## 2025-02-19 DIAGNOSIS — R92.30 DENSE BREAST: ICD-10-CM

## 2025-02-19 PROCEDURE — 99215 OFFICE O/P EST HI 40 MIN: CPT | Mod: PBBFAC | Performed by: NURSE PRACTITIONER

## 2025-02-19 RX ORDER — ESTRADIOL 0.1 MG/G
1 CREAM VAGINAL
Qty: 42.5 G | Refills: 3 | Status: SHIPPED | OUTPATIENT
Start: 2025-02-20 | End: 2026-02-20

## 2025-02-19 RX ORDER — PROPRANOLOL HYDROCHLORIDE 10 MG/1
10 TABLET ORAL 2 TIMES DAILY
COMMUNITY
Start: 2025-02-13

## 2025-02-19 NOTE — PROGRESS NOTES
"Patient ID: Remi Baptiste is a 54 y.o. female.    Chief Complaint: Annual gyn exam       HPI:  The patient is G0 here for annual gyn exam. Denies hx of abnormal pap or cervical dysplasia. Pt is postmenopausal since age 49. Denies vaginal bleeding/discharge. She is currently on premarin vaginal cream for atrophic vaginitis. Admits to infrequent use. Pt is in relationship but not sexually active. States has not been active in over 8 years. Denies breast complaints. Reports has dense breasts and typically gets US in addition to screening mammogram. Denies known hx of breast cancer. She thinks there is hx of ovarian cancer in distant aunt. She is followed by TRAVIS Cardoza NP for primary care. Pt reports takes otc amberen for hot flashes with improvement in her symptoms. PMHx- CVA (age 52-given TPA); anxiety/depression. Last pap 2/2024-NIL; HPV negative.     Review of Systems:   Negative except for findings in HPI     Objective:   /68 (BP Location: Left arm, Patient Position: Sitting)   Pulse 79   Temp 98 °F (36.7 °C) (Oral)   Resp 16   Ht 5' 4" (1.626 m)   Wt 57.8 kg (127 lb 6.4 oz)   SpO2 99%   BMI 21.87 kg/m²    Physical Exam:  GENERAL: Pt is aware and alert and  in no acute distress.  BREASTS: Bilateral-No masses, nipple discharge, skin changes, or tenderness.  ABDOMEN: Soft, non tender.  VULVA:  No lesions or skin changes.  URETHRA: No lesions  BLADDER: No tenderness.  VAGINA: Mucosa atrophic;no discharge; no lesions.narrow introitus  CERVIX:  no CMT, NO discharge; NO lesions  BIMANUAL EXAM: reveals a 8-week-sized uterus. The uterus is mobile, nontender, no palpable masses. Damián adnexa reveal no evidence of masses; no fullness   SKIN: Warm and Dry  PSYCHIATRIC: Patient is awake and alert. Mood and affect are normal.    Assessment:   Women's annual routine gynecological examination    Atrophic vaginitis  -     estradioL (ESTRACE) 0.01 % (0.1 mg/gram) vaginal cream; Place 1 g vaginally twice a week.  " Dispense: 42.5 g; Refill: 3    Screening mammogram for breast cancer  -     Mammo Digital Screening Bilat w/ Refugio (XPD); Future; Expected date: 03/19/2025  -     US Breast Bilateral Complete; Future; Expected date: 03/19/2025    Dense breast  -     Mammo Digital Screening Bilat w/ Refugio (XPD); Future; Expected date: 03/19/2025  -     US Breast Bilateral Complete; Future; Expected date: 03/19/2025            1. Women's annual routine gynecological examination    2. Atrophic vaginitis    3. Screening mammogram for breast cancer    4. Dense breast             -pap UTD  -Contact clinic with any vaginal bleeding as this would be abnormal in postmenopausal pt.   -switch to vaginal estrace d/t cost  Plan:       Follow up in about 1 year (around 2/19/2026).

## 2025-02-21 ENCOUNTER — OFFICE VISIT (OUTPATIENT)
Dept: UROLOGY | Facility: CLINIC | Age: 55
End: 2025-02-21
Payer: MEDICAID

## 2025-02-21 VITALS
RESPIRATION RATE: 20 BRPM | DIASTOLIC BLOOD PRESSURE: 68 MMHG | BODY MASS INDEX: 21.51 KG/M2 | SYSTOLIC BLOOD PRESSURE: 104 MMHG | HEIGHT: 64 IN | HEART RATE: 79 BPM | TEMPERATURE: 98 F | WEIGHT: 126 LBS | OXYGEN SATURATION: 100 %

## 2025-02-21 DIAGNOSIS — N30.10 INTERSTITIAL CYSTITIS: Primary | ICD-10-CM

## 2025-02-21 LAB
BILIRUB SERPL-MCNC: NEGATIVE MG/DL
BLOOD URINE, POC: NEGATIVE
COLOR, POC UA: YELLOW
GLUCOSE UR QL STRIP: NEGATIVE
KETONES UR QL STRIP: NEGATIVE
LEUKOCYTE ESTERASE URINE, POC: NEGATIVE
NITRITE, POC UA: NEGATIVE
PH, POC UA: 5.5
PROTEIN, POC: NEGATIVE
SPECIFIC GRAVITY, POC UA: 1.03
UROBILINOGEN, POC UA: 0.2

## 2025-02-21 PROCEDURE — 1159F MED LIST DOCD IN RCRD: CPT | Mod: CPTII,,, | Performed by: NURSE PRACTITIONER

## 2025-02-21 PROCEDURE — 3074F SYST BP LT 130 MM HG: CPT | Mod: CPTII,,, | Performed by: NURSE PRACTITIONER

## 2025-02-21 PROCEDURE — 81001 URINALYSIS AUTO W/SCOPE: CPT | Mod: PBBFAC | Performed by: NURSE PRACTITIONER

## 2025-02-21 PROCEDURE — 51700 IRRIGATION OF BLADDER: CPT | Mod: S$PBB,,, | Performed by: NURSE PRACTITIONER

## 2025-02-21 PROCEDURE — 1160F RVW MEDS BY RX/DR IN RCRD: CPT | Mod: CPTII,,, | Performed by: NURSE PRACTITIONER

## 2025-02-21 PROCEDURE — 3008F BODY MASS INDEX DOCD: CPT | Mod: CPTII,,, | Performed by: NURSE PRACTITIONER

## 2025-02-21 PROCEDURE — 3078F DIAST BP <80 MM HG: CPT | Mod: CPTII,,, | Performed by: NURSE PRACTITIONER

## 2025-02-21 PROCEDURE — 99215 OFFICE O/P EST HI 40 MIN: CPT | Mod: PBBFAC | Performed by: NURSE PRACTITIONER

## 2025-02-21 PROCEDURE — 51700 IRRIGATION OF BLADDER: CPT | Mod: PBBFAC | Performed by: NURSE PRACTITIONER

## 2025-02-21 PROCEDURE — 99213 OFFICE O/P EST LOW 20 MIN: CPT | Mod: S$PBB,25,, | Performed by: NURSE PRACTITIONER

## 2025-02-21 RX ORDER — LIDOCAINE HYDROCHLORIDE 20 MG/ML
5 INJECTION, SOLUTION INFILTRATION; PERINEURAL
Status: COMPLETED | OUTPATIENT
Start: 2025-02-21 | End: 2025-02-21

## 2025-02-21 RX ORDER — SODIUM BICARBONATE 42 MG/ML
5 INJECTION, SOLUTION INTRAVENOUS
Status: COMPLETED | OUTPATIENT
Start: 2025-02-21 | End: 2025-02-21

## 2025-02-21 RX ORDER — HEPARIN SODIUM 5000 [USP'U]/ML
40000 INJECTION, SOLUTION INTRAVENOUS; SUBCUTANEOUS
Status: COMPLETED | OUTPATIENT
Start: 2025-02-21 | End: 2025-02-21

## 2025-02-21 RX ORDER — DEXAMETHASONE SODIUM PHOSPHATE 10 MG/ML
100 INJECTION INTRAMUSCULAR; INTRAVENOUS
Status: COMPLETED | OUTPATIENT
Start: 2025-02-21 | End: 2025-02-21

## 2025-02-21 RX ADMIN — LIDOCAINE HYDROCHLORIDE 5 ML: 20 INJECTION, SOLUTION INFILTRATION; PERINEURAL at 09:02

## 2025-02-21 RX ADMIN — HEPARIN SODIUM 40000 UNITS: 5000 INJECTION, SOLUTION INTRAVENOUS; SUBCUTANEOUS at 09:02

## 2025-02-21 RX ADMIN — DEXAMETHASONE SODIUM PHOSPHATE 100 MG: 10 INJECTION, SOLUTION INTRAMUSCULAR; INTRAVENOUS at 09:02

## 2025-02-21 RX ADMIN — SODIUM BICARBONATE 10 ML: 42 INJECTION, SOLUTION INTRAVENOUS at 09:02

## 2025-02-21 NOTE — PROGRESS NOTES
Chief Complaint:   Chief Complaint   Patient presents with    f/u after instillations    Cystitis    Nephrolithiasis       HPI:   Patient is a 54-year-old female follow-up after completing 3 month treatment for interstitial cystitis with bladder instillations.  Patient seen by PCP on 09/24/2024 with a persistent history of bladder distension. Past couple of months is urinating 3 times a hour which is a full amount of urine. Disrupting her life.   Patient previously finished 8 week course of bladder instillations for interstitial cystitis.  Patient states having recurrent symptoms at 2 weeks therefore  taper dose her bladder instillations.  Patient states having urinary urgency and frequency with drinking water therefore would like to restart Gemtesa.  Patient's history of kidney stones as listed below.  Today patient presents with a persistent bladder pain unable to taper dose off the instillations instructed patient will continue Gemtesa and bladder instillations we will schedule patient for a CT of the abdomen pelvis with and without contrast and schedule for a cystoscope with Dr. Rae to evaluate probable cause of bladder pain.      Allergies:  Review of patient's allergies indicates:   Allergen Reactions    Hydrocodone-guaifenesin Hives    Hydrocodone-acetaminophen     Penicillins Rash       Medications:  Current Medications[1]    Review of Systems:  General: No fever, chills, fatigability, or weight loss.  Skin: No rashes, itching, or changes in color or texture of skin.  Chest: Denies SCOTT, cyanosis, wheezing, cough, and sputum production.  Abdomen: Appetite fine. No weight loss. Denies diarrhea, abdominal pain, hematemesis, or blood in stool.  Musculoskeletal: No joint stiffness or swelling. Denies back pain.  : As above.  All other review of systems negative.    PMH:  Past Medical History:   Diagnosis Date    Anxiety     Chest pain 04/29/2023    Depression     Diverticulitis     Endometrial cyst of ovary      Infertility, female     Endometriosis    Leg pain, posterior, right 09/24/2024    Osteoporosis     Right wrist injury     Serum calcium elevated 10/09/2024    Stroke        PSH:  Past Surgical History:   Procedure Laterality Date    BLADDER SUSPENSION      x2    COLPOSCOPY  11/23    Internal hemorrhoids    OVARIAN CYST REMOVAL      endometreosis    PELVIC LAPAROSCOPY      TONSILLECTOMY         FamHx:  Family History   Problem Relation Name Age of Onset    Hypertension Mother Karen Mcgee     Arthritis Mother Karen Mcgee     COPD Mother Karen Mcgee     Cancer Mother Karen Mcgee     Hypertension Father Husam jenkins     Colon cancer Maternal Grandfather  70    Colon cancer Paternal Grandmother Brice jenkins 70       SocHx:  Social History[2]    Physical Exam:  There were no vitals filed for this visit.  General: A&Ox3, no apparent distress, no deformities  Neck: No masses, normal thyroid  Lungs: CTA lesly, no use of accessory muscles  Heart: RRR, no arrhythmias  Abdomen: Soft, NT, ND, no masses, no hernias, no hepatosplenomegaly  Lymphatic: Neck and groin nodes negative  Skin: The skin is warm and dry. No jaundice.  Ext: No c/c/e.      Urinalysis:        Impression:  1. Interstitial cystitis  - POCT URINE DIPSTICK WITH MICROSCOPE, AUTOMATED  2. Overactive bladder    Plan:  Instructed patient to continue Gemtesa 75 mg p.o. daily and bladder instillations.  Instructed patient is scheduling for a CT of the abdomen pelvis without contrast and a cystoscope with Dr. Chan to evaluate cause of persistent bladder pain.  Instructed patient on timed voiding every 2-3 hrs, double voiding, avoidance of bladder irritants such as alcohol, citrus foods, chocolate, caffeinated drinks, energy drinks, spicy foods, sugar, caffeine products, sodas. Instructed patient to avoid drinking fluids 1-2 hours prior to bedtime & void immediately before bedtime.   RTC after cystoscope per Dr. Camilo.  Instructed patient if  develops any abnormal urologic symptoms notify clinic to be re-evaluate treated or during after hours go to emergency room versus urgent here.                           [facility]       [1]   Current Outpatient Medications   Medication Sig Dispense Refill    azelastine (ASTELIN) 137 mcg (0.1 %) nasal spray 1 spray (137 mcg total) by Nasal route 2 (two) times daily. 30 mL 5    cetirizine (ZYRTEC) 10 MG tablet Take 1 tablet by mouth every morning.      estradioL (ESTRACE) 0.01 % (0.1 mg/gram) vaginal cream Place 1 g vaginally twice a week. 42.5 g 3    famotidine (PEPCID) 40 MG tablet Take 1 tablet (40 mg total) by mouth once daily. 30 tablet 11    fluticasone propionate (FLONASE) 50 mcg/actuation nasal spray 1 spray (50 mcg total) by Each Nostril route 2 (two) times a day. 16 g 11    ondansetron (ZOFRAN) 4 MG tablet Take 1 tablet (4 mg total) by mouth every 6 (six) hours as needed for Nausea. 12 tablet 0    diclofenac (VOLTAREN) 75 MG EC tablet Take 1 tablet (75 mg total) by mouth 2 (two) times daily. (Patient not taking: Reported on 2/21/2025) 60 tablet 0    fish,bora,flax oils-om3,6,9no1 (OMEGA 3-6-9) 1,200 mg Cap Take 1 capsule by mouth Daily. (Patient not taking: Reported on 2/21/2025)      lactulose (CHRONULAC) 10 gram/15 mL solution Take 30 mLs by mouth. (Patient not taking: Reported on 2/21/2025)      propranoloL (INDERAL) 10 MG tablet Take 10 mg by mouth 2 (two) times daily. (Patient not taking: Reported on 2/21/2025)      tamsulosin (FLOMAX) 0.4 mg Cap Take 1 capsule (0.4 mg total) by mouth once daily. (Patient not taking: Reported on 2/21/2025) 30 capsule 3    vibegron (GEMTESA) 75 mg Tab Take 1 tablet (75 mg total) by mouth once daily. (Patient not taking: Reported on 2/21/2025) 30 tablet 11    vitamin E 100 UNIT capsule Take 100 Units by mouth once daily. (Patient not taking: Reported on 2/21/2025)       No current facility-administered medications for this visit.   [2]   Social History  Socioeconomic History     Marital status: Single    Number of children: 0   Occupational History    Occupation: [employer]    Occupation: Housekeeping   Tobacco Use    Smoking status: Never     Passive exposure: Never    Smokeless tobacco: Never   Substance and Sexual Activity    Alcohol use: Not Currently     Comment: occasionally    Drug use: Never    Sexual activity: Not Currently     Partners: Male     Birth control/protection: Post-menopausal     Social Drivers of Health     Financial Resource Strain: High Risk (8/5/2024)    Overall Financial Resource Strain (CARDIA)     Difficulty of Paying Living Expenses: Very hard   Food Insecurity: Food Insecurity Present (8/5/2024)    Hunger Vital Sign     Worried About Running Out of Food in the Last Year: Sometimes true     Ran Out of Food in the Last Year: Never true   Transportation Needs: No Transportation Needs (6/22/2022)    PRAPARE - Transportation     Lack of Transportation (Medical): No     Lack of Transportation (Non-Medical): No   Physical Activity: Insufficiently Active (8/5/2024)    Exercise Vital Sign     Days of Exercise per Week: 3 days     Minutes of Exercise per Session: 30 min   Stress: Stress Concern Present (8/5/2024)    [employer] - Occupational Stress Questionnaire     Feeling of Stress : Very much   Housing Stability: High Risk (8/5/2024)    Housing Stability Vital Sign     Unable to Pay for Housing in the Last Year: Yes

## 2025-02-21 NOTE — PROGRESS NOTES
Pt seen by [facility]; CT ordered & pt given centralized scheduling information sheet; Pt instructed to return to clinic for next available cystoscopy & to continue weekly bladder instillations; Discharge paperwork given w/pt verbalizing understanding    Pt presented for weekly maintenance bladder instillation. 12F in & out catheter inserted using aseptic technique w/scant amount of clear yellow urine obtained. Bladder medication instilled w/no complications. Pt instructed to return to clinic in 1 week for instillation

## 2025-02-26 ENCOUNTER — OFFICE VISIT (OUTPATIENT)
Dept: NEUROLOGY | Facility: CLINIC | Age: 55
End: 2025-02-26
Payer: MEDICAID

## 2025-02-26 VITALS
WEIGHT: 126.63 LBS | RESPIRATION RATE: 18 BRPM | DIASTOLIC BLOOD PRESSURE: 75 MMHG | SYSTOLIC BLOOD PRESSURE: 114 MMHG | BODY MASS INDEX: 21.62 KG/M2 | HEART RATE: 62 BPM | OXYGEN SATURATION: 100 % | HEIGHT: 64 IN | TEMPERATURE: 98 F

## 2025-02-26 DIAGNOSIS — R51.9 CHRONIC INTRACTABLE HEADACHE, UNSPECIFIED HEADACHE TYPE: ICD-10-CM

## 2025-02-26 DIAGNOSIS — G44.40 MEDICATION OVERUSE HEADACHE: ICD-10-CM

## 2025-02-26 DIAGNOSIS — I63.9 STROKE ABORTED BY ADMINISTRATION OF THROMBOLYTIC AGENT: Primary | ICD-10-CM

## 2025-02-26 DIAGNOSIS — G47.00 INSOMNIA, UNSPECIFIED TYPE: ICD-10-CM

## 2025-02-26 DIAGNOSIS — G89.29 CHRONIC INTRACTABLE HEADACHE, UNSPECIFIED HEADACHE TYPE: ICD-10-CM

## 2025-02-26 PROCEDURE — 99215 OFFICE O/P EST HI 40 MIN: CPT | Mod: PBBFAC | Performed by: NURSE PRACTITIONER

## 2025-02-26 RX ORDER — DEXAMETHASONE 4 MG/1
4 TABLET ORAL EVERY 12 HOURS
Qty: 10 TABLET | Refills: 0 | Status: SHIPPED | OUTPATIENT
Start: 2025-02-26 | End: 2025-03-03

## 2025-02-26 NOTE — PROGRESS NOTES
"[facility] Follow Up Office Visit Note    Initial Visit Date: 1/19/2023  Last Visit Date: 8/28/2024  Current Visit Date:  02/26/2025    Chief Complaint:     Chief Complaint   Patient presents with    Stroke     Pt states she has been having headaches daily     History of Present Illness:      This is 54 y.o.  female with hx of PTSD, DJD, CVA 2023 no deficits, cervical and lumbar radiculopathy, SAVI, interstitial cystitis, renal stone, localized osteoporosis, GERD, hepatic cyst, who was referred for right leg paresthesia of unknown chronicity. She states that she has been having bilateral hand paresthesia, upon awakening, along with neck pain. She also notes right leg numbness and paresthesia. Denied any UI/SI and saddle anesthesia. 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 She has not been doing home exercises. During last visit, MagOx 400mg daily was continued.    Today, Pt states she has had a recent flare in HA to daily over last few weeks. Either to bilat temples or forehead. OTC medication only effective for short periods, requires next day. No longer taking magnesium. Started Gemtesa which can cause HA. Continues to suffer w/chronic back and neck pain. Pain scale 3/10 with medication.     Other Hx:  ED on 4/28/2023 for L sided weakness including inability to open OS, difficulty ambulating, states she could not think clearly. States she was at home watching TV. Went to  and was unable to pull up her pants. Significant other transported to [facility] ED. Was told by stroke MD at [facility] she had a stroke. Dr. Mitchell approved TPA administration, MRI brain negative followed TPA administration. States symptoms lasted less than 24 hrs. Feels back to baseline today. Denies smoking, ETOH  intake, is a vegetarian. Is not currently taking statin as she read on internet it could cause a stroke. Is followed by [facility] for "leaky valve".     Medications:     Current Outpatient Medications on " File Prior to Visit   Medication Sig Dispense Refill    azelastine (ASTELIN) 137 mcg (0.1 %) nasal spray 1 spray (137 mcg total) by Nasal route 2 (two) times daily. 30 mL 5    cetirizine (ZYRTEC) 10 MG tablet Take 1 tablet by mouth every morning.      estradioL (ESTRACE) 0.01 % (0.1 mg/gram) vaginal cream Place 1 g vaginally twice a week. 42.5 g 3    famotidine (PEPCID) 40 MG tablet Take 1 tablet (40 mg total) by mouth once daily. 30 tablet 11    fish,bora,flax oils-om3,6,9no1 (OMEGA 3-6-9) 1,200 mg Cap Take 1 capsule by mouth Daily.      fluticasone propionate (FLONASE) 50 mcg/actuation nasal spray 1 spray (50 mcg total) by Each Nostril route 2 (two) times a day. 16 g 11    ondansetron (ZOFRAN) 4 MG tablet Take 1 tablet (4 mg total) by mouth every 6 (six) hours as needed for Nausea. 12 tablet 0    propranoloL (INDERAL) 10 MG tablet Take 10 mg by mouth 2 (two) times daily.      tamsulosin (FLOMAX) 0.4 mg Cap Take 1 capsule (0.4 mg total) by mouth once daily. 30 capsule 3    vibegron (GEMTESA) 75 mg Tab Take 1 tablet (75 mg total) by mouth once daily. 30 tablet 11    vitamin E 100 UNIT capsule Take 100 Units by mouth once daily.      [DISCONTINUED] diclofenac (VOLTAREN) 75 MG EC tablet Take 1 tablet (75 mg total) by mouth 2 (two) times daily. (Patient not taking: Reported on 2/19/2025) 60 tablet 0    [DISCONTINUED] lactulose (CHRONULAC) 10 gram/15 mL solution Take 30 mLs by mouth. (Patient not taking: Reported on 2/19/2025)       No current facility-administered medications on file prior to visit.     Labs:     Results for orders placed or performed in visit on 02/21/25   POCT URINE DIPSTICK WITH MICROSCOPE, AUTOMATED    Collection Time: 02/21/25  8:34 AM   Result Value Ref Range    Color, UA Yellow     Spec Grav UA 1.030     pH, UA 5.5     WBC, UA Negative     Nitrite, UA Negative     Protein, POC Negative     Glucose, UA Negative     Ketones, UA Negative     Urobilinogen, UA 0.2     Bilirubin, POC Negative      Blood, UA Negative        Studies:      - [facility]: 7/9/2023 - no acute intracranial findings    - MRI C and L-spine without contrast 2022: Multilevel DJD with severe left neuroforaminal narrowing at C4-C5, C5-C6, C6-C7.  Mild-to-moderate right L3-L4 neural foraminal narrowing.    - MRI brain without without contrast 10/2022:  I have reviewed the study independently and with the patient.  Chronic microvascular changes.    Review of Systems:     Review of Systems   All other systems reviewed and are negative.  As per HPI    Physical Exams:     Vitals:    02/26/25 1458   BP: 114/75   Pulse: 62   Resp: 18   Temp: 97.6 °F (36.4 °C)         Physical Exam  Vitals and nursing note reviewed.   Constitutional:       Appearance: Normal appearance.   HENT:      Head: Normocephalic and atraumatic.      Nose: Nose normal.      Mouth/Throat:      Mouth: Mucous membranes are moist.      Pharynx: Oropharynx is clear.   Eyes:      Conjunctiva/sclera: Conjunctivae normal.   Cardiovascular:      Rate and Rhythm: Normal rate and regular rhythm.      Pulses: Normal pulses.   Pulmonary:      Effort: Pulmonary effort is normal.      Breath sounds: Normal breath sounds.   Abdominal:      General: Abdomen is flat.   Musculoskeletal:         General: Normal range of motion.      Cervical back: Normal range of motion.   Skin:     General: Skin is warm.   Neurological:      Mental Status: She is alert.     Comprehensive Neurological Exam:  Mental Status: Alert Oriented to Self, Date, and Place. Naming, repetition, reading, and writing wnl. Comprehension wnl. No dysarthria.   CN II - XII: LUCI, No APD, VA grossly intact to finger counting at 6 ft, VFFC, No ptosis OU, EOMI without nystagmus, LT/Temp symmetric in CN V1-3 distribution, Hearing grossly intact, Face Symmetric, Tongue and Uvula midline, Trapezius symmetric bilateral.   Motor: tone and bulk wnl throughout, no abnormal involuntary or voluntary movements, 5/5 to confrontation, Fine finger  "movements wnl b/l, No pronator drift. Pain to L side of neck when turning head to R or w/R ear to shoulder tilt  Sensory: LT, Proprioception, Vibration, PP, Temp symmetric to bilat lower ext, decreased sensation to bilat upper ext, No sensory simultagnosia.   Reflexes: 2+ throughout, plantar reflexes downward bilateral.   Cerebellar: FNF wnl b/l  Romberg: Negative  Gait: normal, bilat arm swing intact    Assessment:     This is 54 y.o.  female with hx of  PTSD, DJD, CVA 2023 no deficits, cervical and lumbar radiculopathy, SAVI, interstitial cystitis, renal stone, localized osteoporosis, GERD, hepatic cystwho was referred for Cervical and Lumbar DJD. Recent admission for weakness. Was evaluated by neurosurgeon who did not recommend sx. CVA workup negative. Is followed by [facility] in [facility] for "leaky valve". Denies smoking, ETOH intake, is a vegetarian. Has participated in PT in the past w/adequate pain relief. HA have worsened to daily x 1 month. No longer on Magnesium. Taking OTC medication daily which is probably causing medication overuse HA. Taking OTC medication for menopause. Difficulty sleeping at night. Not staying well hydration.     Problem List Items Addressed This Visit          Neuro    Stroke aborted by administration of thrombolytic agent - Primary    Chronic intractable headache    Relevant Medications    dexAMETHasone (DECADRON) 4 MG Tab    Medication overuse headache       Other    Insomnia     Plan:     [] c/w ASA 81mg daily  [] change to Magnesium glycinate 100mg nightly for sleep and headache  [] dexamethasone 4mg PO BID x 5 days  [] c/w ondansetron PRN for nausea (denies worsening headache)  [] take either cyclobenzaprine or methocarbamol (has old Rx at home) nightly for sleep and neck pain  [] f/u w/[facility]    RTC 4 mth    I have explained the treatment plan, diagnosis, and prognosis to patient. All questions are answered to the best of my knowledge.     Face to face time 30 minutes, " including documentation, chart review, counseling, education, review of test results, relevant medical records, and coordination of care.     I have explained the treatment plan, diagnosis, and prognosis to patient. All questions are answered to the best of my knowledge.     YESENIA NiñoC  General Neurology    02/26/2025

## 2025-02-27 ENCOUNTER — OFFICE VISIT (OUTPATIENT)
Dept: OTOLARYNGOLOGY | Facility: CLINIC | Age: 55
End: 2025-02-27
Payer: MEDICAID

## 2025-02-27 VITALS
RESPIRATION RATE: 18 BRPM | TEMPERATURE: 98 F | HEIGHT: 64 IN | BODY MASS INDEX: 21.61 KG/M2 | DIASTOLIC BLOOD PRESSURE: 47 MMHG | SYSTOLIC BLOOD PRESSURE: 110 MMHG | WEIGHT: 126.56 LBS | HEART RATE: 63 BPM

## 2025-02-27 DIAGNOSIS — J34.89 NASAL OBSTRUCTION: Primary | ICD-10-CM

## 2025-02-27 PROCEDURE — 99215 OFFICE O/P EST HI 40 MIN: CPT | Mod: PBBFAC | Performed by: OTOLARYNGOLOGY

## 2025-02-27 RX ORDER — CLINDAMYCIN PHOSPHATE 900 MG/50ML
900 INJECTION, SOLUTION INTRAVENOUS
OUTPATIENT
Start: 2025-02-27

## 2025-02-27 RX ORDER — SODIUM CHLORIDE 0.9 % (FLUSH) 0.9 %
10 SYRINGE (ML) INJECTION
OUTPATIENT
Start: 2025-02-27

## 2025-02-27 RX ORDER — LIDOCAINE HYDROCHLORIDE 10 MG/ML
1 INJECTION, SOLUTION EPIDURAL; INFILTRATION; INTRACAUDAL; PERINEURAL ONCE
OUTPATIENT
Start: 2025-02-27 | End: 2025-02-27

## 2025-02-27 NOTE — PROGRESS NOTES
[facility]  Otolaryngology Clinic Note    Remi Baptiste  YOB: 1970    Chief Complaint:   Chief Complaint   Patient presents with    Ear Problem    Follow-up     Follow up for nasal obstruction. Pt states that she's still anxious about having the surgery.        HPI: 02/27/2025: 54 y.o. female presents with c/o nasal congestion/obstruction which is most pronounced at night. Occasional rhinitis and PND. Denies recurrent sinus infections or significant allergy symptoms. States she has sustained a few episodes of nasal trauma and is concerned she has a deviated septum. She has been using flonase and zyrtec daily over the past month without much benefit. States she mouth breathes at night and sometimes awakens coughing/choking. PCP ordered a sleep study but she has not yet been contacted.    7/31/24:  Here for follow-up.  Patient reports that she got COVID about 6 weeks ago and since then she has been having increased globus sensation.  Patient does have reflux and takes omeprazole.  She still feels very congested however she is not having much nasal drainage.  We discussed using nasal sprays.  She is interested in surgery.  We were discussing septoplasty and turbinate reduction however she does not want nasal splints in her nose as that may suffocate her as she has panic attacks.    10/31/24:  Patient presents today for follow-up.  She reports that she has been doing extensive research into inferior turbinate reduction and septoplasty.  She states that she has severe anxiety regarding postoperative swelling/crusting/splints.  She also states that she has anxiety regarding any sort of sharp instrumentation in her nose.  She states she has been using her Flonase and Astelin as needed and does not notice much of a difference.  She would be interested in something less invasive.    12/19/24: No changes. Eager for Vivair/Rhinair    02/27/2025:  Returns in follow-up.  Bilateral nasal  obstruction has been stable.  No significant rhinorrhea.  She continues to use nasal regimen of Flonase, azelastine, and nasal irrigations.        ROS:   10-point review of systems negative except per HPI      Review of patient's allergies indicates:   Allergen Reactions    Hydrocodone-guaifenesin Hives    Hydrocodone-acetaminophen     Penicillins Rash       Past Medical History:   Diagnosis Date    Anxiety     Chest pain 04/29/2023    Depression     Diverticulitis     Endometrial cyst of ovary     Infertility, female     Endometriosis    Leg pain, posterior, right 09/24/2024    Osteoporosis     Right wrist injury     Serum calcium elevated 10/09/2024    Stroke        Past Surgical History:   Procedure Laterality Date    BLADDER SUSPENSION      x2    COLPOSCOPY  11/23    Internal hemorrhoids    OVARIAN CYST REMOVAL      endometreosis    PELVIC LAPAROSCOPY      TONSILLECTOMY         Social History     Socioeconomic History    Marital status: Single    Number of children: 0   Occupational History    Occupation: [employer]    Occupation: Housekeeping   Tobacco Use    Smoking status: Never     Passive exposure: Never    Smokeless tobacco: Never   Substance and Sexual Activity    Alcohol use: Not Currently     Comment: occasionally    Drug use: Never    Sexual activity: Not Currently     Partners: Male     Birth control/protection: Post-menopausal     Social Drivers of Health     Financial Resource Strain: High Risk (8/5/2024)    Overall Financial Resource Strain (CARDIA)     Difficulty of Paying Living Expenses: Very hard   Food Insecurity: Food Insecurity Present (8/5/2024)    Hunger Vital Sign     Worried About Running Out of Food in the Last Year: Sometimes true     Ran Out of Food in the Last Year: Never true   Transportation Needs: No Transportation Needs (6/22/2022)    PRAPARE - Transportation     Lack of Transportation (Medical): No     Lack of Transportation (Non-Medical): No   Physical Activity:  Insufficiently Active (8/5/2024)    Exercise Vital Sign     Days of Exercise per Week: 3 days     Minutes of Exercise per Session: 30 min   Stress: Stress Concern Present (8/5/2024)    [employer] [place] - Occupational Stress Questionnaire     Feeling of Stress : Very much   Housing Stability: High Risk (8/5/2024)    Housing Stability Vital Sign     Unable to Pay for Housing in the Last Year: Yes       Family History   Problem Relation Name Age of Onset    Hypertension Mother Karen Mcgee     Arthritis Mother Karen Mcgee     COPD Mother Karen Mcgee     Cancer Mother Karen Mcgee     Hypertension Father Husam jenkins     Colon cancer Maternal Grandfather  70    Colon cancer Paternal Grandmother Brice jenkins 70       Outpatient Encounter Medications as of 2/27/2025   Medication Sig Dispense Refill    azelastine (ASTELIN) 137 mcg (0.1 %) nasal spray 1 spray (137 mcg total) by Nasal route 2 (two) times daily. 30 mL 5    cetirizine (ZYRTEC) 10 MG tablet Take 1 tablet by mouth every morning.      dexAMETHasone (DECADRON) 4 MG Tab Take 1 tablet (4 mg total) by mouth every 12 (twelve) hours. for 5 days 10 tablet 0    estradioL (ESTRACE) 0.01 % (0.1 mg/gram) vaginal cream Place 1 g vaginally twice a week. 42.5 g 3    famotidine (PEPCID) 40 MG tablet Take 1 tablet (40 mg total) by mouth once daily. 30 tablet 11    fish,bora,flax oils-om3,6,9no1 (OMEGA 3-6-9) 1,200 mg Cap Take 1 capsule by mouth Daily.      fluticasone propionate (FLONASE) 50 mcg/actuation nasal spray 1 spray (50 mcg total) by Each Nostril route 2 (two) times a day. 16 g 11    ondansetron (ZOFRAN) 4 MG tablet Take 1 tablet (4 mg total) by mouth every 6 (six) hours as needed for Nausea. 12 tablet 0    propranoloL (INDERAL) 10 MG tablet Take 10 mg by mouth 2 (two) times daily.      tamsulosin (FLOMAX) 0.4 mg Cap Take 1 capsule (0.4 mg total) by mouth once daily. 30 capsule 3    vibegron (GEMTESA) 75 mg Tab Take 1 tablet (75 mg  "total) by mouth once daily. (Patient not taking: Reported on 2/27/2025) 30 tablet 11    vitamin E 100 UNIT capsule Take 100 Units by mouth once daily.      [DISCONTINUED] conjugated estrogens (PREMARIN) vaginal cream Place 1 g vaginally once daily. 1 applicator 11    [DISCONTINUED] diclofenac (VOLTAREN) 75 MG EC tablet Take 1 tablet (75 mg total) by mouth 2 (two) times daily. (Patient not taking: Reported on 2/19/2025) 60 tablet 0    [DISCONTINUED] lactulose (CHRONULAC) 10 gram/15 mL solution Take 30 mLs by mouth. (Patient not taking: Reported on 2/19/2025)       No facility-administered encounter medications on file as of 2/27/2025.       Physical Exam:  Vitals:    02/27/25 1302   BP: (!) 110/47   BP Location: Right arm   Patient Position: Sitting   Pulse: 63   Resp: 18   Temp: 98.2 °F (36.8 °C)   TempSrc: Oral   Weight: 57.4 kg (126 lb 8.7 oz)   Height: 5' 4" (1.626 m)       General: NAD, voice normal  Neuro: AAO, CN II - XII grossly intact  Head/ Face: NCAT, symmetric, sensations intact bilaterally  Eyes: EOMI, PERRL  Ears: externally normal with grossly normal hearing  AD: EAC patent, TM intact, no middle ear effusion, no retractions  AS: EAC patent, TM intact, no middle ear effusion, no retractions  Nose:   Externally, no palpable bony step-off; mild middle third weakness; mild improvement with Dallas and modified Dallas maneuvers bilaterally; septum with leftward deviation at mid septum, rightward deviation at caudal septum; significant inferior turbinate hypertrophy bilaterally, no significant rhinorrhea  OC/OP: MMM, no intraoral lesions,  no trismus, dentition is good, no uvular deviation, bilaterally symmetric soft palate elevation, palatoglossus and palatopharyngeal fold wnl; tonsils are symmetric/absent  Neck: soft, supple, no LAD, normal ROM, no thyromegaly  Respiratory: nonlabored, no wheezing, bilateral chest rise    Pertinent Data:    NOSE Scale (2/27/25): 80    ? LABS:  ? AUDIO:           ? PATH:  "     Imaging:   I personally reviewed the following images:        Assessment/Plan:  54 y.o. female with bilateral nasal obstruction.  She is hesitant to undergo an open surgery as she has fear of significant nasal obstruction following surgery in addition to the process of debridement postoperatively.  Ideally, surgery would involve an OSRP in the setting of patient's previous nasal trauma.    Discussed that she may experience some benefit with inferior turbinate reduction, which could be performed submucosally with a microdebrider to minimize any crusting.  After discussion of risks, benefits, and alternatives, she is interested in proceeding.  She would likely experience some benefit from radiofrequency-induced manipulation of ULC with Vivaer, will see if this can be made available.  We will tentatively plan for surgery on 3/24.    -- RTC 1 week following surgery    Dereck Wallace MD  [facility] Department of Otolaryngology  -

## 2025-02-28 ENCOUNTER — CLINICAL SUPPORT (OUTPATIENT)
Dept: UROLOGY | Facility: CLINIC | Age: 55
End: 2025-02-28
Payer: MEDICAID

## 2025-02-28 DIAGNOSIS — N30.10 INTERSTITIAL CYSTITIS: Primary | ICD-10-CM

## 2025-02-28 PROCEDURE — 99211 OFF/OP EST MAY X REQ PHY/QHP: CPT | Mod: PBBFAC

## 2025-02-28 RX ORDER — LIDOCAINE HYDROCHLORIDE 20 MG/ML
5 INJECTION, SOLUTION INFILTRATION; PERINEURAL
Status: COMPLETED | OUTPATIENT
Start: 2025-02-28 | End: 2025-02-28

## 2025-02-28 RX ORDER — DEXAMETHASONE SODIUM PHOSPHATE 10 MG/ML
100 INJECTION INTRAMUSCULAR; INTRAVENOUS
Status: COMPLETED | OUTPATIENT
Start: 2025-02-28 | End: 2025-02-28

## 2025-02-28 RX ORDER — SODIUM BICARBONATE 42 MG/ML
5 INJECTION, SOLUTION INTRAVENOUS
Status: COMPLETED | OUTPATIENT
Start: 2025-02-28 | End: 2025-02-28

## 2025-02-28 RX ORDER — HEPARIN SODIUM 5000 [USP'U]/ML
40000 INJECTION, SOLUTION INTRAVENOUS; SUBCUTANEOUS
Status: COMPLETED | OUTPATIENT
Start: 2025-02-28 | End: 2025-02-28

## 2025-02-28 RX ADMIN — SODIUM BICARBONATE 10 ML: 42 INJECTION, SOLUTION INTRAVENOUS at 10:02

## 2025-02-28 RX ADMIN — HEPARIN SODIUM 40000 UNITS: 5000 INJECTION, SOLUTION INTRAVENOUS; SUBCUTANEOUS at 10:02

## 2025-02-28 RX ADMIN — DEXAMETHASONE SODIUM PHOSPHATE 100 MG: 10 INJECTION INTRAMUSCULAR; INTRAVENOUS at 10:02

## 2025-02-28 RX ADMIN — LIDOCAINE HYDROCHLORIDE 5 ML: 20 INJECTION, SOLUTION INFILTRATION; PERINEURAL at 10:02

## 2025-02-28 NOTE — PROGRESS NOTES
Presented to clinic with complaints of Gemtesa causing severe headaches.  WAYNE Hopkins NP notified by inbasket message. Urethra prepped with betadine. 12FR blake catheter inserted using sterile technique with 50cc clear yellow urine noted. Bladder instillation  instilled without difficulty.

## 2025-03-03 ENCOUNTER — TELEPHONE (OUTPATIENT)
Dept: UROLOGY | Facility: CLINIC | Age: 55
End: 2025-03-03
Payer: MEDICAID

## 2025-03-03 NOTE — TELEPHONE ENCOUNTER
Patient discontinued Gemtesa due to causing severe headaches patient will waits for a cystoscope to be done on April 7th at 8:45 a.m. and speak with Dr. Rae regarding condition.

## 2025-03-10 ENCOUNTER — ANESTHESIA EVENT (OUTPATIENT)
Dept: SURGERY | Facility: HOSPITAL | Age: 55
End: 2025-03-10

## 2025-03-10 NOTE — ANESTHESIA PREPROCEDURE EVALUATION
"Remi Baptiste is a 54 y.o. female presenting for REDUCTION, NASAL TURBINATE (Bilateral: Nose) with a history of   -Nasal obstruction   -HLD  -ANXIETY/DEPRESSION   -H/O MILD CVA 2023 W/O DEFICITS  -GERD    BETA-BLOCKER: PROPRANOLOL    Last dose:  ASA LD: NEED RECS    New Orders for Anesthesia: UPT  3/21 1700--upon patient being contacted with arrival time for sx on 3/24, she asked ODS nurse if cardiac clearance was received. ODS nurse notified myself. Advised that we were unaware of any cardiac clearance pending. There was documentation in chart from 2023 ICU admission stating patient was concerned about a "heart condition" due to a history of hyperlipidemia, and in neuro notes where patient alleged following "[facility]" for a leaky valve, however, [facility] does not have patient in the system. Pt went on to state that she recently saw her cardiologist due to her heart "flip flopping." Attempted to reach pt to see her her cardiologist was, but there was no answer. Advised Leena that d/t new cardiac symptoms/findings, she would need clearance first and will need to be cancelled for 3/24 as clearance was indeed, not received. Leena to notify ENT on call      Active Ambulatory Problems     Diagnosis Date Noted    SAVI (generalized anxiety disorder) 11/18/2022    PTSD (post-traumatic stress disorder) 11/18/2022    Grief reaction 11/23/2022    Degenerative disc disease, cervical 01/06/2023    Cervical radiculopathy 01/19/2023    Lumbar radiculopathy 01/19/2023    Degeneration of intervertebral disc of lumbar region with discogenic back pain and lower extremity pain 02/13/2023    Stroke aborted by administration of thrombolytic agent 04/29/2023    Closed nondisplaced fracture of pisiform of right wrist with routine healing 02/16/2024    GERD (gastroesophageal reflux disease) 09/24/2024    Interstitial cystitis 09/24/2024    Diverticulosis 10/09/2024    Stone in kidney 12/13/2024    Hepatic cyst 12/13/2024    Localized osteoporosis " without current pathological fracture 01/13/2025    Overactive bladder 01/16/2025    Chronic intractable headache 02/26/2025    Insomnia 02/26/2025    Medication overuse headache 02/26/2025     Resolved Ambulatory Problems     Diagnosis Date Noted    Chest pain 04/29/2023    Weakness due to acute stroke 04/29/2023    Brain TIA 05/24/2023    Leg pain, posterior, right 09/24/2024    Serum calcium elevated 10/09/2024     Past Medical History:   Diagnosis Date    Anxiety     Depression     Diverticulitis     Endometrial cyst of ovary     Infertility, female     Osteoporosis     Right wrist injury     Stroke        Pre-op Assessment          Review of Systems  Cardiovascular:                hyperlipidemia                               Hepatic/GI:     GERD                Neurological:   CVA                                    Psych:   anxiety depression                Physical Exam  General: Alert      Lab Results   Component Value Date    WBC 5.30 12/05/2024    HGB 14.1 12/05/2024    HCT 41.2 12/05/2024    MCV 94.7 (H) 12/05/2024     12/05/2024       CMP  Sodium   Date Value Ref Range Status   10/28/2024 142 136 - 145 mmol/L Final     Potassium   Date Value Ref Range Status   10/28/2024 4.9 3.5 - 5.1 mmol/L Final     Chloride   Date Value Ref Range Status   10/28/2024 106 98 - 107 mmol/L Final     CO2   Date Value Ref Range Status   10/28/2024 29 22 - 29 mmol/L Final     Blood Urea Nitrogen   Date Value Ref Range Status   10/28/2024 13.7 9.8 - 20.1 mg/dL Final     Creatinine   Date Value Ref Range Status   10/28/2024 0.69 0.55 - 1.02 mg/dL Final     Calcium   Date Value Ref Range Status   10/28/2024 9.8 8.4 - 10.2 mg/dL Final     Albumin   Date Value Ref Range Status   10/28/2024 4.1 3.5 - 5.0 g/dL Final     Bilirubin Total   Date Value Ref Range Status   10/28/2024 0.5 <=1.5 mg/dL Final     ALP   Date Value Ref Range Status   10/28/2024 48 40 - 150 unit/L Final     AST   Date Value Ref Range Status   10/28/2024 24  5 - 34 unit/L Final     ALT   Date Value Ref Range Status   10/28/2024 24 0 - 55 unit/L Final     eGFR   Date Value Ref Range Status   10/28/2024 >60 mL/min/1.73/m2 Final             Anesthesia Plan  Type of Anesthesia, risks & benefits discussed:    Anesthesia Type: Gen ETT  ASA Score: 3    .

## 2025-03-14 ENCOUNTER — CLINICAL SUPPORT (OUTPATIENT)
Dept: UROLOGY | Facility: CLINIC | Age: 55
End: 2025-03-14
Payer: MEDICAID

## 2025-03-14 DIAGNOSIS — N30.10 INTERSTITIAL CYSTITIS: Primary | ICD-10-CM

## 2025-03-14 PROCEDURE — 99211 OFF/OP EST MAY X REQ PHY/QHP: CPT | Mod: PBBFAC

## 2025-03-14 RX ORDER — DEXAMETHASONE SODIUM PHOSPHATE 10 MG/ML
100 INJECTION INTRAMUSCULAR; INTRAVENOUS
Status: COMPLETED | OUTPATIENT
Start: 2025-03-14 | End: 2025-03-14

## 2025-03-14 RX ORDER — LIDOCAINE HYDROCHLORIDE 20 MG/ML
5 INJECTION, SOLUTION INFILTRATION; PERINEURAL
Status: COMPLETED | OUTPATIENT
Start: 2025-03-14 | End: 2025-03-14

## 2025-03-14 RX ORDER — SODIUM BICARBONATE 42 MG/ML
5 INJECTION, SOLUTION INTRAVENOUS
Status: COMPLETED | OUTPATIENT
Start: 2025-03-14 | End: 2025-03-14

## 2025-03-14 RX ORDER — HEPARIN SODIUM 5000 [USP'U]/ML
40000 INJECTION, SOLUTION INTRAVENOUS; SUBCUTANEOUS
Status: COMPLETED | OUTPATIENT
Start: 2025-03-14 | End: 2025-03-14

## 2025-03-14 RX ADMIN — SODIUM BICARBONATE 10 ML: 42 INJECTION, SOLUTION INTRAVENOUS at 09:03

## 2025-03-14 RX ADMIN — LIDOCAINE HYDROCHLORIDE 5 ML: 20 INJECTION, SOLUTION INFILTRATION; PERINEURAL at 09:03

## 2025-03-14 RX ADMIN — DEXAMETHASONE SODIUM PHOSPHATE 100 MG: 10 INJECTION INTRAMUSCULAR; INTRAVENOUS at 09:03

## 2025-03-14 RX ADMIN — HEPARIN SODIUM 40000 UNITS: 5000 INJECTION, SOLUTION INTRAVENOUS; SUBCUTANEOUS at 09:03

## 2025-03-14 NOTE — PROGRESS NOTES
"Bladder instillation #2 held for two hrs and "symptoms seem to be decreasing "stated by patient . Bladder instillation #3 administered with sterile technique via [address] , scant amount  of clear odorless urine drained from bladder. Procedure tolerated well RTC for weekly instillation     "

## 2025-03-20 NOTE — OR NURSING
"[facility] note  1325-DOS 3/24/2025-pt called back 3/20/2025 states she is "confused because might have talked to the other nurse but doesn't remember and feels scatter-minded and has concerns about being able to breathe from nose after surgery" and that she "takes propanolol twice daily for my heart". Instructed pt to take famotodine and propanolol morning of sx and to call ENT Clinic at [phone] in regards to concerns about sx. Pt verbalized understanding.     133-Spoke with ENT Clinic-they verified pt called and spoke with Michell in regards to concerns.   "

## 2025-03-21 ENCOUNTER — PATIENT MESSAGE (OUTPATIENT)
Dept: SURGERY | Facility: HOSPITAL | Age: 55
End: 2025-03-21
Payer: MEDICAID

## 2025-03-21 ENCOUNTER — CLINICAL SUPPORT (OUTPATIENT)
Dept: UROLOGY | Facility: CLINIC | Age: 55
End: 2025-03-21
Payer: MEDICAID

## 2025-03-21 DIAGNOSIS — N30.10 INTERSTITIAL CYSTITIS: Primary | ICD-10-CM

## 2025-03-21 PROCEDURE — 99211 OFF/OP EST MAY X REQ PHY/QHP: CPT | Mod: PBBFAC

## 2025-03-21 RX ORDER — DEXAMETHASONE SODIUM PHOSPHATE 10 MG/ML
100 INJECTION INTRAMUSCULAR; INTRAVENOUS
Status: COMPLETED | OUTPATIENT
Start: 2025-03-21 | End: 2025-03-21

## 2025-03-21 RX ORDER — LIDOCAINE HYDROCHLORIDE 20 MG/ML
5 INJECTION, SOLUTION INFILTRATION; PERINEURAL
Status: COMPLETED | OUTPATIENT
Start: 2025-03-21 | End: 2025-03-21

## 2025-03-21 RX ORDER — HEPARIN SODIUM 5000 [USP'U]/ML
40000 INJECTION, SOLUTION INTRAVENOUS; SUBCUTANEOUS
Status: COMPLETED | OUTPATIENT
Start: 2025-03-21 | End: 2025-03-21

## 2025-03-21 RX ORDER — SODIUM BICARBONATE 42 MG/ML
5 INJECTION, SOLUTION INTRAVENOUS
Status: COMPLETED | OUTPATIENT
Start: 2025-03-21 | End: 2025-03-21

## 2025-03-21 RX ADMIN — LIDOCAINE HYDROCHLORIDE 5 ML: 20 INJECTION, SOLUTION INFILTRATION; PERINEURAL at 10:03

## 2025-03-21 RX ADMIN — SODIUM BICARBONATE 10 ML: 42 INJECTION, SOLUTION INTRAVENOUS at 10:03

## 2025-03-21 RX ADMIN — HEPARIN SODIUM 40000 UNITS: 5000 INJECTION, SOLUTION INTRAVENOUS; SUBCUTANEOUS at 10:03

## 2025-03-21 RX ADMIN — DEXAMETHASONE SODIUM PHOSPHATE 100 MG: 10 INJECTION INTRAMUSCULAR; INTRAVENOUS at 10:03

## 2025-03-21 NOTE — PROGRESS NOTES
"  Bladder instillation #4 held for two hrs and "symptoms seem to be persistent  "stated by patient . Bladder instillation #5 administered with sterile technique via [address] , small amount of clear odorless urine drained from bladder. Procedure tolerated well RTC for weekly instillation   "

## 2025-03-21 NOTE — PROGRESS NOTES
"Following message is sent to Dr Nix (on call for ENT):  Contacting you about Remi Baptiste, scheduled for a turb reduction on Monday 3/24/2025. She questions if she got "cardiac clearance", she also says that she has "mitral valve prolapse" and recently saw the cardiologist because her heart was "flippity flopping" . Following note found in progress note after she had an aborted stroke in 08/2024: ED on 4/28/2023 for L sided weakness including inability to open OS, difficulty ambulating, states she could not think clearly. States "Is followed by [facility] in [facility] for "leaky valve". " Chay Lam Anesthesia NP contacted [facility] in the [facility]. They say that they have no record of patient. Patient does NOT answer when I attempted to call and clarify the name of her cardiologist. Ms. Lam suggests cancelling patient's surgery until we can get some clarification on her cardiac status.   "

## 2025-03-24 ENCOUNTER — HOSPITAL ENCOUNTER (OUTPATIENT)
Facility: HOSPITAL | Age: 55
Discharge: HOME | End: 2025-03-24
Attending: OTOLARYNGOLOGY | Admitting: OTOLARYNGOLOGY
Payer: MEDICAID

## 2025-03-24 ENCOUNTER — ANESTHESIA (OUTPATIENT)
Dept: SURGERY | Facility: HOSPITAL | Age: 55
End: 2025-03-24

## 2025-03-24 VITALS
OXYGEN SATURATION: 99 % | DIASTOLIC BLOOD PRESSURE: 56 MMHG | WEIGHT: 123.38 LBS | TEMPERATURE: 99 F | SYSTOLIC BLOOD PRESSURE: 104 MMHG | HEART RATE: 82 BPM | BODY MASS INDEX: 21.18 KG/M2 | RESPIRATION RATE: 16 BRPM

## 2025-03-24 DIAGNOSIS — J34.89 NASAL OBSTRUCTION: ICD-10-CM

## 2025-03-24 LAB
B-HCG UR QL: NEGATIVE
CTP QC/QA: YES

## 2025-03-24 PROCEDURE — 81025 URINE PREGNANCY TEST: CPT | Performed by: NURSE PRACTITIONER

## 2025-03-24 RX ORDER — SODIUM CHLORIDE 0.9 % (FLUSH) 0.9 %
10 SYRINGE (ML) INJECTION
Status: DISCONTINUED | OUTPATIENT
Start: 2025-03-24 | End: 2025-03-24 | Stop reason: HOSPADM

## 2025-03-24 RX ORDER — SODIUM CHLORIDE, SODIUM LACTATE, POTASSIUM CHLORIDE, CALCIUM CHLORIDE 600; 310; 30; 20 MG/100ML; MG/100ML; MG/100ML; MG/100ML
INJECTION, SOLUTION INTRAVENOUS CONTINUOUS
Status: DISCONTINUED | OUTPATIENT
Start: 2025-03-25 | End: 2025-03-24 | Stop reason: HOSPADM

## 2025-03-24 RX ORDER — CLINDAMYCIN PHOSPHATE 900 MG/50ML
900 INJECTION, SOLUTION INTRAVENOUS
Status: DISCONTINUED | OUTPATIENT
Start: 2025-03-24 | End: 2025-03-24 | Stop reason: HOSPADM

## 2025-03-24 RX ORDER — LIDOCAINE HYDROCHLORIDE 10 MG/ML
1 INJECTION, SOLUTION EPIDURAL; INFILTRATION; INTRACAUDAL; PERINEURAL ONCE
Status: DISCONTINUED | OUTPATIENT
Start: 2025-03-24 | End: 2025-03-24 | Stop reason: HOSPADM

## 2025-03-24 RX ORDER — MIDAZOLAM HYDROCHLORIDE 2 MG/2ML
2 INJECTION, SOLUTION INTRAMUSCULAR; INTRAVENOUS ONCE AS NEEDED
Status: DISCONTINUED | OUTPATIENT
Start: 2025-03-25 | End: 2025-03-24 | Stop reason: HOSPADM

## 2025-03-24 NOTE — DISCHARGE INSTRUCTIONS
Follow-up with your cardiologist today.  Contact the ENT clinic to reschedule your surgery once the cardiologist has completed his work-up.

## 2025-03-24 NOTE — PROGRESS NOTES
"Upon admit process patient questioned whether her cardiac records had been received. She stated she has an ECHO done on Friday, but does not have the results. She does not remember the name of her Cardiologist, but gave the address of [address] [place] [address].  I spoke with ERA Hoffman and the Cardiologist office was also contacted. The patient has a scheduled appointment for this afternoon at 3:15.   I notified Dr. RINA Nix with ENT of the above and she stated that the patient will not be able to have surgery today based on the fact that she had the ECHO and has a follow-up appointment with her cardiologist. Her surgery will have to be rescheduled.  I spoke with the patient and explained the reason for her surgery cancellation for today and that she first has to follow-up with her cardiologist before it can be rescheduled.  Patient was visibly upset and expressed disbelief that she was not told this prior to today.  She stated that she told "Bety" in the ENT clinic that she sees a cardiologist.   She stated that she also spoke with another nurse about seeing a cardiologist prior to today.   I offered that she may speak with Bre Reyes, Patient Advocate. She stated she would like that. I provided the telephone number and encouraged her to call to discuss her concerns.   "

## 2025-04-02 ENCOUNTER — CLINICAL SUPPORT (OUTPATIENT)
Dept: OTOLARYNGOLOGY | Facility: CLINIC | Age: 55
End: 2025-04-02
Payer: MEDICAID

## 2025-04-02 ENCOUNTER — CLINICAL SUPPORT (OUTPATIENT)
Dept: UROLOGY | Facility: CLINIC | Age: 55
End: 2025-04-02
Payer: MEDICAID

## 2025-04-02 DIAGNOSIS — J34.3 NASAL TURBINATE HYPERTROPHY: ICD-10-CM

## 2025-04-02 DIAGNOSIS — R09.81 NASAL CONGESTION: ICD-10-CM

## 2025-04-02 DIAGNOSIS — J34.89 NASAL OBSTRUCTION: Primary | ICD-10-CM

## 2025-04-02 DIAGNOSIS — N30.10 INTERSTITIAL CYSTITIS: Primary | ICD-10-CM

## 2025-04-02 DIAGNOSIS — J34.2 NASAL SEPTAL DEVIATION: ICD-10-CM

## 2025-04-02 DIAGNOSIS — J34.829 NASAL VALVE COLLAPSE: ICD-10-CM

## 2025-04-02 PROCEDURE — 96372 THER/PROPH/DIAG INJ SC/IM: CPT | Mod: PBBFAC

## 2025-04-02 PROCEDURE — 81001 URINALYSIS AUTO W/SCOPE: CPT | Mod: PBBFAC

## 2025-04-02 PROCEDURE — 99211 OFF/OP EST MAY X REQ PHY/QHP: CPT | Mod: PBBFAC

## 2025-04-02 PROCEDURE — 51700 IRRIGATION OF BLADDER: CPT | Mod: PBBFAC | Performed by: NURSE PRACTITIONER

## 2025-04-02 PROCEDURE — 51700 IRRIGATION OF BLADDER: CPT | Mod: S$PBB,,, | Performed by: NURSE PRACTITIONER

## 2025-04-02 RX ORDER — LIDOCAINE HYDROCHLORIDE 20 MG/ML
5 INJECTION, SOLUTION INFILTRATION; PERINEURAL
Status: COMPLETED | OUTPATIENT
Start: 2025-04-02 | End: 2025-04-02

## 2025-04-02 RX ORDER — HEPARIN SODIUM 5000 [USP'U]/ML
40000 INJECTION, SOLUTION INTRAVENOUS; SUBCUTANEOUS
Status: COMPLETED | OUTPATIENT
Start: 2025-04-02 | End: 2025-04-02

## 2025-04-02 RX ORDER — DEXAMETHASONE SODIUM PHOSPHATE 10 MG/ML
100 INJECTION INTRAMUSCULAR; INTRAVENOUS
Status: COMPLETED | OUTPATIENT
Start: 2025-04-02 | End: 2025-04-02

## 2025-04-02 RX ORDER — SODIUM BICARBONATE 42 MG/ML
5 INJECTION, SOLUTION INTRAVENOUS
Status: COMPLETED | OUTPATIENT
Start: 2025-04-02 | End: 2025-04-02

## 2025-04-02 RX ADMIN — LIDOCAINE HYDROCHLORIDE 5 ML: 20 INJECTION, SOLUTION INFILTRATION; PERINEURAL at 11:04

## 2025-04-02 RX ADMIN — SODIUM BICARBONATE 5 MEQ: 42 INJECTION, SOLUTION INTRAVENOUS at 11:04

## 2025-04-02 RX ADMIN — HEPARIN SODIUM 40000 UNITS: 5000 INJECTION, SOLUTION INTRAVENOUS; SUBCUTANEOUS at 10:04

## 2025-04-02 RX ADMIN — DEXAMETHASONE SODIUM PHOSPHATE 100 MG: 10 INJECTION, SOLUTION INTRAMUSCULAR; INTRAVENOUS at 10:04

## 2025-04-02 NOTE — PROGRESS NOTES
Presented to clinic without complaints.  Urethra prepped with betadine.  14FR blake catheter inserted using sterile technique with clear yellow urine noted.  Bladder instillation instilled without difficulty.  Tolerated well.  Keep upcoming cysto appt.    Component  Ref Range & Units (hover) 10:45   Color, UA Yellow   Spec Grav UA 1.025   pH, UA 5.5   WBC, UA neg   Nitrite, UA neg   Protein, POC neg   Glucose, UA neg   Ketones, UA neg   Urobilinogen, UA 0.2   Bilirubin, POC neg   Blood, UA neg           Microscopic urinalysis revealed negative RBCs, negative WBCs, negative nitrites.  Specimen Collected: 04/02/25 10:45 CDT Last Resulted: 04/02/25 10:45 CDT

## 2025-04-02 NOTE — PROGRESS NOTES
[facility]  Otolaryngology Clinic Note    Remi Baptiste  YOB: 1970    Chief Complaint:   Chief Complaint   Patient presents with    Follow-up     Discuss surgery        HPI: 04/02/2025: 54 y.o. female presents with c/o nasal congestion/obstruction which is most pronounced at night. Occasional rhinitis and PND. Denies recurrent sinus infections or significant allergy symptoms. States she has sustained a few episodes of nasal trauma and is concerned she has a deviated septum. She has been using flonase and zyrtec daily over the past month without much benefit. States she mouth breathes at night and sometimes awakens coughing/choking. PCP ordered a sleep study but she has not yet been contacted.    7/31/24:  Here for follow-up.  Patient reports that she got COVID about 6 weeks ago and since then she has been having increased globus sensation.  Patient does have reflux and takes omeprazole.  She still feels very congested however she is not having much nasal drainage.  We discussed using nasal sprays.  She is interested in surgery.  We were discussing septoplasty and turbinate reduction however she does not want nasal splints in her nose as that may suffocate her as she has panic attacks.    10/31/24:  Patient presents today for follow-up.  She reports that she has been doing extensive research into inferior turbinate reduction and septoplasty.  She states that she has severe anxiety regarding postoperative swelling/crusting/splints.  She also states that she has anxiety regarding any sort of sharp instrumentation in her nose.  She states she has been using her Flonase and Astelin as needed and does not notice much of a difference.  She would be interested in something less invasive.    12/19/24: No changes. Eager for Vivair/Rhinair    02/27/2025:  Returns in follow-up.  Bilateral nasal obstruction has been stable.  No significant rhinorrhea.  She continues to use nasal regimen of  Flonase, azelastine, and nasal irrigations.    4/2/25:  Telehealth visit. Discussed rescheduling surgery with patient today. She did receive cardiac clearance however the records were not sent over. She is very nervous about surgery. She does endorse nasal obstruction but is not able to localize it to one side. She does not note any relieving or exacerbating factors. She has been using saline spray which helps for a few minutes. Did not get much benefit with Flonase or Astelin. She also endorses sensation of something in her throat.      ROS:   10-point review of systems negative except per HPI      Review of patient's allergies indicates:   Allergen Reactions    Hydrocodone-guaifenesin Hives    Hydrocodone-acetaminophen     Penicillins Rash       Past Medical History:   Diagnosis Date    Anxiety     Chest pain 04/29/2023    Depression     Diverticulitis     Endometrial cyst of ovary     Infertility, female     Endometriosis    Leg pain, posterior, right 09/24/2024    Osteoporosis     Right wrist injury     Serum calcium elevated 10/09/2024    Stroke        Past Surgical History:   Procedure Laterality Date    BLADDER SUSPENSION      x2    COLPOSCOPY  11/23    Internal hemorrhoids    OVARIAN CYST REMOVAL      endometreosis    PELVIC LAPAROSCOPY      TONSILLECTOMY         Social History     Socioeconomic History    Marital status: Single    Number of children: 0   Occupational History    Occupation: [employer]    Occupation: Housekeeping   Tobacco Use    Smoking status: Never     Passive exposure: Never    Smokeless tobacco: Never   Substance and Sexual Activity    Alcohol use: Not Currently     Comment: occasionally    Drug use: Never    Sexual activity: Not Currently     Partners: Male     Birth control/protection: Post-menopausal     Social Drivers of Health     Financial Resource Strain: High Risk (8/5/2024)    Overall Financial Resource Strain (CARDIA)     Difficulty of Paying Living Expenses: Very hard   Food  Insecurity: Food Insecurity Present (8/5/2024)    Hunger Vital Sign     Worried About Running Out of Food in the Last Year: Sometimes true     Ran Out of Food in the Last Year: Never true   Transportation Needs: No Transportation Needs (6/22/2022)    PRAPARE - Transportation     Lack of Transportation (Medical): No     Lack of Transportation (Non-Medical): No   Physical Activity: Insufficiently Active (8/5/2024)    Exercise Vital Sign     Days of Exercise per Week: 3 days     Minutes of Exercise per Session: 30 min   Stress: Stress Concern Present (8/5/2024)    [employer] [place] [employer] - Occupational Stress Questionnaire     Feeling of Stress : Very much   Housing Stability: High Risk (8/5/2024)    Housing Stability Vital Sign     Unable to Pay for Housing in the Last Year: Yes       Family History   Problem Relation Name Age of Onset    Hypertension Mother Karen Mcgee     Arthritis Mother Karen Mcgee     COPD Mother Karen Mcgee     Cancer Mother Karen Mcgee     Hypertension Father Husam jenkins     Colon cancer Maternal Grandfather  70    Colon cancer Paternal Grandmother Brice jenknis 70       Outpatient Encounter Medications as of 4/2/2025   Medication Sig Dispense Refill    azelastine (ASTELIN) 137 mcg (0.1 %) nasal spray 1 spray (137 mcg total) by Nasal route 2 (two) times daily. 30 mL 5    cetirizine (ZYRTEC) 10 MG tablet Take 1 tablet by mouth every morning.      fish,bora,flax oils-om3,6,9no1 (OMEGA 3-6-9) 1,200 mg Cap Take 1 capsule by mouth Daily.      fluticasone propionate (FLONASE) 50 mcg/actuation nasal spray 1 spray (50 mcg total) by Each Nostril route 2 (two) times a day. 16 g 11    ondansetron (ZOFRAN) 4 MG tablet Take 1 tablet (4 mg total) by mouth every 6 (six) hours as needed for Nausea. 12 tablet 0    propranoloL (INDERAL) 10 MG tablet Take 10 mg by mouth 2 (two) times daily.      vitamin E 100 UNIT capsule Take 100 Units by mouth once daily.      estradioL (ESTRACE)  0.01 % (0.1 mg/gram) vaginal cream Place 1 g vaginally twice a week. (Patient not taking: Reported on 4/2/2025) 42.5 g 3    famotidine (PEPCID) 40 MG tablet Take 1 tablet (40 mg total) by mouth once daily. (Patient not taking: Reported on 4/2/2025) 30 tablet 11    tamsulosin (FLOMAX) 0.4 mg Cap Take 1 capsule (0.4 mg total) by mouth once daily. 30 capsule 3    vibegron (GEMTESA) 75 mg Tab Take 1 tablet (75 mg total) by mouth once daily. (Patient not taking: Reported on 2/27/2025) 30 tablet 11     No facility-administered encounter medications on file as of 4/2/2025.       Physical Exam:  Not performed - telephone visit    Pertinent Data:    NOSE Scale (2/27/25): 80    ? LABS:  ? AUDIO:           ? PATH:      Imaging:   I personally reviewed the following images:        Assessment/Plan:  54 y.o. female with bilateral nasal obstruction.  She is hesitant to undergo an open surgery as she has fear of significant nasal obstruction following surgery in addition to the process of debridement postoperatively.  Ideally, surgery would involve an OSRP in the setting of patient's previous nasal trauma.    Discussed that she may experience some benefit with inferior turbinate reduction although symptomatology is inconsistent based on previous evaluation. She is very nervous about any postoperative nasal obstruction or crusting. She did have some benefit from modified clyde in the past. We discussed proceeding with in-office Vivair and foregoing turbinate reduction for now to see if she gets any benefit.    -- Schedule vivair on procedure day  -- FFL at that time for globus sensation    Juanita Armendariz MD  [facility]  4/2/2025 10:18 AM

## 2025-04-07 ENCOUNTER — PROCEDURE VISIT (OUTPATIENT)
Dept: UROLOGY | Facility: CLINIC | Age: 55
End: 2025-04-07
Payer: MEDICAID

## 2025-04-07 VITALS
DIASTOLIC BLOOD PRESSURE: 66 MMHG | WEIGHT: 126.38 LBS | SYSTOLIC BLOOD PRESSURE: 104 MMHG | TEMPERATURE: 98 F | BODY MASS INDEX: 21.57 KG/M2 | HEART RATE: 82 BPM | RESPIRATION RATE: 20 BRPM | OXYGEN SATURATION: 99 % | HEIGHT: 64 IN

## 2025-04-07 DIAGNOSIS — N32.81 OVERACTIVE BLADDER: Primary | ICD-10-CM

## 2025-04-07 LAB
BILIRUB SERPL-MCNC: NEGATIVE MG/DL
BLOOD URINE, POC: NEGATIVE
COLOR, POC UA: YELLOW
GLUCOSE UR QL STRIP: NEGATIVE
KETONES UR QL STRIP: NEGATIVE
LEUKOCYTE ESTERASE URINE, POC: NEGATIVE
NITRITE, POC UA: NEGATIVE
PH, POC UA: 5.5
PROTEIN, POC: NEGATIVE
SPECIFIC GRAVITY, POC UA: 1.02
UROBILINOGEN, POC UA: 0.2

## 2025-04-07 PROCEDURE — 52000 CYSTOURETHROSCOPY: CPT | Mod: PBBFAC | Performed by: UROLOGY

## 2025-04-07 PROCEDURE — 52000 CYSTOURETHROSCOPY: CPT | Mod: S$PBB,,, | Performed by: UROLOGY

## 2025-04-07 PROCEDURE — 81001 URINALYSIS AUTO W/SCOPE: CPT | Mod: PBBFAC | Performed by: UROLOGY

## 2025-04-07 RX ORDER — CIPROFLOXACIN 500 MG/1
500 TABLET ORAL
Status: COMPLETED | OUTPATIENT
Start: 2025-04-07 | End: 2025-04-07

## 2025-04-07 RX ORDER — LIDOCAINE HYDROCHLORIDE 20 MG/ML
JELLY TOPICAL
Status: COMPLETED | OUTPATIENT
Start: 2025-04-07 | End: 2025-04-07

## 2025-04-07 RX ADMIN — CIPROFLOXACIN 500 MG: 500 TABLET ORAL at 09:04

## 2025-04-07 RX ADMIN — LIDOCAINE HYDROCHLORIDE: 20 JELLY TOPICAL at 09:04

## 2025-04-07 NOTE — PROGRESS NOTES
Pt seen by Dr. Moctezuma; Pt consented & premedicated for procedure; Time out documentation completed; Cystoscopy performed w/no complications; Pt instructed to return to clinic for next available cystoscopy w/Botox & maintenance bladder instillation; Message sent to MA to obtain Botox PA; Discharge paperwork given w/pt verbalizing understanding

## 2025-04-07 NOTE — PROGRESS NOTES
Faculty Attestation: Remi Baptiste  was seen at [facility] in the Orthopaedic Clinic. Patient seen and evaluated at the time of the visit. History of Present Illness, Physical Exam, and Assessment and Plan reviewed. Treatment plan is reasonable and appropriate. Compliance with treatment recommendations is important. No procedure was performed.     Bernardo Cody MD  Orthopaedic Surgery     PAST MEDICAL HISTORY:  No pertinent past medical history

## 2025-04-09 ENCOUNTER — CLINICAL SUPPORT (OUTPATIENT)
Dept: UROLOGY | Facility: CLINIC | Age: 55
End: 2025-04-09
Payer: MEDICAID

## 2025-04-09 DIAGNOSIS — N30.10 INTERSTITIAL CYSTITIS: Primary | ICD-10-CM

## 2025-04-09 LAB
BILIRUB SERPL-MCNC: NORMAL MG/DL
BLOOD URINE, POC: NORMAL
COLOR, POC UA: YELLOW
GLUCOSE UR QL STRIP: NORMAL
KETONES UR QL STRIP: NORMAL
LEUKOCYTE ESTERASE URINE, POC: NORMAL
NITRITE, POC UA: NORMAL
PH, POC UA: 7.5
PROTEIN, POC: NORMAL
SPECIFIC GRAVITY, POC UA: 1.02
UROBILINOGEN, POC UA: 0.2

## 2025-04-09 PROCEDURE — 51700 IRRIGATION OF BLADDER: CPT | Mod: PBBFAC | Performed by: NURSE PRACTITIONER

## 2025-04-09 PROCEDURE — 81001 URINALYSIS AUTO W/SCOPE: CPT | Mod: PBBFAC

## 2025-04-09 PROCEDURE — 99212 OFFICE O/P EST SF 10 MIN: CPT | Mod: PBBFAC,25

## 2025-04-09 PROCEDURE — 96372 THER/PROPH/DIAG INJ SC/IM: CPT | Mod: PBBFAC

## 2025-04-09 PROCEDURE — 51700 IRRIGATION OF BLADDER: CPT | Mod: S$PBB,,, | Performed by: NURSE PRACTITIONER

## 2025-04-09 RX ORDER — SODIUM BICARBONATE 42 MG/ML
5 INJECTION, SOLUTION INTRAVENOUS
Status: COMPLETED | OUTPATIENT
Start: 2025-04-09 | End: 2025-04-09

## 2025-04-09 RX ORDER — DEXAMETHASONE SODIUM PHOSPHATE 10 MG/ML
100 INJECTION INTRAMUSCULAR; INTRAVENOUS
Status: COMPLETED | OUTPATIENT
Start: 2025-04-09 | End: 2025-04-09

## 2025-04-09 RX ORDER — LIDOCAINE HYDROCHLORIDE 20 MG/ML
5 INJECTION, SOLUTION INFILTRATION; PERINEURAL
Status: COMPLETED | OUTPATIENT
Start: 2025-04-09 | End: 2025-04-09

## 2025-04-09 RX ORDER — HEPARIN SODIUM 5000 [USP'U]/ML
40000 INJECTION, SOLUTION INTRAVENOUS; SUBCUTANEOUS
Status: COMPLETED | OUTPATIENT
Start: 2025-04-09 | End: 2025-04-09

## 2025-04-09 RX ADMIN — LIDOCAINE HYDROCHLORIDE 5 ML: 20 INJECTION, SOLUTION INFILTRATION; PERINEURAL at 11:04

## 2025-04-09 RX ADMIN — DEXAMETHASONE SODIUM PHOSPHATE 100 MG: 10 INJECTION, SOLUTION INTRAMUSCULAR; INTRAVENOUS at 11:04

## 2025-04-09 RX ADMIN — SODIUM BICARBONATE 5 MEQ: 42 INJECTION, SOLUTION INTRAVENOUS at 11:04

## 2025-04-09 RX ADMIN — HEPARIN SODIUM 40000 UNITS: 5000 INJECTION INTRAVENOUS; SUBCUTANEOUS at 11:04

## 2025-04-09 NOTE — PROGRESS NOTES
Pt presented today for bladder instillation #6. Verified pt allergies, and answered questions. Pt states she was able to hold last instillation for one hour. 12 F catheter inserted using aseptic technique. Small amount of light yellow urine noted, and urine specimen obtained. Bladder medication instilled, pt tolerated well. RTC in one week for next instillation.

## 2025-04-11 ENCOUNTER — TELEPHONE (OUTPATIENT)
Dept: UROLOGY | Facility: CLINIC | Age: 55
End: 2025-04-11
Payer: MEDICAID

## 2025-04-11 NOTE — TELEPHONE ENCOUNTER
Approved today by [employer] [place] [employer] 2017    PA has been Approved. PA End Date: 04/11/2026    ----- Message from Checo Ray sent at 4/7/2025  1:40 PM CDT -----  Regarding: RE: PA Needed for Botox  The Pharmacy benefit of this member is carved out to [employer] vendor for processing.  ----- Message -----  From: Katerine Talamantes RN  Sent: 4/7/2025   9:40 AM CDT  To: Cory Marcano MA  Subject: PA Needed for Botox                              Pt needs PA for Botox. Cysto scheduled for 05Jun2025. Thanks!

## 2025-04-13 RX ORDER — MIRABEGRON 50 MG/1
50 TABLET, FILM COATED, EXTENDED RELEASE ORAL DAILY
Qty: 30 TABLET | Refills: 11 | Status: SHIPPED | OUTPATIENT
Start: 2025-04-13 | End: 2026-04-13

## 2025-04-16 ENCOUNTER — CLINICAL SUPPORT (OUTPATIENT)
Dept: UROLOGY | Facility: CLINIC | Age: 55
End: 2025-04-16
Payer: MEDICAID

## 2025-04-16 DIAGNOSIS — N30.10 INTERSTITIAL CYSTITIS: Primary | ICD-10-CM

## 2025-04-16 PROCEDURE — 81001 URINALYSIS AUTO W/SCOPE: CPT | Mod: PBBFAC

## 2025-04-16 PROCEDURE — 51700 IRRIGATION OF BLADDER: CPT | Mod: S$PBB,,, | Performed by: NURSE PRACTITIONER

## 2025-04-16 PROCEDURE — 99211 OFF/OP EST MAY X REQ PHY/QHP: CPT | Mod: PBBFAC

## 2025-04-16 PROCEDURE — 96372 THER/PROPH/DIAG INJ SC/IM: CPT | Mod: PBBFAC

## 2025-04-16 PROCEDURE — 51700 IRRIGATION OF BLADDER: CPT | Mod: PBBFAC | Performed by: NURSE PRACTITIONER

## 2025-04-16 RX ORDER — HEPARIN SODIUM 5000 [USP'U]/ML
40000 INJECTION, SOLUTION INTRAVENOUS; SUBCUTANEOUS
Status: COMPLETED | OUTPATIENT
Start: 2025-04-16 | End: 2025-04-16

## 2025-04-16 RX ORDER — SODIUM BICARBONATE 42 MG/ML
5 INJECTION, SOLUTION INTRAVENOUS
Status: COMPLETED | OUTPATIENT
Start: 2025-04-16 | End: 2025-04-16

## 2025-04-16 RX ORDER — DEXAMETHASONE SODIUM PHOSPHATE 10 MG/ML
100 INJECTION INTRAMUSCULAR; INTRAVENOUS
Status: COMPLETED | OUTPATIENT
Start: 2025-04-16 | End: 2025-04-16

## 2025-04-16 RX ORDER — LIDOCAINE HYDROCHLORIDE 20 MG/ML
5 INJECTION, SOLUTION INFILTRATION; PERINEURAL
Status: COMPLETED | OUTPATIENT
Start: 2025-04-16 | End: 2025-04-16

## 2025-04-16 RX ADMIN — DEXAMETHASONE SODIUM PHOSPHATE 100 MG: 10 INJECTION, SOLUTION INTRAMUSCULAR; INTRAVENOUS at 10:04

## 2025-04-16 RX ADMIN — SODIUM BICARBONATE 5 MEQ: 42 INJECTION, SOLUTION INTRAVENOUS at 10:04

## 2025-04-16 RX ADMIN — HEPARIN SODIUM 40000 UNITS: 5000 INJECTION, SOLUTION INTRAVENOUS; SUBCUTANEOUS at 10:04

## 2025-04-16 RX ADMIN — LIDOCAINE HYDROCHLORIDE 5 ML: 20 INJECTION, SOLUTION INFILTRATION; PERINEURAL at 10:04

## 2025-04-16 NOTE — PROGRESS NOTES
"Bladder instillation #6 held for two hrs and "symptoms seem to be decreasing "stated by patient . Bladder instillation #7 administered with sterile technique via [address] , small amount of clear odorless urine drained from bladder. Procedure tolerated well RTC for weekly instillation     "

## 2025-04-17 ENCOUNTER — OFFICE VISIT (OUTPATIENT)
Dept: ENDOCRINOLOGY | Facility: CLINIC | Age: 55
End: 2025-04-17
Payer: MEDICAID

## 2025-04-17 VITALS
SYSTOLIC BLOOD PRESSURE: 99 MMHG | HEIGHT: 64 IN | WEIGHT: 128.75 LBS | RESPIRATION RATE: 12 BRPM | BODY MASS INDEX: 21.98 KG/M2 | DIASTOLIC BLOOD PRESSURE: 60 MMHG | HEART RATE: 65 BPM | TEMPERATURE: 98 F

## 2025-04-17 DIAGNOSIS — M81.6 LOCALIZED OSTEOPOROSIS WITHOUT CURRENT PATHOLOGICAL FRACTURE: Primary | ICD-10-CM

## 2025-04-17 PROCEDURE — 99215 OFFICE O/P EST HI 40 MIN: CPT | Mod: PBBFAC | Performed by: NURSE PRACTITIONER

## 2025-04-17 PROCEDURE — 3074F SYST BP LT 130 MM HG: CPT | Mod: CPTII,,, | Performed by: NURSE PRACTITIONER

## 2025-04-17 PROCEDURE — 99204 OFFICE O/P NEW MOD 45 MIN: CPT | Mod: S$PBB,,, | Performed by: NURSE PRACTITIONER

## 2025-04-17 PROCEDURE — 3008F BODY MASS INDEX DOCD: CPT | Mod: CPTII,,, | Performed by: NURSE PRACTITIONER

## 2025-04-17 PROCEDURE — 1159F MED LIST DOCD IN RCRD: CPT | Mod: CPTII,,, | Performed by: NURSE PRACTITIONER

## 2025-04-17 PROCEDURE — 3078F DIAST BP <80 MM HG: CPT | Mod: CPTII,,, | Performed by: NURSE PRACTITIONER

## 2025-04-17 PROCEDURE — 1160F RVW MEDS BY RX/DR IN RCRD: CPT | Mod: CPTII,,, | Performed by: NURSE PRACTITIONER

## 2025-04-17 RX ORDER — PANTOPRAZOLE SODIUM 40 MG/1
40 TABLET, DELAYED RELEASE ORAL
COMMUNITY
Start: 2025-04-14

## 2025-04-17 NOTE — ASSESSMENT & PLAN NOTE
Patient has a confirmed diagnosis of low T-score of-2.5 or lower in the lumbar spine, femoral neck, total proximal femur, or 1/3 radius.  Current plan:  Discussed lab results.  Patient qualify for Prolia injections once every 6 months.  Will avoid oral medications due to GI issue.  Discussed side effects of medication.Education printout of deepak Citl[employer] provided to patient on discharge  Discussed the importance of exercising 30 minutes a day up to 5 days a week as simple as brisk walking, stay hydrated with water.  Follow low-fat low-cholesterol diet.  Lifestyle modifications and healthy eating habits.  Patient to return to clinic to receive injection, patient not ready today due to work and she wants to take it on a day she is off just to make sure she is not having any side effect of the medicine.  Patient requesting repeat vitamin-D.  If low will start vitamin-D supplementation.  Discussed OTC vitamin-D 5000 IU or a prescription of 50,000 IU once a week.  Patient to return in 6 months after 1st injection.  Questions solicited and answered, patient verbalized understanding and agreed to plan of care.  Questions solicited and answered, patient verbalized understanding and agreed to plan of care

## 2025-04-17 NOTE — PROGRESS NOTES
Patient Name: Remi Baptiste   : 1970  MRN: [id number]     SUBJECTIVE DATA:    CHIEF COMPLAINT:   Remi Baptiste is a 54 y.o. female who presents to clinic today with Osteoporosis        HPI:  54-year-old female presents to endocrine clinic to establish care and to discuss osteoporosis management.    Any prior fractures: left foot while lightly jogging few years ago, rt wrist last year from a fall.  Patient report cervical spine degenerative changes with spondylosis  Mother history of sever osteoperosis  Prior oral medications to treat osteoporosis:  No patient has GI issues  2024 vitamin-D 34 ng/ mL  10/28/2024 calcium, ionized 5.36 mg/dL, calcium 9.8, PTH 65.4  2024 calcium 9.9 mg/dL      Last DEXA bone scan was completed on 2025  EXAMINATION:  DXA BONE DENSITY AXIAL SKELETON 1 OR MORE SITES     CLINICAL HISTORY:  post menopausal; Asymptomatic menopausal state     TECHNIQUE:  DXA scanning was performed over the bilateral hip and lumbar spine.  Review of the images confirms satisfactory positioning and technique.     COMPARISON:  None     FINDINGS:  The L1 to L4 vertebral bone mineral density is equal to 0.841 g/cm squared with a T score of -2.9.     The left femoral neck bone mineral density is equal to 0.800 g/cm squared with a T score of -1.7.  The right femoral neck bone mineral density is equal to 0.770 g/cm squared with a T score of -1.9.     Impression:     The measured bone mineral density in the lumbar spine corresponds with the osteoporotic category according to world health organization criteria.     Consider FDA approved medical therapies in postmenopausal women and men aged 50 years and older, based on the following:     *A hip or vertebral (clinical or morphometric) fracture  *T score less than or equal to -2.5 at the femoral neck or spine after appropriate evaluation to exclude secondary causes.  *Low bone mass -- also known as osteopenia (T score between -1.0 and -2.5 at  the femoral neck or spine) and a 10 year probability of hip fracture greater than or equal to 3% or a 10 year probability of major osteoporosis-related fracture greater than or equal to 20% based on the [place]-adapted WHO algorithm.  *Clinician's judgment and/or patient preference may indicate treatment for people with 10 year fracture probabilities is above or below these levels.        Electronically signed by:Seun Goldsmith MD  Date:                                            01/04/2025  Time:                                           17:28        Patient has a confirmed diagnosis of low T-score of-2.5 or lower in the lumbar spine, femoral neck, total proximal femur, or 1/3 radius.  Current plan:  Discussed lab results.  Patient qualify for Prolia injections once every 6 months.  Will avoid oral medications due to GI issue.  Discussed side effects of medication.  Education printout of deepak [place] provided to patient on discharge  Discussed the importance of exercising 30 minutes a day up to 5 days a week as simple as brisk walking, stay hydrated with water.  Follow low-fat low-cholesterol diet.  Lifestyle modifications and healthy eating habits.  Patient to return to clinic to receive injection, patient not ready today due to work and she wants to take it on a day she is off just to make sure she is not having any side effect of the medicine.  Patient requesting repeat vitamin-D.  If low will start vitamin-D supplementation.  Discussed OTC vitamin-D 5000 IU or a prescription of 50,000 IU once a week.  Patient to return in 6 months after 1st injection.  Questions solicited and answered, patient verbalized understanding and agreed to plan of care.  Questions solicited and answered, patient verbalized understanding and agreed to plan of care        Patient denies chest pain, shortness of breath, dyspnea on exertion, palpitations, peripheral edema, abdominal pain, nausea, vomiting, diarrhea, constipation, fatigue, fever,  "chills, dysuria,  hematuria, dark stools or bloody stools        ALLERGIES:   Review of patient's allergies indicates:   Allergen Reactions    Hydrocodone-guaifenesin Hives    Hydrocodone-acetaminophen Itching, Nausea And Vomiting and Rash    Penicillins Rash         ROS:  Review of Systems   All other systems reviewed and are negative.        OBJECTIVE DATA:  Vital signs  Vitals:    04/17/25 1014   BP: 99/60   BP Location: Left arm   Patient Position: Sitting   Pulse: 65   Resp: 12   Temp: 97.8 °F (36.6 °C)   TempSrc: Oral   Weight: 58.4 kg (128 lb 12 oz)   Height: 5' 4" (1.626 m)      Body mass index is 22.1 kg/m².    PHYSICAL EXAM:   Physical Exam  Vitals and nursing note reviewed.   Constitutional:       General: She is awake. She is not in acute distress.     Appearance: Normal appearance. She is well-developed, well-groomed and normal weight. She is not ill-appearing, toxic-appearing or diaphoretic.   HENT:      Head: Normocephalic and atraumatic.      Right Ear: External ear normal.      Left Ear: External ear normal.      Nose: Nose normal.      Mouth/Throat:      Lips: Pink.      Mouth: Mucous membranes are moist.      Pharynx: Oropharynx is clear.   Eyes:      General: Lids are normal. Gaze aligned appropriately.      Extraocular Movements: Extraocular movements intact.      Pupils: Pupils are equal, round, and reactive to light.   Neck:      Thyroid: No thyroid mass, thyromegaly or thyroid tenderness.      Trachea: Trachea and phonation normal.   Cardiovascular:      Rate and Rhythm: Normal rate and regular rhythm.      Pulses: Normal pulses.           Radial pulses are 2+ on the right side and 2+ on the left side.      Heart sounds: Normal heart sounds, S1 normal and S2 normal. No murmur heard.  Pulmonary:      Effort: Pulmonary effort is normal.      Breath sounds: Normal breath sounds and air entry. No wheezing.   Abdominal:      General: Abdomen is flat.      Palpations: Abdomen is soft. "   Musculoskeletal:         General: Normal range of motion.      Cervical back: Normal range of motion and neck supple. No rigidity or tenderness.   Lymphadenopathy:      Cervical: No cervical adenopathy.   Skin:     General: Skin is warm and dry.      Capillary Refill: Capillary refill takes less than 2 seconds.   Neurological:      General: No focal deficit present.      Mental Status: She is alert and oriented to person, place, and time. Mental status is at baseline.      GCS: GCS eye subscore is 4. GCS verbal subscore is 5. GCS motor subscore is 6.      Cranial Nerves: No cranial nerve deficit.      Sensory: No sensory deficit.      Motor: No weakness.      Coordination: Coordination normal.      Gait: Gait normal.   Psychiatric:         Attention and Perception: Attention and perception normal.         Mood and Affect: Affect normal. Mood is anxious (Anxious about Prolia injection).         Speech: Speech normal.         Behavior: Behavior normal. Behavior is cooperative.         Thought Content: Thought content normal.         Cognition and Memory: Cognition and memory normal.         Judgment: Judgment normal.          ASSESSMENT/PLAN:  1. Localized osteoporosis without current pathological fracture  Overview:  The L1 to L4 vertebral bone mineral density is equal to 0.841 g/cm squared with a T score of -2.9.     The left femoral neck bone mineral density is equal to 0.800 g/cm squared with a T score of -1.7.  The right femoral neck bone mineral density is equal to 0.770 g/cm squared with a T score of -1.9.       Assessment & Plan:        Patient has a confirmed diagnosis of low T-score of-2.5 or lower in the lumbar spine, femoral neck, total proximal femur, or 1/3 radius.  Current plan:  Discussed lab results.  Patient qualify for Prolia injections once every 6 months.  Will avoid oral medications due to GI issue.  Discussed side effects of medication.Education printout of deepak Lennon provided to patient on  discharge  Discussed the importance of exercising 30 minutes a day up to 5 days a week as simple as brisk walking, stay hydrated with water.  Follow low-fat low-cholesterol diet.  Lifestyle modifications and healthy eating habits.  Patient to return to clinic to receive injection, patient not ready today due to work and she wants to take it on a day she is off just to make sure she is not having any side effect of the medicine.  Patient requesting repeat vitamin-D.  If low will start vitamin-D supplementation.  Discussed OTC vitamin-D 5000 IU or a prescription of 50,000 IU once a week.  Patient to return in 6 months after 1st injection.  Questions solicited and answered, patient verbalized understanding and agreed to plan of care.  Questions solicited and answered, patient verbalized understanding and agreed to plan of care      Orders:  -     Ambulatory referral/consult to Endocrinology  -     Calcium; Future; Expected date: 04/17/2025  -     Vitamin D; Future; Expected date: 04/17/2025  -     Comprehensive Metabolic Panel; Future; Expected date: 10/24/2025  -     Vitamin D; Future; Expected date: 10/24/2025           RESULTS:  Recent Results (from the past 6 weeks)   POCT urine pregnancy    Collection Time: 03/24/25  7:47 AM   Result Value Ref Range    POC Preg Test, Ur Negative Negative     Acceptable Yes    POCT URINE DIPSTICK WITH MICROSCOPE, AUTOMATED    Collection Time: 04/02/25 10:45 AM   Result Value Ref Range    Color, UA Yellow     Spec Grav UA 1.025     pH, UA 5.5     WBC, UA neg     Nitrite, UA neg     Protein, POC neg     Glucose, UA neg     Ketones, UA neg     Urobilinogen, UA 0.2     Bilirubin, POC neg     Blood, UA neg    POCT URINE DIPSTICK WITH MICROSCOPE, AUTOMATED    Collection Time: 04/07/25  9:04 AM   Result Value Ref Range    Color, UA Yellow     Spec Grav UA 1.025     pH, UA 5.5     WBC, UA Negative     Nitrite, UA Negative     Protein, POC Negative     Glucose, UA Negative      Ketones, UA Negative     Urobilinogen, UA 0.2     Bilirubin, POC Negative     Blood, UA Negative    POCT URINE DIPSTICK WITH MICROSCOPE, AUTOMATED    Collection Time: 04/09/25 11:07 AM   Result Value Ref Range    Color, UA Yellow     Spec Grav UA 1.025     pH, UA 7.5     WBC, UA neg     Nitrite, UA neg     Protein, POC neg     Glucose, UA neg     Ketones, UA neg     Urobilinogen, UA 0.2     Bilirubin, POC neg     Blood, UA neg    POCT URINE DIPSTICK WITH MICROSCOPE, AUTOMATED    Collection Time: 04/16/25 11:24 AM   Result Value Ref Range    Color, UA Yellow     Spec Grav UA >=1.030     pH, UA 5.5     WBC, UA neg     Nitrite, UA neg     Protein, POC neg     Glucose, UA neg     Ketones, UA trace     Urobilinogen, UA 0.2     Bilirubin, POC neg     Blood, UA neg          Follow Up:  Follow up in about 7 months (around 11/17/2025).     45 minutes of total time spent on the encounter, which includes face to face time and non-face to face time preparing to see the patient (eg, review of tests), Obtaining and/or reviewing separately obtained history, Documenting clinical information in the electronic or other health record, Independently interpreting results (not separately reported) and communicating results to the patient/family/caregiver, or Care coordination (not separately reported).        Previous medical history/lab work/radiology reviewed and considered during medical management decisions.   Medication list reviewed and medication reconciliation performed.  Patient was provided  and care about his/her current diagnosis (es) and medications including risk/benefit and side effects/adverse events, over the counter medication uses/doses, home self-care and contact precautions,  and red flags and indications for when to seek immediate medical attention.   Patient was advised to continue compliance with current medication list and medical recommendations.  Patient dvised continued compliance with recommended  eating habits/ diets for medical conditions and exercise 150 minutes/ week (if possible) for medical condition (s).  Educational handouts and instructions on selected disease management in AVS (After Visit Summary).    All of the patient's questions were answered to patient's satisfaction.   The patient was receptive, expressed verbal understanding and agreement the above plan.          This note was created with the assistance of a voice recognition software or phone dictation. There may be transcription errors as a result of using this technology however minimal. Effort has been made to assure accuracy of transcription but any obvious errors or omissions should be clarified with the author of the document

## 2025-04-22 NOTE — PROCEDURES
CC:  Cystoscopy    HPI:  Remi Baptiste is a 54 y.o. female here for cystoscopy for overactive bladder  She was referred by the nurse practitioner for a cystoscopy to evaluate for overactive bladder.  She is on Gemtesa and is also getting bladder instillations.    Urinalysis:  Results for orders placed or performed in visit on 04/07/25   POCT URINE DIPSTICK WITH MICROSCOPE, AUTOMATED   Result Value Ref Range    Color, UA Yellow     Spec Grav UA 1.025     pH, UA 5.5     WBC, UA Negative     Nitrite, UA Negative     Protein, POC Negative     Glucose, UA Negative     Ketones, UA Negative     Urobilinogen, UA 0.2     Bilirubin, POC Negative     Blood, UA Negative        Microscopic Urinalysis:  WBC:   None per HPF     RBC:    None per HPF     Bacteria:    None per HPF     Squamous epithelial cells:  None per HPF  Crystals:   None    Imaging:  MRI - 15 February 2025:  Urinary tract is negative.    ROS:  All systems reviewed and are negative except as documented in HPI and/or Assessment and Plan.     Patient Active Problem List:     Problem List[1]     Past Medical History:  Past Medical History:   Diagnosis Date    Anxiety     Chest pain 04/29/2023    Depression     Diverticulitis     Endometrial cyst of ovary     Infertility, female     Endometriosis    Leg pain, posterior, right 09/24/2024    Osteoporosis     Right wrist injury     Serum calcium elevated 10/09/2024    Stroke         Past Surgical History:  Past Surgical History:   Procedure Laterality Date    BLADDER SUSPENSION      x2    COLPOSCOPY  11/23    Internal hemorrhoids    OVARIAN CYST REMOVAL      endometreosis    PELVIC LAPAROSCOPY      TONSILLECTOMY          Family History:  Family History   Problem Relation Name Age of Onset    Hypertension Mother Karen Mcgee     Arthritis Mother Karen Mcgee     COPD Mother Karen Mcgee     Cancer Mother Karen Mcgee     Hypertension Father Huasm baptiste     Colon cancer Maternal Grandfather  70    Colon cancer  Paternal Grandmother Brice Ivan        Social History:  Social History     Socioeconomic History    Marital status: Single    Number of children: 0   Occupational History    Occupation: [employer]    Occupation: Housekeeping   Tobacco Use    Smoking status: Never     Passive exposure: Never    Smokeless tobacco: Never   Substance and Sexual Activity    Alcohol use: Not Currently     Comment: occasionally    Drug use: Never    Sexual activity: Not Currently     Partners: Male     Birth control/protection: Post-menopausal     Social Drivers of Health     Financial Resource Strain: High Risk (8/5/2024)    Overall Financial Resource Strain (CARDIA)     Difficulty of Paying Living Expenses: Very hard   Food Insecurity: Food Insecurity Present (8/5/2024)    Hunger Vital Sign     Worried About Running Out of Food in the Last Year: Sometimes true     Ran Out of Food in the Last Year: Never true   Transportation Needs: No Transportation Needs (6/22/2022)    PRAPARE - Transportation     Lack of Transportation (Medical): No     Lack of Transportation (Non-Medical): No   Physical Activity: Insufficiently Active (8/5/2024)    Exercise Vital Sign     Days of Exercise per Week: 3 days     Minutes of Exercise per Session: 30 min   Stress: Stress Concern Present (8/5/2024)    [employer] - Occupational Stress Questionnaire     Feeling of Stress : Very much   Housing Stability: High Risk (8/5/2024)    Housing Stability Vital Sign     Unable to Pay for Housing in the Last Year: Yes        Allergies:  Review of patient's allergies indicates:   Allergen Reactions    Hydrocodone-guaifenesin Hives    Hydrocodone-acetaminophen Itching, Nausea And Vomiting and Rash    Penicillins Rash        Objective:  Vitals:    04/07/25 0848   BP: 104/66   Pulse: 82   Resp: 20   Temp: 98.4 °F (36.9 °C)     General:  Well developed, well nourished adult female in no acute distress  Abdomen: Soft, nontender, no  masses  Extremities:  No clubbing, cyanosis, or edema  Neurologic:  Grossly intact  Musculoskeletal:  Normal tone  :  External genitalia is normal without lesions.  Vagina is normal.      Cystoscopy:         - Urethral meatus:  No strictures        - Urethra:  Normal without strictures or lesions        - Bladder neck:  Normal        - Bladder:  No mucosal abnormalities        - Ureteral orifices:  On the trigone with clear efflux bilaterally    The patient tolerated the procedure well without complications.  She was given Cipro 500mg, one tablet in the clinic.   The urethra was anesthetized with 2% Lidocaine Jelly, Urojet.      Assessment:  1. Overactive bladder  - POCT URINE DIPSTICK WITH MICROSCOPE, AUTOMATED  - mirabegron (MYRBETRIQ) 50 mg Tb24; Take 1 tablet (50 mg total) by mouth once daily.  Dispense: 30 tablet; Refill: 11     Plan:  The cystoscopy was negative.    Return appointment:  She will return to see the nurse practitioner as scheduled.               [1]   Patient Active Problem List  Diagnosis    SAVI (generalized anxiety disorder)    PTSD (post-traumatic stress disorder)    Grief reaction    Degenerative disc disease, cervical    Cervical radiculopathy    Lumbar radiculopathy    Degeneration of intervertebral disc of lumbar region with discogenic back pain and lower extremity pain    Stroke aborted by administration of thrombolytic agent    Closed nondisplaced fracture of pisiform of right wrist with routine healing    GERD (gastroesophageal reflux disease)    Interstitial cystitis    Diverticulosis    Stone in kidney    Hepatic cyst    Localized osteoporosis without current pathological fracture    Overactive bladder    Chronic intractable headache    Insomnia    Medication overuse headache

## 2025-04-23 ENCOUNTER — CLINICAL SUPPORT (OUTPATIENT)
Dept: UROLOGY | Facility: CLINIC | Age: 55
End: 2025-04-23
Payer: MEDICAID

## 2025-04-23 DIAGNOSIS — N30.10 INTERSTITIAL CYSTITIS: Primary | ICD-10-CM

## 2025-04-23 LAB
BILIRUB SERPL-MCNC: NEGATIVE MG/DL
BLOOD URINE, POC: NEGATIVE
COLOR, POC UA: YELLOW
GLUCOSE UR QL STRIP: NEGATIVE
KETONES UR QL STRIP: NEGATIVE
LEUKOCYTE ESTERASE URINE, POC: NEGATIVE
NITRITE, POC UA: NEGATIVE
PH, POC UA: 6.5
PROTEIN, POC: NEGATIVE
SPECIFIC GRAVITY, POC UA: 1.01
UROBILINOGEN, POC UA: 0.2

## 2025-04-23 PROCEDURE — 51700 IRRIGATION OF BLADDER: CPT | Mod: PBBFAC | Performed by: NURSE PRACTITIONER

## 2025-04-23 PROCEDURE — 51700 IRRIGATION OF BLADDER: CPT | Mod: S$PBB,,, | Performed by: NURSE PRACTITIONER

## 2025-04-23 PROCEDURE — 81001 URINALYSIS AUTO W/SCOPE: CPT | Mod: PBBFAC

## 2025-04-23 RX ORDER — SODIUM BICARBONATE 42 MG/ML
5 INJECTION, SOLUTION INTRAVENOUS
Status: DISCONTINUED | OUTPATIENT
Start: 2025-04-23 | End: 2025-04-23

## 2025-04-23 RX ORDER — LIDOCAINE HYDROCHLORIDE 20 MG/ML
JELLY TOPICAL
Status: DISCONTINUED | OUTPATIENT
Start: 2025-04-23 | End: 2025-04-23

## 2025-04-23 RX ORDER — HEPARIN SODIUM 5000 [USP'U]/ML
40000 INJECTION, SOLUTION INTRAVENOUS; SUBCUTANEOUS
Status: DISCONTINUED | OUTPATIENT
Start: 2025-04-23 | End: 2025-04-23

## 2025-04-23 RX ORDER — DEXAMETHASONE SODIUM PHOSPHATE 10 MG/ML
100 INJECTION INTRAMUSCULAR; INTRAVENOUS
Status: DISCONTINUED | OUTPATIENT
Start: 2025-04-23 | End: 2025-04-23

## 2025-04-23 RX ORDER — LIDOCAINE HYDROCHLORIDE 20 MG/ML
5 INJECTION, SOLUTION INFILTRATION; PERINEURAL
Status: DISCONTINUED | OUTPATIENT
Start: 2025-04-23 | End: 2025-04-23

## 2025-04-23 NOTE — PROGRESS NOTES
Patient presented to clinic for pain and does not want to do bladder instillation , patient is complaining of pain and cramping to bladder , urine was collected for testing, instillation rescheduled for 04/25

## 2025-04-29 ENCOUNTER — OFFICE VISIT (OUTPATIENT)
Dept: ORTHOPEDICS | Facility: CLINIC | Age: 55
End: 2025-04-29
Payer: MEDICAID

## 2025-04-29 VITALS
SYSTOLIC BLOOD PRESSURE: 111 MMHG | BODY MASS INDEX: 21.53 KG/M2 | DIASTOLIC BLOOD PRESSURE: 72 MMHG | HEIGHT: 64 IN | HEART RATE: 68 BPM | WEIGHT: 126.13 LBS | TEMPERATURE: 98 F

## 2025-04-29 DIAGNOSIS — M19.019 AC JOINT ARTHROPATHY: Primary | ICD-10-CM

## 2025-04-29 PROCEDURE — 20606 DRAIN/INJ JOINT/BURSA W/US: CPT | Mod: PBBFAC | Performed by: STUDENT IN AN ORGANIZED HEALTH CARE EDUCATION/TRAINING PROGRAM

## 2025-04-29 PROCEDURE — 99213 OFFICE O/P EST LOW 20 MIN: CPT | Mod: PBBFAC | Performed by: STUDENT IN AN ORGANIZED HEALTH CARE EDUCATION/TRAINING PROGRAM

## 2025-04-29 RX ORDER — TRIAMCINOLONE ACETONIDE 40 MG/ML
40 INJECTION, SUSPENSION INTRA-ARTICULAR; INTRAMUSCULAR ONCE
Status: COMPLETED | OUTPATIENT
Start: 2025-04-29 | End: 2025-04-29

## 2025-04-29 RX ORDER — LIDOCAINE HYDROCHLORIDE 10 MG/ML
1 INJECTION, SOLUTION EPIDURAL; INFILTRATION; INTRACAUDAL; PERINEURAL
Status: COMPLETED | OUTPATIENT
Start: 2025-04-29 | End: 2025-04-29

## 2025-04-29 RX ADMIN — LIDOCAINE HYDROCHLORIDE 10 MG: 10 INJECTION, SOLUTION EPIDURAL; INFILTRATION; INTRACAUDAL; PERINEURAL at 10:04

## 2025-04-29 RX ADMIN — TRIAMCINOLONE ACETONIDE 40 MG: 40 INJECTION, SUSPENSION INTRA-ARTICULAR; INTRAMUSCULAR at 10:04

## 2025-04-29 NOTE — PROGRESS NOTES
"Subjective:    Patient ID: Remi Baptiste is a right handed 54 y.o. female  who presented to [facility] for follow up..      Chief Complaint: Pain of the Right Shoulder      History of Present Illness:  Remi Baptiste  is a 54-year-old female who presents to clinic today for follow up on her right shoulder pain that has been going on for the past few weeks.  I saw the patient back in November of 2024 for shoulder impingement and rotator cuff tendinopathy.  She has been undergoing formal physical therapy reports that she has had significant improvement in her range of motion and strengthening her shoulder.  She reports that even with formal physical therapy she had started developing tenderness over her AC joint over the past month or so.  She reports that it is hard to reach across her body and she rates her pain today as a 6/10 mildly improving with oral and topical medications.      Prior to her AC joint injection, she reports that she has a pain of a 6/10 after the injection, patient reports that the pain is down to about 2/10.    Shoulder Review of Systems:  Swelling?  no  Instability?  no  Clicking?  no  Limited ROM? no  Fever/Chills? no  Subluxation? no  Dislocation? no    Comorbid Conditions  DDD  GERD       Objective:      Physical Exam:    /72 (Patient Position: Sitting)   Pulse 68   Temp 97.6 °F (36.4 °C)   Ht 5' 4" (1.626 m)   Wt 57.2 kg (126 lb 1.7 oz)   BMI 21.65 kg/m²     Ortho/SPM Exam    Appearance:  Soft tissue swelling: Left: no Right: no  Effusion: Left:  Negative Right: Negative  Erythema: Left no Right: no  Ecchymosis: Left: no Right: no  Atrophy: Left: no Right: no  Scapular winging: Left: no Right: no    Palpation:  Shoulder Tenderness: Left: none  Right: AC joint    Range of motion:  Flexion (0-90): Left:  180 Right: 180  Abduction (0-180): Left:  180 Right: 180  External rotation (0-55): Left: 55 Right: 55  Internal rotation " (0-45): Left: 45 Right: 45    Strength:  Abduction: Left: 5/5 Pain: no Right: 5/5 Pain: no  External rotation: Left: 5/5 Pain: no Right: 5/5 Pain: no  Internal rotation: Left: 5/5 Pain: no Right: 5/5 Pain: no  Elbow flexion: Left: 5/5 Pain: no Right: 5/5 Pain: no  Elbow extension: Left: 5/5 Pain: no Right: 5/5 Pain: no    Special Tests:  Subacromial Impingement  Neer: Left: Negative Right: Negative  Araujo: Left: Negative Right: Negative    AC Joint Arthritis:  Cross-body abduction: Left: Negative Right: Positive    Rotator Cuff Tear   Drop arm test: Left: Negative Right: Negative  Hornblower: Left: Left: Not performed Right: Not performed   Belly press test: Left: Negative Right: Negative  Kael test (Empty can): Left: Negative Right: Negative    Biceps tendon/Labral tear  Speed's: Left: Negative Right: Negative  Yergason's: Left: Negative Right: Negative    Cervical   Spurling: Left: Negative Right: Negative    AIN/PIN/Ulnar nerve: Intact and symmetric    General appearance: NAD  Peripheral pulses: normal bilaterally   Reflexes: Left: Not performed Right: Not performed   Sensation: normal    Labs:  Last A1c: The patient doesn't have any registry metric data available     Imaging:   Previous images reviewed.  X-rays ordered and performed today: no      Assessment:        Encounter Diagnoses   Code Name Primary?    M19.019 AC joint arthropathy Yes        Plan:     Dx:  Left AC joint arthritis, moderate exacerbation, new problem  Treatment Plan:Discussed with patient diagnosis and treatment recommendations. Handout given. Recommend conservative treatment to include: avoidance of aggravating activity, significant modification of daily activities, hot/cold therapies, topical and oral medications, braces, HEP/PT/OT, and injections.   Patient with pain over her left AC joint worse with cross-body adduction.  We will give patient a corticosteroid injection into her AC joint at this time.  Prior to her injection, she  reports that her pain with a 6/10.  After the injection pain improved to a 2/10.  We will give patient a prescription for formal physical therapy.  She can continue using oral topical medication for pain relief.  We will see patient back in 1 month for re-evaluation.  We can consider doing a subacromial subdeltoid joint injection at a time if patient's pain has returned.  Imaging: radiological studies ordered and independently reviewed; discussed with patient; agree with radiologist interpretation.   Procedure: Discussed CSI as treatment options; discussed injections as treatment options; since conservative measures did not improve symptoms patient consented for CSI today.  Therapy: Physical Therapy  Medication: CONTINUE over-the-counter acetaminophen (Tylenol 1000 mg three times per day as needed)  CONTINUE Voltaren Gel 1% as prescribed. Please see your primary care physician for further refills.  RTC: 4 weeks.         Intermediate Joint Aspiration/Injection: R acromioclavicular    Date/Time: 4/29/2025 9:30 AM    Performed by: Channing Rea,   Authorized by: Channing Rea, DO    Consent Done?:  Yes (Written)  Indications:  Pain and diagnostic evaluation  Timeout: Prior to procedure the correct patient, procedure, and site was verified      Location:  Shoulder  Site:  R acromioclavicular  Ultrasonic Guidance for needle placement: Yes  Images are saved and documented.    Needle size:  25 G  Approach:  Superior  Patient tolerance:  Patient tolerated the procedure well with no immediate complications      Staff Attending: Val Clark MD    Risks:  Possible complications with the injection include bleeding, infection (.01%), tendon rupture, steroid flare, fat pad or soft tissue atrophy, skin depigmentation, allergic reaction to medications and vasovagal response. (steroid flare treatment is rest, ice, NSAIDs and resolves in 24-36 hours.)    Consent:  No absolute contraindications (cellulitis overlying joint,  infection, lack of informed consent, allergy to injection medication, AVN protein or egg allergy for sodium hyaluronate, or history of steroid flare) or relative contraindications (uncontrolled DM2 A1c>10, coagulopathy, INR > 3.5, previous joint replacement or history of AVN).        Description:  The patient was prepped in normal sterile fashion use of chlorhexidine scrub and the appropriate and anatomic landmarks were identified with ultrasound as image guidance. The patient was evaluated with a [employer] ultrasound machine using a 10 MHz linear probe, following a standard protocol. Ultrasound imaging confirmed placement of the needle in the correct position, with reference to surrounding anatomic structures. Dynamic visualization of the needle was continuous throughout the procedure(s) and maintained good position. Care was taken to ensure there was unrestricted flow of syringe contents (listed below) into the site of injection. The ultrasound image for needle placement was captured and saved in the patient's medical record.  .       Indication for use of Ultrasound Guidance: The indication for the use of ultrasound was to ensure accurate localization of needle for aspiration and/or injection, and minimize risk of damage to surrounding structures. Fiorella EL, Gilbert S, Justo LJ, Anil ALVARADO. Improving injection accuracy of the elbow, knee, and shoulder: does injection site and imaging make a difference? A systematic review. Am J Sports Med. 2011 Mar;39(3):656-62. doi: 10.1177/0996775428947696. Epub 2011 Jan 21. PMID: 79310134.    Body mass index is 21.65 kg/m².    Contents of syringe included: 1mL of 1% lidocaine with 40mg of Kenalog (1mL of 40mg/mL)    Post Procedure: Patient alert, and moving all extremities. ROM improved, pain decreased.  Good peripheral pulses, no signs of vascular compromise and range of motion intact.  Aftercare instructions were given to patient at time of discharge.  Relative rest for 3  days-avoiding excess activity.  Place ice on the area for 15 minutes every 4-6 hours. Patient may take Tylenol a 1000 mg b.i.d. or ibuprofen 600 mg t.i.d. for the next 3-4 days if not on medication already and safe to take pending co-morbidities.  Protect the area for the next 1-8 hours if anesthetic was used.  Avoid excessive activity for the next 3-4 weeks.  ER precautions given for fever, severe joint pain or allergic reaction or other new symptoms related to the joint injection.        Channing Rea D.O.  Sports Medicine Fellow

## 2025-05-09 ENCOUNTER — CLINICAL SUPPORT (OUTPATIENT)
Dept: UROLOGY | Facility: CLINIC | Age: 55
End: 2025-05-09
Payer: MEDICAID

## 2025-05-09 DIAGNOSIS — N30.10 INTERSTITIAL CYSTITIS: Primary | ICD-10-CM

## 2025-05-09 LAB
BILIRUB SERPL-MCNC: NEGATIVE MG/DL
BLOOD URINE, POC: NEGATIVE
COLOR, POC UA: YELLOW
GLUCOSE UR QL STRIP: NEGATIVE
KETONES UR QL STRIP: NEGATIVE
LEUKOCYTE ESTERASE URINE, POC: NEGATIVE
NITRITE, POC UA: NEGATIVE
PH, POC UA: 7.5
PROTEIN, POC: NEGATIVE
SPECIFIC GRAVITY, POC UA: 1.02
UROBILINOGEN, POC UA: 0.2

## 2025-05-09 PROCEDURE — 99211 OFF/OP EST MAY X REQ PHY/QHP: CPT | Mod: PBBFAC

## 2025-05-09 RX ORDER — DEXAMETHASONE SODIUM PHOSPHATE 10 MG/ML
100 INJECTION INTRAMUSCULAR; INTRAVENOUS
Status: COMPLETED | OUTPATIENT
Start: 2025-05-09 | End: 2025-05-09

## 2025-05-09 RX ORDER — LIDOCAINE HYDROCHLORIDE 20 MG/ML
5 INJECTION, SOLUTION INFILTRATION; PERINEURAL
Status: COMPLETED | OUTPATIENT
Start: 2025-05-09 | End: 2025-05-09

## 2025-05-09 RX ORDER — SODIUM BICARBONATE 42 MG/ML
5 INJECTION, SOLUTION INTRAVENOUS ONCE
Status: COMPLETED | OUTPATIENT
Start: 2025-05-09 | End: 2025-05-09

## 2025-05-09 RX ORDER — HEPARIN SODIUM 5000 [USP'U]/ML
40000 INJECTION, SOLUTION INTRAVENOUS; SUBCUTANEOUS ONCE
Status: COMPLETED | OUTPATIENT
Start: 2025-05-09 | End: 2025-05-09

## 2025-05-09 RX ADMIN — DEXAMETHASONE SODIUM PHOSPHATE 100 MG: 10 INJECTION INTRAMUSCULAR; INTRAVENOUS at 09:05

## 2025-05-09 RX ADMIN — LIDOCAINE HYDROCHLORIDE 5 ML: 20 INJECTION, SOLUTION INFILTRATION; PERINEURAL at 09:05

## 2025-05-09 RX ADMIN — SODIUM BICARBONATE 5 MEQ: 42 INJECTION, SOLUTION INTRAVENOUS at 09:05

## 2025-05-09 RX ADMIN — HEPARIN SODIUM 40000 UNITS: 5000 INJECTION, SOLUTION INTRAVENOUS; SUBCUTANEOUS at 09:05

## 2025-05-09 NOTE — PROGRESS NOTES
"Bladder instillation #7 held for two hrs and "symptoms seem to be helping "stated by patient . Bladder instillation #9 administered with sterile technique via [address] , small amount of clear odorless urine drained from bladder. Procedure tolerated well RTC for weekly instillation , got onfirmation to continue instillations until procedure next mnth by Dr. Moctezuma     "

## 2025-05-13 ENCOUNTER — LAB VISIT (OUTPATIENT)
Dept: LAB | Facility: HOSPITAL | Age: 55
End: 2025-05-13
Attending: NURSE PRACTITIONER
Payer: MEDICAID

## 2025-05-13 DIAGNOSIS — M81.6 LOCALIZED OSTEOPOROSIS WITHOUT CURRENT PATHOLOGICAL FRACTURE: ICD-10-CM

## 2025-05-13 LAB
25(OH)D3+25(OH)D2 SERPL-MCNC: 42 NG/ML (ref 30–80)
CALCIUM SERPL-MCNC: 9.5 MG/DL (ref 8.4–10.2)

## 2025-05-13 PROCEDURE — 82310 ASSAY OF CALCIUM: CPT

## 2025-05-13 PROCEDURE — 82306 VITAMIN D 25 HYDROXY: CPT

## 2025-05-13 PROCEDURE — 36415 COLL VENOUS BLD VENIPUNCTURE: CPT

## 2025-05-14 ENCOUNTER — RESULTS FOLLOW-UP (OUTPATIENT)
Dept: ENDOCRINOLOGY | Facility: CLINIC | Age: 55
End: 2025-05-14

## 2025-05-14 ENCOUNTER — CLINICAL SUPPORT (OUTPATIENT)
Dept: ENDOCRINOLOGY | Facility: CLINIC | Age: 55
End: 2025-05-14
Payer: MEDICAID

## 2025-05-14 DIAGNOSIS — M81.6 LOCALIZED OSTEOPOROSIS WITHOUT CURRENT PATHOLOGICAL FRACTURE: Primary | ICD-10-CM

## 2025-05-14 PROCEDURE — 99211 OFF/OP EST MAY X REQ PHY/QHP: CPT | Mod: PBBFAC

## 2025-05-14 PROCEDURE — 96372 THER/PROPH/DIAG INJ SC/IM: CPT | Mod: PBBFAC

## 2025-05-14 RX ADMIN — DENOSUMAB 60 MG: 60 INJECTION SUBCUTANEOUS at 02:05

## 2025-05-14 NOTE — PROGRESS NOTES
Spoke to patient in clinic.  Discussed lab results.  Patient cleared to receive Prolia.  Patient verbalized understanding

## 2025-05-23 ENCOUNTER — CLINICAL SUPPORT (OUTPATIENT)
Dept: UROLOGY | Facility: CLINIC | Age: 55
End: 2025-05-23
Payer: MEDICAID

## 2025-05-23 DIAGNOSIS — N30.10 INTERSTITIAL CYSTITIS: Primary | ICD-10-CM

## 2025-05-23 RX ORDER — HEPARIN SODIUM 5000 [USP'U]/ML
40000 INJECTION, SOLUTION INTRAVENOUS; SUBCUTANEOUS
Status: COMPLETED | OUTPATIENT
Start: 2025-05-23 | End: 2025-05-23

## 2025-05-23 RX ORDER — LIDOCAINE HYDROCHLORIDE 20 MG/ML
JELLY TOPICAL
Status: DISCONTINUED | OUTPATIENT
Start: 2025-05-23 | End: 2025-05-23

## 2025-05-23 RX ORDER — DEXAMETHASONE SODIUM PHOSPHATE 10 MG/ML
100 INJECTION INTRAMUSCULAR; INTRAVENOUS
Status: COMPLETED | OUTPATIENT
Start: 2025-05-23 | End: 2025-05-23

## 2025-05-23 RX ORDER — LIDOCAINE HYDROCHLORIDE 20 MG/ML
5 INJECTION, SOLUTION INFILTRATION; PERINEURAL
Status: COMPLETED | OUTPATIENT
Start: 2025-05-23 | End: 2025-05-23

## 2025-05-23 RX ORDER — SODIUM BICARBONATE 42 MG/ML
5 INJECTION, SOLUTION INTRAVENOUS
Status: COMPLETED | OUTPATIENT
Start: 2025-05-23 | End: 2025-05-23

## 2025-05-23 RX ADMIN — DEXAMETHASONE SODIUM PHOSPHATE 100 MG: 10 INJECTION INTRAMUSCULAR; INTRAVENOUS at 09:05

## 2025-05-23 RX ADMIN — LIDOCAINE HYDROCHLORIDE 5 ML: 20 INJECTION, SOLUTION INFILTRATION; PERINEURAL at 09:05

## 2025-05-23 RX ADMIN — SODIUM BICARBONATE 5 MEQ: 42 INJECTION, SOLUTION INTRAVENOUS at 09:05

## 2025-05-23 RX ADMIN — HEPARIN SODIUM 40000 UNITS: 5000 INJECTION, SOLUTION INTRAVENOUS; SUBCUTANEOUS at 09:05

## 2025-05-23 NOTE — PROGRESS NOTES
Bladder Instillation #10 today. Urethra prepped with betadine and 12 FR in and out catheter inserted using aseptic technique obtained scant amount of clear yellow urine. Bladder medication instilled and tolerated well. Will return in one week.

## 2025-05-27 ENCOUNTER — OFFICE VISIT (OUTPATIENT)
Dept: ORTHOPEDICS | Facility: CLINIC | Age: 55
End: 2025-05-27
Payer: MEDICAID

## 2025-05-27 ENCOUNTER — PROCEDURE VISIT (OUTPATIENT)
Dept: OTOLARYNGOLOGY | Facility: CLINIC | Age: 55
End: 2025-05-27
Payer: MEDICAID

## 2025-05-27 VITALS
TEMPERATURE: 98 F | OXYGEN SATURATION: 98 % | DIASTOLIC BLOOD PRESSURE: 73 MMHG | HEART RATE: 65 BPM | WEIGHT: 126 LBS | SYSTOLIC BLOOD PRESSURE: 110 MMHG | BODY MASS INDEX: 21.51 KG/M2 | HEIGHT: 64 IN | RESPIRATION RATE: 20 BRPM

## 2025-05-27 VITALS
BODY MASS INDEX: 21.53 KG/M2 | DIASTOLIC BLOOD PRESSURE: 69 MMHG | HEART RATE: 66 BPM | SYSTOLIC BLOOD PRESSURE: 114 MMHG | WEIGHT: 126.13 LBS | HEIGHT: 64 IN

## 2025-05-27 DIAGNOSIS — J34.3 NASAL TURBINATE HYPERTROPHY: ICD-10-CM

## 2025-05-27 DIAGNOSIS — J34.89 NASAL OBSTRUCTION: Primary | ICD-10-CM

## 2025-05-27 DIAGNOSIS — R09.81 NASAL CONGESTION: ICD-10-CM

## 2025-05-27 DIAGNOSIS — M70.61 GREATER TROCHANTERIC BURSITIS OF BOTH HIPS: Primary | ICD-10-CM

## 2025-05-27 DIAGNOSIS — J34.2 NASAL SEPTAL DEVIATION: ICD-10-CM

## 2025-05-27 DIAGNOSIS — M70.62 GREATER TROCHANTERIC BURSITIS OF BOTH HIPS: Primary | ICD-10-CM

## 2025-05-27 DIAGNOSIS — J34.89 NASAL OBSTRUCTION: ICD-10-CM

## 2025-05-27 DIAGNOSIS — J34.829 NASAL VALVE COLLAPSE: Primary | ICD-10-CM

## 2025-05-27 PROCEDURE — 99214 OFFICE O/P EST MOD 30 MIN: CPT | Mod: PBBFAC | Performed by: STUDENT IN AN ORGANIZED HEALTH CARE EDUCATION/TRAINING PROGRAM

## 2025-05-27 RX ORDER — DIAZEPAM 5 MG/1
TABLET ORAL
Qty: 2 TABLET | Refills: 0 | Status: SHIPPED | OUTPATIENT
Start: 2025-05-27

## 2025-05-27 NOTE — PROCEDURES
Otolaryngology - Head and Neck Surgery  Procedure Note    Patient Name: Remi Baptiste    Patient : 1970  Procedure Date: 2025     PRE-OP DIAGNOSIS: check all that apply  ICD-10 Diagnosis Code(s)   [x] R09.81 (nasal congestion)   [x] J34.82 - Nasal valve collapse   [] J34.820 - Internal nasal valve collapse   [] J34.8200 - Internal nasal valve collapse, unspecified   [] J34.8201 - Internal nasal valve collapse, static   [] J34.8202 - Internal nasal valve collapse, dynamic   [] J34.829 - Nasal valve collapse, unspecified   [x] Other Dx: __inferior turbinate hypertrophy_________     PROCEDURE(s) PERFORMED: [INSERT appropriate procedure code and description]   [x] CPT® 25834 - (Repair of nasal valve collapse with low energy, temperature-controlled (i.e., radiofrequency) subcutaneous/submucosal remodeling)   [] CPT® 58796 - Destruction of intranasal tissue by radiofrequency, internal approach   [x]CPT® 12695 - Ablation, soft tissue of inferior turbinates, unilateral or bilateral, by radiofrequency; superficial or CPT 65333 - Ablation, soft tissue of inferior turbinates, unilateral or bilateral, by radiofrequency; submucosal        After informed consent was obtained, a time out was performed. Then pledgets impregnated with 4% lidocaine were applied to bilateral nasal cavities. Once sufficiently anesthetized, 1cc of 1% lidocaine/1:100,000 Epinephrine was injected bilaterally at the caudal border of the upper lateral cartilage and along the medial edge of the inferior turbinate.     Once sufficiently anesthetized, the temperature-controlled radiofrequency device was introduced to ablate the inferior turbinate and used to destroy lesions at the caudal edge of the upper lateral cartilage and nasal septal region on the left side. This process was repeated on the right side. The patient tolerated the procedure without difficulty, and no complications were encountered. Instructions were given to the patient for  discharge.    Plan:  - Nasal saline spray TID  - Aquaphor to bilateral nares BID  - RTC 1-2 weeks    Juanita Armendariz MD  [facility] - Head & Neck Surgery  5/27/2025

## 2025-05-27 NOTE — PROGRESS NOTES
"Subjective:    Patient ID: Remi Baptiste is a 55 y.o. female  who presented to [facility] for follow up..      Chief Complaint: Pain of the Right Hip and Pain of the Left Hip      History of Present Illness:  HPI    Remi Baptiste  is a 55-year-old female who presents to the clinic today for evaluation of bilateral lateral hip pain that has been going on for the past few months.  Since her last visit, she reports that symptoms have improve with rest and time.  She currently reports that her pain today is a 0/10.  She does report that the pain comes and goes and is located on her lateral hip.  With the pain is present she is unable to lay on the painful side at night.  She denied any radiation of symptoms down her leg.  She denies any pain in her anterior groin.  She has been using topical medications with moderate improvement in her symptoms.  She denied any formal physical therapy for hips at this time.    Hip Review of Systems:  Swelling?  no  Instability?  no  Mechanical sx?  no  Consistent clicking/popping? no  <30 min AM stiffness? no  Limited ROM? no  Fever/Chills? no    Current Choice of Exercise:  Walking    ROS       Objective:      Physical Exam:    /69   Pulse 66   Ht 5' 4" (1.626 m)   Wt 57.2 kg (126 lb 1.7 oz)   BMI 21.65 kg/m²     Ortho/SPM Exam    Appearance:  Normal gait/station  FWB  Alignment: Left: normal Right: normal   Soft tissue swelling: Left: no Right: no  Effusion: Left:  Negative Right: Negative  Erythema: Left no Right: no  Ecchymosis: Left: no Right: no  Atrophy: Left: no Right: no    Palpation:  Hip/Back Tenderness: Left: Greater Trochanter Right: Greater Trochanter     Range of motion:  HIP  Flexion (135): Left: 135 Right: 135  Extension (30): Left: 30 Right: 30  Abduction (45-50): Left: 45 Right: 45  Adduction (20-30): Left: 20 Right: 20  Internal Rotation (35): Left: 35  Right: 35  External Rotation (45):Left: 45 Right: " 45    Strength:  Extension: Left 5/5  Pain: no     Right 5/5 Pain: no  Flexion: Left 5/5 Pain: no Right   5/5 Pain: no  Abduction: Left 5/5  Pain: no     Right 5/5 Pain: no  Adduction: Left 5/5  Pain: no     Right 5/5 Pain: no  External Rotation: Left 5/5  Pain: no     Right 5/5 Pain: no  Internal Rotation: Left 5/5  Pain: no     Right 5/5 Pain: no    Special Tests:  Log Roll: Left: Negative Right: Negative  FADIR: Left: Negative Right: Positive  ERIS: Left: Negative Right: Negative  Trendelenburg test: Left: Negative Right: Negative  Asa: Left: Negative Right: Negative   Straight Leg Raise Left: Negative Right: Negative    General appearance: NAD  Peripheral pulses: normal bilaterally   Reflexes: Left: normal Right normal   Sensation: normal    Labs:  Last A1c: 5.1     Imaging:   Previous images reviewed.  X-rays ordered and performed today: no         Assessment:        Encounter Diagnosis   Name Primary?    Greater trochanteric bursitis of both hips Yes        Plan:     Dx:  Bilateral greater trochanteric bursitis, new problem  Treatment Plan: Discussed with patient diagnosis and treatment recommendations. Handout given. Recommend conservative treatment to include: avoidance of aggravating activity, significant modification of daily activities, hot/cold therapies, topical and oral medications, braces, HEP/PT/OT, and injections.   Patient with symptoms of greater trochanteric bursitis with the left worse than right.  She reports her pain is minimal today.  Patient was not able to stay for x-rays due to commitments of another appointment.  We will send patient to formal physical therapy to strengthen her upper glutes.  She can continue using topical medications for pain relief.  We will see patient back in 6 weeks for re-evaluation.  If patient's symptoms do not improve or worsen, we can consider doing a greater trochanteric bursa injection at that time.    Imaging: none.   Procedure: Discussed CSI/VSI as  treatment options; discussed injections as treatment options in future if conservative measures do not improve symptoms.  Activity: Activity as tolerated; HEP to include aerobic conditioning and strength training with non-painful activity. ROM/STG exercises. Proper footware; assistive devises to avoid limping.   Therapy: Physical Therapy  Medication: CONTINUE over-the-counter acetaminophen (Tylenol 1000 mg three times per day as needed)  CONTINUE Voltaren Gel 1% as prescribed  CONTINUE over-the-counter NSAIDs (ibuprofen 200mg three tablets three times a day as needed). Please see your primary care physician for further refills.  RTC: 6 Weeks with XR..             Channing Rea D.O.  Sports Medicine Fellow

## 2025-05-29 NOTE — PROGRESS NOTES
I have reviewed and agree with the resident's findings, including all diagnostic interpretations and plans as written.     Vini Bryant M.D.

## 2025-05-30 ENCOUNTER — CLINICAL SUPPORT (OUTPATIENT)
Dept: UROLOGY | Facility: CLINIC | Age: 55
End: 2025-05-30
Payer: MEDICAID

## 2025-05-30 DIAGNOSIS — N30.10 INTERSTITIAL CYSTITIS: Primary | ICD-10-CM

## 2025-05-30 LAB
BILIRUB SERPL-MCNC: NEGATIVE MG/DL
BLOOD URINE, POC: NEGATIVE
COLOR, POC UA: YELLOW
GLUCOSE UR QL STRIP: NEGATIVE
KETONES UR QL STRIP: NEGATIVE
LEUKOCYTE ESTERASE URINE, POC: NEGATIVE
NITRITE, POC UA: NEGATIVE
PH, POC UA: 8.5
PROTEIN, POC: NEGATIVE
SPECIFIC GRAVITY, POC UA: 1.02
UROBILINOGEN, POC UA: 0.2

## 2025-05-30 PROCEDURE — 99212 OFFICE O/P EST SF 10 MIN: CPT | Mod: PBBFAC

## 2025-05-30 RX ORDER — HEPARIN SODIUM 5000 [USP'U]/ML
40000 INJECTION, SOLUTION INTRAVENOUS; SUBCUTANEOUS
Status: COMPLETED | OUTPATIENT
Start: 2025-05-30 | End: 2025-05-30

## 2025-05-30 RX ORDER — DEXAMETHASONE SODIUM PHOSPHATE 10 MG/ML
100 INJECTION INTRAMUSCULAR; INTRAVENOUS
Status: COMPLETED | OUTPATIENT
Start: 2025-05-30 | End: 2025-05-30

## 2025-05-30 RX ORDER — SODIUM BICARBONATE 42 MG/ML
5 INJECTION, SOLUTION INTRAVENOUS
Status: COMPLETED | OUTPATIENT
Start: 2025-05-30 | End: 2025-05-30

## 2025-05-30 RX ORDER — LIDOCAINE HYDROCHLORIDE 20 MG/ML
5 INJECTION, SOLUTION INFILTRATION; PERINEURAL
Status: COMPLETED | OUTPATIENT
Start: 2025-05-30 | End: 2025-05-30

## 2025-05-30 RX ADMIN — SODIUM BICARBONATE 5 MEQ: 42 INJECTION, SOLUTION INTRAVENOUS at 12:05

## 2025-05-30 RX ADMIN — HEPARIN SODIUM 40000 UNITS: 5000 INJECTION, SOLUTION INTRAVENOUS; SUBCUTANEOUS at 12:05

## 2025-05-30 RX ADMIN — DEXAMETHASONE SODIUM PHOSPHATE 100 MG: 10 INJECTION INTRAMUSCULAR; INTRAVENOUS at 12:05

## 2025-05-30 RX ADMIN — LIDOCAINE HYDROCHLORIDE 5 ML: 20 INJECTION, SOLUTION INFILTRATION; PERINEURAL at 12:05

## 2025-05-30 NOTE — PATIENT INSTRUCTIONS
Bladder Instillation   Why is this procedure done?   Bladder instillation therapy is when a liquid drug is used to coat the inside of your bladder. Your doctor may order this procedure to:  Treat bladder pain or interstitial cystitis  Treat recurrent bladder infections  Prevent bladder stones  What happens before the procedure?   Your doctor will ask about your health history. Talk to the doctor about:  All the drugs you are taking. Be sure to include all prescription, over the counter, vitamins, and herbal supplements. Bring a list of drugs you take with you. Tell the doctor if you have any drug allergy.  If you take a diuretic, ask the doctor if you should take this drug the morning of your treatment.  If you think you may have a bladder infection, if you have had any fevers, or have had any recent blood in your urine  Any surgeries you have had on your bladder, kidneys, urethra, or ureters  If you need to limit fluids or avoid caffeine before the procedure  The doctor may order tests and check your urine for blood or infection.  What happens during the procedure?   The staff will place a thin, flexible tube through your urethra into your bladder. This is a catheter. The catheter will drain any urine from your bladder.  The doctor will place a mixture of fluid and drugs through the catheter into your bladder. The doctor may heat the fluid before giving it to you.  You will hold the fluid and drugs inside your bladder for a time. Talk with your doctor to find out how long you need to hold the drugs in your bladder to help them work. This could be anywhere from a few minutes to a few hours. Ask your doctor if you need to change positions while the fluid is in your bladder.  What happens after the procedure?   You may have to stay in the doctor's office or hospital for a time while the drugs are in your bladder.  The doctor may take the catheter out right away or it may be left in place to help drain out the fluid  and then be taken out afterwards.  If the doctor takes the catheter out, you will sit on the toilet and pass urine so the urine does not splash when you are allowed to get rid of the fluids and drugs from your bladder.  What care is needed at home?   Ask your doctor what you need to do when you go home. Make sure you ask questions if you do not understand everything the doctor says.  Your doctor may ask you to drink extra fluids after your procedure.  What follow-up care is needed?   Your doctor may ask you to come back to the office to check on your progress. Be sure to keep these visits.  You may need to have more bladder instillations. Talk to your doctor about how many treatments you may need.  What problems could happen?   Bladder or belly pain  Passing urine often  Urinary tract or prostate infection  Severe infection throughout the body  Blood in the urine  Burning feeling in bladder  Feeling tired  Fever  Where can I learn more?   American Cancer Society  https://www.cancer.org/cancer/bladder-cancer/treating/intravesical-therapy.html   [place] [employer]  https://www.baus.org.uk/_userfiles/pages/files/Patients/Leaflets/Painful%20bladder.pdf   Last Reviewed Date   2021-03-18  Consumer Information Use and Disclaimer   This information is not specific medical advice and does not replace information you receive from your health care provider. This is only a brief summary of general information. It does NOT include all information about conditions, illnesses, injuries, tests, procedures, treatments, therapies, discharge instructions or life-style choices that may apply to you. You must talk with your health care provider for complete information about your health and treatment options. This information should not be used to decide whether or not to accept your health care providers advice, instructions or recommendations. Only your health care provider has the knowledge and training to  provide advice that is right for you.  Copyright   Copyright © 2021 [employer] and its affiliates and/or licensors. All rights reserved.

## 2025-05-30 NOTE — PROGRESS NOTES
Pt presented today for maintenance bladder instillation. Verified pt allergies. 12 F catheter inserted using aseptic technique. Small amount of yellow urine noted. Bladder medication instilled, tolerated well. Pt already have appointment scheduled for next week with Dr. Moctezuma.

## 2025-06-10 ENCOUNTER — OFFICE VISIT (OUTPATIENT)
Dept: OTOLARYNGOLOGY | Facility: CLINIC | Age: 55
End: 2025-06-10
Payer: MEDICAID

## 2025-06-10 VITALS — TEMPERATURE: 98 F | SYSTOLIC BLOOD PRESSURE: 115 MMHG | DIASTOLIC BLOOD PRESSURE: 72 MMHG | HEART RATE: 78 BPM

## 2025-06-10 DIAGNOSIS — J34.89 NASAL OBSTRUCTION: Primary | ICD-10-CM

## 2025-06-10 DIAGNOSIS — J34.2 NASAL SEPTAL DEVIATION: ICD-10-CM

## 2025-06-10 DIAGNOSIS — J34.3 NASAL TURBINATE HYPERTROPHY: ICD-10-CM

## 2025-06-10 PROCEDURE — 99213 OFFICE O/P EST LOW 20 MIN: CPT | Mod: PBBFAC

## 2025-06-10 NOTE — PROGRESS NOTES
[facility]  Otolaryngology Clinic Note    Remi Baptiste  YOB: 1970    Chief Complaint:   Chief Complaint   Patient presents with    Post-op Evaluation        HPI: 06/10/2025: 55 y.o. female presents with c/o nasal congestion/obstruction which is most pronounced at night. Occasional rhinitis and PND. Denies recurrent sinus infections or significant allergy symptoms. States she has sustained a few episodes of nasal trauma and is concerned she has a deviated septum. She has been using flonase and zyrtec daily over the past month without much benefit. States she mouth breathes at night and sometimes awakens coughing/choking. PCP ordered a sleep study but she has not yet been contacted.    7/31/24:  Here for follow-up.  Patient reports that she got COVID about 6 weeks ago and since then she has been having increased globus sensation.  Patient does have reflux and takes omeprazole.  She still feels very congested however she is not having much nasal drainage.  We discussed using nasal sprays.  She is interested in surgery.  We were discussing septoplasty and turbinate reduction however she does not want nasal splints in her nose as that may suffocate her as she has panic attacks.    10/31/24:  Patient presents today for follow-up.  She reports that she has been doing extensive research into inferior turbinate reduction and septoplasty.  She states that she has severe anxiety regarding postoperative swelling/crusting/splints.  She also states that she has anxiety regarding any sort of sharp instrumentation in her nose.  She states she has been using her Flonase and Astelin as needed and does not notice much of a difference.  She would be interested in something less invasive.    12/19/24: No changes. Eager for Vivair/Rhinair    02/27/2025:  Returns in follow-up.  Bilateral nasal obstruction has been stable.  No significant rhinorrhea.  She continues to use nasal regimen of Flonase,  azelastine, and nasal irrigations.    4/2/25:  Telehealth visit. Discussed rescheduling surgery with patient today. She did receive cardiac clearance however the records were not sent over. She is very nervous about surgery. She does endorse nasal obstruction but is not able to localize it to one side. She does not note any relieving or exacerbating factors. She has been using saline spray which helps for a few minutes. Did not get much benefit with Flonase or Astelin. She also endorses sensation of something in her throat.    6/10/25:  Here for follow up for here nasal obstruction/congestion now s/p Vivaer on 5/27/25.  She still has some difficulty breathing through the nose that is fluctuating in nature.  She is using the nasal saline spray, but has not restarted her other nasal sprays yet.    ROS:   10-point review of systems negative except per HPI      Review of patient's allergies indicates:   Allergen Reactions    Hydrocodone-guaifenesin Hives    Nsaids (non-steroidal anti-inflammatory drug) Other (See Comments)    Pseudoephedrine hcl Other (See Comments)    Hydrocodone-acetaminophen Itching, Nausea And Vomiting and Rash    Penicillins Rash       Past Medical History:   Diagnosis Date    Anxiety     Chest pain 04/29/2023    Depression     Diverticulitis     Endometrial cyst of ovary     Infertility, female     Endometriosis    Leg pain, posterior, right 09/24/2024    Osteoporosis     Right wrist injury     Serum calcium elevated 10/09/2024    Stroke        Past Surgical History:   Procedure Laterality Date    BLADDER SUSPENSION      x2    COLPOSCOPY  11/23    Internal hemorrhoids    OVARIAN CYST REMOVAL      endometreosis    PELVIC LAPAROSCOPY      TONSILLECTOMY         Social History     Socioeconomic History    Marital status: Single    Number of children: 0   Occupational History    Occupation: [employer]    Occupation: Housekeeping   Tobacco Use    Smoking status: Never     Passive exposure: Never     Smokeless tobacco: Never   Substance and Sexual Activity    Alcohol use: Not Currently     Comment: Occasionally    Drug use: Never    Sexual activity: Not Currently     Partners: Male     Birth control/protection: Post-menopausal     Social Drivers of Health     Financial Resource Strain: High Risk (8/5/2024)    Overall Financial Resource Strain (CARDIA)     Difficulty of Paying Living Expenses: Very hard   Food Insecurity: Food Insecurity Present (8/5/2024)    Hunger Vital Sign     Worried About Running Out of Food in the Last Year: Sometimes true     Ran Out of Food in the Last Year: Never true   Transportation Needs: No Transportation Needs (6/22/2022)    PRAPARE - Transportation     Lack of Transportation (Medical): No     Lack of Transportation (Non-Medical): No   Physical Activity: Insufficiently Active (8/5/2024)    Exercise Vital Sign     Days of Exercise per Week: 3 days     Minutes of Exercise per Session: 30 min   Stress: Stress Concern Present (8/5/2024)    [employer] - Occupational Stress Questionnaire     Feeling of Stress : Very much   Housing Stability: High Risk (8/5/2024)    Housing Stability Vital Sign     Unable to Pay for Housing in the Last Year: Yes       Family History   Problem Relation Name Age of Onset    Hypertension Mother Karen Mcgee     Arthritis Mother Karen Mcgee     COPD Mother Karen Mcgee     Cancer Mother Karen Mcgee     Hypertension Father Husam jenkins     Colon cancer Maternal Grandfather  70    Colon cancer Paternal Grandmother Brice jenkins 70       Outpatient Encounter Medications as of 6/10/2025   Medication Sig Dispense Refill    cetirizine (ZYRTEC) 10 MG tablet Take 1 tablet by mouth every morning.      denosumab (PROLIA) 60 mg/mL Syrg Inject 1 mL (60 mg total) into the skin every 6 (six) months. 2 mL 0    diazePAM (VALIUM) 5 MG tablet Take one tablet 30 minutes before procedure. Take second tablet before procedure if necessary.  2 tablet 0    estradioL (ESTRACE) 0.01 % (0.1 mg/gram) vaginal cream Place 1 g vaginally twice a week. 42.5 g 3    famotidine (PEPCID) 40 MG tablet Take 1 tablet (40 mg total) by mouth once daily. 30 tablet 11    fish,bora,flax oils-om3,6,9no1 (OMEGA 3-6-9) 1,200 mg Cap Take 1 capsule by mouth Daily.      fluticasone propionate (FLONASE) 50 mcg/actuation nasal spray 1 spray (50 mcg total) by Each Nostril route 2 (two) times a day. 16 g 11    ondansetron (ZOFRAN) 4 MG tablet Take 1 tablet (4 mg total) by mouth every 6 (six) hours as needed for Nausea. 12 tablet 0    pantoprazole (PROTONIX) 40 MG tablet Take 40 mg by mouth.      vitamin E 100 UNIT capsule Take 100 Units by mouth once daily.      azelastine (ASTELIN) 137 mcg (0.1 %) nasal spray 1 spray (137 mcg total) by Nasal route 2 (two) times daily. (Patient not taking: Reported on 6/10/2025) 30 mL 5    mirabegron (MYRBETRIQ) 50 mg Tb24 Take 1 tablet (50 mg total) by mouth once daily. (Patient not taking: Reported on 4/29/2025) 30 tablet 11    propranoloL (INDERAL) 10 MG tablet Take 10 mg by mouth 2 (two) times daily. (Patient not taking: Reported on 6/10/2025)       Facility-Administered Encounter Medications as of 6/10/2025   Medication Dose Route Frequency Provider Last Rate Last Admin    dexAMETHasone injection 100 mg  100 mg Perineural 1 time in Clinic/HOD         heparin (porcine) injection 40,000 Units  40,000 Units Intra-Catheter 1 time in Clinic/HOD         LIDOcaine HCL 20 mg/ml (2%) injection 5 mL  5 mL Other 1 time in Clinic/HOD         sodium bicarbonate 4.2% 5 mEq  5 mEq Bladder Instillation 1 time in Clinic/HOD            Physical Exam:  General: NAD, voice normal  Neuro: AAO, CN II - XII grossly intact  Head/ Face: NCAT, symmetric, sensations intact bilaterally  Eyes: EOMI, PERRL  Ears: externally normal with grossly normal hearing  AD: EAC patent, TM intact, no middle ear effusion, no retractions  AS: EAC patent, TM intact, no middle ear effusion,  no retractions  Nose:   Intransal exam with small wound at the previous Vivaer site that is healing well.  Turbinate hypertrophy mild bilaterally.  OC/OP: MMM, no intraoral lesions,  no trismus, dentition is good, no uvular deviation, bilaterally symmetric soft palate elevation, palatoglossus and palatopharyngeal fold wnl; tonsils are symmetric/absent  Neck: soft, supple, no LAD, normal ROM, no thyromegaly  Respiratory: nonlabored, no wheezing, bilateral chest rise    Pertinent Data:    NOSE Scale (2/27/25): 80    ? LABS:  ? AUDIO:           ? PATH:      Imaging:   I personally reviewed the following images:        Assessment/Plan:  55 y.o. female with bilateral nasal obstruction.  She is hesitant to undergo an open surgery as she has fear of significant nasal obstruction following surgery in addition to the process of debridement postoperatively.    She is now s/p Vivaer on 5/27/25.    -- Continue nasal saline spray BID or more frequently as needed.  -- Nasal Ayr gel to the inside of the nare.  -- Restart flonase daily.  -- RTC in 3 months to assess response to therapy      Costa Dwyer MD  [facility]  6/10/2025 10:18 AM

## 2025-06-13 ENCOUNTER — CLINICAL SUPPORT (OUTPATIENT)
Dept: UROLOGY | Facility: CLINIC | Age: 55
End: 2025-06-13
Payer: MEDICAID

## 2025-06-13 DIAGNOSIS — N30.10 INTERSTITIAL CYSTITIS: Primary | ICD-10-CM

## 2025-06-13 RX ORDER — DEXAMETHASONE SODIUM PHOSPHATE 10 MG/ML
100 INJECTION INTRAMUSCULAR; INTRAVENOUS
Status: COMPLETED | OUTPATIENT
Start: 2025-06-13 | End: 2025-06-13

## 2025-06-13 RX ORDER — LIDOCAINE HYDROCHLORIDE 20 MG/ML
5 INJECTION, SOLUTION INFILTRATION; PERINEURAL
Status: COMPLETED | OUTPATIENT
Start: 2025-06-13 | End: 2025-06-13

## 2025-06-13 RX ORDER — SODIUM BICARBONATE 42 MG/ML
5 INJECTION, SOLUTION INTRAVENOUS
Status: COMPLETED | OUTPATIENT
Start: 2025-06-13 | End: 2025-06-13

## 2025-06-13 RX ORDER — HEPARIN SODIUM 5000 [USP'U]/ML
40000 INJECTION, SOLUTION INTRAVENOUS; SUBCUTANEOUS
Status: COMPLETED | OUTPATIENT
Start: 2025-06-13 | End: 2025-06-13

## 2025-06-13 RX ADMIN — SODIUM BICARBONATE 5 MEQ: 42 INJECTION, SOLUTION INTRAVENOUS at 10:06

## 2025-06-13 RX ADMIN — DEXAMETHASONE SODIUM PHOSPHATE 100 MG: 10 INJECTION INTRAMUSCULAR; INTRAVENOUS at 10:06

## 2025-06-13 RX ADMIN — LIDOCAINE HYDROCHLORIDE 5 ML: 20 INJECTION, SOLUTION INFILTRATION; PERINEURAL at 10:06

## 2025-06-13 RX ADMIN — HEPARIN SODIUM 40000 UNITS: 5000 INJECTION, SOLUTION INTRAVENOUS; SUBCUTANEOUS at 10:06

## 2025-06-13 NOTE — PROGRESS NOTES
Pt presented today for one week maintenance bladder instillation. Verified pt allergies. 12 F catheter inserted using aseptic technique, small amount of light yellow urine noted. Bladder medication instilled, pt tolerated well. RTC in one week for next instillation.

## 2025-06-18 ENCOUNTER — HOSPITAL ENCOUNTER (OUTPATIENT)
Dept: RADIOLOGY | Facility: HOSPITAL | Age: 55
Discharge: HOME | End: 2025-06-18
Attending: NURSE PRACTITIONER
Payer: MEDICAID

## 2025-06-18 DIAGNOSIS — N20.0 KIDNEY STONE: ICD-10-CM

## 2025-06-18 PROCEDURE — 76775 US EXAM ABDO BACK WALL LIM: CPT | Mod: TC

## 2025-06-19 DIAGNOSIS — R10.32 LEFT LOWER QUADRANT ABDOMINAL PAIN: ICD-10-CM

## 2025-06-19 DIAGNOSIS — K29.70 GASTRITIS, UNSPECIFIED, WITHOUT BLEEDING: ICD-10-CM

## 2025-06-19 DIAGNOSIS — R14.0 ABDOMINAL BLOATING: Primary | ICD-10-CM

## 2025-06-19 DIAGNOSIS — Z87.19 HISTORY OF DIVERTICULITIS: ICD-10-CM

## 2025-06-20 ENCOUNTER — CLINICAL SUPPORT (OUTPATIENT)
Dept: UROLOGY | Facility: CLINIC | Age: 55
End: 2025-06-20
Payer: MEDICAID

## 2025-06-20 DIAGNOSIS — R10.9 ABDOMINAL PAIN, UNSPECIFIED ABDOMINAL LOCATION: ICD-10-CM

## 2025-06-20 DIAGNOSIS — N30.10 INTERSTITIAL CYSTITIS: Primary | ICD-10-CM

## 2025-06-20 DIAGNOSIS — N20.0 KIDNEY STONE: ICD-10-CM

## 2025-06-20 PROCEDURE — 99211 OFF/OP EST MAY X REQ PHY/QHP: CPT | Mod: PBBFAC

## 2025-06-20 RX ORDER — SODIUM BICARBONATE 42 MG/ML
5 INJECTION, SOLUTION INTRAVENOUS
Status: COMPLETED | OUTPATIENT
Start: 2025-06-20 | End: 2025-06-20

## 2025-06-20 RX ORDER — HEPARIN SODIUM 5000 [USP'U]/ML
40000 INJECTION, SOLUTION INTRAVENOUS; SUBCUTANEOUS
Status: COMPLETED | OUTPATIENT
Start: 2025-06-20 | End: 2025-06-20

## 2025-06-20 RX ORDER — DEXAMETHASONE SODIUM PHOSPHATE 10 MG/ML
100 INJECTION INTRAMUSCULAR; INTRAVENOUS
Status: COMPLETED | OUTPATIENT
Start: 2025-06-20 | End: 2025-06-20

## 2025-06-20 RX ORDER — LIDOCAINE HYDROCHLORIDE 20 MG/ML
5 INJECTION, SOLUTION INFILTRATION; PERINEURAL
Status: COMPLETED | OUTPATIENT
Start: 2025-06-20 | End: 2025-06-20

## 2025-06-20 RX ORDER — BUTALB/ACETAMINOPHEN/CAFFEINE 50-325-40
1 TABLET ORAL 2 TIMES DAILY
Qty: 60 TABLET | Refills: 11 | Status: SHIPPED | OUTPATIENT
Start: 2025-06-20 | End: 2026-06-20

## 2025-06-20 RX ADMIN — HEPARIN SODIUM 40000 UNITS: 5000 INJECTION, SOLUTION INTRAVENOUS; SUBCUTANEOUS at 09:06

## 2025-06-20 RX ADMIN — LIDOCAINE HYDROCHLORIDE 5 ML: 20 INJECTION, SOLUTION INFILTRATION; PERINEURAL at 09:06

## 2025-06-20 RX ADMIN — SODIUM BICARBONATE 5 MEQ: 42 INJECTION, SOLUTION INTRAVENOUS at 09:06

## 2025-06-20 RX ADMIN — DEXAMETHASONE SODIUM PHOSPHATE 100 MG: 10 INJECTION INTRAMUSCULAR; INTRAVENOUS at 09:06

## 2025-06-20 NOTE — PROGRESS NOTES
"Bladder instillation #7 held for two hrs and "symptoms seem to be decreasing "stated by patient . Bladder instillation #8 administered with sterile technique via [address] , small amount of clear odorless urine drained from bladder. Procedure tolerated well RTC for weekly instillation   Component  Ref Range & Units (hover) 09:57   Color, UA Yellow   Spec Grav UA 1.020   pH, UA 5.5   WBC, UA neg   Nitrite, UA neg   Protein, POC neg   Glucose, UA neg   Ketones, UA neg   Urobilinogen, UA 0.2   Bilirubin, POC neg   Blood, UA neg      Microscopic urinalysis revealed RBCs negative, nitrites negative, WBCs negative.       Specimen Collected: 06/20/25 09:57 CDT Last Resulted: 06/20/25 09:57 CDT     "

## 2025-06-23 ENCOUNTER — TELEPHONE (OUTPATIENT)
Dept: FAMILY MEDICINE | Facility: CLINIC | Age: 55
End: 2025-06-23
Payer: MEDICAID

## 2025-06-23 NOTE — TELEPHONE ENCOUNTER
Copied from CRM #[id number]. Topic: General Inquiry - Patient Advice  >> Jun 20, 2025  2:45 PM Brandy wrote:  .Who Called: Remi Baptiste    Patient is returning phone call    Who Left Message for Patient:  Does the patient know what this is regarding?:PT stated Dr once mentioned a probiotic that helps with menopause and she needs name---pls advise       Preferred Method of Contact: Phone Call  Patient's Preferred Phone Number on File: [phone]   Best Call Back Number, if different:  Additional Information:

## 2025-06-26 ENCOUNTER — HOSPITAL ENCOUNTER (OUTPATIENT)
Dept: RADIOLOGY | Facility: HOSPITAL | Age: 55
Discharge: HOME | End: 2025-06-26
Attending: PHYSICIAN ASSISTANT
Payer: MEDICAID

## 2025-06-26 ENCOUNTER — TELEPHONE (OUTPATIENT)
Dept: NEUROLOGY | Facility: CLINIC | Age: 55
End: 2025-06-26
Payer: MEDICAID

## 2025-06-26 ENCOUNTER — HOSPITAL ENCOUNTER (OUTPATIENT)
Dept: RADIOLOGY | Facility: HOSPITAL | Age: 55
Discharge: HOME | End: 2025-06-26
Attending: NURSE PRACTITIONER
Payer: MEDICAID

## 2025-06-26 DIAGNOSIS — Z87.19 HISTORY OF DIVERTICULITIS: ICD-10-CM

## 2025-06-26 DIAGNOSIS — R14.0 ABDOMINAL BLOATING: ICD-10-CM

## 2025-06-26 DIAGNOSIS — K29.70 GASTRITIS, UNSPECIFIED, WITHOUT BLEEDING: ICD-10-CM

## 2025-06-26 DIAGNOSIS — R10.32 LEFT LOWER QUADRANT ABDOMINAL PAIN: ICD-10-CM

## 2025-06-26 DIAGNOSIS — R10.9 ABDOMINAL PAIN, UNSPECIFIED ABDOMINAL LOCATION: ICD-10-CM

## 2025-06-26 LAB
CREAT SERPL-MCNC: 0.72 MG/DL (ref 0.55–1.02)
GFR SERPLBLD CREATININE-BSD FMLA CKD-EPI: >60 ML/MIN/1.73/M2

## 2025-06-26 PROCEDURE — 74178 CT ABD&PLV WO CNTR FLWD CNTR: CPT | Mod: TC

## 2025-06-26 PROCEDURE — 76700 US EXAM ABDOM COMPLETE: CPT | Mod: TC

## 2025-06-26 PROCEDURE — 82565 ASSAY OF CREATININE: CPT | Performed by: NURSE PRACTITIONER

## 2025-06-26 PROCEDURE — 25500020 PHARM REV CODE 255: Performed by: NURSE PRACTITIONER

## 2025-06-26 RX ADMIN — IOHEXOL 100 ML: 350 INJECTION, SOLUTION INTRAVENOUS at 01:06

## 2025-06-27 ENCOUNTER — CLINICAL SUPPORT (OUTPATIENT)
Dept: UROLOGY | Facility: CLINIC | Age: 55
End: 2025-06-27
Payer: MEDICAID

## 2025-06-27 DIAGNOSIS — N30.10 INTERSTITIAL CYSTITIS: Primary | ICD-10-CM

## 2025-06-27 RX ORDER — SODIUM BICARBONATE 42 MG/ML
5 INJECTION, SOLUTION INTRAVENOUS
Status: COMPLETED | OUTPATIENT
Start: 2025-06-27 | End: 2025-06-27

## 2025-06-27 RX ORDER — LIDOCAINE HYDROCHLORIDE 20 MG/ML
JELLY TOPICAL
Status: ACTIVE | OUTPATIENT
Start: 2025-06-27

## 2025-06-27 RX ORDER — DEXAMETHASONE SODIUM PHOSPHATE 10 MG/ML
100 INJECTION INTRAMUSCULAR; INTRAVENOUS
Status: COMPLETED | OUTPATIENT
Start: 2025-06-27 | End: 2025-06-27

## 2025-06-27 RX ORDER — HEPARIN SODIUM 5000 [USP'U]/ML
40000 INJECTION, SOLUTION INTRAVENOUS; SUBCUTANEOUS
Status: COMPLETED | OUTPATIENT
Start: 2025-06-27 | End: 2025-06-27

## 2025-06-27 RX ORDER — LIDOCAINE HYDROCHLORIDE 20 MG/ML
5 INJECTION, SOLUTION INFILTRATION; PERINEURAL
Status: COMPLETED | OUTPATIENT
Start: 2025-06-27 | End: 2025-06-27

## 2025-06-27 RX ADMIN — LIDOCAINE HYDROCHLORIDE 5 ML: 20 INJECTION, SOLUTION INFILTRATION; PERINEURAL at 10:06

## 2025-06-27 RX ADMIN — DEXAMETHASONE SODIUM PHOSPHATE 100 MG: 10 INJECTION INTRAMUSCULAR; INTRAVENOUS at 10:06

## 2025-06-27 RX ADMIN — HEPARIN SODIUM 40000 UNITS: 5000 INJECTION, SOLUTION INTRAVENOUS; SUBCUTANEOUS at 10:06

## 2025-06-27 RX ADMIN — SODIUM BICARBONATE 5 MEQ: 42 INJECTION, SOLUTION INTRAVENOUS at 10:06

## 2025-07-01 ENCOUNTER — LAB VISIT (OUTPATIENT)
Dept: LAB | Facility: HOSPITAL | Age: 55
End: 2025-07-01
Attending: NURSE PRACTITIONER
Payer: MEDICAID

## 2025-07-01 ENCOUNTER — TELEPHONE (OUTPATIENT)
Dept: FAMILY MEDICINE | Facility: CLINIC | Age: 55
End: 2025-07-01
Payer: MEDICAID

## 2025-07-01 ENCOUNTER — OFFICE VISIT (OUTPATIENT)
Dept: NEUROLOGY | Facility: CLINIC | Age: 55
End: 2025-07-01
Payer: MEDICAID

## 2025-07-01 VITALS
WEIGHT: 128.19 LBS | SYSTOLIC BLOOD PRESSURE: 115 MMHG | BODY MASS INDEX: 21.89 KG/M2 | TEMPERATURE: 99 F | HEART RATE: 78 BPM | RESPIRATION RATE: 16 BRPM | OXYGEN SATURATION: 100 % | DIASTOLIC BLOOD PRESSURE: 71 MMHG | HEIGHT: 64 IN

## 2025-07-01 DIAGNOSIS — R25.2 SPASM: ICD-10-CM

## 2025-07-01 DIAGNOSIS — F41.9 ANXIETY: ICD-10-CM

## 2025-07-01 DIAGNOSIS — R51.9 CHRONIC INTRACTABLE HEADACHE, UNSPECIFIED HEADACHE TYPE: Primary | Chronic | ICD-10-CM

## 2025-07-01 DIAGNOSIS — G89.29 CHRONIC INTRACTABLE HEADACHE, UNSPECIFIED HEADACHE TYPE: Primary | Chronic | ICD-10-CM

## 2025-07-01 DIAGNOSIS — G44.40 MEDICATION OVERUSE HEADACHE: ICD-10-CM

## 2025-07-01 LAB
ALBUMIN SERPL-MCNC: 4.3 G/DL (ref 3.5–5)
ALBUMIN/GLOB SERPL: 1.4 RATIO (ref 1.1–2)
ALP SERPL-CCNC: 49 UNIT/L (ref 40–150)
ALT SERPL-CCNC: 19 UNIT/L (ref 0–55)
ANION GAP SERPL CALC-SCNC: 9 MEQ/L
AST SERPL-CCNC: 19 UNIT/L (ref 11–45)
BASOPHILS # BLD AUTO: 0.05 X10(3)/MCL
BASOPHILS NFR BLD AUTO: 0.9 %
BILIRUB SERPL-MCNC: 0.5 MG/DL
BUN SERPL-MCNC: 15.9 MG/DL (ref 9.8–20.1)
CALCIUM SERPL-MCNC: 9.7 MG/DL (ref 8.4–10.2)
CHLORIDE SERPL-SCNC: 103 MMOL/L (ref 98–107)
CO2 SERPL-SCNC: 29 MMOL/L (ref 22–29)
CREAT SERPL-MCNC: 0.69 MG/DL (ref 0.55–1.02)
CREAT/UREA NIT SERPL: 23
EOSINOPHIL # BLD AUTO: 0.28 X10(3)/MCL (ref 0–0.9)
EOSINOPHIL NFR BLD AUTO: 5.1 %
ERYTHROCYTE [DISTWIDTH] IN BLOOD BY AUTOMATED COUNT: 11.6 % (ref 11.5–17)
GFR SERPLBLD CREATININE-BSD FMLA CKD-EPI: >60 ML/MIN/1.73/M2
GLOBULIN SER-MCNC: 3.1 GM/DL (ref 2.4–3.5)
GLUCOSE SERPL-MCNC: 95 MG/DL (ref 74–100)
HCT VFR BLD AUTO: 40.9 % (ref 37–47)
HGB BLD-MCNC: 13.8 G/DL (ref 12–16)
IMM GRANULOCYTES # BLD AUTO: 0.01 X10(3)/MCL (ref 0–0.04)
IMM GRANULOCYTES NFR BLD AUTO: 0.2 %
LYMPHOCYTES # BLD AUTO: 2.61 X10(3)/MCL (ref 0.6–4.6)
LYMPHOCYTES NFR BLD AUTO: 47.1 %
MAGNESIUM SERPL-MCNC: 2.2 MG/DL (ref 1.6–2.6)
MCH RBC QN AUTO: 32.2 PG (ref 27–31)
MCHC RBC AUTO-ENTMCNC: 33.7 G/DL (ref 33–36)
MCV RBC AUTO: 95.3 FL (ref 80–94)
MONOCYTES # BLD AUTO: 0.46 X10(3)/MCL (ref 0.1–1.3)
MONOCYTES NFR BLD AUTO: 8.3 %
NEUTROPHILS # BLD AUTO: 2.13 X10(3)/MCL (ref 2.1–9.2)
NEUTROPHILS NFR BLD AUTO: 38.4 %
NRBC BLD AUTO-RTO: 0 %
PLATELET # BLD AUTO: 260 X10(3)/MCL (ref 130–400)
PMV BLD AUTO: 9.4 FL (ref 7.4–10.4)
POTASSIUM SERPL-SCNC: 4.1 MMOL/L (ref 3.5–5.1)
PROT SERPL-MCNC: 7.4 GM/DL (ref 6.4–8.3)
RBC # BLD AUTO: 4.29 X10(6)/MCL (ref 4.2–5.4)
SODIUM SERPL-SCNC: 141 MMOL/L (ref 136–145)
WBC # BLD AUTO: 5.54 X10(3)/MCL (ref 4.5–11.5)

## 2025-07-01 PROCEDURE — 80053 COMPREHEN METABOLIC PANEL: CPT

## 2025-07-01 PROCEDURE — 99215 OFFICE O/P EST HI 40 MIN: CPT | Mod: PBBFAC | Performed by: NURSE PRACTITIONER

## 2025-07-01 PROCEDURE — 83735 ASSAY OF MAGNESIUM: CPT

## 2025-07-01 PROCEDURE — 36415 COLL VENOUS BLD VENIPUNCTURE: CPT

## 2025-07-01 PROCEDURE — 3008F BODY MASS INDEX DOCD: CPT | Mod: CPTII,,, | Performed by: NURSE PRACTITIONER

## 2025-07-01 PROCEDURE — 99215 OFFICE O/P EST HI 40 MIN: CPT | Mod: S$PBB,,, | Performed by: NURSE PRACTITIONER

## 2025-07-01 PROCEDURE — 85025 COMPLETE CBC W/AUTO DIFF WBC: CPT

## 2025-07-01 PROCEDURE — 3078F DIAST BP <80 MM HG: CPT | Mod: CPTII,,, | Performed by: NURSE PRACTITIONER

## 2025-07-01 PROCEDURE — 3074F SYST BP LT 130 MM HG: CPT | Mod: CPTII,,, | Performed by: NURSE PRACTITIONER

## 2025-07-01 RX ORDER — PROPRANOLOL HYDROCHLORIDE 10 MG/1
10 TABLET ORAL DAILY
Qty: 30 TABLET | Refills: 2 | Status: SHIPPED | OUTPATIENT
Start: 2025-07-01

## 2025-07-01 RX ORDER — LANOLIN ALCOHOL/MO/W.PET/CERES
CREAM (GRAM) TOPICAL
COMMUNITY

## 2025-07-01 RX ORDER — OMEPRAZOLE 40 MG/1
CAPSULE, DELAYED RELEASE ORAL
COMMUNITY
Start: 2024-07-10

## 2025-07-01 NOTE — PROGRESS NOTES
"[facility] Neurology Follow Up Office Visit Note    Initial Visit Date: 1/19/2023  Last Visit Date: 2/26/2025  Current Visit Date:  07/01/2025    Chief Complaint:     Chief Complaint   Patient presents with    Headache     Pt states she is having a headache everyday     History of Present Illness:      This is 55 y.o.  female with hx of PTSD, DJD, CVA 2023 no deficits, cervical and lumbar radiculopathy, SAVI, interstitial cystitis, renal stone, localized osteoporosis, GERD, hepatic cyst, who was referred for right leg paresthesia of unknown chronicity. She states that she has been having bilateral hand paresthesia, upon awakening, along with neck pain. She also notes right leg numbness and paresthesia. Denied any UI/SI and saddle anesthesia. 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 She has not been doing home exercises. During last visit, Magnesium glycinate 100mg nightly was restarted. Dexamethasone 4mg Po BID x 5 days was provided.    Today, Pt states she continues to have daily HA accompanied by nausea. Had improved on MagGlycinate 100mg nightly. Taking Tylenol and BC Powder daily which can cause medication overuse HA. Denies HA today.  Either to bilat temples or forehead. OTC medication only effective for short periods, requires next day.  No longer on Gemtesa. Continues to suffer w/chronic back and neck pain. Pain scale 3/10 with medication. New c/o blurred vision, followed by ophthalmology and has new Rx glasses/contacts. Also has new c/o LLE "shooting" straight up and toes curl/cramp that may last a "couple" of minutes. Worsening back/hip pain, sharp/stabbing pain. Has appt with ortho tomorrow. Resolves on it's own. Has occurred 3-4 x since last visit. Has had to call 911. Worsening of bladder loss and is scheduled for Botox.    Other Hx:  ED on 4/28/2023 for L sided weakness including inability to open OS, difficulty ambulating, states she could not think clearly. States she was at home " "watching TV. Went to  and was unable to pull up her pants. Significant other transported to [facility] ED. Was told by stroke MD at [facility] she had a stroke. Dr. Mitchell approved TPA administration, MRI brain negative followed TPA administration. States symptoms lasted less than 24 hrs. Feels back to baseline today. Denies smoking, ETOH  intake, is a vegetarian. Is not currently taking statin as she read on internet it could cause a stroke. Is followed by [facility] in [facility] for "leaky valve".     Medications:     Current Outpatient Medications on File Prior to Visit   Medication Sig Dispense Refill    azelastine (ASTELIN) 137 mcg (0.1 %) nasal spray 1 spray (137 mcg total) by Nasal route 2 (two) times daily. 30 mL 5    calcium citrate-vitamin D3 315-200 mg (CALCIUM CITRATE + D) 315 mg-5 mcg (200 unit) per tablet Take 1 tablet by mouth 2 (two) times daily. 60 tablet 11    cetirizine (ZYRTEC) 10 MG tablet Take 1 tablet by mouth every morning.      denosumab (PROLIA) 60 mg/mL Syrg Inject 1 mL (60 mg total) into the skin every 6 (six) months. 2 mL 0    estradioL (ESTRACE) 0.01 % (0.1 mg/gram) vaginal cream Place 1 g vaginally twice a week. 42.5 g 3    famotidine (PEPCID) 40 MG tablet Take 1 tablet (40 mg total) by mouth once daily. 30 tablet 11    fish,bora,flax oils-om3,6,9no1 (OMEGA 3-6-9) 1,200 mg Cap Take 1 capsule by mouth Daily.      fluticasone propionate (FLONASE) 50 mcg/actuation nasal spray 1 spray (50 mcg total) by Each Nostril route 2 (two) times a day. 16 g 11    magnesium oxide (MAG-OX) 400 mg (241.3 mg magnesium) tablet Oral; Duration: 30 Days      omeprazole (PRILOSEC) 40 MG capsule 1 capsule 30 minutes before morning meal Orally Once a day; Duration: 30 days      ondansetron (ZOFRAN) 4 MG tablet Take 1 tablet (4 mg total) by mouth every 6 (six) hours as needed for Nausea. 12 tablet 0    TURMERIC ORAL Take by mouth.      vitamin E 100 UNIT capsule Take 100 Units by mouth once daily.      diazePAM (VALIUM) 5 MG " tablet Take one tablet 30 minutes before procedure. Take second tablet before procedure if necessary. (Patient not taking: Reported on 7/1/2025) 2 tablet 0    [DISCONTINUED] mirabegron (MYRBETRIQ) 50 mg Tb24 Take 1 tablet (50 mg total) by mouth once daily. (Patient not taking: Reported on 7/1/2025) 30 tablet 11    [DISCONTINUED] pantoprazole (PROTONIX) 40 MG tablet Take 40 mg by mouth. (Patient not taking: Reported on 7/1/2025)      [DISCONTINUED] propranoloL (INDERAL) 10 MG tablet Take 10 mg by mouth 2 (two) times daily. (Patient not taking: Reported on 7/1/2025)       Current Facility-Administered Medications on File Prior to Visit   Medication Dose Route Frequency Provider Last Rate Last Admin    dexAMETHasone injection 100 mg  100 mg Perineural 1 time in Clinic/HOD         heparin (porcine) injection 40,000 Units  40,000 Units Intra-Catheter 1 time in Clinic/HOD         LIDOcaine HCl 2% urojet   Mucous Membrane 1 time in Clinic/HOD         LIDOcaine HCL 20 mg/ml (2%) injection 5 mL  5 mL Other 1 time in Clinic/HOD         sodium bicarbonate 4.2% 5 mEq  5 mEq Bladder Instillation 1 time in Clinic/HOD          Labs:     Results for orders placed or performed during the hospital encounter of 06/26/25   Creatinine, Serum    Collection Time: 06/26/25 12:04 PM   Result Value Ref Range    Creatinine 0.72 0.55 - 1.02 mg/dL    eGFR >60 mL/min/1.73/m2       Studies:      - [facility]: 7/9/2023 - no acute intracranial findings    - MRI C and L-spine without contrast 2022: Multilevel DJD with severe left neuroforaminal narrowing at C4-C5, C5-C6, C6-C7.  Mild-to-moderate right L3-L4 neural foraminal narrowing.    - MRI brain without without contrast 10/2022:  I have reviewed the study independently and with the patient.  Chronic microvascular changes.    Review of Systems:     Review of Systems   All other systems reviewed and are negative.  As per HPI    Physical Exams:     Vitals:    07/01/25 1438   BP: 115/71   Pulse: 78    Resp: 16   Temp: 98.5 °F (36.9 °C)     Physical Exam  Vitals and nursing note reviewed.   Constitutional:       Appearance: Normal appearance.   HENT:      Head: Normocephalic and atraumatic.      Nose: Nose normal.      Mouth/Throat:      Mouth: Mucous membranes are moist.      Pharynx: Oropharynx is clear.   Eyes:      Conjunctiva/sclera: Conjunctivae normal.   Cardiovascular:      Rate and Rhythm: Normal rate and regular rhythm.      Pulses: Normal pulses.   Pulmonary:      Effort: Pulmonary effort is normal.      Breath sounds: Normal breath sounds.   Abdominal:      General: Abdomen is flat.   Musculoskeletal:         General: Normal range of motion.      Cervical back: Normal range of motion.   Skin:     General: Skin is warm.   Neurological:      Mental Status: She is alert.     Comprehensive Neurological Exam:  Mental Status: Alert Oriented to Self, Date, and Place. Naming, repetition, reading, and writing wnl. Comprehension wnl. No dysarthria.   CN II - XII: LUCI, No APD, VA grossly intact to finger counting at 6 ft, VFFC, No ptosis OU, EOMI without nystagmus, LT/Temp symmetric in CN V1-3 distribution, Hearing grossly intact, Face Symmetric, Tongue and Uvula midline, Trapezius symmetric bilateral.   Motor: tone and bulk wnl throughout, no abnormal involuntary or voluntary movements, 5/5 to confrontation, Fine finger movements wnl b/l, No pronator drift. Pain to L side of neck when turning head to R or w/R ear to shoulder tilt  Sensory: LT, Proprioception, Vibration, PP, Temp symmetric to bilat lower ext, decreased sensation to bilat upper ext, No sensory simultagnosia.   Reflexes: 2+ throughout, plantar reflexes downward bilateral.   Cerebellar: FNF wnl b/l  Romberg: Negative  Gait: normal, bilat arm swing intact    Assessment:     This is 55 y.o.  female with hx of  PTSD, DJD, CVA 2023 no deficits, cervical and lumbar radiculopathy, SAVI, interstitial cystitis, renal stone, localized  "osteoporosis, GERD, hepatic cystwho was referred for Cervical and Lumbar DJD. Recent admission for weakness. Was evaluated by neurosurgeon who did not recommend sx. CVA workup negative. Is followed by [facility] in [facility] for "leaky valve". Denies smoking, ETOH intake, is a vegetarian. Pt states she continues to have daily HA accompanied by nausea. Had improved on MagGlycinate 100mg nightly. Taking Tylenol and BC Powder daily which can cause medication overuse HA. Denies HA today.  Either to bilat temples or forehead. OTC medication only effective for short periods, requires next day.  No longer on Gemtesa. Continues to suffer w/chronic back and neck pain. Pain scale 3/10 with medication. New c/o blurred vision, followed by ophthalmology and has new Rx glasses/contacts. Also has new c/o LLE "shooting" straight up and toes curl/cramp that may last a "couple" of minutes. Worsening back/hip pain, sharp/stabbing pain. Has appt with ortho tomorrow. Resolves on it's own. Has occurred 3-4 x since last visit. Has had to call 911. Worsening of bladder loss and is scheduled for Botox.    Problem List Items Addressed This Visit          Neuro    Chronic intractable headache - Primary (Chronic)    Relevant Medications    propranoloL (INDERAL) 10 MG tablet    Other Relevant Orders    MRI Brain W WO Contrast    Medication overuse headache       Other    Spasm    Relevant Orders    CBC auto differential    Comprehensive metabolic panel    Magnesium     Other Visit Diagnoses         Anxiety        Relevant Medications    propranoloL (INDERAL) 10 MG tablet          Plan:     [] c/w ASA 81mg daily  [] change to Magnesium glyc  [] start propranolol 10mg 1/2 tab daily for anxiety and headache  [] c/w magnesium glycinate 100mg nightly for sleep and headache  [] c/w ondansetron PRN for nausea (denies worsening headache)  [] take either cyclobenzaprine or methocarbamol (has old Rx at home) nightly for sleep and neck pain  [] f/u w/[facility]  [] " order MRI brain w/w/out Guillermo for worsening headache and new symptoms  [] CBC, CMP, Magnesium level today    RTC 4 mth    Headache education provided: good sleep hygiene and 7 hours of sleep per night, stress management, medication overuse education provided. Using more 3 OTC per week may worsen headaches, high intensity interval training has shown to reduce headache frequency. Low carb, high protein has shown to reduce headache frequency. Patient is instructed in keep headache diary.      I have explained the treatment plan, diagnosis, and prognosis to patient. All questions are answered to the best of my knowledge.     Face to face time 40 minutes, including documentation, chart review, counseling, education, review of test results, relevant medical records, and coordination of care.     Pamela Hughes, ERA-C  General Neurology    07/01/2025

## 2025-07-03 ENCOUNTER — OFFICE VISIT (OUTPATIENT)
Dept: FAMILY MEDICINE | Facility: CLINIC | Age: 55
End: 2025-07-03
Payer: MEDICAID

## 2025-07-03 VITALS
HEIGHT: 64 IN | HEART RATE: 64 BPM | RESPIRATION RATE: 18 BRPM | BODY MASS INDEX: 22.02 KG/M2 | WEIGHT: 129 LBS | DIASTOLIC BLOOD PRESSURE: 61 MMHG | SYSTOLIC BLOOD PRESSURE: 101 MMHG | OXYGEN SATURATION: 98 % | TEMPERATURE: 98 F

## 2025-07-03 DIAGNOSIS — Z00.00 ENCOUNTER FOR WELLNESS EXAMINATION: ICD-10-CM

## 2025-07-03 DIAGNOSIS — G89.29 CHRONIC INTRACTABLE HEADACHE, UNSPECIFIED HEADACHE TYPE: Chronic | ICD-10-CM

## 2025-07-03 DIAGNOSIS — K76.89 HEPATIC CYST: ICD-10-CM

## 2025-07-03 DIAGNOSIS — K57.90 DIVERTICULOSIS: ICD-10-CM

## 2025-07-03 DIAGNOSIS — R51.9 CHRONIC INTRACTABLE HEADACHE, UNSPECIFIED HEADACHE TYPE: Chronic | ICD-10-CM

## 2025-07-03 DIAGNOSIS — N32.81 OVERACTIVE BLADDER: ICD-10-CM

## 2025-07-03 DIAGNOSIS — M54.12 CERVICAL RADICULOPATHY: Primary | ICD-10-CM

## 2025-07-03 DIAGNOSIS — K21.9 GASTROESOPHAGEAL REFLUX DISEASE, UNSPECIFIED WHETHER ESOPHAGITIS PRESENT: ICD-10-CM

## 2025-07-03 DIAGNOSIS — N20.0 STONE IN KIDNEY: ICD-10-CM

## 2025-07-03 PROCEDURE — 99215 OFFICE O/P EST HI 40 MIN: CPT | Mod: PBBFAC,PN | Performed by: NURSE PRACTITIONER

## 2025-07-03 NOTE — PATIENT INSTRUCTIONS
Lalo Khalil,     If you are due for any health screening(s) below please notify me so we can arrange them to be ordered and scheduled. Most healthy patients at your age complete them, but you are free to accept or refuse.     If you can't do it, I'll definitely understand. If you can, I'd certainly appreciate it!    All of your core healthy metrics are met.

## 2025-07-03 NOTE — PROGRESS NOTES
[facility]      Patient Name: Remi Baptiste     : 1970    MRN: [id number]     Subjective:     Patient ID: Remi Baptiste is a 55 y.o. female.    Chief Complaint:   Chief Complaint   Patient presents with    Follow-up     Pt is here for follow up.         HPI: 7/3/25: FU 6 months. Pt states she is only here for follow-up and that all of her issues are being addressed by other providers.  Denies new complaints.  Is established with Hepatology, GI, Ent, Endocrine, etc.  Is now getting Prolia injections. Is not due for any type of wellness screenings today. Her MMG is already scheduled.  Reports relief from her stomach issues.  She has kidney stones as well that are being addressed by Urology.  She has surveillance with MRI and labs for her liver lesion.      25: FU 1 month liver lesion, DXA results. Pt is here for follow up. Pt reports going to [facility] hepatology dept and was told no Dr 's name is not on the referral. Pt crying and doesn't want to answer questions. Actively seeing GI and having workup of her abdominal pain. Has upcoming EGD and Colonoscopy scheduled for February.      24: ER FU.  Pt presents to clinic today with c/o side and back pain limiting mobility. Pt seen at [facility] ER. Pt also reports rash from neck down.  Patient told to follow up with GI after ER visit or return to ER if pain persisted.     ER Note:   Patient states that she she has pain lower mid and left lower quadrant that is constantly aching with intermittent stabbing sensations radiating into her lower back.     Medical Decision Making  On arrival to the emergency department patient's vital signs were all normal. Nothing to suggest SIRS criteria. Patient's white count resulted normal. Urine is clear. CT of the abdomen and pelvis with IV contrast does not demonstrate any diverticulitis. Patient does have moderate constipation which could be the cause of the patient's symptoms. Patient has  been advised to take laxatives to help with the constipation and to follow-up with her GI doctor in 1 to 2 days. Patient also strongly encouraged to return to ER symptoms should worsen. Patient will be sent home with a Medrol Dosepak.    CT Abdomen Pelvis with IV Contrast Only (12/09/2024 4:17 PM CST)  Imaging Results - CT Abdomen Pelvis with IV Contrast Only (12/09/2024 4:17 PM CST)  Anatomical Region Laterality Modality  Abdomen, Pelvis   Computed Tomography  Imaging Results - CT Abdomen Pelvis with IV Contrast Only (12/09/2024 4:17 PM CST)  Specimen (Source) Anatomical Location / Laterality Collection Method / Volume Collection Time Received Time  Imaging Results - CT Abdomen Pelvis with IV Contrast Only (12/09/2024 4:17 PM CST)  Impressions  12/09/2024 5:00 PM CST   1. Moderate retained colonic stool burden, suggesting constipation.  2. No acute inflammatory process in the abdomen or pelvis. No evidence of diverticulitis.         12/09/2024 5:00 PM CST   CT ABDOMEN AND PELVIS  Clinical Indication:  Female, 54 years old. Worsening lower abdominal pain with radiation to the back. Vomiting. Onset 2 days ago. History of diverticulitis.    Technique:  Multiple contiguous axial images were obtained through the abdomen and pelvis following the administration of IV contrast material.  Post processing coronal and sagittal reconstruction images were made from the axial images.    All CT scans at this facility use dose modulation, iterative reconstruction, and/or weight base dose when appropriate to reduce radiation dose to as low as reasonably achievable.  IV contrast: 100mL Omnipaque 300  Bowel contrast:  None  Comparison: None  FINDINGS:  Lower Thorax: 5 mm fissural-based nodule of the right middle lobe. No basilar airspace disease or pleural effusion.  Liver and Biliary system: Liver is normal in size and contour. Scattered cystic changes throughout the left lobe of the liver, reflecting hepatic cyst. Gallbladder is  unremarkable. No calcified gallstones. No perihepatic ascites.  Spleen: Normal in size and contour.  Adrenal Glands and Kidneys: Both adrenal glands are unremarkable. The kidneys are normal in size and contour. Kidneys enhance symmetrically and empty into nondilated collecting systems. Nonobstructive stones in the pole of the left kidney.  Pancreas and Retroperitoneum: Pancreas appears unremarkable. No peripancreatic stranding or fluid. No suspicious retroperitoneal adenopathy.  Aorta and Major Vessels: Unremarkable. .  Bowel, Mesentery and Peritoneal space: Appendix is unremarkable in the right lower quadrant. No periappendiceal stranding or fluid collection.  There is mild scattered sigmoid diverticulosis without evidence of diverticulitis. Moderate retained stool throughout the colon. No free fluid. Stomach is fluid distended without wall abnormality. Duodenum is unremarkable.  Pelvis: Urinary bladder is well distended. The uterus is unremarkable. No free fluid..  Abdominal wall and Osseous Structures: Unremarkable.      Patient told to follow up with GI after ER visit or return to ER if pain persisted.     Patient states that she she has pain lower mid and left lower quadrant that is constantly aching with intermittent stabbing sensations radiating into her lower back.     Medical Decision Making  On arrival to the emergency department patient's vital signs were all normal. Nothing to suggest SIRS criteria. Patient's white count resulted normal. Urine is clear. CT of the abdomen and pelvis with IV contrast does not demonstrate any diverticulitis. Patient does have moderate constipation which could be the cause of the patient's symptoms. Patient has been advised to take laxatives to help with the constipation and to follow-up with her GI doctor in 1 to 2 days. Patient also strongly encouraged to return to ER symptoms should worsen. Patient will be sent home with a Medrol Dosepak.    CT Abdomen Pelvis with IV  Contrast Only (12/09/2024 4:17 PM CST)  Imaging Results - CT Abdomen Pelvis with IV Contrast Only (12/09/2024 4:17 PM CST)  Anatomical Region Laterality Modality  Abdomen, Pelvis   Computed Tomography    Imaging Results - CT Abdomen Pelvis with IV Contrast Only (12/09/2024 4:17 PM CST)  Specimen (Source) Anatomical Location / Laterality Collection Method / Volume Collection Time Received Time               Imaging Results - CT Abdomen Pelvis with IV Contrast Only (12/09/2024 4:17 PM CST)  Impressions  12/09/2024 5:00 PM CST     1. Moderate retained colonic stool burden, suggesting constipation.  2. No acute inflammatory process in the abdomen or pelvis. No evidence of diverticulitis.       12/09/2024 5:00 PM CST   CT ABDOMEN AND PELVIS  Clinical Indication:  Female, 54 years old. Worsening lower abdominal pain with radiation to the back. Vomiting. Onset 2 days ago. History of diverticulitis.    Technique:  Multiple contiguous axial images were obtained through the abdomen and pelvis following the administration of IV contrast material.  Post processing coronal and sagittal reconstruction images were made from the axial images.    All CT scans at this facility use dose modulation, iterative reconstruction, and/or weight base dose when appropriate to reduce radiation dose to as low as reasonably achievable.  IV contrast: 100mL Omnipaque 300  Bowel contrast:  None  Comparison: None  FINDINGS:  Lower Thorax: 5 mm fissural-based nodule of the right middle lobe. No basilar airspace disease or pleural effusion.  Liver and Biliary system: Liver is normal in size and contour. Scattered cystic changes throughout the left lobe of the liver, reflecting hepatic cyst. Gallbladder is unremarkable. No calcified gallstones. No perihepatic ascites.  Spleen: Normal in size and contour.  Adrenal Glands and Kidneys: Both adrenal glands are unremarkable. The kidneys are normal in size and contour. Kidneys enhance symmetrically and empty  into nondilated collecting systems. Nonobstructive stones in the pole of the left kidney.  Pancreas and Retroperitoneum: Pancreas appears unremarkable. No peripancreatic stranding or fluid. No suspicious retroperitoneal adenopathy.  Aorta and Major Vessels: Unremarkable. .  Bowel, Mesentery and Peritoneal space: Appendix is unremarkable in the right lower quadrant. No periappendiceal stranding or fluid collection.  There is mild scattered sigmoid diverticulosis without evidence of diverticulitis. Moderate retained stool throughout the colon. No free fluid. Stomach is fluid distended without wall abnormality. Duodenum is unremarkable.  Pelvis: Urinary bladder is well distended. The uterus is unremarkable. No free fluid..  Abdominal wall and Osseous Structures: Unremarkable.              ROS:      Review of Systems   Constitutional: Negative.    HENT:  Positive for congestion.    Eyes: Negative.    Respiratory: Negative.     Cardiovascular: Negative.    Gastrointestinal: Negative.    Genitourinary: Negative.    Musculoskeletal: Negative.    Skin: Negative.    Neurological: Negative.    Endo/Heme/Allergies: Negative.    Psychiatric/Behavioral: Negative.     All other systems reviewed and are negative.      12 point review of systems conducted, negative except as stated in the history of present illness. See HPI for details.    History:     Health Maintenance         Date Due Completion Date    Shingles Vaccine (1 of 2) 09/24/2025 (Originally 5/17/2021) ---    COVID-19 Vaccine (4 - 2024-25 season) 09/24/2025 (Originally 9/1/2024) 12/18/2021    Pneumococcal Vaccines (Age 50+) (1 of 2 - PCV) 01/13/2026 (Originally 5/17/1990) ---    High Dose Statin 07/03/2026 (Originally 5/17/1992) ---    Influenza Vaccine (1) 09/01/2025 2/28/2024 (Declined)    Override on 2/28/2024: Declined    TETANUS VACCINE 11/11/2025 11/11/2015    Mammogram 11/13/2025 11/13/2024    DEXA Scan 01/03/2027 1/3/2025    Cervical Cancer Screening 02/14/2027  2/14/2024    Colorectal Cancer Screening 11/27/2027 11/27/2023    Lipid Panel 09/24/2029 9/24/2024    RSV Vaccine (Age 60+ and Pregnant patients) (1 - 1-dose 75+ series) 05/17/2046 ---            Past Medical History:   Diagnosis Date    Anxiety     Chest pain 04/29/2023    Depression     Diverticulitis     Endometrial cyst of ovary     Infertility, female     Endometriosis    Leg pain, posterior, right 09/24/2024    Osteoporosis     Right wrist injury     Serum calcium elevated 10/09/2024    Stroke         Past Surgical History:   Procedure Laterality Date    BLADDER SUSPENSION      x2    COLPOSCOPY  11/23    Internal hemorrhoids    OVARIAN CYST REMOVAL      endometreosis    PELVIC LAPAROSCOPY      TONSILLECTOMY         Family History   Problem Relation Name Age of Onset    Hypertension Mother Karen Mcgee     Arthritis Mother Karen Mcgee     COPD Mother Karen Mcgee     Cancer Mother Karen Mcgee     Hypertension Father Husam jenkins     Colon cancer Maternal Grandfather  70    Colon cancer Paternal Grandmother Brice jenkins 70        Social History     Tobacco Use    Smoking status: Never     Passive exposure: Never    Smokeless tobacco: Never   Substance and Sexual Activity    Alcohol use: Not Currently     Comment: Occasionally    Drug use: Never    Sexual activity: Not Currently     Partners: Male     Birth control/protection: Post-menopausal       Current Outpatient Medications   Medication Instructions    azelastine (ASTELIN) 137 mcg, Nasal, 2 times daily    calcium citrate-vitamin D3 315-200 mg (CALCIUM CITRATE + D) 315 mg-5 mcg (200 unit) per tablet 1 tablet, Oral, 2 times daily    cetirizine (ZYRTEC) 10 MG tablet 1 tablet, Every morning    denosumab (PROLIA) 60 mg, Subcutaneous, Every 6 months    diazePAM (VALIUM) 5 MG tablet Take one tablet 30 minutes before procedure. Take second tablet before procedure if necessary.    estradioL (ESTRACE) 1 g, Vaginal, Twice weekly    famotidine (PEPCID) 40  "mg, Oral, Daily    fish,bora,flax oils-om3,6,9no1 (OMEGA 3-6-9) 1,200 mg Cap 1 capsule, Daily    fluticasone propionate (FLONASE) 50 mcg, Each Nostril, 2 times daily    magnesium oxide (MAG-OX) 400 mg (241.3 mg magnesium) tablet Oral; Duration: 30 Days    omeprazole (PRILOSEC) 40 MG capsule 1 capsule 30 minutes before morning meal Orally Once a day; Duration: 30 days    ondansetron (ZOFRAN) 4 mg, Oral, Every 6 hours PRN    propranoloL (INDERAL) 10 mg, Oral, Daily    TURMERIC ORAL Take by mouth.    vitamin E 100 Units, Daily        Review of patient's allergies indicates:   Allergen Reactions    Hydrocodone-guaifenesin Hives    Nsaids (non-steroidal anti-inflammatory drug) Other (See Comments)    Pseudoephedrine hcl Other (See Comments)    Hydrocodone-acetaminophen Itching, Nausea And Vomiting and Rash    Penicillins Rash       Patient Care Team:  Shasha Cardoza FNP as PCP - General (Family Medicine)    Objective:     Visit Vitals  /61   Pulse 64   Temp 97.6 °F (36.4 °C) (Oral)   Resp 18   Ht 5' 4" (1.626 m)   Wt 58.5 kg (129 lb)   SpO2 98%   BMI 22.14 kg/m²       Physical Examination:     Physical Exam  Vitals reviewed.   Constitutional:       Appearance: Normal appearance. She is normal weight.   HENT:      Head: Normocephalic.   Cardiovascular:      Rate and Rhythm: Normal rate and regular rhythm.      Pulses: Normal pulses.      Heart sounds: Normal heart sounds.   Pulmonary:      Effort: Pulmonary effort is normal.      Breath sounds: Normal breath sounds.   Abdominal:      General: Abdomen is flat.      Palpations: Abdomen is soft.   Musculoskeletal:         General: Normal range of motion.      Cervical back: Normal range of motion.   Skin:     General: Skin is warm and dry.   Neurological:      Mental Status: She is alert.   Psychiatric:         Mood and Affect: Mood normal.         Lab Results:     Chemistry:  Lab Results   Component Value Date     07/01/2025    K 4.1 07/01/2025    BUN 15.9 " 07/01/2025    CREATININE 0.69 07/01/2025    EGFRNORACEVR >60 07/01/2025    CALCIUM 9.7 07/01/2025    ALKPHOS 49 07/01/2025    ALBUMIN 4.3 07/01/2025    BILIDIR 0.3 07/02/2021    IBILI 0.40 07/02/2021    AST 19 07/01/2025    ALT 19 07/01/2025    MG 2.20 07/01/2025    HBMVFNSP04VO 42 05/13/2025    TSH 1.566 12/31/2024    CKNNYQ0PEUE 0.93 09/24/2024        Lab Results   Component Value Date    HGBA1C 5.1 09/24/2024        Hematology:  Lab Results   Component Value Date    WBC 5.54 07/01/2025    HGB 13.8 07/01/2025    HCT 40.9 07/01/2025     07/01/2025       Lipid Panel:  Lab Results   Component Value Date    CHOL 266 (H) 09/24/2024     (H) 09/24/2024    .00 (H) 09/24/2024    TRIG 61 09/24/2024    TOTALCHOLEST 3 09/24/2024        Urine:  Lab Results   Component Value Date    APPEARANCEUA Clear 09/24/2024    SGUA 1.011 09/24/2024    PROTEINUA Negative 09/24/2024    KETONESUA Negative 09/24/2024    BLOODUA Negative 10/07/2024    LEUKOCYTESUR Negative 09/24/2024    RBCUA 0-5 09/24/2024    WBCUA None Seen 09/24/2024    BACTERIA None Seen 09/24/2024    SQEPUA None Seen 09/24/2024    HYALINECASTS None Seen 09/24/2024        Assessment:          ICD-10-CM ICD-9-CM   1. Cervical radiculopathy  M54.12 723.4   2. Overactive bladder  N32.81 596.51   3. Stone in kidney  N20.0 592.0   4. Chronic intractable headache, unspecified headache type  R51.9 784.0    G89.29    5. Hepatic cyst  K76.89 573.8   6. Gastroesophageal reflux disease, unspecified whether esophagitis present  K21.9 530.81   7. Diverticulosis  K57.90 562.10          Plan:     1. Cervical radiculopathy  Assessment & Plan:  Pt has FU      2. Overactive bladder  Assessment & Plan:  Urology FU      3. Stone in kidney  Assessment & Plan:  FU with Urology  Increase fluid intake        4. Chronic intractable headache, unspecified headache type  Assessment & Plan:  Followed by Neurology      5. Hepatic cyst  Assessment & Plan:  FU with hepatology and  GI      6. Gastroesophageal reflux disease, unspecified whether esophagitis present  Assessment & Plan:  Pepcid, omeprazole to continue, helping  Chronic, stable issue    Avoid spicy, acidic, fried foods and alcohol.  Eat 2-3 hours before going to bed.  Avoid tight clothing, chew food thoroughly.  Reduce caffeine intake, avoid soda.  Stressed importance of Smoking/Tobacco Cessation.  Increased risk of Osteoporosis with PPI use over 1 year. Increase Calcium intake 1200-1800mg. It also increases risk for stomach polyp, dementia, and malnutrition        7. Diverticulosis  Assessment & Plan:  FU with GI             Follow up in about 6 months (around 1/3/2026) for Wellness..  In addition to their scheduled follow up, the patient has also been instructed to follow up on as needed basis.       Future Appointments   Date Time Provider Department Center   7/8/2025  7:50 AM Channing Rea DO [facility] ORTHO [facility]   7/9/2025  7:00 AM [facility] MRI1 650 LB LIMIT [facility] MRI [facility]   7/9/2025 10:00 AM Kari Moctezuma DO [facility] UROLO [facility]   7/18/2025  9:00 AM Marco Hopkins, ERA [facility] UROLO [facility]   7/22/2025  8:15 AM RESIDENT 1, [facility] OTORHINOLARYNGOLOGY [facility] ENT [facility]   7/30/2025  3:15 PM Kari Moctezuma DO [facility] UROLO [facility]   9/10/2025  8:30 AM RESIDENT 2, [facility] OTORHINOLARYNGOLOGY [facility] ENT [facility]   11/4/2025  3:00 PM Pamela Hughes, ANP [facility] NEURO [facility]   11/14/2025  8:45 AM [facility] MAMMO1 SCR1 [facility] SADI [facility]   11/14/2025  9:00 AM [facility] US1 [facility] US [facility]   11/17/2025  9:00 AM Enoc Whitten FNP [facility] ENDOCR [facility]   1/13/2026  7:00 AM Shasha Cardoza FNP [facility]   2/25/2026  9:30 AM Emily Mohr FNP [facility] GYN [facility]        JOAQUINA Chamberlain

## 2025-07-07 ENCOUNTER — RESULTS FOLLOW-UP (OUTPATIENT)
Dept: NEUROLOGY | Facility: CLINIC | Age: 55
End: 2025-07-07

## 2025-07-07 NOTE — PROGRESS NOTES
"Subjective:    Patient ID: Remi Baptiste is a 55 y.o. female  who presented to [facility] for follow up..      Chief Complaint: Pain of the Left Hip and Pain of the Right Hip      History of Present Illness:  HPI    Remi Baptiste is a 55-year-old female who presents to the clinic today for right-sided anterior hip pain that has been going on for the past few weeks.  During the last visit, she did report having some pain over her bilateral greater trochanter which has since resolved.  She does report having anterior hip pain worse with trying to flex at the hip under resistance.  She denied any numbness tingling or radiation symptoms down her leg.  She reports that she is only taking Tylenol 2 tablets 3 times a day which is helping her with her pain.  She has not done any formal physical therapy or exercises at this time.  She rates her pain today as a 0/10 and can be worse with prolonged standing or ambulation.  She does describes the pain as dull and achy at times.    Hip Review of Systems:  Swelling?  no  Instability?  no  Mechanical sx?  no  Consistent clicking/popping? no  <30 min AM stiffness? no  Limited ROM? no  Fever/Chills? no    Current Choice of Exercise:  none    ROS       Objective:      Physical Exam:    /67 (Patient Position: Sitting)   Pulse 71   Temp 97.7 °F (36.5 °C)   Ht 5' 4" (1.626 m)   Wt 59 kg (130 lb)   BMI 22.31 kg/m²     Ortho/SPM Exam    Appearance:  Normal gait/station  FWB  Alignment: Left: normal Right: normal   Soft tissue swelling: Left: no Right: no  Effusion: Left:  Negative Right: Negative  Erythema: Left no Right: no  Ecchymosis: Left: no Right: no  Atrophy: Left: no Right: no    Palpation:  Hip/Back Tenderness: Left: None Right: Iliopsoas     Range of motion:  HIP  Flexion (135): Left: 135 Right: 135  Extension (30): Left: 30 Right: 30  Abduction (45-50): Left: 45 Right: 45  Adduction (20-30): Left: 20 Right: " 20  Internal Rotation (35): Left: 35  Right: 35  External Rotation (45):Left: 45 Right: 45  Prone ER(45): Left: 45 Right: 45     Strength:  Extension: Left 5/5  Pain: no     Right 5/5 Pain: no  Flexion: Left 5/5 Pain: no Right   5/5 Pain: yes  Abduction: Left 5/5  Pain: no     Right 5/5 Pain: no  Adduction: Left 5/5  Pain: no     Right 5/5 Pain: no  External Rotation: Left 5/5  Pain: no     Right 5/5 Pain: no  Internal Rotation: Left 5/5  Pain: no     Right 5/5 Pain: no    Special Tests:  Log Roll: Left: Negative Right: Negative  FADIR: Left: Not performed Right: Not performed  ERIS: Left: Negative Right: Positive  Trendelenburg test: Left: Positive Right: Positive  Asa: Left: Not performed Right: Not performed   Duane Test: Left: Not performed Right: Not performed  Axial load & distraction: Left: Negative Right: Negative  Straight Leg Raise Left: Negative Right: Negative    General appearance: NAD  Peripheral pulses: normal bilaterally   Reflexes: Left: normal Right normal   Sensation: normal    Labs:  Last A1c: 5.1     Imaging:   Previous images reviewed.  X-rays ordered and performed today: yes  # of views: 4 Laterality: bilateral  My Interpretation:  no fracture, dislocation, swelling or degenerative changes noted         Assessment:        Encounter Diagnosis   Name Primary?    Hip flexor tendinitis, right Yes        Plan:     Dx:  Right hip flexor/iliopsoas tendinitis, new problem  Treatment Plan: Discussed with patient diagnosis and treatment recommendations. Handout given. Recommend conservative treatment to include: avoidance of aggravating activity, significant modification of daily activities, hot/cold therapies, topical and oral medications, braces, HEP/PT/OT, and injections.   Patient with right anterior hip pain that is not related to any osteoarthritis in her hip.  She has planned with flexion of the hip under resistance.  Patient likely has hip flexor tendinitis.  We will send patient to formal  physical therapy to help her with her symptoms.  She can continue using oral topical medications for pain relief.  We will see patient back in 2-3 months.  If symptoms not improve, we can consider doing a iliopsoas bursa injection at that time.  Imaging: radiological studies ordered and independently reviewed; discussed with patient; agree with radiologist interpretation.   Procedure: Discussed CSI/VSI as treatment options; discussed injections as treatment options in future if conservative measures do not improve symptoms.  Activity: Activity as tolerated; HEP to include aerobic conditioning and strength training with non-painful activity. ROM/STG exercises. Proper footware; assistive devises to avoid limping.   Therapy: Physical Therapy  Medication: CONTINUE over-the-counter acetaminophen (Tylenol 1000 mg three times per day as needed)  CONTINUE Voltaren Gel 1% as prescribed  CONTINUE over-the-counter NSAIDs (ibuprofen 200mg three tablets three times a day as needed). Please see your primary care physician for further refills.  RTC: 3 months.           Channing Rea D.O.  Sports Medicine Fellow

## 2025-07-08 ENCOUNTER — HOSPITAL ENCOUNTER (OUTPATIENT)
Dept: RADIOLOGY | Facility: HOSPITAL | Age: 55
Discharge: HOME | End: 2025-07-08
Attending: STUDENT IN AN ORGANIZED HEALTH CARE EDUCATION/TRAINING PROGRAM
Payer: MEDICAID

## 2025-07-08 ENCOUNTER — OFFICE VISIT (OUTPATIENT)
Dept: ORTHOPEDICS | Facility: CLINIC | Age: 55
End: 2025-07-08
Payer: MEDICAID

## 2025-07-08 VITALS
DIASTOLIC BLOOD PRESSURE: 67 MMHG | BODY MASS INDEX: 22.2 KG/M2 | WEIGHT: 130 LBS | HEART RATE: 71 BPM | HEIGHT: 64 IN | TEMPERATURE: 98 F | SYSTOLIC BLOOD PRESSURE: 102 MMHG

## 2025-07-08 DIAGNOSIS — M25.552 LEFT HIP PAIN: ICD-10-CM

## 2025-07-08 DIAGNOSIS — M76.891 HIP FLEXOR TENDINITIS, RIGHT: Primary | ICD-10-CM

## 2025-07-08 DIAGNOSIS — M25.551 RIGHT HIP PAIN: ICD-10-CM

## 2025-07-08 PROCEDURE — 73502 X-RAY EXAM HIP UNI 2-3 VIEWS: CPT | Mod: TC,RT

## 2025-07-08 PROCEDURE — 99215 OFFICE O/P EST HI 40 MIN: CPT | Mod: PBBFAC,25 | Performed by: STUDENT IN AN ORGANIZED HEALTH CARE EDUCATION/TRAINING PROGRAM

## 2025-07-08 PROCEDURE — 73502 X-RAY EXAM HIP UNI 2-3 VIEWS: CPT | Mod: TC,LT

## 2025-07-08 NOTE — LETTER
July 8, 2025      [facility]  [address] [place] [id number]  Phone: [phone]       Patient: Remi Baptiste   YOB: 1970  Date of Visit: 07/08/2025    To Whom It May Concern:    Steve Baptiste  was at [facility] on 07/08/2025. The patient may return to work on 7/8/25 with no restrictions. If you have any questions or concerns, or if I can be of further assistance, please do not hesitate to contact me.    Sincerely,    Channing Rea, DO

## 2025-07-09 ENCOUNTER — HOSPITAL ENCOUNTER (OUTPATIENT)
Dept: RADIOLOGY | Facility: HOSPITAL | Age: 55
Discharge: HOME | End: 2025-07-09
Attending: NURSE PRACTITIONER
Payer: MEDICAID

## 2025-07-09 ENCOUNTER — OFFICE VISIT (OUTPATIENT)
Dept: UROLOGY | Facility: CLINIC | Age: 55
End: 2025-07-09
Payer: MEDICAID

## 2025-07-09 VITALS
OXYGEN SATURATION: 100 % | DIASTOLIC BLOOD PRESSURE: 82 MMHG | RESPIRATION RATE: 18 BRPM | HEIGHT: 64 IN | WEIGHT: 129 LBS | SYSTOLIC BLOOD PRESSURE: 137 MMHG | HEART RATE: 89 BPM | BODY MASS INDEX: 22.02 KG/M2

## 2025-07-09 DIAGNOSIS — N32.81 OVERACTIVE BLADDER: ICD-10-CM

## 2025-07-09 DIAGNOSIS — G89.29 CHRONIC INTRACTABLE HEADACHE, UNSPECIFIED HEADACHE TYPE: Chronic | ICD-10-CM

## 2025-07-09 DIAGNOSIS — R51.9 CHRONIC INTRACTABLE HEADACHE, UNSPECIFIED HEADACHE TYPE: Chronic | ICD-10-CM

## 2025-07-09 DIAGNOSIS — N30.10 INTERSTITIAL CYSTITIS: Primary | ICD-10-CM

## 2025-07-09 LAB
BILIRUB SERPL-MCNC: NEGATIVE MG/DL
BLOOD URINE, POC: NEGATIVE
COLOR, POC UA: YELLOW
GLUCOSE UR QL STRIP: NEGATIVE
KETONES UR QL STRIP: NORMAL
LEUKOCYTE ESTERASE URINE, POC: NORMAL
NITRITE, POC UA: NEGATIVE
PH, POC UA: 5.5
PROTEIN, POC: NEGATIVE
SPECIFIC GRAVITY, POC UA: 1.03
UROBILINOGEN, POC UA: 0.2

## 2025-07-09 PROCEDURE — 3008F BODY MASS INDEX DOCD: CPT | Mod: CPTII,,, | Performed by: NURSE PRACTITIONER

## 2025-07-09 PROCEDURE — 99213 OFFICE O/P EST LOW 20 MIN: CPT | Mod: 25,S$PBB,, | Performed by: NURSE PRACTITIONER

## 2025-07-09 PROCEDURE — 3075F SYST BP GE 130 - 139MM HG: CPT | Mod: CPTII,,, | Performed by: NURSE PRACTITIONER

## 2025-07-09 PROCEDURE — 99214 OFFICE O/P EST MOD 30 MIN: CPT | Mod: PBBFAC,25 | Performed by: NURSE PRACTITIONER

## 2025-07-09 PROCEDURE — 81001 URINALYSIS AUTO W/SCOPE: CPT | Mod: PBBFAC | Performed by: NURSE PRACTITIONER

## 2025-07-09 PROCEDURE — 51700 IRRIGATION OF BLADDER: CPT | Mod: S$PBB,,, | Performed by: NURSE PRACTITIONER

## 2025-07-09 PROCEDURE — 51700 IRRIGATION OF BLADDER: CPT | Mod: PBBFAC | Performed by: NURSE PRACTITIONER

## 2025-07-09 PROCEDURE — 1159F MED LIST DOCD IN RCRD: CPT | Mod: CPTII,,, | Performed by: NURSE PRACTITIONER

## 2025-07-09 PROCEDURE — 3079F DIAST BP 80-89 MM HG: CPT | Mod: CPTII,,, | Performed by: NURSE PRACTITIONER

## 2025-07-09 RX ORDER — HEPARIN SODIUM 5000 [USP'U]/ML
40000 INJECTION, SOLUTION INTRAVENOUS; SUBCUTANEOUS
Status: COMPLETED | OUTPATIENT
Start: 2025-07-09 | End: 2025-07-09

## 2025-07-09 RX ORDER — LIDOCAINE HYDROCHLORIDE 20 MG/ML
JELLY TOPICAL
Status: COMPLETED | OUTPATIENT
Start: 2025-07-09 | End: 2025-07-09

## 2025-07-09 RX ORDER — DEXAMETHASONE SODIUM PHOSPHATE 10 MG/ML
100 INJECTION INTRAMUSCULAR; INTRAVENOUS
Status: COMPLETED | OUTPATIENT
Start: 2025-07-09 | End: 2025-07-09

## 2025-07-09 RX ORDER — MIRABEGRON 50 MG/1
50 TABLET, FILM COATED, EXTENDED RELEASE ORAL DAILY
Qty: 90 TABLET | Refills: 3 | Status: SHIPPED | OUTPATIENT
Start: 2025-07-09 | End: 2026-07-09

## 2025-07-09 RX ORDER — CIPROFLOXACIN 500 MG/1
500 TABLET, FILM COATED ORAL
Status: DISCONTINUED | OUTPATIENT
Start: 2025-07-09 | End: 2025-07-09

## 2025-07-09 RX ORDER — SODIUM BICARBONATE 42 MG/ML
5 INJECTION, SOLUTION INTRAVENOUS
Status: COMPLETED | OUTPATIENT
Start: 2025-07-09 | End: 2025-07-09

## 2025-07-09 RX ORDER — LIDOCAINE HYDROCHLORIDE 20 MG/ML
5 INJECTION, SOLUTION INFILTRATION; PERINEURAL
Status: COMPLETED | OUTPATIENT
Start: 2025-07-09 | End: 2025-07-09

## 2025-07-09 RX ORDER — LIDOCAINE HYDROCHLORIDE 20 MG/ML
JELLY TOPICAL
Status: DISCONTINUED | OUTPATIENT
Start: 2025-07-09 | End: 2025-07-09

## 2025-07-09 RX ADMIN — LIDOCAINE HYDROCHLORIDE: 20 JELLY TOPICAL at 11:07

## 2025-07-09 RX ADMIN — LIDOCAINE HYDROCHLORIDE 5 ML: 20 INJECTION, SOLUTION INFILTRATION; PERINEURAL at 12:07

## 2025-07-09 RX ADMIN — DEXAMETHASONE SODIUM PHOSPHATE 100 MG: 10 INJECTION, SOLUTION INTRAMUSCULAR; INTRAVENOUS at 12:07

## 2025-07-09 RX ADMIN — SODIUM BICARBONATE 5 MEQ: 42 INJECTION, SOLUTION INTRAVENOUS at 12:07

## 2025-07-09 RX ADMIN — HEPARIN SODIUM 40000 UNITS: 5000 INJECTION INTRAVENOUS; SUBCUTANEOUS at 11:07

## 2025-07-09 NOTE — LETTER
July 9, 2025      [facility]  [address] [facility] [address]  Phone: [phone]       Patient: Remi Baptiste   YOB: 1970  Date of Visit: 07/09/2025    To Whom It May Concern:    Steve Baptiste  was at [facility] on 07/09/2025. The patient may return to work/school on 07/09/2025 with no restrictions. If you have any questions or concerns, or if I can be of further assistance, please do not hesitate to contact me.    Sincerely,    Ana Estrella LPN

## 2025-07-09 NOTE — PROGRESS NOTES
Bladder instillation ordered per WAYNE Hopkins NP. Verified pt allergies. Pt prepped with iodine and lidocaine urojet. 12 F catheter inserted using aseptic technique. Small amount of light yellow urine noted. Bladder medication instilled, pt tolerated well. RTC as scheduled for F/U with WAYNE Hopkins NP. Pt will call to schedule next bladder instillation.  Component  Ref Range & Units (hover) 10:27   Color, UA Yellow   Spec Grav UA 1.030   pH, UA 5.5   WBC, UA Small   Nitrite, UA Negative   Protein, POC Negative   Glucose, UA Negative   Ketones, UA Trace   Urobilinogen, UA 0.2   Bilirubin, POC Negative   Blood, UA Negative      Microscopic urinalysis revealed RBCs negative, negative WBCs       Specimen Collected: 07/09/25 10:27 CDT Last Resulted: 07/09/25 10:27 CDT

## 2025-07-13 NOTE — PROGRESS NOTES
CC:    Overactive bladder    HPI:  Remi Baptiste is a 55 y.o. female here for overactive bladder.  She was scheduled today for a Botox injection for overactive bladder.  I had talked to her about this when I did the cystoscopy in April 2025.  She had decided she wanted to do this to see if this would help with the overactive bladder as well as the IC.  She has reschedule the procedure at least two times and comes in today than that she does not want to do it.  I talked with her and tried to understand reasons and she states that she is just too scared that it will cause pain and possibly retention and she does not want to go ahead with it.  She did have an instillation today.  She says that these are helping a little bit for IC.    Urinalysis:    Results for orders placed or performed in visit on 07/09/25   POCT URINE DIPSTICK WITH MICROSCOPE, AUTOMATED   Result Value Ref Range    Color, UA Yellow     Spec Grav UA 1.030     pH, UA 5.5     WBC, UA Small     Nitrite, UA Negative     Protein, POC Negative     Glucose, UA Negative     Ketones, UA Trace     Urobilinogen, UA 0.2     Bilirubin, POC Negative     Blood, UA Negative      Microscopic Urinalysis:  WBC:   1-2 per HPF     RBC:    None per HPF     Bacteria:    None per HPF     Squamous epithelial cells:  1-2 per HPF      Crystals:   None    Lab Results:  Recent Labs     07/01/25  1546   CREATININE 0.69      ROS:  All systems reviewed and are negative except as documented in HPI and/or Assessment and Plan.     Patient Active Problem List:     Problem List[1]     Past Medical History:  Past Medical History:   Diagnosis Date    Anxiety     Chest pain 04/29/2023    Depression     Diverticulitis     Endometrial cyst of ovary     Infertility, female     Endometriosis    Leg pain, posterior, right 09/24/2024    Osteoporosis     Right wrist injury     Serum calcium elevated 10/09/2024    Stroke         Past Surgical History:  Past Surgical History:   Procedure Laterality  Date    BLADDER SUSPENSION      x2    COLPOSCOPY  11/23    Internal hemorrhoids    OVARIAN CYST REMOVAL      endometreosis    PELVIC LAPAROSCOPY      TONSILLECTOMY          Family History:  Family History   Problem Relation Name Age of Onset    Hypertension Mother Karen Mcgee     Arthritis Mother Karen Mcgee     COPD Mother Karen Mcgee     Cancer Mother Karen Mcgee     Hypertension Father Husam jenkins     Colon cancer Maternal Grandfather  70    Colon cancer Paternal Grandmother Brice jenkins 70        Social History:  Social History     Socioeconomic History    Marital status: Single    Number of children: 0   Occupational History    Occupation: [employer]    Occupation: Housekeeping   Tobacco Use    Smoking status: Never     Passive exposure: Never    Smokeless tobacco: Never   Substance and Sexual Activity    Alcohol use: Not Currently     Comment: Occasionally    Drug use: Never    Sexual activity: Not Currently     Partners: Male     Birth control/protection: Post-menopausal     Social Drivers of Health     Financial Resource Strain: High Risk (8/5/2024)    Overall Financial Resource Strain (CARDIA)     Difficulty of Paying Living Expenses: Very hard   Food Insecurity: Food Insecurity Present (8/5/2024)    Hunger Vital Sign     Worried About Running Out of Food in the Last Year: Sometimes true     Ran Out of Food in the Last Year: Never true   Transportation Needs: No Transportation Needs (6/22/2022)    PRAPARE - Transportation     Lack of Transportation (Medical): No     Lack of Transportation (Non-Medical): No   Physical Activity: Insufficiently Active (8/5/2024)    Exercise Vital Sign     Days of Exercise per Week: 3 days     Minutes of Exercise per Session: 30 min   Stress: Stress Concern Present (8/5/2024)    [employer] [place] [employer] - Occupational Stress Questionnaire     Feeling of Stress : Very much   Housing Stability: High Risk (8/5/2024)    Housing Stability Vital Sign      Unable to Pay for Housing in the Last Year: Yes        Allergies:  Review of patient's allergies indicates:   Allergen Reactions    Hydrocodone-guaifenesin Hives    Nsaids (non-steroidal anti-inflammatory drug) Other (See Comments)    Pseudoephedrine hcl Other (See Comments)    Hydrocodone-acetaminophen Itching, Nausea And Vomiting and Rash    Penicillins Rash        Objective:  Vitals:    07/09/25 1023   BP: 137/82   Pulse: 89   Resp: 18     General:  Well developed, well nourished adult female in no acute distress  Abdomen: Soft, nontender, no masses  Extremities:  No clubbing, cyanosis, or edema  Neurologic:  Grossly intact  Musculoskeletal:  Normal tone    Assessment:  1. Interstitial cystitis  - mirabegron (MYRBETRIQ) 50 mg Tb24; Take 1 tablet (50 mg total) by mouth once daily.  Dispense: 90 tablet; Refill: 3  - heparin (porcine) injection 40,000 Units  - sodium bicarbonate 4.2% 5 mEq  - LIDOcaine HCL 20 mg/ml (2%) injection 5 mL  - dexAMETHasone injection 100 mg  - LIDOcaine HCl 2% urojet    2. Overactive bladder  - POCT URINE DIPSTICK WITH MICROSCOPE, AUTOMATED  - mirabegron (MYRBETRIQ) 50 mg Tb24; Take 1 tablet (50 mg total) by mouth once daily.  Dispense: 90 tablet; Refill: 3  - heparin (porcine) injection 40,000 Units  - sodium bicarbonate 4.2% 5 mEq  - LIDOcaine HCL 20 mg/ml (2%) injection 5 mL  - dexAMETHasone injection 100 mg     Plan:  She did receive an instillation today for interstitial cystitis.  I talked with her and tried to allay her fears and explained the procedure the potential complications and she made a decision that she did not want to proceed.    I spent a total of 21 minutes on the day of the visit.This includes face to face time and non-face to face time preparing to see the patient (eg, review of tests), obtaining and/or reviewing separately obtained history, documenting clinical information in the electronic or other health record, independently interpreting results and  communicating results to the patient/family/caregiver, or care coordinator.            [1]   Patient Active Problem List  Diagnosis    SAVI (generalized anxiety disorder)    PTSD (post-traumatic stress disorder)    Grief reaction    Degenerative disc disease, cervical    Cervical radiculopathy    Lumbar radiculopathy    Degeneration of intervertebral disc of lumbar region with discogenic back pain and lower extremity pain    Stroke aborted by administration of thrombolytic agent    Closed nondisplaced fracture of pisiform of right wrist with routine healing    GERD (gastroesophageal reflux disease)    Interstitial cystitis    Diverticulosis    Stone in kidney    Hepatic cyst    Localized osteoporosis without current pathological fracture    Overactive bladder    Chronic intractable headache    Insomnia    Medication overuse headache    Spasm

## 2025-07-16 ENCOUNTER — CLINICAL SUPPORT (OUTPATIENT)
Dept: UROLOGY | Facility: CLINIC | Age: 55
End: 2025-07-16
Payer: MEDICAID

## 2025-07-16 DIAGNOSIS — N30.10 INTERSTITIAL CYSTITIS: Primary | ICD-10-CM

## 2025-07-16 PROCEDURE — 51700 IRRIGATION OF BLADDER: CPT | Mod: PBBFAC | Performed by: NURSE PRACTITIONER

## 2025-07-16 PROCEDURE — 99211 OFF/OP EST MAY X REQ PHY/QHP: CPT | Mod: PBBFAC

## 2025-07-16 PROCEDURE — 51700 IRRIGATION OF BLADDER: CPT | Mod: S$PBB,,, | Performed by: NURSE PRACTITIONER

## 2025-07-16 PROCEDURE — 81001 URINALYSIS AUTO W/SCOPE: CPT | Mod: PBBFAC

## 2025-07-16 PROCEDURE — 96372 THER/PROPH/DIAG INJ SC/IM: CPT | Mod: PBBFAC

## 2025-07-16 RX ORDER — HEPARIN SODIUM 5000 [USP'U]/ML
40000 INJECTION, SOLUTION INTRAVENOUS; SUBCUTANEOUS
Status: COMPLETED | OUTPATIENT
Start: 2025-07-16 | End: 2025-07-16

## 2025-07-16 RX ORDER — LIDOCAINE HYDROCHLORIDE 20 MG/ML
5 INJECTION, SOLUTION INFILTRATION; PERINEURAL
Status: COMPLETED | OUTPATIENT
Start: 2025-07-16 | End: 2025-07-16

## 2025-07-16 RX ORDER — DEXAMETHASONE SODIUM PHOSPHATE 10 MG/ML
100 INJECTION INTRAMUSCULAR; INTRAVENOUS
Status: COMPLETED | OUTPATIENT
Start: 2025-07-16 | End: 2025-07-16

## 2025-07-16 RX ORDER — SODIUM BICARBONATE 42 MG/ML
5 INJECTION, SOLUTION INTRAVENOUS
Status: COMPLETED | OUTPATIENT
Start: 2025-07-16 | End: 2025-07-16

## 2025-07-16 RX ADMIN — LIDOCAINE HYDROCHLORIDE 5 ML: 20 INJECTION, SOLUTION INFILTRATION; PERINEURAL at 08:07

## 2025-07-16 RX ADMIN — HEPARIN SODIUM 40000 UNITS: 5000 INJECTION INTRAVENOUS; SUBCUTANEOUS at 08:07

## 2025-07-16 RX ADMIN — DEXAMETHASONE SODIUM PHOSPHATE 100 MG: 10 INJECTION, SOLUTION INTRAMUSCULAR; INTRAVENOUS at 08:07

## 2025-07-16 RX ADMIN — SODIUM BICARBONATE 5 MEQ: 42 INJECTION, SOLUTION INTRAVENOUS at 08:07

## 2025-07-16 NOTE — PROGRESS NOTES
Bladder instillation, doing ok per patient. Urethra prepped with betadine and 12  FR in and out catheter inserted using aseptic technique obtained 45 ml of clear yellow urine for sample. Bladder emptied and bladder medication instilled. Tolerated well. Will return in one week.   Component  Ref Range & Units (hover) 08:57   Color, UA Yellow   Spec Grav UA 1.025   pH, UA 5.5   WBC, UA Negative   Nitrite, UA Negative   Protein, POC Negative   Glucose, UA Negative   Ketones, UA Negative   Urobilinogen, UA 0.2   Bilirubin, POC Negative   Blood, UA Negative      Microscopic urinalysis revealed RBCs negative, nitrites negative, WBCs negative.       Specimen Collected: 07/16/25 08:57 CDT Last Resulted: 07/16/25 08:57 CDT

## 2025-07-22 ENCOUNTER — OFFICE VISIT (OUTPATIENT)
Dept: OTOLARYNGOLOGY | Facility: CLINIC | Age: 55
End: 2025-07-22
Payer: MEDICAID

## 2025-07-22 ENCOUNTER — PATIENT MESSAGE (OUTPATIENT)
Dept: NEUROLOGY | Facility: CLINIC | Age: 55
End: 2025-07-22
Payer: MEDICAID

## 2025-07-22 VITALS
DIASTOLIC BLOOD PRESSURE: 71 MMHG | HEART RATE: 60 BPM | TEMPERATURE: 98 F | WEIGHT: 129 LBS | OXYGEN SATURATION: 98 % | BODY MASS INDEX: 22.02 KG/M2 | RESPIRATION RATE: 20 BRPM | SYSTOLIC BLOOD PRESSURE: 108 MMHG | HEIGHT: 64 IN

## 2025-07-22 DIAGNOSIS — J34.2 NASAL SEPTAL DEVIATION: ICD-10-CM

## 2025-07-22 DIAGNOSIS — J34.829 NASAL VALVE COLLAPSE: ICD-10-CM

## 2025-07-22 DIAGNOSIS — G89.29 CHRONIC INTRACTABLE HEADACHE, UNSPECIFIED HEADACHE TYPE: Primary | ICD-10-CM

## 2025-07-22 DIAGNOSIS — R51.9 CHRONIC INTRACTABLE HEADACHE, UNSPECIFIED HEADACHE TYPE: Primary | ICD-10-CM

## 2025-07-22 DIAGNOSIS — J34.3 NASAL TURBINATE HYPERTROPHY: ICD-10-CM

## 2025-07-22 DIAGNOSIS — J34.89 NASAL OBSTRUCTION: Primary | ICD-10-CM

## 2025-07-22 PROCEDURE — 99215 OFFICE O/P EST HI 40 MIN: CPT | Mod: PBBFAC | Performed by: OTOLARYNGOLOGY

## 2025-07-22 RX ORDER — DIAZEPAM 2 MG/1
4 TABLET ORAL ONCE AS NEEDED
Qty: 2 TABLET | Refills: 0 | Status: SHIPPED | OUTPATIENT
Start: 2025-07-22 | End: 2025-07-22

## 2025-07-22 NOTE — PROGRESS NOTES
[facility]  Otolaryngology Clinic Note    Remi Baptiste  YOB: 1970    Chief Complaint:   Chief Complaint   Patient presents with    Post-op Evaluation     Checkup following vivaer        HPI: 07/22/2025: 55 y.o. female presents with c/o nasal congestion/obstruction which is most pronounced at night. Occasional rhinitis and PND. Denies recurrent sinus infections or significant allergy symptoms. States she has sustained a few episodes of nasal trauma and is concerned she has a deviated septum. She has been using flonase and zyrtec daily over the past month without much benefit. States she mouth breathes at night and sometimes awakens coughing/choking. PCP ordered a sleep study but she has not yet been contacted.    7/31/24:  Here for follow-up.  Patient reports that she got COVID about 6 weeks ago and since then she has been having increased globus sensation.  Patient does have reflux and takes omeprazole.  She still feels very congested however she is not having much nasal drainage.  We discussed using nasal sprays.  She is interested in surgery.  We were discussing septoplasty and turbinate reduction however she does not want nasal splints in her nose as that may suffocate her as she has panic attacks.    10/31/24:  Patient presents today for follow-up.  She reports that she has been doing extensive research into inferior turbinate reduction and septoplasty.  She states that she has severe anxiety regarding postoperative swelling/crusting/splints.  She also states that she has anxiety regarding any sort of sharp instrumentation in her nose.  She states she has been using her Flonase and Astelin as needed and does not notice much of a difference.  She would be interested in something less invasive.    12/19/24: No changes. Eager for Vivair/Rhinair    02/27/2025:  Returns in follow-up.  Bilateral nasal obstruction has been stable.  No significant rhinorrhea.  She continues to use  nasal regimen of Flonase, azelastine, and nasal irrigations.    4/2/25:  Telehealth visit. Discussed rescheduling surgery with patient today. She did receive cardiac clearance however the records were not sent over. She is very nervous about surgery. She does endorse nasal obstruction but is not able to localize it to one side. She does not note any relieving or exacerbating factors. She has been using saline spray which helps for a few minutes. Did not get much benefit with Flonase or Astelin. She also endorses sensation of something in her throat.    5/27/25: In-office Vivaer    6/10/25:  Here for follow up for here nasal obstruction/congestion now s/p Vivaer on 5/27/25.  She still has some difficulty breathing through the nose that is fluctuating in nature.  She is using the nasal saline spray, but has not restarted her other nasal sprays yet.    7/22/25: Patient presents for 2nd follow up after Vivaer. Multiple complaints today, including nasal obstruction that she thinks has gotten worse since the procedure. Also with facial pressure/pain, PND, odontogenic pain, and foul taste in her mouth that she attributes to possible cavity. Using NSI, flonase, and astelin routinely with no relief. Last CT head was 7/2023. Also reports balance issues and is currently being worked up by neurology--MRI brain scheduled tomorrow.       ROS:   10-point review of systems negative except per HPI      Review of patient's allergies indicates:   Allergen Reactions    Hydrocodone-guaifenesin Hives    Nsaids (non-steroidal anti-inflammatory drug) Other (See Comments)    Pseudoephedrine hcl Other (See Comments)    Hydrocodone-acetaminophen Itching, Nausea And Vomiting and Rash    Penicillins Rash       Past Medical History:   Diagnosis Date    Anxiety     Chest pain 04/29/2023    Depression     Diverticulitis     Endometrial cyst of ovary     Infertility, female     Endometriosis    Leg pain, posterior, right 09/24/2024    Osteoporosis      Right wrist injury     Serum calcium elevated 10/09/2024    Stroke        Past Surgical History:   Procedure Laterality Date    BLADDER SUSPENSION      x2    COLPOSCOPY  11/23    Internal hemorrhoids    OVARIAN CYST REMOVAL      endometreosis    PELVIC LAPAROSCOPY      TONSILLECTOMY         Social History     Socioeconomic History    Marital status: Single    Number of children: 0   Occupational History    Occupation: [employer]    Occupation: Housekeeping   Tobacco Use    Smoking status: Never     Passive exposure: Never    Smokeless tobacco: Never   Substance and Sexual Activity    Alcohol use: Not Currently     Comment: Occasionally    Drug use: Never    Sexual activity: Not Currently     Partners: Male     Birth control/protection: Post-menopausal     Social Drivers of Health     Financial Resource Strain: High Risk (8/5/2024)    Overall Financial Resource Strain (CARDIA)     Difficulty of Paying Living Expenses: Very hard   Food Insecurity: Food Insecurity Present (8/5/2024)    Hunger Vital Sign     Worried About Running Out of Food in the Last Year: Sometimes true     Ran Out of Food in the Last Year: Never true   Transportation Needs: No Transportation Needs (6/22/2022)    PRAPARE - Transportation     Lack of Transportation (Medical): No     Lack of Transportation (Non-Medical): No   Physical Activity: Insufficiently Active (8/5/2024)    Exercise Vital Sign     Days of Exercise per Week: 3 days     Minutes of Exercise per Session: 30 min   Stress: Stress Concern Present (8/5/2024)    [employer] [place] [employer] - Occupational Stress Questionnaire     Feeling of Stress : Very much   Housing Stability: High Risk (8/5/2024)    Housing Stability Vital Sign     Unable to Pay for Housing in the Last Year: Yes       Family History   Problem Relation Name Age of Onset    Hypertension Mother Karen Mcgee     Arthritis Mother Karen Mcgee     COPD Mother Karen Mcgee     Cancer Mother Karen  Everardo     Hypertension Father Husam jenkins     Colon cancer Maternal Grandfather  70    Colon cancer Paternal Grandmother Brice jenkins 70       Outpatient Encounter Medications as of 7/22/2025   Medication Sig Dispense Refill    azelastine (ASTELIN) 137 mcg (0.1 %) nasal spray 1 spray (137 mcg total) by Nasal route 2 (two) times daily. 30 mL 5    calcium citrate-vitamin D3 315-200 mg (CALCIUM CITRATE + D) 315 mg-5 mcg (200 unit) per tablet Take 1 tablet by mouth 2 (two) times daily. 60 tablet 11    denosumab (PROLIA) 60 mg/mL Syrg Inject 1 mL (60 mg total) into the skin every 6 (six) months. 2 mL 0    estradioL (ESTRACE) 0.01 % (0.1 mg/gram) vaginal cream Place 1 g vaginally twice a week. 42.5 g 3    famotidine (PEPCID) 40 MG tablet Take 1 tablet (40 mg total) by mouth once daily. 30 tablet 11    fish,bora,flax oils-om3,6,9no1 (OMEGA 3-6-9) 1,200 mg Cap Take 1 capsule by mouth Daily.      fluticasone propionate (FLONASE) 50 mcg/actuation nasal spray 1 spray (50 mcg total) by Each Nostril route 2 (two) times a day. 16 g 11    magnesium oxide (MAG-OX) 400 mg (241.3 mg magnesium) tablet Oral; Duration: 30 Days      propranoloL (INDERAL) 10 MG tablet Take 1 tablet (10 mg total) by mouth once daily. 30 tablet 2    TURMERIC ORAL Take by mouth.      vitamin E 100 UNIT capsule Take 100 Units by mouth once daily.      cetirizine (ZYRTEC) 10 MG tablet Take 1 tablet by mouth every morning. (Patient not taking: Reported on 7/22/2025)      diazePAM (VALIUM) 2 MG tablet Take 2 tablets (4 mg total) by mouth once as needed for Anxiety. (Patient not taking: Reported on 7/22/2025) 2 tablet 0    mirabegron (MYRBETRIQ) 50 mg Tb24 Take 1 tablet (50 mg total) by mouth once daily. (Patient not taking: Reported on 7/22/2025) 90 tablet 3    omeprazole (PRILOSEC) 40 MG capsule  (Patient not taking: Reported on 7/22/2025)      ondansetron (ZOFRAN) 4 MG tablet Take 1 tablet (4 mg total) by mouth every 6 (six) hours as needed for Nausea.  (Patient not taking: Reported on 7/22/2025) 12 tablet 0    [DISCONTINUED] diazePAM (VALIUM) 5 MG tablet Take one tablet 30 minutes before procedure. Take second tablet before procedure if necessary. 2 tablet 0    [DISCONTINUED] mirabegron (MYRBETRIQ) 50 mg Tb24 Take 1 tablet (50 mg total) by mouth once daily. (Patient not taking: Reported on 7/1/2025) 30 tablet 11    [DISCONTINUED] pantoprazole (PROTONIX) 40 MG tablet Take 40 mg by mouth. (Patient not taking: Reported on 7/1/2025)      [DISCONTINUED] propranoloL (INDERAL) 10 MG tablet Take 10 mg by mouth 2 (two) times daily. (Patient not taking: Reported on 7/1/2025)       Facility-Administered Encounter Medications as of 7/22/2025   Medication Dose Route Frequency Provider Last Rate Last Admin    dexAMETHasone injection 100 mg  100 mg Perineural 1 time in Clinic/HOD         heparin (porcine) injection 40,000 Units  40,000 Units Intra-Catheter 1 time in Clinic/HOD         LIDOcaine HCl 2% urojet   Mucous Membrane 1 time in Clinic/HOD         LIDOcaine HCL 20 mg/ml (2%) injection 5 mL  5 mL Other 1 time in Clinic/HOD         sodium bicarbonate 4.2% 5 mEq  5 mEq Bladder Instillation 1 time in Clinic/HOD            Physical Exam:  General: NAD, voice normal  Neuro: AAO, CN II - XII grossly intact  Head/ Face: NCAT, symmetric, sensations intact bilaterally  Eyes: EOMI, PERRL  Ears: externally normal with grossly normal hearing  AD: EAC patent, TM intact, no middle ear effusion, no retractions  AS: EAC patent, TM intact, no middle ear effusion, no retractions  Nose:   Intransal exam with septal deviation and narrow nasal passages.Turbinate hypertrophy mild bilaterally.  OC/OP: MMM, no intraoral lesions,  no trismus, dentition is moderate, no uvular deviation, bilaterally symmetric soft palate elevation, palatoglossus and palatopharyngeal fold wnl; tonsils are symmetric/absent  Neck: soft, supple, no LAD, normal ROM, no thyromegaly  Respiratory: nonlabored, no wheezing,  bilateral chest rise    Pertinent Data:  NOSE Scale (2/27/25): 80      Imaging:   I personally reviewed the following images:  CT Head (7/9/23):  FINDINGS:  Hemorrhage: No acute intracranial hemorrhage is seen.     CSF spaces: The ventricles sulci and basal cisterns are within normal limits.     Brain parenchyma: Unremarkable with preservation of the grey white junction throughout.     Cerebellum: Unremarkable.     Sella and skull base: The sella appears to be within normal limits for age.     Intracranial calcifications: Incidental note is made of bilateral choroid plexus calcification. Incidental note is made of some pineal region calcification.     Calvarium: No acute linear or depressed skull fracture is seen.     Maxillofacial Structures:     Paranasal sinuses: The visualized paranasal sinuses appear clear with no mucoperiosteal thickening or air fluid levels identified.     Orbits: The orbits appear unremarkable.     Zygomatic arches: The zygomatic arches are intact and unremarkable.     Temporal bones and mastoids: The temporal bones and mastoids appear unremarkable.     TMJ: The mandibular condyles appear normally placed with respect to the mandibular fossa.     Impression:   1. Unremarkable noncontrast CT of the head. Details as above.     Electronically signed by:Chavo Wong  Date:                                            07/10/2023  Time:                                           07:06      Assessment/Plan:  55 y.o. female with bilateral nasal obstruction.  She is hesitant to undergo an open surgery as she has fear of significant nasal obstruction following surgery in addition to the process of debridement postoperatively.    She is now s/p Vivaer on 5/27/25 with no relief. Last CT head was in 7/2023.     -- Will order CT sinus today for evaluation of nasal airway and sinuses   -- Continue nasal saline spray, Flonase, and Astelin   -- Instructed to contact the clinic with any new or worsening  symptoms    Telemed visit in 3-4 weeks to follow up CT sinus & MRI brain (for reported balance issues)      Gogo Nix, PGY4  [facility]  7/22/2025 8:21 AM

## 2025-07-23 ENCOUNTER — CLINICAL SUPPORT (OUTPATIENT)
Dept: UROLOGY | Facility: CLINIC | Age: 55
End: 2025-07-23
Payer: MEDICAID

## 2025-07-23 ENCOUNTER — TELEPHONE (OUTPATIENT)
Dept: NEUROLOGY | Facility: CLINIC | Age: 55
End: 2025-07-23
Payer: MEDICAID

## 2025-07-23 DIAGNOSIS — N30.10 INTERSTITIAL CYSTITIS: Primary | ICD-10-CM

## 2025-07-23 LAB
BILIRUB SERPL-MCNC: NORMAL MG/DL
BLOOD URINE, POC: NORMAL
COLOR, POC UA: YELLOW
GLUCOSE UR QL STRIP: NORMAL
KETONES UR QL STRIP: NORMAL
LEUKOCYTE ESTERASE URINE, POC: NORMAL
NITRITE, POC UA: NORMAL
PH, POC UA: 7
PROTEIN, POC: NORMAL
SPECIFIC GRAVITY, POC UA: 1.01
UROBILINOGEN, POC UA: 0.2

## 2025-07-23 PROCEDURE — 96372 THER/PROPH/DIAG INJ SC/IM: CPT | Mod: PBBFAC,59

## 2025-07-23 PROCEDURE — 81001 URINALYSIS AUTO W/SCOPE: CPT | Mod: PBBFAC

## 2025-07-23 PROCEDURE — 51700 IRRIGATION OF BLADDER: CPT | Mod: PBBFAC | Performed by: NURSE PRACTITIONER

## 2025-07-23 PROCEDURE — 51700 IRRIGATION OF BLADDER: CPT | Mod: S$PBB,,, | Performed by: NURSE PRACTITIONER

## 2025-07-23 RX ORDER — SOLIFENACIN SUCCINATE 5 MG/1
5 TABLET, FILM COATED ORAL DAILY
Qty: 90 TABLET | Refills: 3 | Status: SHIPPED | OUTPATIENT
Start: 2025-07-23 | End: 2026-07-23

## 2025-07-23 RX ORDER — DEXAMETHASONE SODIUM PHOSPHATE 10 MG/ML
100 INJECTION INTRAMUSCULAR; INTRAVENOUS
Status: COMPLETED | OUTPATIENT
Start: 2025-07-23 | End: 2025-07-23

## 2025-07-23 RX ORDER — LIDOCAINE HYDROCHLORIDE 20 MG/ML
5 INJECTION, SOLUTION INFILTRATION; PERINEURAL
Status: COMPLETED | OUTPATIENT
Start: 2025-07-23 | End: 2025-07-23

## 2025-07-23 RX ORDER — LIDOCAINE HYDROCHLORIDE 20 MG/ML
JELLY TOPICAL
Status: COMPLETED | OUTPATIENT
Start: 2025-07-23 | End: 2025-07-23

## 2025-07-23 RX ORDER — SODIUM BICARBONATE 42 MG/ML
5 INJECTION, SOLUTION INTRAVENOUS
Status: COMPLETED | OUTPATIENT
Start: 2025-07-23 | End: 2025-07-23

## 2025-07-23 RX ORDER — HEPARIN SODIUM 5000 [USP'U]/ML
40000 INJECTION, SOLUTION INTRAVENOUS; SUBCUTANEOUS
Status: COMPLETED | OUTPATIENT
Start: 2025-07-23 | End: 2025-07-23

## 2025-07-23 RX ADMIN — SODIUM BICARBONATE 5 MEQ: 42 INJECTION, SOLUTION INTRAVENOUS at 08:07

## 2025-07-23 RX ADMIN — DEXAMETHASONE SODIUM PHOSPHATE 100 MG: 10 INJECTION, SOLUTION INTRAMUSCULAR; INTRAVENOUS at 08:07

## 2025-07-23 RX ADMIN — HEPARIN SODIUM 40000 UNITS: 5000 INJECTION, SOLUTION INTRAVENOUS; SUBCUTANEOUS at 08:07

## 2025-07-23 RX ADMIN — LIDOCAINE HYDROCHLORIDE 5 ML: 20 INJECTION, SOLUTION INFILTRATION; PERINEURAL at 08:07

## 2025-07-23 RX ADMIN — LIDOCAINE HYDROCHLORIDE: 20 JELLY TOPICAL at 08:07

## 2025-07-23 NOTE — TELEPHONE ENCOUNTER
----- Message from HARDIK Niño sent at 7/22/2025  7:45 AM CDT -----  Regarding: RE: Anxiey med for MRI request  Done  ----- Message -----  From: Suni Owens LPN  Sent: 7/21/2025   3:18 PM CDT  To: HARDIK López  Subject: FW: Anxiey med for MRI request                     ----- Message -----  From: Hiral Herring  Sent: 7/21/2025   3:11 PM CDT  To: [facility] Neurology Clinical Support Staff  Subject: Anxiey med for MRI request                       Pt called to see if she can get medication called in for anxiety for her MRI on 7/23 @ 4:00 pm . She states at [place] that she has to be in neck brace and helmet and she can't do without medication .Pt uses [facility] on [address].      [phone]

## 2025-07-23 NOTE — PROGRESS NOTES
Presented to clinic for bladder instillation.  Patient complaints of severe headache with myrbetriq.  WAYNE HopkinsNP notified. Urethra prepped with betadine. Lidocaine urojet applied.  12FR blake catheter inserted with clear yellow urine noted. Bladder instillation instilled without difficulty.  Tolerated well.  RTC 1 week as scheduled.  Component  Ref Range & Units (hover) 09:14   Color, UA Yellow   Spec Grav UA 1.010   pH, UA 7.0   WBC, UA neg   Nitrite, UA neg   Protein, POC neg   Glucose, UA neg   Ketones, UA neg   Urobilinogen, UA 0.2   Bilirubin, POC neg   Blood, UA neg      Microscopic urinalysis revealed RBCs negative, nitrites negative, WBCs negative.       Specimen Collected: 07/23/25 09:14 CDT Last Resulted: 07/23/25 09:14 CDT

## 2025-07-24 ENCOUNTER — PATIENT MESSAGE (OUTPATIENT)
Dept: NEUROLOGY | Facility: CLINIC | Age: 55
End: 2025-07-24
Payer: MEDICAID

## 2025-07-24 ENCOUNTER — TELEPHONE (OUTPATIENT)
Dept: NEUROLOGY | Facility: CLINIC | Age: 55
End: 2025-07-24
Payer: MEDICAID

## 2025-07-24 NOTE — TELEPHONE ENCOUNTER
Called Pt to inform her of MRI brain, Pt verbalized understanding. Called her back to discuss lab history and work up via Dr. Willams, left message on VM and send message in her portal.   hypothyroidism. Diagnoses and all orders for this visit:    Acquired hypothyroidism    Other orders  -     levothyroxine (SYNTHROID) 75 MCG tablet; Take 1 tablet by mouth daily  -     albuterol sulfate  (90 Base) MCG/ACT inhaler; Inhale 2 puffs into the lungs every 6 hours as needed for Wheezing      As above. Call or go to ED immediately if symptoms worsen or persist.  No Follow-up on file. , or sooner if necessary. Educational materials and/or home exercises printed for patient's review and were included in patient instructions on his/her After Visit Summary and given to patient at the end of visit. Counseled regarding above diagnosis, including possible risks and complications,  especially if left uncontrolled. Counseled regarding the possible side effects, risks, benefits and alternatives to treatment; patient and/or guardian verbalizes understanding, agrees, feels comfortable with and wishes to proceed with above treatment plan. Advised patient to call with any new medication issues, and read all Rx info from pharmacy to assure aware of all possible risks and side effects of medication before taking. Reviewed age and gender appropriate health screening exams and vaccinations. Advised patient regarding importance of keeping up with recommended health maintenance and to schedule as soon as possible if overdue, as this is important in assessing for undiagnosed pathology, especially cancer, as well as protecting against potentially harmful/life threatening disease. Patient and/or guardian verbalizes understanding and agrees with above counseling, assessment and plan. All questions answered.

## 2025-07-29 ENCOUNTER — TELEPHONE (OUTPATIENT)
Dept: NEUROLOGY | Facility: CLINIC | Age: 55
End: 2025-07-29
Payer: MEDICAID

## 2025-07-29 NOTE — TELEPHONE ENCOUNTER
I will call her when I get to the office. She can be put on the cancellation list and we can talk then.

## 2025-07-29 NOTE — TELEPHONE ENCOUNTER
----- Message from Camelia sent at 7/28/2025  1:12 PM CDT -----  Regarding: please advise  Pt is in need of a call back. Pt can be reached at [phone]      Thank You

## 2025-07-29 NOTE — TELEPHONE ENCOUNTER
Returned pt call, she stated she would like to schedule a virtual visit or come in and talk with provider in regards to her options. I read pt providers message that was left in chart and what was sent on portal, pt still would like to talk to provider. Please advise

## 2025-07-30 ENCOUNTER — HOSPITAL ENCOUNTER (OUTPATIENT)
Dept: RADIOLOGY | Facility: HOSPITAL | Age: 55
Discharge: HOME | End: 2025-07-30
Payer: MEDICAID

## 2025-07-30 ENCOUNTER — CLINICAL SUPPORT (OUTPATIENT)
Dept: UROLOGY | Facility: CLINIC | Age: 55
End: 2025-07-30
Payer: MEDICAID

## 2025-07-30 DIAGNOSIS — J34.2 NASAL SEPTAL DEVIATION: ICD-10-CM

## 2025-07-30 DIAGNOSIS — J34.89 NASAL OBSTRUCTION: ICD-10-CM

## 2025-07-30 DIAGNOSIS — N30.10 INTERSTITIAL CYSTITIS: Primary | ICD-10-CM

## 2025-07-30 DIAGNOSIS — J34.3 NASAL TURBINATE HYPERTROPHY: ICD-10-CM

## 2025-07-30 LAB
BILIRUB SERPL-MCNC: NORMAL MG/DL
BLOOD URINE, POC: NORMAL
COLOR, POC UA: YELLOW
GLUCOSE UR QL STRIP: NORMAL
KETONES UR QL STRIP: NORMAL
LEUKOCYTE ESTERASE URINE, POC: NORMAL
NITRITE, POC UA: NORMAL
PH, POC UA: 8
PROTEIN, POC: NORMAL
SPECIFIC GRAVITY, POC UA: 1.02
UROBILINOGEN, POC UA: 0.2

## 2025-07-30 PROCEDURE — 81001 URINALYSIS AUTO W/SCOPE: CPT | Mod: PBBFAC

## 2025-07-30 PROCEDURE — 51700 IRRIGATION OF BLADDER: CPT | Mod: S$PBB,,, | Performed by: NURSE PRACTITIONER

## 2025-07-30 PROCEDURE — 96372 THER/PROPH/DIAG INJ SC/IM: CPT | Mod: PBBFAC

## 2025-07-30 PROCEDURE — 51700 IRRIGATION OF BLADDER: CPT | Mod: PBBFAC | Performed by: NURSE PRACTITIONER

## 2025-07-30 PROCEDURE — 99211 OFF/OP EST MAY X REQ PHY/QHP: CPT | Mod: PBBFAC

## 2025-07-30 PROCEDURE — 70486 CT MAXILLOFACIAL W/O DYE: CPT | Mod: TC

## 2025-07-30 RX ORDER — LIDOCAINE HYDROCHLORIDE 20 MG/ML
5 INJECTION, SOLUTION INFILTRATION; PERINEURAL
Status: COMPLETED | OUTPATIENT
Start: 2025-07-30 | End: 2025-07-30

## 2025-07-30 RX ORDER — HEPARIN SODIUM 5000 [USP'U]/ML
40000 INJECTION, SOLUTION INTRAVENOUS; SUBCUTANEOUS
Status: COMPLETED | OUTPATIENT
Start: 2025-07-30 | End: 2025-07-30

## 2025-07-30 RX ORDER — SODIUM BICARBONATE 42 MG/ML
5 INJECTION, SOLUTION INTRAVENOUS
Status: COMPLETED | OUTPATIENT
Start: 2025-07-30 | End: 2025-07-30

## 2025-07-30 RX ORDER — LIDOCAINE HYDROCHLORIDE 20 MG/ML
JELLY TOPICAL
Status: COMPLETED | OUTPATIENT
Start: 2025-07-30 | End: 2025-07-30

## 2025-07-30 RX ORDER — DEXAMETHASONE SODIUM PHOSPHATE 10 MG/ML
100 INJECTION INTRAMUSCULAR; INTRAVENOUS
Status: COMPLETED | OUTPATIENT
Start: 2025-07-30 | End: 2025-07-30

## 2025-07-30 RX ADMIN — DEXAMETHASONE SODIUM PHOSPHATE 100 MG: 10 INJECTION, SOLUTION INTRAMUSCULAR; INTRAVENOUS at 08:07

## 2025-07-30 RX ADMIN — LIDOCAINE HYDROCHLORIDE: 20 JELLY TOPICAL at 08:07

## 2025-07-30 RX ADMIN — SODIUM BICARBONATE 5 MEQ: 42 INJECTION, SOLUTION INTRAVENOUS at 08:07

## 2025-07-30 RX ADMIN — LIDOCAINE HYDROCHLORIDE 5 ML: 20 INJECTION, SOLUTION INFILTRATION; PERINEURAL at 08:07

## 2025-07-30 RX ADMIN — HEPARIN SODIUM 40000 UNITS: 5000 INJECTION, SOLUTION INTRAVENOUS; SUBCUTANEOUS at 08:07

## 2025-07-30 NOTE — PROGRESS NOTES
Pt presents today for weekly bladder instillation. Verified pt allergies. Prepped with iodine and lidocaine urojet. 12 F catheter inserted using aseptic technique, small amount of light yellow urine noted. Bladder medication instilled, pt tolerated well. Pt already has appointment scheduled to see provider on next Wednesday (8/6/25).

## 2025-08-04 ENCOUNTER — TELEPHONE (OUTPATIENT)
Dept: NEUROLOGY | Facility: CLINIC | Age: 55
End: 2025-08-04
Payer: MEDICAID

## 2025-08-04 NOTE — TELEPHONE ENCOUNTER
"Giuliana, can you call her and ask her if she has been able to take a video of the "issue"? Thanks  " RRT     Date: 2022   Time RRT called: 08  Location: in clinic  Time Team Arrived: 834                           End time: 834  Patient name and : Oralia Boo, 1973  Reason for RRT: seizure    RRT Team: Urgent Care   Primary RN: Ruth Ann Gaitan RN MD or APC: Channing  Other:     Vital signs: There were no vitals filed for this visit.  Interventions provided: No  O2: Oxygen provided by Pain department  IV access:   Labs: No results found for: POCGLU    EKG:   Meds:     Additional comments: Patient had a seizure and fell to the floor from chair. Patient unresponsive until she was in the ER. Patient transferred to ER for further evaluation and treatment.     Patient was transferred to: emergency department    RN completing form: Ruth Ann Gaitan RN

## 2025-08-04 NOTE — TELEPHONE ENCOUNTER
----- Message from Camelia sent at 8/4/2025  1:33 PM CDT -----  Pt is in need of a sooner appt. Pt can be reached at [phone]    Thank You

## 2025-08-04 NOTE — TELEPHONE ENCOUNTER
Called patient to ask her the reason for an earlier apt time ,she verbally stated to discuss her MRI results and treatment , as well as her balance its happening more frequently .

## 2025-08-04 NOTE — TELEPHONE ENCOUNTER
"Spoke with patient. 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 States she was in so much pain she could barely move and she tried to get to her phone, but by the time she was able to the episode had ended. Also c/o difficulty with dizziness and mobility, walking and balance. She also states she had an incident a few nights ago where she was lying down in bed but states it felt like she was laying down horizontally.   "

## 2025-08-06 ENCOUNTER — OFFICE VISIT (OUTPATIENT)
Dept: UROLOGY | Facility: CLINIC | Age: 55
End: 2025-08-06
Payer: MEDICAID

## 2025-08-06 VITALS
HEIGHT: 64 IN | SYSTOLIC BLOOD PRESSURE: 100 MMHG | OXYGEN SATURATION: 97 % | TEMPERATURE: 98 F | DIASTOLIC BLOOD PRESSURE: 65 MMHG | HEART RATE: 66 BPM | WEIGHT: 129.63 LBS | BODY MASS INDEX: 22.13 KG/M2

## 2025-08-06 DIAGNOSIS — N30.10 INTERSTITIAL CYSTITIS: Primary | ICD-10-CM

## 2025-08-06 LAB
BILIRUB SERPL-MCNC: NEGATIVE MG/DL
BLOOD URINE, POC: NEGATIVE
COLOR, POC UA: YELLOW
GLUCOSE UR QL STRIP: NEGATIVE
KETONES UR QL STRIP: NEGATIVE
LEUKOCYTE ESTERASE URINE, POC: NEGATIVE
NITRITE, POC UA: NEGATIVE
PH, POC UA: 7
POC RESIDUAL URINE VOLUME: 0 ML (ref 0–100)
PROTEIN, POC: NEGATIVE
SPECIFIC GRAVITY, POC UA: 1.01
UROBILINOGEN, POC UA: 0.2

## 2025-08-06 PROCEDURE — 1159F MED LIST DOCD IN RCRD: CPT | Mod: CPTII,,, | Performed by: NURSE PRACTITIONER

## 2025-08-06 PROCEDURE — 3008F BODY MASS INDEX DOCD: CPT | Mod: CPTII,,, | Performed by: NURSE PRACTITIONER

## 2025-08-06 PROCEDURE — 51798 US URINE CAPACITY MEASURE: CPT | Mod: PBBFAC | Performed by: NURSE PRACTITIONER

## 2025-08-06 PROCEDURE — 81001 URINALYSIS AUTO W/SCOPE: CPT | Mod: PBBFAC | Performed by: NURSE PRACTITIONER

## 2025-08-06 PROCEDURE — 3078F DIAST BP <80 MM HG: CPT | Mod: CPTII,,, | Performed by: NURSE PRACTITIONER

## 2025-08-06 PROCEDURE — 3074F SYST BP LT 130 MM HG: CPT | Mod: CPTII,,, | Performed by: NURSE PRACTITIONER

## 2025-08-06 PROCEDURE — 99213 OFFICE O/P EST LOW 20 MIN: CPT | Mod: S$PBB,,, | Performed by: NURSE PRACTITIONER

## 2025-08-06 PROCEDURE — 99215 OFFICE O/P EST HI 40 MIN: CPT | Mod: PBBFAC | Performed by: NURSE PRACTITIONER

## 2025-08-06 PROCEDURE — 1160F RVW MEDS BY RX/DR IN RCRD: CPT | Mod: CPTII,,, | Performed by: NURSE PRACTITIONER

## 2025-08-06 NOTE — LETTER
August 6, 2025      [facility]  [address] [address]  Phone: [phone]       Patient: Remi Baptiste   YOB: 1970  Date of Visit: 08/06/2025    To Whom It May Concern:    Steve Baptiste  was at [facility] on 08/06/2025. The patient may return to work/school on [id number] with no restrictions. If you have any questions or concerns, or if I can be of further assistance, please do not hesitate to contact me.    Sincerely,    Desi Milligan, RN

## 2025-08-06 NOTE — PROGRESS NOTES
Chief Complaint:   Chief Complaint   Patient presents with    Follow-up     6 months with renal u/s       HPI:   Patient is a 54-year-old female follow-up after completing 3 month treatment for interstitial cystitis with bladder instillations.  Patient seen by PCP on 09/24/2024 with a persistent history of bladder distension. Past couple of months is urinating 3 times a hour which is a full amount of urine. Disrupting her life.   Patient previously finished 8 week course of bladder instillations for interstitial cystitis.    Patient's history of kidney stones as listed below.  On patient's last appointment she presented with a persistent bladder pain unable to taper dose off the instillations instructed patient will continue Gemtesa and bladder instillations we will schedule patient for a CT of the abdomen pelvis with and without contrast and schedule for a cystoscope with Dr. Rae to evaluate probable cause of bladder pain however patient decided not to go through with the cystoscope due to unsure of complications with Botox and other health issues ongoing in conflicting with patient's upcoming MRI of the brain.  Today patient presents with persistent headaches with using Myrbetriq or Gemtesa.  Therefore patient we will continue bladder instillations until her other health issues are resolved.      Allergies:  Review of patient's allergies indicates:   Allergen Reactions    Hydrocodone-guaifenesin Hives    Nsaids (non-steroidal anti-inflammatory drug) Other (See Comments)    Pseudoephedrine hcl Other (See Comments)    Hydrocodone-acetaminophen Itching, Nausea And Vomiting and Rash    Penicillins Rash       Medications:  Current Medications[1]    Review of Systems:  General: No fever, chills, fatigability, or weight loss.  Skin: No rashes, itching, or changes in color or texture of skin.  Chest: Denies SCOTT, cyanosis, wheezing, cough, and sputum production.  Abdomen: Appetite fine. No weight loss. Denies diarrhea,  abdominal pain, hematemesis, or blood in stool.  Musculoskeletal: No joint stiffness or swelling. Denies back pain.  : As above.  All other review of systems negative.    PMH:  Past Medical History:   Diagnosis Date    Anxiety     Chest pain 04/29/2023    Depression     Diverticulitis     Endometrial cyst of ovary     Infertility, female     Endometriosis    Leg pain, posterior, right 09/24/2024    Osteoporosis     Right wrist injury     Serum calcium elevated 10/09/2024    Stroke        PSH:  Past Surgical History:   Procedure Laterality Date    BLADDER SUSPENSION      x2    COLPOSCOPY  11/23    Internal hemorrhoids    OVARIAN CYST REMOVAL      endometreosis    PELVIC LAPAROSCOPY      TONSILLECTOMY         FamHx:  Family History   Problem Relation Name Age of Onset    Hypertension Mother Karen Mcgee     Arthritis Mother Karen Mcgee     COPD Mother Karen Mcgee     Cancer Mother Karen Mcgee     Hypertension Father Husam jenkins     Colon cancer Maternal Grandfather  70    Colon cancer Paternal Grandmother Brice jenkins 70       SocHx:  Social History[2]    Physical Exam:  Vitals:    08/06/25 1030   BP: 100/65   Pulse: 66   Temp: 98 °F (36.7 °C)     General: A&Ox3, no apparent distress, no deformities  Neck: No masses, normal thyroid  Lungs: CTA lesly, no use of accessory muscles  Heart: RRR, no arrhythmias  Abdomen: Soft, NT, ND, no masses, no hernias, no hepatosplenomegaly  Lymphatic: Neck and groin nodes negative  Skin: The skin is warm and dry. No jaundice.  Ext: No c/c/e.      Urinalysis:  Results for orders placed or performed in visit on 08/06/25   POCT URINE DIPSTICK WITH MICROSCOPE, AUTOMATED   Result Value Ref Range    Color, UA Yellow     Spec Grav UA 1.010     pH, UA 7.0     WBC, UA Negative     Nitrite, UA Negative     Protein, POC Negative     Glucose, UA Negative     Ketones, UA Negative     Urobilinogen, UA 0.2     Bilirubin, POC Negative     Blood, UA Negative    Microscopic urinalysis  revealed RBCs negative, nitrites negative, WBCs negative.        Impression:  1. Interstitial cystitis  - POCT URINE DIPSTICK WITH MICROSCOPE, AUTOMATED  - POCT Bladder Scan      Plan:  Instructed patient to continue bladder instillations.  RTC 6 months for re-evaluation.  Instructed patient if develops any abnormal urologic symptoms notify clinic to be re-evaluate treated or during after hours go to emergency room versus urgent here.                           [facility]       [1]   Current Outpatient Medications   Medication Sig Dispense Refill    azelastine (ASTELIN) 137 mcg (0.1 %) nasal spray 1 spray (137 mcg total) by Nasal route 2 (two) times daily. 30 mL 5    calcium citrate-vitamin D3 315-200 mg (CALCIUM CITRATE + D) 315 mg-5 mcg (200 unit) per tablet Take 1 tablet by mouth 2 (two) times daily. 60 tablet 11    cetirizine (ZYRTEC) 10 MG tablet Take 1 tablet by mouth every morning.      denosumab (PROLIA) 60 mg/mL Syrg Inject 1 mL (60 mg total) into the skin every 6 (six) months. 2 mL 0    estradioL (ESTRACE) 0.01 % (0.1 mg/gram) vaginal cream Place 1 g vaginally twice a week. 42.5 g 3    famotidine (PEPCID) 40 MG tablet Take 1 tablet (40 mg total) by mouth once daily. 30 tablet 11    fish,bora,flax oils-om3,6,9no1 (OMEGA 3-6-9) 1,200 mg Cap Take 1 capsule by mouth Daily.      fluticasone propionate (FLONASE) 50 mcg/actuation nasal spray 1 spray (50 mcg total) by Each Nostril route 2 (two) times a day. 16 g 11    magnesium oxide (MAG-OX) 400 mg (241.3 mg magnesium) tablet Oral; Duration: 30 Days      ondansetron (ZOFRAN) 4 MG tablet Take 1 tablet (4 mg total) by mouth every 6 (six) hours as needed for Nausea. 12 tablet 0    propranoloL (INDERAL) 10 MG tablet Take 1 tablet (10 mg total) by mouth once daily. 30 tablet 2    tobramycin-dexAMETHasone 0.3-0.1% (TOBRADEX) 0.3-0.1 % DrpS INSTILL 1 DROP INTO LEFT EYE 4 TIMES DAILY FOR 5 DAYS      TURMERIC ORAL Take by mouth.      vitamin E 100 UNIT capsule Take 100 Units by  mouth once daily.      mirabegron (MYRBETRIQ) 50 mg Tb24 Take 1 tablet (50 mg total) by mouth once daily. (Patient not taking: Reported on 7/22/2025) 90 tablet 3    omeprazole (PRILOSEC) 40 MG capsule  (Patient not taking: Reported on 7/22/2025)      solifenacin (VESICARE) 5 MG tablet Take 1 tablet (5 mg total) by mouth once daily. (Patient not taking: Reported on 8/6/2025) 90 tablet 3     Current Facility-Administered Medications   Medication Dose Route Frequency Provider Last Rate Last Admin    dexAMETHasone injection 100 mg  100 mg Perineural 1 time in Clinic/HOD         heparin (porcine) injection 40,000 Units  40,000 Units Intra-Catheter 1 time in Clinic/HOD         LIDOcaine HCl 2% urojet   Mucous Membrane 1 time in Clinic/HOD         LIDOcaine HCL 20 mg/ml (2%) injection 5 mL  5 mL Other 1 time in Clinic/HOD         sodium bicarbonate 4.2% 5 mEq  5 mEq Bladder Instillation 1 time in Clinic/HOD        [2]   Social History  Socioeconomic History    Marital status: Single    Number of children: 0   Occupational History    Occupation: [employer]    Occupation: Housekeeping   Tobacco Use    Smoking status: Never     Passive exposure: Never    Smokeless tobacco: Never   Substance and Sexual Activity    Alcohol use: Not Currently     Comment: Occasionally    Drug use: Never    Sexual activity: Not Currently     Partners: Male     Birth control/protection: Post-menopausal     Social Drivers of Health     Financial Resource Strain: High Risk (8/5/2024)    Overall Financial Resource Strain (CARDIA)     Difficulty of Paying Living Expenses: Very hard   Food Insecurity: Food Insecurity Present (8/5/2024)    Hunger Vital Sign     Worried About Running Out of Food in the Last Year: Sometimes true     Ran Out of Food in the Last Year: Never true   Transportation Needs: No Transportation Needs (6/22/2022)    PRAPARE - Transportation     Lack of Transportation (Medical): No     Lack of Transportation (Non-Medical): No    Physical Activity: Insufficiently Active (8/5/2024)    Exercise Vital Sign     Days of Exercise per Week: 3 days     Minutes of Exercise per Session: 30 min   Stress: Stress Concern Present (8/5/2024)    [employer] - Occupational Stress Questionnaire     Feeling of Stress : Very much   Housing Stability: High Risk (8/5/2024)    Housing Stability Vital Sign     Unable to Pay for Housing in the Last Year: Yes

## 2025-08-07 ENCOUNTER — LAB VISIT (OUTPATIENT)
Dept: LAB | Facility: HOSPITAL | Age: 55
End: 2025-08-07
Attending: NURSE PRACTITIONER
Payer: MEDICAID

## 2025-08-07 ENCOUNTER — OFFICE VISIT (OUTPATIENT)
Dept: NEUROLOGY | Facility: CLINIC | Age: 55
End: 2025-08-07
Payer: MEDICAID

## 2025-08-07 ENCOUNTER — PATIENT MESSAGE (OUTPATIENT)
Dept: NEUROLOGY | Facility: CLINIC | Age: 55
End: 2025-08-07

## 2025-08-07 VITALS
RESPIRATION RATE: 18 BRPM | WEIGHT: 129.63 LBS | OXYGEN SATURATION: 97 % | HEIGHT: 64 IN | DIASTOLIC BLOOD PRESSURE: 68 MMHG | SYSTOLIC BLOOD PRESSURE: 104 MMHG | HEART RATE: 65 BPM | TEMPERATURE: 98 F | BODY MASS INDEX: 22.13 KG/M2

## 2025-08-07 DIAGNOSIS — R20.2 LEFT LEG PARESTHESIAS: ICD-10-CM

## 2025-08-07 DIAGNOSIS — G89.29 CHRONIC INTRACTABLE HEADACHE, UNSPECIFIED HEADACHE TYPE: Primary | Chronic | ICD-10-CM

## 2025-08-07 DIAGNOSIS — R51.9 CHRONIC INTRACTABLE HEADACHE, UNSPECIFIED HEADACHE TYPE: Primary | Chronic | ICD-10-CM

## 2025-08-07 DIAGNOSIS — R41.3 MEMORY CHANGES: ICD-10-CM

## 2025-08-07 DIAGNOSIS — G44.40 MEDICATION OVERUSE HEADACHE: ICD-10-CM

## 2025-08-07 DIAGNOSIS — F44.7 FUNCTIONAL NEUROLOGICAL SYMPTOM DISORDER WITH MIXED SYMPTOMS: ICD-10-CM

## 2025-08-07 DIAGNOSIS — M62.838 MUSCLE SPASM OF LEFT LOWER EXTREMITY: ICD-10-CM

## 2025-08-07 DIAGNOSIS — G47.00 INSOMNIA, UNSPECIFIED TYPE: ICD-10-CM

## 2025-08-07 LAB
FOLATE SERPL-MCNC: 13.5 NG/ML (ref 7–31.4)
T PALLIDUM AB SER QL: NONREACTIVE
VIT B12 SERPL-MCNC: 812 PG/ML (ref 213–816)

## 2025-08-07 PROCEDURE — 99215 OFFICE O/P EST HI 40 MIN: CPT | Mod: PBBFAC | Performed by: NURSE PRACTITIONER

## 2025-08-07 PROCEDURE — 82746 ASSAY OF FOLIC ACID SERUM: CPT

## 2025-08-07 PROCEDURE — 82607 VITAMIN B-12: CPT

## 2025-08-07 PROCEDURE — 36415 COLL VENOUS BLD VENIPUNCTURE: CPT

## 2025-08-07 PROCEDURE — 86780 TREPONEMA PALLIDUM: CPT

## 2025-08-07 RX ORDER — BACLOFEN 10 MG/1
10 TABLET ORAL DAILY
Qty: 30 TABLET | Refills: 2 | Status: SHIPPED | OUTPATIENT
Start: 2025-08-07 | End: 2026-08-07

## 2025-08-07 NOTE — PROGRESS NOTES
"[facility] Neurology Follow Up Office Visit Note    Initial Visit Date: 1/19/2023  Last Visit Date: 7/1/2025  Current Visit Date:  08/07/2025    Chief Complaint:     Chief Complaint   Patient presents with    Headache     Pt states headaches are daily     History of Present Illness:      This is 55 y.o.  female with hx of PTSD, DJD, CVA 2023 no deficits, COVID 2024, cervical and lumbar radiculopathy, SAVI, interstitial cystitis, renal stone, localized osteoporosis, GERD, hepatic cyst, who was referred for left leg paresthesia of unknown chronicity. She states that she has been having bilateral hand paresthesia, upon awakening, along with neck pain. She also notes right leg numbness and paresthesia. Had been scheduled w/Dr. Casper for EMG, but declined due to pain. Denied any UI/SI and saddle anesthesia. Works as house keeper. She has not had PT in many years. She has not been doing home exercises. During last visit, MRI brain +/- Savi was ordered for worsening HA, CBC, CMP, Magnesium levels were ordered. Propranolol 10mg PO 1/2 tab daily was initiated for daily HA.     Today, Pt states she continues to have bilat temples or forehead daily HA accompanied by nausea. Taking BC Powder, Ibuprofen, Tylenol daily. (Possible medication overuse HA). Not taking propranolol daily as prescribed, did not recall it was prescribed for HA. States last issue with balance and spasm where toes were turned to the side, was two weeks ago, could not access her phone to video episode. Occurred not in the setting of stress. Usually occurs when sitting. New c/o memory issues, states she walked into a co-worker's office and picked up her phone and left the office with it. Has no recollection of doing this.  Had a feeling of laying in a "different" direction than where she was actually laying, lasted a few seconds. New sensation of "wet" to L lower ext, comes and goes. No particular pattern. Followed by ortho for hip pain and referred to " "physical therapy. Awakens multiple times nightly to void, feels like she has difficulty breathing due to "turbinates".    Continues to suffer w/chronic back and neck pain. Pain scale 3/10 with medication. Followed by ophthalmology and has new Rx glasses/contacts.     Other Hx:  ED on 4/28/2023 for L sided weakness including inability to open OS, difficulty ambulating, states she could not think clearly. States she was at home watching TV. Went to  and was unable to pull up her pants. Significant other transported to [facility] ED. Was told by stroke MD at [facility] she had a stroke. Dr. Mitchell approved TPA administration, MRI brain negative followed TPA administration. States symptoms lasted less than 24 hrs. Feels back to baseline today. Denies smoking, ETOH  intake, is a vegetarian. Is not currently taking statin as she read on internet it could cause a stroke. Is followed by [facility] in [facility] for "leaky valve".     Medications:     Current Outpatient Medications on File Prior to Visit   Medication Sig Dispense Refill    azelastine (ASTELIN) 137 mcg (0.1 %) nasal spray 1 spray (137 mcg total) by Nasal route 2 (two) times daily. 30 mL 5    calcium citrate-vitamin D3 315-200 mg (CALCIUM CITRATE + D) 315 mg-5 mcg (200 unit) per tablet Take 1 tablet by mouth 2 (two) times daily. 60 tablet 11    cetirizine (ZYRTEC) 10 MG tablet Take 1 tablet by mouth every morning.      denosumab (PROLIA) 60 mg/mL Syrg Inject 1 mL (60 mg total) into the skin every 6 (six) months. 2 mL 0    estradioL (ESTRACE) 0.01 % (0.1 mg/gram) vaginal cream Place 1 g vaginally twice a week. 42.5 g 3    famotidine (PEPCID) 40 MG tablet Take 1 tablet (40 mg total) by mouth once daily. 30 tablet 11    fish,bora,flax oils-om3,6,9no1 (OMEGA 3-6-9) 1,200 mg Cap Take 1 capsule by mouth Daily.      fluticasone propionate (FLONASE) 50 mcg/actuation nasal spray 1 spray (50 mcg total) by Each Nostril route 2 (two) times a day. 16 g 11    ondansetron (ZOFRAN) 4 MG " tablet Take 1 tablet (4 mg total) by mouth every 6 (six) hours as needed for Nausea. 12 tablet 0    tobramycin-dexAMETHasone 0.3-0.1% (TOBRADEX) 0.3-0.1 % DrpS INSTILL 1 DROP INTO LEFT EYE 4 TIMES DAILY FOR 5 DAYS      TURMERIC ORAL Take by mouth.      vitamin E 100 UNIT capsule Take 100 Units by mouth once daily.      [DISCONTINUED] magnesium oxide (MAG-OX) 400 mg (241.3 mg magnesium) tablet Oral; Duration: 30 Days      [DISCONTINUED] propranoloL (INDERAL) 10 MG tablet Take 1 tablet (10 mg total) by mouth once daily. 30 tablet 2    [DISCONTINUED] mirabegron (MYRBETRIQ) 50 mg Tb24 Take 1 tablet (50 mg total) by mouth once daily. (Patient not taking: Reported on 8/7/2025) 90 tablet 3    [DISCONTINUED] omeprazole (PRILOSEC) 40 MG capsule  (Patient not taking: Reported on 8/7/2025)      [DISCONTINUED] solifenacin (VESICARE) 5 MG tablet Take 1 tablet (5 mg total) by mouth once daily. (Patient not taking: Reported on 8/7/2025) 90 tablet 3     Current Facility-Administered Medications on File Prior to Visit   Medication Dose Route Frequency Provider Last Rate Last Admin    dexAMETHasone injection 100 mg  100 mg Perineural 1 time in Clinic/HOD         heparin (porcine) injection 40,000 Units  40,000 Units Intra-Catheter 1 time in Clinic/HOD         LIDOcaine HCl 2% urojet   Mucous Membrane 1 time in Clinic/HOD         LIDOcaine HCL 20 mg/ml (2%) injection 5 mL  5 mL Other 1 time in Clinic/HOD         sodium bicarbonate 4.2% 5 mEq  5 mEq Bladder Instillation 1 time in Clinic/HOD          Labs:     Results for orders placed or performed in visit on 08/06/25   POCT URINE DIPSTICK WITH MICROSCOPE, AUTOMATED    Collection Time: 08/06/25 10:30 AM   Result Value Ref Range    Color, UA Yellow     Spec Grav UA 1.010     pH, UA 7.0     WBC, UA Negative     Nitrite, UA Negative     Protein, POC Negative     Glucose, UA Negative     Ketones, UA Negative     Urobilinogen, UA 0.2     Bilirubin, POC Negative     Blood, UA Negative     POCT Bladder Scan    Collection Time: 08/06/25 10:46 AM   Result Value Ref Range    POC Residual Urine Volume 0 0 - 100 mL       Studies:      - MRI brain +/- Guillermo: small developmental venous anomaly in the R cerebellar lobe    - [facility]: 7/9/2023 - no acute intracranial findings    - MRI C and L-spine without contrast 2022: Multilevel DJD with severe left neuroforaminal narrowing at C4-C5, C5-C6, C6-C7.  Mild-to-moderate right L3-L4 neural foraminal narrowing.    - MRI brain without without contrast 10/2022:  I have reviewed the study independently and with the patient.  Chronic microvascular changes.    Review of Systems:     Review of Systems   All other systems reviewed and are negative.  As per HPI    Physical Exams:     Vitals:    08/07/25 1423   BP: 104/68   Pulse: 65   Resp:    Temp:        Physical Exam  Vitals and nursing note reviewed.   Constitutional:       Appearance: Normal appearance.   HENT:      Head: Normocephalic and atraumatic.      Nose: Nose normal.      Mouth/Throat:      Mouth: Mucous membranes are moist.      Pharynx: Oropharynx is clear.   Eyes:      Conjunctiva/sclera: Conjunctivae normal.   Cardiovascular:      Rate and Rhythm: Normal rate and regular rhythm.      Pulses: Normal pulses.   Pulmonary:      Effort: Pulmonary effort is normal.      Breath sounds: Normal breath sounds.   Abdominal:      General: Abdomen is flat.   Musculoskeletal:         General: Normal range of motion.      Cervical back: Normal range of motion.   Skin:     General: Skin is warm.   Neurological:      Mental Status: She is alert.     Comprehensive Neurological Exam:  Mental Status: Alert Oriented to Self, Date, and Place. Naming, repetition, reading, and writing wnl. Comprehension wnl. No dysarthria.   CN II - XII: LUCI, No APD, VA grossly intact to finger counting at 6 ft, VFFC, No ptosis OU, EOMI without nystagmus, LT/Temp symmetric in CN V1-3 distribution, Hearing grossly intact, Face Symmetric, Tongue and  "Uvula midline, Trapezius symmetric bilateral.   Motor: tone and bulk wnl throughout, no abnormal involuntary or voluntary movements, 5/5 to confrontation, Fine finger movements wnl b/l, No pronator drift. Pain to L side of neck when turning head to R or w/R ear to shoulder tilt  Sensory: LT, Proprioception, Vibration, PP, Temp symmetric to bilat lower ext, decreased sensation to bilat upper ext, No sensory simultagnosia.   Reflexes: 2+ throughout, plantar reflexes downward bilateral.   Cerebellar: FNF wnl b/l  Romberg: Negative  Gait: normal, bilat arm swing intact    Assessment:     This is 55 y.o.  female with hx of  PTSD, DJD, CVA 2023 no deficits, cervical and lumbar radiculopathy, SAVI, interstitial cystitis, COVID 2024, renal stone, localized osteoporosis, GERD, hepatic cystwho was referred for Cervical and Lumbar DJD. Pt states she continues to have bilat temples or forehead daily HA accompanied by nausea. Taking BC Powder, Ibuprofen, Tylenol daily. (Possible medication overuse HA). Not taking propranolol daily as prescribed, did not recall it was prescribed for HA. States last issue with balance and spasm where toes were turned to the side, was two weeks ago, could not access her phone to video episode. Occurred not in the setting of stress. Usually occurs when sitting. New c/o memory issues, states she walked into a co-worker's office and picked up her phone and left the office with it. Has no recollection of doing this.  Had a feeling of laying in a "different" direction than where she was actually laying, lasted a few seconds. New sensation of "wet" to L lower ext, comes and goes. No particular pattern. Followed by ortho for hip pain and referred to physical therapy. Awakens multiple times nightly to void, feels like she has difficulty breathing due to "turbinates".    Continues to suffer w/chronic back and neck pain. Pain scale 3/10 with medication. Followed by ophthalmology and has new Rx " glasses/contacts. .    Discussed at length at MRI brain was reviewed by neurologist, Dr. Ana Richards. No indication of MS or brain tumor. Discussed need for psychiatrist or psychologist for counseling due to her hx of PTSD. Symptoms indicative of functional neurologic disorder.  Problem List Items Addressed This Visit          Neuro    Chronic intractable headache - Primary (Chronic)    Medication overuse headache    Memory changes    Relevant Orders    RPR    Left leg paresthesias    Relevant Orders    Folate    Vitamin B12       Orthopedic    Muscle spasm of left lower extremity    Relevant Medications    baclofen (LIORESAL) 10 MG tablet       Other    Insomnia    Relevant Medications    magnesium glycinate 100 mg Tab       Plan:     [] c/w ASA 81mg daily  [] change to Magnesium glycinate 200mg nightly  [] stop propranolol 10mg 1/2 tab daily for anxiety and headache  [] c/w ondansetron PRN for nausea (denies worsening headache)  [] take either cyclobenzaprine or methocarbamol (has old Rx at home) nightly for sleep and neck pain  [] f/u w/[facility]  [] orthostatics today are negative  [] RPR, folate, vitamin B12 today    RTC 2 mth in office for [facility]    Headache education provided: good sleep hygiene and 7 hours of sleep per night, stress management, medication overuse education provided. Using more 3 OTC per week may worsen headaches, high intensity interval training has shown to reduce headache frequency. Low carb, high protein has shown to reduce headache frequency. Patient is instructed in keep headache diary.      I have explained the treatment plan, diagnosis, and prognosis to patient. All questions are answered to the best of my knowledge.     Face to face time 45 minutes, including documentation, chart review, counseling, education, review of test results, relevant medical records, and coordination of care.     Pamela Hughes NP-C  General Neurology    08/07/2025

## 2025-08-08 ENCOUNTER — CLINICAL SUPPORT (OUTPATIENT)
Dept: UROLOGY | Facility: CLINIC | Age: 55
End: 2025-08-08
Payer: MEDICAID

## 2025-08-08 DIAGNOSIS — N30.10 INTERSTITIAL CYSTITIS: Primary | ICD-10-CM

## 2025-08-08 LAB
BILIRUB SERPL-MCNC: NORMAL MG/DL
BLOOD URINE, POC: NORMAL
COLOR, POC UA: NORMAL
GLUCOSE UR QL STRIP: NORMAL
KETONES UR QL STRIP: NORMAL
LEUKOCYTE ESTERASE URINE, POC: NORMAL
NITRITE, POC UA: NORMAL
PH, POC UA: 7
PROTEIN, POC: NORMAL
SPECIFIC GRAVITY, POC UA: 1.01
UROBILINOGEN, POC UA: 0.2

## 2025-08-08 PROCEDURE — 99211 OFF/OP EST MAY X REQ PHY/QHP: CPT | Mod: PBBFAC

## 2025-08-08 RX ORDER — LIDOCAINE HYDROCHLORIDE 20 MG/ML
5 INJECTION, SOLUTION INFILTRATION; PERINEURAL
Status: COMPLETED | OUTPATIENT
Start: 2025-08-08 | End: 2025-08-08

## 2025-08-08 RX ORDER — HEPARIN SODIUM 5000 [USP'U]/ML
40000 INJECTION, SOLUTION INTRAVENOUS; SUBCUTANEOUS
Status: COMPLETED | OUTPATIENT
Start: 2025-08-08 | End: 2025-08-08

## 2025-08-08 RX ORDER — SODIUM BICARBONATE 42 MG/ML
5 INJECTION, SOLUTION INTRAVENOUS
Status: COMPLETED | OUTPATIENT
Start: 2025-08-08 | End: 2025-08-08

## 2025-08-08 RX ORDER — DEXAMETHASONE SODIUM PHOSPHATE 10 MG/ML
100 INJECTION INTRAMUSCULAR; INTRAVENOUS
Status: COMPLETED | OUTPATIENT
Start: 2025-08-08 | End: 2025-08-08

## 2025-08-08 RX ORDER — LIDOCAINE HYDROCHLORIDE 20 MG/ML
JELLY TOPICAL
Status: COMPLETED | OUTPATIENT
Start: 2025-08-08 | End: 2025-08-08

## 2025-08-08 RX ADMIN — LIDOCAINE HYDROCHLORIDE: 20 JELLY TOPICAL at 10:08

## 2025-08-08 RX ADMIN — HEPARIN SODIUM 40000 UNITS: 5000 INJECTION, SOLUTION INTRAVENOUS; SUBCUTANEOUS at 10:08

## 2025-08-08 RX ADMIN — SODIUM BICARBONATE 5 MEQ: 42 INJECTION, SOLUTION INTRAVENOUS at 10:08

## 2025-08-08 RX ADMIN — LIDOCAINE HYDROCHLORIDE 5 ML: 20 INJECTION, SOLUTION INFILTRATION; PERINEURAL at 10:08

## 2025-08-08 RX ADMIN — DEXAMETHASONE SODIUM PHOSPHATE 100 MG: 10 INJECTION INTRAMUSCULAR; INTRAVENOUS at 10:08

## 2025-08-08 NOTE — PROGRESS NOTES
Pt presented for weekly bladder instillation. Verified pt allergies. 12 F catheter inserted using aseptic technique, clear yellow urine noted. Bladder medication instilled, pt tolerated well. RTC in one week for next instillation.      Component  Ref Range & Units (hover) 10:52   Color, UA Light Yellow   Spec Grav UA 1.010   pH, UA 7.0   WBC, UA neg   Nitrite, UA neg   Protein, POC neg   Glucose, UA neg   Ketones, UA neg   Urobilinogen, UA 0.2   Bilirubin, POC neg   Blood, UA neg             Specimen Collected: 08/08/25 10:52 CDT Last Resulted: 08/08/25 10:52 CDT   Microscopic urinalysis revealed RBCs negative, nitrites negative, WBCs negative.

## 2025-08-08 NOTE — LETTER
August 8, 2025      [facility]  [address] [facility] [address]  Phone: [phone]       Patient: Remi Baptiste   YOB: 1970  Date of Visit: 08/08/2025    To Whom It May Concern:    Steve Baptiste  was at [facility] on 08/08/2025. The patient may return to work/school on 08/08/2025 with no restrictions. If you have any questions or concerns, or if I can be of further assistance, please do not hesitate to contact me.    Sincerely,    Ana Estrella LPN

## 2025-08-15 ENCOUNTER — CLINICAL SUPPORT (OUTPATIENT)
Dept: UROLOGY | Facility: CLINIC | Age: 55
End: 2025-08-15
Payer: MEDICAID

## 2025-08-15 DIAGNOSIS — N30.10 INTERSTITIAL CYSTITIS: Primary | ICD-10-CM

## 2025-08-15 RX ORDER — SODIUM BICARBONATE 42 MG/ML
5 INJECTION, SOLUTION INTRAVENOUS
Status: COMPLETED | OUTPATIENT
Start: 2025-08-15 | End: 2025-08-15

## 2025-08-15 RX ORDER — LIDOCAINE HYDROCHLORIDE 20 MG/ML
JELLY TOPICAL
Status: COMPLETED | OUTPATIENT
Start: 2025-08-15 | End: 2025-08-15

## 2025-08-15 RX ORDER — HEPARIN SODIUM 5000 [USP'U]/ML
40000 INJECTION, SOLUTION INTRAVENOUS; SUBCUTANEOUS
Status: COMPLETED | OUTPATIENT
Start: 2025-08-15 | End: 2025-08-15

## 2025-08-15 RX ORDER — DEXAMETHASONE SODIUM PHOSPHATE 10 MG/ML
100 INJECTION INTRAMUSCULAR; INTRAVENOUS
Status: COMPLETED | OUTPATIENT
Start: 2025-08-15 | End: 2025-08-15

## 2025-08-15 RX ORDER — LIDOCAINE HYDROCHLORIDE 20 MG/ML
5 INJECTION, SOLUTION INFILTRATION; PERINEURAL
Status: COMPLETED | OUTPATIENT
Start: 2025-08-15 | End: 2025-08-15

## 2025-08-15 RX ADMIN — LIDOCAINE HYDROCHLORIDE: 20 JELLY TOPICAL at 09:08

## 2025-08-15 RX ADMIN — SODIUM BICARBONATE 5 MEQ: 42 INJECTION, SOLUTION INTRAVENOUS at 09:08

## 2025-08-15 RX ADMIN — LIDOCAINE HYDROCHLORIDE 5 ML: 20 INJECTION, SOLUTION INFILTRATION; PERINEURAL at 09:08

## 2025-08-15 RX ADMIN — HEPARIN SODIUM 40000 UNITS: 5000 INJECTION, SOLUTION INTRAVENOUS; SUBCUTANEOUS at 09:08

## 2025-08-15 RX ADMIN — DEXAMETHASONE SODIUM PHOSPHATE 100 MG: 10 INJECTION INTRAMUSCULAR; INTRAVENOUS at 09:08

## 2025-08-29 ENCOUNTER — CLINICAL SUPPORT (OUTPATIENT)
Dept: UROLOGY | Facility: CLINIC | Age: 55
End: 2025-08-29
Payer: MEDICAID

## 2025-08-29 DIAGNOSIS — N30.10 INTERSTITIAL CYSTITIS: Primary | ICD-10-CM

## 2025-08-29 RX ORDER — LIDOCAINE HYDROCHLORIDE 20 MG/ML
5 INJECTION, SOLUTION INFILTRATION; PERINEURAL
Status: COMPLETED | OUTPATIENT
Start: 2025-08-29 | End: 2025-08-29

## 2025-08-29 RX ORDER — HEPARIN SODIUM 5000 [USP'U]/ML
40000 INJECTION, SOLUTION INTRAVENOUS; SUBCUTANEOUS
Status: COMPLETED | OUTPATIENT
Start: 2025-08-29 | End: 2025-08-29

## 2025-08-29 RX ORDER — SODIUM BICARBONATE 42 MG/ML
5 INJECTION, SOLUTION INTRAVENOUS
Status: COMPLETED | OUTPATIENT
Start: 2025-08-29 | End: 2025-08-29

## 2025-08-29 RX ORDER — LIDOCAINE HYDROCHLORIDE 20 MG/ML
JELLY TOPICAL
Status: COMPLETED | OUTPATIENT
Start: 2025-08-29 | End: 2025-08-29

## 2025-08-29 RX ORDER — DEXAMETHASONE SODIUM PHOSPHATE 10 MG/ML
100 INJECTION INTRAMUSCULAR; INTRAVENOUS
Status: COMPLETED | OUTPATIENT
Start: 2025-08-29 | End: 2025-08-29

## 2025-08-29 RX ADMIN — SODIUM BICARBONATE 5 MEQ: 42 INJECTION, SOLUTION INTRAVENOUS at 10:08

## 2025-08-29 RX ADMIN — DEXAMETHASONE SODIUM PHOSPHATE 100 MG: 10 INJECTION INTRAMUSCULAR; INTRAVENOUS at 10:08

## 2025-08-29 RX ADMIN — HEPARIN SODIUM 40000 UNITS: 5000 INJECTION, SOLUTION INTRAVENOUS; SUBCUTANEOUS at 10:08

## 2025-08-29 RX ADMIN — LIDOCAINE HYDROCHLORIDE: 20 JELLY TOPICAL at 10:08

## 2025-08-29 RX ADMIN — LIDOCAINE HYDROCHLORIDE 5 ML: 20 INJECTION, SOLUTION INFILTRATION; PERINEURAL at 10:08

## 2025-09-05 ENCOUNTER — CLINICAL SUPPORT (OUTPATIENT)
Dept: UROLOGY | Facility: CLINIC | Age: 55
End: 2025-09-05
Payer: MEDICAID

## 2025-09-05 DIAGNOSIS — N30.10 INTERSTITIAL CYSTITIS: Primary | ICD-10-CM

## 2025-09-05 LAB
BILIRUB SERPL-MCNC: NORMAL MG/DL
BLOOD URINE, POC: NORMAL
COLOR, POC UA: YELLOW
GLUCOSE UR QL STRIP: NORMAL
KETONES UR QL STRIP: NORMAL
LEUKOCYTE ESTERASE URINE, POC: NORMAL
NITRITE, POC UA: NORMAL
PH, POC UA: 8.5
PROTEIN, POC: NORMAL
SPECIFIC GRAVITY, POC UA: 1.02
UROBILINOGEN, POC UA: 0.2

## 2025-09-05 RX ORDER — DEXAMETHASONE SODIUM PHOSPHATE 10 MG/ML
100 INJECTION INTRAMUSCULAR; INTRAVENOUS
Status: COMPLETED | OUTPATIENT
Start: 2025-09-05 | End: 2025-09-05

## 2025-09-05 RX ORDER — LIDOCAINE HYDROCHLORIDE 20 MG/ML
5 INJECTION, SOLUTION INFILTRATION; PERINEURAL
Status: COMPLETED | OUTPATIENT
Start: 2025-09-05 | End: 2025-09-05

## 2025-09-05 RX ORDER — LIDOCAINE HYDROCHLORIDE 20 MG/ML
JELLY TOPICAL
Status: COMPLETED | OUTPATIENT
Start: 2025-09-05 | End: 2025-09-05

## 2025-09-05 RX ORDER — SODIUM BICARBONATE 42 MG/ML
5 INJECTION, SOLUTION INTRAVENOUS
Status: COMPLETED | OUTPATIENT
Start: 2025-09-05 | End: 2025-09-05

## 2025-09-05 RX ORDER — HEPARIN SODIUM 5000 [USP'U]/ML
40000 INJECTION, SOLUTION INTRAVENOUS; SUBCUTANEOUS
Status: COMPLETED | OUTPATIENT
Start: 2025-09-05 | End: 2025-09-05

## 2025-09-05 RX ADMIN — HEPARIN SODIUM 40000 UNITS: 5000 INJECTION, SOLUTION INTRAVENOUS; SUBCUTANEOUS at 09:09

## 2025-09-05 RX ADMIN — SODIUM BICARBONATE 5 MEQ: 42 INJECTION, SOLUTION INTRAVENOUS at 09:09

## 2025-09-05 RX ADMIN — LIDOCAINE HYDROCHLORIDE: 20 JELLY TOPICAL at 09:09

## 2025-09-05 RX ADMIN — LIDOCAINE HYDROCHLORIDE 5 ML: 20 INJECTION, SOLUTION INFILTRATION; PERINEURAL at 09:09

## 2025-09-05 RX ADMIN — DEXAMETHASONE SODIUM PHOSPHATE 100 MG: 10 INJECTION INTRAMUSCULAR; INTRAVENOUS at 09:09

## (undated) DEVICE — SOL NACL IRR 1000ML BTL

## (undated) DEVICE — NDL ECLIPSE SAFETY 18GX1-1/2IN

## (undated) DEVICE — CLOSURE SKIN STERI STRIP 1/2X4

## (undated) DEVICE — TUBING MEDI-VAC 20FT .25IN

## (undated) DEVICE — KIT ANTIFOG W/SPONG & FLUID

## (undated) DEVICE — SOL NORMAL USPCA 0.9%

## (undated) DEVICE — NDL 27G X 1 1/4

## (undated) DEVICE — SYR 10CC LUER LOCK

## (undated) DEVICE — SHAMPOO BABY CARE 1.7OZ

## (undated) DEVICE — Device

## (undated) DEVICE — ELECTRODE PATIENT RETURN DISP

## (undated) DEVICE — KIT SURGICAL TURNOVER

## (undated) DEVICE — MANIFOLD 4 PORT